# Patient Record
Sex: MALE | Race: WHITE | Employment: OTHER | URBAN - METROPOLITAN AREA
[De-identification: names, ages, dates, MRNs, and addresses within clinical notes are randomized per-mention and may not be internally consistent; named-entity substitution may affect disease eponyms.]

---

## 2020-06-11 ENCOUNTER — HOSPITAL ENCOUNTER (OUTPATIENT)
Dept: LAB | Age: 65
Discharge: HOME | End: 2020-06-11

## 2020-06-11 ENCOUNTER — OFFICE VISIT (OUTPATIENT)
Dept: ONCOLOGY | Age: 65
End: 2020-06-11

## 2020-06-11 VITALS
HEART RATE: 82 BPM | HEIGHT: 71 IN | SYSTOLIC BLOOD PRESSURE: 155 MMHG | OXYGEN SATURATION: 97 % | DIASTOLIC BLOOD PRESSURE: 92 MMHG | WEIGHT: 183.2 LBS | TEMPERATURE: 99.2 F | BODY MASS INDEX: 25.65 KG/M2

## 2020-06-11 DIAGNOSIS — C7A.8 LARGE CELL NEUROENDOCRINE CARCINOMA (HCC): ICD-10-CM

## 2020-06-11 DIAGNOSIS — R03.0 ELEVATED BP WITHOUT DIAGNOSIS OF HYPERTENSION: ICD-10-CM

## 2020-06-11 DIAGNOSIS — Z00.00 HEALTHCARE MAINTENANCE: ICD-10-CM

## 2020-06-11 DIAGNOSIS — C7A.8 LARGE CELL NEUROENDOCRINE CARCINOMA (HCC): Primary | ICD-10-CM

## 2020-06-11 DIAGNOSIS — F90.8 ATTENTION DEFICIT HYPERACTIVITY DISORDER (ADHD), OTHER TYPE: ICD-10-CM

## 2020-06-11 LAB
ALBUMIN SERPL-MCNC: 3.6 G/DL (ref 3.5–5)
ALBUMIN/GLOB SERPL: 1 {RATIO} (ref 1.1–2.2)
ALP SERPL-CCNC: 130 U/L (ref 45–117)
ALT SERPL-CCNC: 22 U/L (ref 12–78)
ANION GAP SERPL CALC-SCNC: 10 MMOL/L (ref 5–15)
AST SERPL-CCNC: 26 U/L (ref 15–37)
BASOPHILS # BLD: 0.1 K/UL (ref 0–0.1)
BASOPHILS NFR BLD: 1 % (ref 0–1)
BILIRUB SERPL-MCNC: 0.6 MG/DL (ref 0.2–1)
BUN SERPL-MCNC: 22 MG/DL (ref 6–20)
BUN/CREAT SERPL: 21 (ref 12–20)
CALCIUM SERPL-MCNC: 8.7 MG/DL (ref 8.5–10.1)
CEA SERPL-MCNC: 1.2 NG/ML
CHLORIDE SERPL-SCNC: 104 MMOL/L (ref 97–108)
CO2 SERPL-SCNC: 24 MMOL/L (ref 21–32)
CREAT SERPL-MCNC: 1.05 MG/DL (ref 0.7–1.3)
DIFFERENTIAL METHOD BLD: ABNORMAL
EOSINOPHIL # BLD: 0.3 K/UL (ref 0–0.4)
EOSINOPHIL NFR BLD: 2 % (ref 0–7)
ERYTHROCYTE [DISTWIDTH] IN BLOOD BY AUTOMATED COUNT: 13.7 % (ref 11.5–14.5)
GLOBULIN SER CALC-MCNC: 3.6 G/DL (ref 2–4)
GLUCOSE SERPL-MCNC: 129 MG/DL (ref 65–100)
HCT VFR BLD AUTO: 41.8 % (ref 36.6–50.3)
HGB BLD-MCNC: 13.3 G/DL (ref 12.1–17)
IMM GRANULOCYTES # BLD AUTO: 0.1 K/UL (ref 0–0.04)
IMM GRANULOCYTES NFR BLD AUTO: 1 % (ref 0–0.5)
LYMPHOCYTES # BLD: 1.7 K/UL (ref 0.8–3.5)
LYMPHOCYTES NFR BLD: 14 % (ref 12–49)
MCH RBC QN AUTO: 28.9 PG (ref 26–34)
MCHC RBC AUTO-ENTMCNC: 31.8 G/DL (ref 30–36.5)
MCV RBC AUTO: 90.9 FL (ref 80–99)
MONOCYTES # BLD: 1.1 K/UL (ref 0–1)
MONOCYTES NFR BLD: 9 % (ref 5–13)
NEUTS SEG # BLD: 9 K/UL (ref 1.8–8)
NEUTS SEG NFR BLD: 73 % (ref 32–75)
NRBC # BLD: 0 K/UL (ref 0–0.01)
NRBC BLD-RTO: 0 PER 100 WBC
PLATELET # BLD AUTO: 301 K/UL (ref 150–400)
PMV BLD AUTO: 11.8 FL (ref 8.9–12.9)
POTASSIUM SERPL-SCNC: 3.8 MMOL/L (ref 3.5–5.1)
PROT SERPL-MCNC: 7.2 G/DL (ref 6.4–8.2)
RBC # BLD AUTO: 4.6 M/UL (ref 4.1–5.7)
SODIUM SERPL-SCNC: 138 MMOL/L (ref 136–145)
WBC # BLD AUTO: 12.3 K/UL (ref 4.1–11.1)

## 2020-06-11 RX ORDER — DEXTROAMPHETAMINE SACCHARATE, AMPHETAMINE ASPARTATE, DEXTROAMPHETAMINE SULFATE AND AMPHETAMINE SULFATE 2.5; 2.5; 2.5; 2.5 MG/1; MG/1; MG/1; MG/1
TABLET ORAL
COMMUNITY
Start: 2019-02-10

## 2020-06-11 RX ORDER — ACYCLOVIR 400 MG/1
400 TABLET ORAL 2 TIMES DAILY
COMMUNITY

## 2020-06-11 NOTE — PROGRESS NOTES
Orly Rai is a 71 y.o. male here for evaluation of carcinoma. Patient with no complaints of pain at this time.

## 2020-06-11 NOTE — LETTER
6/11/20 Patient: Randy Rivera YOB: 1955 Date of Visit: 6/11/2020 Mary Ann Villa MD 
[phone] VIA Facsimile: [phone] Dear Mary Ann Villa MD, Thank you for referring Mr. Hyacinth Gaviria to [facility] for evaluation. My notes for this consultation are attached. If you have questions, please do not hesitate to call me. I look forward to following your patient along with you. Sincerely, Sanford Pardo MD

## 2020-06-12 LAB — CANCER AG19-9 SERPL-ACNC: 6 U/ML (ref 0–35)

## 2020-06-13 LAB
PSA SERPL-MCNC: 38.6 NG/ML (ref 0–4)
REFLEX CRITERIA: ABNORMAL

## 2020-06-15 ENCOUNTER — TELEPHONE (OUTPATIENT)
Dept: ONCOLOGY | Age: 65
End: 2020-06-15

## 2020-06-15 NOTE — TELEPHONE ENCOUNTER
[facility] team member Samira Mediate about patients  PET scan  Needs a peer to peer review    The number to call is   [phone]  Ref Number [id number]    Doesn't have to be the doctor   The NP can do it     It is scheduled for Thursday

## 2020-06-16 ENCOUNTER — TELEPHONE (OUTPATIENT)
Dept: ONCOLOGY | Age: 65
End: 2020-06-16

## 2020-06-17 LAB — CGA SERPL-SCNC: 42 NG/ML (ref 0–101.8)

## 2020-06-17 NOTE — PROGRESS NOTES
[address] [facility] at [facility]  [phone]    Hematology / Oncology Established Visit    Reason for Visit:   Axel Fried is a 71 y.o. male who is seen for follow up of neuroendocrine carcinoma. Initially referred by Dr. Don Aguilar. Hematology Oncology Treatment History:     Diagnosis: Large cell neuroendocrine carcinoma. Stage: IV    Pathology:   5/29/20 excisional R external iliac LN biopsy: Poorly differentiated carcinoma with features of large cell neuroendocrine carcinoma with loss of chromogranin and synaptophysin expression. Flow cytometry negative for lymphoproliferative disorder. Prior Treatment: None    Current Treatment: Carbo-Etoposide-Atezolizumab to start 6/29/20  Treatment duration: Until disease progression or toxicity  Frequency of visits: Every 3 weeks     History of Present Illness:   Axel Fried is a 71 y.o. male who comes in for evaluation of poorly differentiated neuroendocrine carcinoma. Pt noticed a nontender bulge in his LLQ in 5/2020. He thought this was a hernia and was evaluated by Dr. David Lagos. CT on 5/20/20 was notable for extensive lymphadenopathy in abd/pelvis. Pt underwent robot assisted excisional Right external iliac LN biopsy 5/29/20, which showed poorly differentiated large cell neuroendocrine carcinoma. He reports intentional weight loss with dietary changes and exercise, losing 40-lbs in past 8 months. No n/v/d, melena/hematochezia, cough, SOB. No fevers, chills, sweats. [employer] nonsmoker. Mother had breast cancer diagnosed age < 48. Twin brother had melanoma diagnosed aged late 46s. He does not think he has ever had a colonoscopy or PSA screening. Interval History:  Pt comes in to review results of PET scan and labwork. No past medical history on file.    Past Surgical History:   Procedure Laterality Date    HX APPENDECTOMY  1964    HX OTHER SURGICAL      subcutaneous cyst of forehead    HX VASECTOMY Social History     Tobacco Use    Smoking status: Never Smoker    Smokeless tobacco: Never Used   Substance Use Topics    Alcohol use: Not Currently   Unmarried. Has 2 children, 27 and 32. 1 lives in [place]. Family History   Problem Relation Age of Onset    Cancer Mother         breast    Cancer Father         prostate    Cancer Brother         melanoma    COPD Brother      Current Outpatient Medications   Medication Sig    dextroamphetamine-amphetamine (ADDERALL) 10 mg tablet TAKE 1 TABLET TWICE A DAY FOR 30 DAYS    acyclovir (ZOVIRAX) 400 mg tablet Take 400 mg by mouth two (2) times a day. No current facility-administered medications for this visit. No Known Allergies     Review of Systems: A complete review of systems was obtained, negative except as described above. Physical Exam:     Visit Vitals  /81 (BP 1 Location: Left arm, BP Patient Position: Sitting)   Pulse 64   Temp 98.5 °F (36.9 °C) (Oral)   Ht 5' 10\" (1.778 m)   Wt 185 lb (83.9 kg)   SpO2 98%   BMI 26.54 kg/m²     ECOG PS: 0  General: Well developed, no acute distress  Eyes: PERRLA, EOMI, anicteric sclerae  HENT: Atraumatic, OP clear, TMs intact without erythema  Neck: Supple  Lymphatic: No cervical, supraclavicular, axillary or inguinal adenopathy  Respiratory: CTAB, normal respiratory effort  CV: Normal rate, regular rhythm, no murmurs, no peripheral edema  GI: Soft, nontender, nondistended, no hepatomegaly, no splenomegaly. L lower abdomen with soft mobile mass, approx 5cm  MS: Normal gait and station. Digits without clubbing or cyanosis. Skin: No rashes, ecchymoses, or petechiae. Normal temperature, turgor, and texture. Neuro/Psych: Alert, oriented. 5/5 strength in all 4 extremities. Appropriate affect, normal judgment/insight.     Results:     Lab Results   Component Value Date/Time    WBC 12.3 (H) 06/11/2020 04:39 PM    HGB 13.3 06/11/2020 04:39 PM    HCT 41.8 06/11/2020 04:39 PM    PLATELET 963 01/15/0964 04:39 PM    MCV 90.9 2020 04:39 PM    ABS. NEUTROPHILS 9.0 (H) 2020 04:39 PM     Lab Results   Component Value Date/Time    Sodium 138 2020 04:39 PM    Potassium 3.8 2020 04:39 PM    Chloride 104 2020 04:39 PM    CO2 24 2020 04:39 PM    Glucose 129 (H) 2020 04:39 PM    BUN 22 (H) 2020 04:39 PM    Creatinine 1.05 2020 04:39 PM    GFR est AA >60 2020 04:39 PM    GFR est non-AA >60 2020 04:39 PM    Calcium 8.7 2020 04:39 PM     Lab Results   Component Value Date/Time    Bilirubin, total 0.6 2020 04:39 PM    ALT (SGPT) 22 2020 04:39 PM    Alk. phosphatase 130 (H) 2020 04:39 PM    Protein, total 7.2 2020 04:39 PM    Albumin 3.6 2020 04:39 PM    Globulin 3.6 2020 04:39 PM     No results found for: IRON, FE, TIBC, IBCT, PSAT, FERR    No results found for: B12LT, FOL, RBCF  No results found for: TSH, TSH2, TSH3, TSHP, TSHEXT, TSHEXT  No results found for: HAMAT, HAAB, HABT, HAAT, HBSAG, HBSB, HBSAT, HBABN, HBCM, HBCAB, HBCAT, [place], [address] [facility], [address] [facility], [facility], [address] [facility], [address] [facility], [address] [facility], [facility], [id number], [id number], [address] [place] [address], [id number], HBCMLT, [id number], HCGAT     20: Chromogranin 42      Imagin/20/20 CT abd/pelvis with IV contrast:  Impression:  1. No findings to suggest gross abdominal wall hernia or flank hernia. 2. Extensive adenopathy throughout the abdomen and pelvis as described. Findings are concerning for possible metastatic disease or lymphoma. Recommend follow up or comparison with prior studies. 3. Other findings as described. 20 PET:  FINDINGS:  HEAD/NECK: No apparent foci of abnormal hypermetabolism. Cerebral evaluation is  limited by normal intense activity. CHEST: Hypermetabolic lymphadenopathy at the base of the left neck and in the  left supraclavicular region is noted.  Hypermetabolic superior mediastinal,  prevascular, right paratracheal, subcarinal, and bilateral hilar lymph  lymphadenopathy, maximum SUV of the subcarinal lymph node is 5.7. Small but  hypermetabolic soft tissue nodule left anterior chest wall. ABDOMEN/PELVIS: Hypermetabolic retrocrural, left para-aortic, aortocaval,  bilateral common iliac, bilateral external iliac, and bilateral inguinal  lymphadenopathy, maximum SUV 5.8 of an aortocaval lymph node. Hypermetabolic  mesenteric lymph node left lower quadrant, maximum SUV 12.3. SKELETON: No foci of abnormal hypermetabolism in the axial and visualized  appendicular skeleton. IMPRESSION: Hypermetabolic lymphadenopathy involving the left neck and left  supraclavicular region, mediastinum, bilateral hilum, retroperitoneum,  mesentery, and pelvis as described above. Hypermetabolic soft tissue nodule left  chest.    Assessment & Plan:   Omar Lafleur is a 71 y.o. 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 1. Large cell neuroendocrine carcinoma of lung: I spoke with pathologist who states this appears to be a high grade aggressive and poorly differentiated cancer. Likely neuroendocrine based on perinuclear staining pattern and some TTF-1 positivity. Primary lesion is likely lung based on PET scan. The pathology is somewhat incongruent with patient who remains largely asymptomatic. Large cell neuroendocrine carcinoma of lung is rare and is thought to behave like small cell lung cancer and non-small cell lung cancer. Genomic profiling can help identify which type is more similar. Current recommendations are to treat similar to small cell carcinoma with Carboplatin-Etoposide. The addition of immunotherapy such as Ann Ego is also recommended by experts based on extrapolation of data in small cell lung cancer. Chemotherapy teaching and consent completed. Supportive medications: EMLA cream, zofran, compazine.    -- Load outside CT in [address] [place] [address] One testing on tumor requested  -- COVID19 testing today/tomorrow  -- Brain MRI this week  -- Port by Dr. Linda Dillon this week  -- Return in 1 week for cycle 1 of Carbo-Etoposide-Atezolizumab    2. Elevated PSA: Refer to Urology for evaluation and discussion. PSA 38.6 and given peculiar (asymptomatic) presentation of the large cell neuroendocrine carcinoma, would like evaluation of elevated PSA. -- Refer to Urology     3. ADHD: On Adderal    4. 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 5. Elevated BP: No diagnosis of  HTN. 6. Health maintenance: No prior colonoscopy or PSA per patient. Emotional well being: Pt is coping well with his/her disease and has excellent support. I appreciate the opportunity to participate in Mr. Isbell Lower care. Significant other: Arnie Murrieta - [phone].      Signed By: Boyd Maxwell MD     June 22, 2020

## 2020-06-18 ENCOUNTER — HOSPITAL ENCOUNTER (OUTPATIENT)
Dept: PET IMAGING | Age: 65
Discharge: HOME | End: 2020-06-18
Attending: INTERNAL MEDICINE
Payer: COMMERCIAL

## 2020-06-18 VITALS — WEIGHT: 182 LBS | BODY MASS INDEX: 26.05 KG/M2 | HEIGHT: 70 IN

## 2020-06-18 DIAGNOSIS — C7A.8 LARGE CELL NEUROENDOCRINE CARCINOMA (HCC): ICD-10-CM

## 2020-06-18 PROCEDURE — A9552 F18 FDG: HCPCS

## 2020-06-18 RX ORDER — SODIUM CHLORIDE 0.9 % (FLUSH) 0.9 %
10 SYRINGE (ML) INJECTION
Status: COMPLETED | OUTPATIENT
Start: 2020-06-18 | End: 2020-06-18

## 2020-06-18 RX ADMIN — Medication 10 ML: at 08:00

## 2020-06-22 ENCOUNTER — OFFICE VISIT (OUTPATIENT)
Dept: ONCOLOGY | Age: 65
End: 2020-06-22

## 2020-06-22 ENCOUNTER — TELEPHONE (OUTPATIENT)
Dept: ONCOLOGY | Age: 65
End: 2020-06-22

## 2020-06-22 ENCOUNTER — DOCUMENTATION ONLY (OUTPATIENT)
Dept: ONCOLOGY | Age: 65
End: 2020-06-22

## 2020-06-22 VITALS
BODY MASS INDEX: 26.48 KG/M2 | WEIGHT: 185 LBS | TEMPERATURE: 98.5 F | HEIGHT: 70 IN | SYSTOLIC BLOOD PRESSURE: 164 MMHG | DIASTOLIC BLOOD PRESSURE: 81 MMHG | HEART RATE: 64 BPM | OXYGEN SATURATION: 98 %

## 2020-06-22 DIAGNOSIS — C7A.8 LARGE CELL NEUROENDOCRINE CARCINOMA (HCC): Primary | ICD-10-CM

## 2020-06-22 DIAGNOSIS — R11.2 CHEMOTHERAPY INDUCED NAUSEA AND VOMITING: ICD-10-CM

## 2020-06-22 DIAGNOSIS — C34.00 MALIGNANT NEOPLASM OF HILUS OF LUNG, UNSPECIFIED LATERALITY (HCC): ICD-10-CM

## 2020-06-22 DIAGNOSIS — T45.1X5A CHEMOTHERAPY INDUCED NAUSEA AND VOMITING: ICD-10-CM

## 2020-06-22 DIAGNOSIS — R97.20 ELEVATED PSA: ICD-10-CM

## 2020-06-22 RX ORDER — DEXAMETHASONE SODIUM PHOSPHATE 4 MG/ML
8 INJECTION, SOLUTION INTRA-ARTICULAR; INTRALESIONAL; INTRAMUSCULAR; INTRAVENOUS; SOFT TISSUE ONCE
Status: CANCELLED | OUTPATIENT
Start: 2020-06-30

## 2020-06-22 RX ORDER — SODIUM CHLORIDE 0.9 % (FLUSH) 0.9 %
10 SYRINGE (ML) INJECTION AS NEEDED
Status: CANCELLED
Start: 2020-07-01

## 2020-06-22 RX ORDER — DIPHENHYDRAMINE HYDROCHLORIDE 50 MG/ML
25 INJECTION, SOLUTION INTRAMUSCULAR; INTRAVENOUS AS NEEDED
Status: CANCELLED
Start: 2020-07-01

## 2020-06-22 RX ORDER — ACETAMINOPHEN 325 MG/1
650 TABLET ORAL AS NEEDED
Status: CANCELLED
Start: 2020-06-29

## 2020-06-22 RX ORDER — ACETAMINOPHEN 325 MG/1
650 TABLET ORAL AS NEEDED
Status: CANCELLED
Start: 2020-06-30

## 2020-06-22 RX ORDER — ONDANSETRON HYDROCHLORIDE 8 MG/1
8 TABLET, FILM COATED ORAL
Qty: 30 TAB | Refills: 2 | Status: SHIPPED | OUTPATIENT
Start: 2020-06-22 | End: 2020-06-29 | Stop reason: SDUPTHER

## 2020-06-22 RX ORDER — SODIUM CHLORIDE 0.9 % (FLUSH) 0.9 %
10 SYRINGE (ML) INJECTION AS NEEDED
Status: CANCELLED
Start: 2020-06-30

## 2020-06-22 RX ORDER — ONDANSETRON 2 MG/ML
8 INJECTION INTRAMUSCULAR; INTRAVENOUS AS NEEDED
Status: CANCELLED | OUTPATIENT
Start: 2020-07-01

## 2020-06-22 RX ORDER — PROCHLORPERAZINE MALEATE 10 MG
5 TABLET ORAL
Qty: 30 TAB | Refills: 2 | Status: SHIPPED | OUTPATIENT
Start: 2020-06-22

## 2020-06-22 RX ORDER — ONDANSETRON 2 MG/ML
8 INJECTION INTRAMUSCULAR; INTRAVENOUS AS NEEDED
Status: CANCELLED | OUTPATIENT
Start: 2020-06-29

## 2020-06-22 RX ORDER — SODIUM CHLORIDE 9 MG/ML
25 INJECTION, SOLUTION INTRAVENOUS CONTINUOUS
Status: CANCELLED | OUTPATIENT
Start: 2020-06-30

## 2020-06-22 RX ORDER — SODIUM CHLORIDE 0.9 % (FLUSH) 0.9 %
10 SYRINGE (ML) INJECTION AS NEEDED
Status: CANCELLED
Start: 2020-06-29

## 2020-06-22 RX ORDER — LORAZEPAM 2 MG/ML
0.5 INJECTION INTRAMUSCULAR
Status: CANCELLED | OUTPATIENT
Start: 2020-07-01

## 2020-06-22 RX ORDER — ONDANSETRON 2 MG/ML
8 INJECTION INTRAMUSCULAR; INTRAVENOUS AS NEEDED
Status: CANCELLED | OUTPATIENT
Start: 2020-06-30

## 2020-06-22 RX ORDER — DIPHENHYDRAMINE HYDROCHLORIDE 50 MG/ML
25 INJECTION, SOLUTION INTRAMUSCULAR; INTRAVENOUS AS NEEDED
Status: CANCELLED
Start: 2020-06-30

## 2020-06-22 RX ORDER — SODIUM CHLORIDE 9 MG/ML
10 INJECTION INTRAMUSCULAR; INTRAVENOUS; SUBCUTANEOUS AS NEEDED
Status: CANCELLED | OUTPATIENT
Start: 2020-06-29

## 2020-06-22 RX ORDER — LORAZEPAM 2 MG/ML
0.5 INJECTION INTRAMUSCULAR
Status: CANCELLED | OUTPATIENT
Start: 2020-06-30

## 2020-06-22 RX ORDER — ALBUTEROL SULFATE 0.83 MG/ML
2.5 SOLUTION RESPIRATORY (INHALATION) AS NEEDED
Status: CANCELLED
Start: 2020-07-01

## 2020-06-22 RX ORDER — ALBUTEROL SULFATE 0.83 MG/ML
2.5 SOLUTION RESPIRATORY (INHALATION) AS NEEDED
Status: CANCELLED
Start: 2020-06-30

## 2020-06-22 RX ORDER — DEXAMETHASONE SODIUM PHOSPHATE 4 MG/ML
8 INJECTION, SOLUTION INTRA-ARTICULAR; INTRALESIONAL; INTRAMUSCULAR; INTRAVENOUS; SOFT TISSUE ONCE
Status: CANCELLED | OUTPATIENT
Start: 2020-07-01

## 2020-06-22 RX ORDER — DIPHENHYDRAMINE HYDROCHLORIDE 50 MG/ML
25 INJECTION, SOLUTION INTRAMUSCULAR; INTRAVENOUS AS NEEDED
Status: CANCELLED
Start: 2020-06-29

## 2020-06-22 RX ORDER — DIPHENHYDRAMINE HYDROCHLORIDE 50 MG/ML
50 INJECTION, SOLUTION INTRAMUSCULAR; INTRAVENOUS AS NEEDED
Status: CANCELLED
Start: 2020-06-30

## 2020-06-22 RX ORDER — LIDOCAINE AND PRILOCAINE 25; 25 MG/G; MG/G
CREAM TOPICAL
Qty: 30 G | Refills: 1 | Status: SHIPPED | OUTPATIENT
Start: 2020-06-22

## 2020-06-22 RX ORDER — EPINEPHRINE 1 MG/ML
0.3 INJECTION, SOLUTION, CONCENTRATE INTRAVENOUS AS NEEDED
Status: CANCELLED | OUTPATIENT
Start: 2020-07-01

## 2020-06-22 RX ORDER — ACETAMINOPHEN 325 MG/1
650 TABLET ORAL AS NEEDED
Status: CANCELLED
Start: 2020-07-01

## 2020-06-22 RX ORDER — DIPHENHYDRAMINE HYDROCHLORIDE 50 MG/ML
50 INJECTION, SOLUTION INTRAMUSCULAR; INTRAVENOUS AS NEEDED
Status: CANCELLED
Start: 2020-06-29

## 2020-06-22 RX ORDER — DIPHENHYDRAMINE HYDROCHLORIDE 50 MG/ML
50 INJECTION, SOLUTION INTRAMUSCULAR; INTRAVENOUS AS NEEDED
Status: CANCELLED
Start: 2020-07-01

## 2020-06-22 RX ORDER — HEPARIN 100 UNIT/ML
300-500 SYRINGE INTRAVENOUS AS NEEDED
Status: CANCELLED
Start: 2020-06-29

## 2020-06-22 RX ORDER — SODIUM CHLORIDE 9 MG/ML
25 INJECTION, SOLUTION INTRAVENOUS CONTINUOUS
Status: CANCELLED | OUTPATIENT
Start: 2020-06-29

## 2020-06-22 RX ORDER — HYDROCORTISONE SODIUM SUCCINATE 100 MG/2ML
100 INJECTION, POWDER, FOR SOLUTION INTRAMUSCULAR; INTRAVENOUS AS NEEDED
Status: CANCELLED | OUTPATIENT
Start: 2020-06-30

## 2020-06-22 RX ORDER — HEPARIN 100 UNIT/ML
300-500 SYRINGE INTRAVENOUS AS NEEDED
Status: CANCELLED
Start: 2020-06-30

## 2020-06-22 RX ORDER — HEPARIN 100 UNIT/ML
300-500 SYRINGE INTRAVENOUS AS NEEDED
Status: CANCELLED
Start: 2020-07-01

## 2020-06-22 RX ORDER — ALBUTEROL SULFATE 0.83 MG/ML
2.5 SOLUTION RESPIRATORY (INHALATION) AS NEEDED
Status: CANCELLED
Start: 2020-06-29

## 2020-06-22 RX ORDER — EPINEPHRINE 1 MG/ML
0.3 INJECTION, SOLUTION, CONCENTRATE INTRAVENOUS AS NEEDED
Status: CANCELLED | OUTPATIENT
Start: 2020-06-30

## 2020-06-22 RX ORDER — SODIUM CHLORIDE 9 MG/ML
25 INJECTION, SOLUTION INTRAVENOUS CONTINUOUS
Status: CANCELLED | OUTPATIENT
Start: 2020-07-01

## 2020-06-22 RX ORDER — EPINEPHRINE 1 MG/ML
0.3 INJECTION, SOLUTION, CONCENTRATE INTRAVENOUS AS NEEDED
Status: CANCELLED | OUTPATIENT
Start: 2020-06-29

## 2020-06-22 RX ORDER — HYDROCORTISONE SODIUM SUCCINATE 100 MG/2ML
100 INJECTION, POWDER, FOR SOLUTION INTRAMUSCULAR; INTRAVENOUS AS NEEDED
Status: CANCELLED | OUTPATIENT
Start: 2020-07-01

## 2020-06-22 RX ORDER — SODIUM CHLORIDE 9 MG/ML
10 INJECTION INTRAMUSCULAR; INTRAVENOUS; SUBCUTANEOUS AS NEEDED
Status: CANCELLED | OUTPATIENT
Start: 2020-06-30

## 2020-06-22 RX ORDER — SODIUM CHLORIDE 9 MG/ML
10 INJECTION INTRAMUSCULAR; INTRAVENOUS; SUBCUTANEOUS AS NEEDED
Status: CANCELLED | OUTPATIENT
Start: 2020-07-01

## 2020-06-22 RX ORDER — ONDANSETRON 2 MG/ML
8 INJECTION INTRAMUSCULAR; INTRAVENOUS ONCE
Status: CANCELLED | OUTPATIENT
Start: 2020-06-29

## 2020-06-22 RX ORDER — HYDROCORTISONE SODIUM SUCCINATE 100 MG/2ML
100 INJECTION, POWDER, FOR SOLUTION INTRAMUSCULAR; INTRAVENOUS AS NEEDED
Status: CANCELLED | OUTPATIENT
Start: 2020-06-29

## 2020-06-22 NOTE — PROGRESS NOTES
Brett Schofield is a 71 y.o. male here for follow up of large cell neuroendocrine carcinoma. Patient with no complaints of pain at this time.

## 2020-06-22 NOTE — PATIENT INSTRUCTIONS
1. Zofran (ondansetron) 8 mg every 8 hours   2. Compazine (Prochlorperazine) 10 mg every 6 hours as needed for nausea    3. EMLA lidocaine cream- apply a quarter size to your port area before you go to the infusion center, this will numb the port spot so it won't hurt when port is accessed    We are prescribing two anti-nausea medications, zofran (ondansetron) and compazine (prochlorperazine). We are prescribing two anti-nausea medications, zofran (ondansetron) and compazine (prochlorperazine). Nausea prevention: For the first 2 days after chemotherapy, take zofran before breakfast and lunch. Take compazine before dinner. This is on a scheduled basis, which means you take these medications whether you feel nauseated or not. Your Care Team:    Dr. Rosa Chang- oncologist  Mickey Rodney, Nurse Practitioner. Zechariah Drummond, Registered Nurse. Contact:   : [phone]  Blend (do not leave voicemail): [phone]  Please set up IM5 if you do not already have it when you check out, we are very quick to respond.

## 2020-06-22 NOTE — ONCOLOGY PATHWAY NOTE
START OFF PATHWAY REGIMEN - Other        [id number]:Atezolizumab D1 + Carboplatin D1 + Etoposide  IV D1-3 q21 Days x 4 Cycles Followed by Denzel Roberto D1 q21 Days:      Atezolizumab (Tecentriq)       Carboplatin (Paraplatin)       Etoposide (Toposar)       Atezolizumab (Tecentriq)     **Always confirm dose/schedule in your pharmacy ordering system**    Patient Characteristics:  Intent of Therapy:  Non-Curative / Palliative Intent, Discussed with Patient

## 2020-06-23 ENCOUNTER — OFFICE VISIT (OUTPATIENT)
Dept: PRIMARY CARE CLINIC | Age: 65
End: 2020-06-23

## 2020-06-23 VITALS — OXYGEN SATURATION: 96 % | TEMPERATURE: 98.8 F | HEART RATE: 75 BPM

## 2020-06-23 DIAGNOSIS — Z11.59 SPECIAL SCREENING EXAMINATION FOR UNSPECIFIED VIRAL DISEASE: Primary | ICD-10-CM

## 2020-06-23 NOTE — PROGRESS NOTES
This note will not be viewable in [address] Oncology Navigator  Psychosocial Assessment    Reason for Assessment:    []Depression  []Anxiety  []Caregiver [place]  []Maladaptive Coping with Serious Illness   [x] Social Work Referral [x] Initial Assessment  [] Other     Sources of Information:    [x]Patient  [x]Family  [x]Staff  [x]Medical Record    Advance Care Planning:  No flowsheet data found.     Mental Status:    [x]Alert  []Lethargic  []Unresponsive   [] Unable to assess   Oriented to:  [x]Person  [x]Place  [x]Time  [x]Situation      Barriers to Learning:    []Language  []Developmental  []Cognitive  []Altered Mental Status  []Visual/Hearing Impairment  []Unable to Read/Write  []Motivational   [x]No Barriers Identified  []Other:    Relationship Status:  []Single  []  [x]Significant Other/Life Partner  []  []  []      Living Circumstances:  []Lives Alone  [x]Family/Significant Other in Household  []Roommates  []Children in the Home  []Paid Caregivers  []Assisted Living Facility/Group Home  []Skilled [address]  []Homeless  []Incarcerated  []Environmental/Care Concerns  []other:    Employment Status:  []Employed Full-time []Employed Part-time []Homemaker [] Disabled  [x] Retired []Other:    Support System:    [x]Strong  []Fair  []Limited    Financial/Legal Concerns:    []Uninsured  []Limited Income/Resources  []Non-Citizen  [x]No Concerns Identified  []Financial POA:    []Other:    Mandaeism/Spiritual/Existential:  []Strong Sense of Spirituality  []Involved in [facility]  []Request  Visit  []Expressing Maureen Dominguezrsusie  [x]No Concerns Identified    Coping with Illness:         Patient: Family/Caregiver:   Understanding and Acceptance of Illness/Prognosis  [x] [x]   Strong Sense of Resilience [] []   Self Reflection [] []   Engaged Support System [x] []   Does not Readily Discuss Illness [] []   Denial of Terminal Status [] []   Anger [] []   Depression [] []   Anxiety/Fear [x] []   Bargaining [] []   Recent Diagnosis/Prognosis [x] []   Difficulties with Body Image [] []   Loss of Identity [] []   Excessive Substance Use [] []   Mental Health History [] []   Enmeshed Relationships [] []   History of Loss [] []   Anticipatory Grief [] []   Concern for Complicated Grief [] []   Suicidal Ideation or Plan [] []   Unable to assess [] []            Narrative: Patient here for consultation with Dr. Hola Tineo for the management of neuroendocrine carcinoma and chemotherapy teaching. Met with patient and his SO to introduce social work role and supports. 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 He has a son and two grandchildren who live close to him. He also tells me he has several friend ad family members who are supportive of his emotional and practical needs. Offered supportive listening as patient ventilates feelings. Patient is tearful as he shared with the shock of his diagnosis and prognosis given his lack of symptoms. Discussed ways to cope and patient is hopeful to continue to engaged in his hobbies such as hiking and bike riding. Reassured patient that we are here to support him during this process. Encouraged him to contact me as needed for ongoing psychosocial support. Assessment/Action:   1. Patient is actively coping with diagnosis and treatment and well supported by family and friends with practical and emotional needs. 2. Ongoing psychosocial support as patient continue to process and coping with diagnosis.     Plan/Referral:    Thank you,  Vivi Mac LCSW

## 2020-06-23 NOTE — PROGRESS NOTES
[facility] Please see scanned documentation as well for further information. S:  Mr. Radha Chappell presents for pre-op or pre-procedure testing for Covid 19. Patient has chemo schedule to start nest week under the direction of  [facility]. Patient denies current Covid type symptoms. O:    Visit Vitals  Pulse 75   Temp 98.8 °F (37.1 °C) (Oral)   SpO2 96%     Alert and oriented  No acute distress, no increased work of breathing  Normocephalic, atraumatic  Skin color normal  Calm and cooperative  Voice clear, conversant without shortness of breath    A/P:  Pre-op, Pre-procedure exam    Covid 19 testing performed  Patient understands he will be contacted if the results are positive. Follow up prn.

## 2020-06-24 NOTE — PROGRESS NOTES
[address]  Medical Oncology at [address] [place] [address]  [phone]    Hematology / Oncology Established Visit    Reason for Visit:   Anthony Amaro is a 76 y.o. male who is seen for follow up of neuroendocrine carcinoma. Initially referred by Dr. Hetty Saint. Hematology Oncology Treatment History:     Diagnosis: Large cell neuroendocrine carcinoma. Stage: IV    Pathology:   5/29/20 excisional R external iliac LN biopsy: Poorly differentiated carcinoma with features of large cell neuroendocrine carcinoma with loss of chromogranin and synaptophysin expression. Flow cytometry negative for lymphoproliferative disorder. Prior Treatment: None    Current Treatment: Carbo-Etoposide-Atezolizumab to start 6/29/20  Treatment duration: Until disease progression or toxicity  Frequency of visits: Every 3 weeks     History of Present Illness:   Anthony Amaro is a 76 y.o. male who comes in for evaluation of poorly differentiated neuroendocrine carcinoma. Pt noticed a nontender bulge in his LLQ in 5/2020. He thought this was a hernia and was evaluated by Dr. Celine Luu. CT on 5/20/20 was notable for extensive lymphadenopathy in abd/pelvis. Pt underwent robot assisted excisional Right external iliac LN biopsy 5/29/20, which showed poorly differentiated large cell neuroendocrine carcinoma. He reports intentional weight loss with dietary changes and exercise, losing 40-lbs in past 8 months. No n/v/d, melena/hematochezia, cough, SOB. No fevers, chills, sweats. [employer] nonsmoker. Mother had breast cancer diagnosed age < 48. Twin brother had melanoma diagnosed aged late 46s. He does not think he has ever had a colonoscopy or PSA screening. Interval History:  Patient her for follow up of neuroendocrine carcinoma and cycle 1 of carbo-etoposide-atezolizumab. We reviewed how to take the premedications and what to expect after treatment. He feels well today. Denies n/v/fatigue/sob/cp. Reports normal BM.  He plans to see Urology on 7/31/20 for his elevated PSA. No past medical history on file. Past Surgical History:   Procedure Laterality Date    HX APPENDECTOMY  1964    HX OTHER SURGICAL      subcutaneous cyst of forehead    HX VASECTOMY        Social History     Tobacco Use    Smoking status: Never Smoker    Smokeless tobacco: Never Used   Substance Use Topics    Alcohol use: Not Currently   Unmarried. Has 2 children, 27 and 32. 1 lives in [place]. Family History   Problem Relation Age of Onset   [employer] Cancer Mother         breast    Cancer Father         prostate    Cancer Brother         melanoma    COPD Brother      Current Outpatient Medications   Medication Sig    lidocaine-prilocaine (EMLA) topical cream Apply a dime size amount to port site 30-60 minutes before access on treatment days to numb port site.  ondansetron hcl (ZOFRAN) 8 mg tablet Take 1 Tab by mouth every eight (8) hours as needed for Nausea or Vomiting.  prochlorperazine (Compazine) 10 mg tablet Take 0.5 Tabs by mouth every six (6) hours as needed for Nausea or Vomiting.  dextroamphetamine-amphetamine (ADDERALL) 10 mg tablet TAKE 1 TABLET TWICE A DAY FOR 30 DAYS    acyclovir (ZOVIRAX) 400 mg tablet Take 400 mg by mouth two (2) times a day. No current facility-administered medications for this visit. No Known Allergies     Review of Systems: A complete review of systems was obtained, negative except as described above.     Physical Exam:     Visit Vitals  /88   Pulse 89   Temp 98.9 °F (37.2 °C) (Temporal)   Resp 18   Ht 5' 10\" (1.778 m)   Wt 180 lb (81.6 kg)   SpO2 97%   BMI 25.83 kg/m²     ECOG PS: 0  General: Well developed, no acute distress  Eyes: PERRLA, EOMI, anicteric sclerae  HENT: Atraumatic, OP clear, TMs intact without erythema  Neck: Supple  Lymphatic: No cervical, supraclavicular, axillary or inguinal adenopathy  Respiratory: CTAB, normal respiratory effort  CV: Normal rate, regular rhythm, no murmurs, no peripheral edema  GI: Soft, nontender, nondistended, no hepatomegaly, no splenomegaly. L lower abdomen with soft mobile mass, approx 5cm  MS: Normal gait and station. Digits without clubbing or cyanosis. Skin: No rashes, ecchymoses, or petechiae. Normal temperature, turgor, and texture. Neuro/Psych: Alert, oriented. 5/5 strength in all 4 extremities. Appropriate affect, normal judgment/insight. Results:     Lab Results   Component Value Date/Time    WBC 8.3 2020 09:58 AM    HGB 14.0 2020 09:58 AM    HCT 40.9 2020 09:58 AM    PLATELET 587  09:58 AM    MCV 88.9 2020 09:58 AM    ABS. NEUTROPHILS 6.0 2020 09:58 AM     Lab Results   Component Value Date/Time    Sodium 138 2020 04:39 PM    Potassium 3.8 2020 04:39 PM    Chloride 104 2020 04:39 PM    CO2 24 2020 04:39 PM    Glucose 129 (H) 2020 04:39 PM    BUN 22 (H) 2020 04:39 PM    Creatinine 1.05 2020 04:39 PM    GFR est AA >60 2020 04:39 PM    GFR est non-AA >60 2020 04:39 PM    Calcium 8.7 2020 04:39 PM     Lab Results   Component Value Date/Time    Bilirubin, total 0.6 2020 04:39 PM    ALT (SGPT) 22 2020 04:39 PM    Alk. phosphatase 130 (H) 2020 04:39 PM    Protein, total 7.2 2020 04:39 PM    Albumin 3.6 2020 04:39 PM    Globulin 3.6 2020 04:39 PM     No results found for: IRON, FE, TIBC, IBCT, PSAT, FERR    No results found for: B12LT, FOL, RBCF  No results found for: TSH, TSH2, TSH3, TSHP, TSHEXT, TSHEXT  No results found for: HAMAT, HAAB, HABT, HAAT, HBSAG, HBSB, HBSAT, HBABN, HBCM, HBCAB, HBCAT, Ed Burns, [facility], [address], XHES, 674/586 Prince Romero, [address] [facility], [address] [place] [address] Drive, [address] [place] [address], [id number], [id number], [address] [place] [address], [id number], HBCMLT, [id number], HCGAT     20: Chromogranin 42      Imagin/20/20 CT abd/pelvis with IV contrast:  Impression:  1. No findings to suggest gross abdominal wall hernia or flank hernia.   2. Extensive adenopathy throughout the abdomen and pelvis as described. Findings are concerning for possible metastatic disease or lymphoma. Recommend follow up or comparison with prior studies. 3. Other findings as described. 6/18/20 PET:  FINDINGS:  HEAD/NECK: No apparent foci of abnormal hypermetabolism. Cerebral evaluation is  limited by normal intense activity. CHEST: Hypermetabolic lymphadenopathy at the base of the left neck and in the  left supraclavicular region is noted. Hypermetabolic superior mediastinal,  prevascular, right paratracheal, subcarinal, and bilateral hilar lymph  lymphadenopathy, maximum SUV of the subcarinal lymph node is 5.7. Small but  hypermetabolic soft tissue nodule left anterior chest wall. ABDOMEN/PELVIS: Hypermetabolic retrocrural, left para-aortic, aortocaval,  bilateral common iliac, bilateral external iliac, and bilateral inguinal  lymphadenopathy, maximum SUV 5.8 of an aortocaval lymph node. Hypermetabolic  mesenteric lymph node left lower quadrant, maximum SUV 12.3. SKELETON: No foci of abnormal hypermetabolism in the axial and visualized  appendicular skeleton. IMPRESSION: Hypermetabolic lymphadenopathy involving the left neck and left  supraclavicular region, mediastinum, bilateral hilum, retroperitoneum,  mesentery, and pelvis as described above. Hypermetabolic soft tissue nodule left  chest.    Brain MRI 6/27/20: IMPRESSION:  There is no evidence of intracranial metastatic disease. Mild chronic microvascular ischemic change. No intracranial mass, hemorrhage or evidence of acute infarction. Assessment & Plan:   Axel Fried is a 76 y.o. 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 1. Large cell neuroendocrine carcinoma of lung: I spoke with pathologist who states this appears to be a high grade aggressive and poorly differentiated cancer. Likely neuroendocrine based on perinuclear staining pattern and some TTF-1 positivity.  Primary lesion is likely lung based on PET scan. The pathology is somewhat incongruent with patient who remains largely asymptomatic. Large cell neuroendocrine carcinoma of lung is rare and is thought to behave like small cell lung cancer and non-small cell lung cancer. Genomic profiling can help identify which type is more similar. Current recommendations are to treat similar to small cell carcinoma with Carboplatin-Etoposide. The addition of immunotherapy such as Tatianna Coins is also recommended by experts based on extrapolation of data in small cell lung cancer. Chemotherapy teaching and consent completed. Supportive medications: EMLA cream, zofran, compazine. -- Load outside CT in [facility] One testing on tumor requested  -- Proceed with cycle 1 of Carbo-Etoposide-Atezolizumab, MD/NP visit. -- Follow up in 3 weeks for Cycle 2 Carbo-Etop-Atezolizumab, MD/NP visit. 2. Elevated PSA: Refer to Urology for evaluation and discussion. PSA 38.6 and given peculiar (asymptomatic) presentation of the large cell neuroendocrine carcinoma, would like evaluation of elevated PSA. -- Scheduled to see Urology 7/31/20.     3. ADHD: On Adderal    4. 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 5. Elevated BP: No diagnosis of  HTN. Likely 2/2 to anxiety, will continue to monitor closely. 6. Health maintenance: No prior colonoscopy or PSA per patient. Emotional well being: Pt is coping well with his/her disease and has excellent support. I appreciate the opportunity to participate in Mr. Fanta tabor. Significant other: Minnette Saint  [phone]. Patient seen with Pura Krabbe, NP.      Signed By: Jose Manuel Vazquez MD     June 29, 2020

## 2020-06-27 ENCOUNTER — HOSPITAL ENCOUNTER (OUTPATIENT)
Dept: MRI IMAGING | Age: 65
Discharge: HOME | End: 2020-06-27
Attending: NURSE PRACTITIONER
Payer: COMMERCIAL

## 2020-06-27 VITALS — HEIGHT: 67 IN | BODY MASS INDEX: 23.54 KG/M2 | WEIGHT: 150 LBS

## 2020-06-27 DIAGNOSIS — C7A.8 LARGE CELL NEUROENDOCRINE CARCINOMA (HCC): ICD-10-CM

## 2020-06-27 LAB — SARS-COV-2, NAA: NOT DETECTED

## 2020-06-27 PROCEDURE — A9575 INJ GADOTERATE MEGLUMI 0.1ML: HCPCS | Performed by: NURSE PRACTITIONER

## 2020-06-27 PROCEDURE — 74011250636 HC RX REV CODE- 250/636: Performed by: NURSE PRACTITIONER

## 2020-06-27 PROCEDURE — 70553 MRI BRAIN STEM W/O & W/DYE: CPT

## 2020-06-27 RX ORDER — GADOTERATE MEGLUMINE 376.9 MG/ML
7 INJECTION INTRAVENOUS
Status: COMPLETED | OUTPATIENT
Start: 2020-06-27 | End: 2020-06-27

## 2020-06-27 RX ADMIN — GADOTERATE MEGLUMINE 7 ML: 376.9 INJECTION INTRAVENOUS at 11:41

## 2020-06-29 ENCOUNTER — OFFICE VISIT (OUTPATIENT)
Dept: ONCOLOGY | Age: 65
End: 2020-06-29

## 2020-06-29 ENCOUNTER — HOSPITAL ENCOUNTER (OUTPATIENT)
Dept: INFUSION THERAPY | Age: 65
Discharge: HOME | End: 2020-06-29
Payer: COMMERCIAL

## 2020-06-29 VITALS
OXYGEN SATURATION: 97 % | HEART RATE: 89 BPM | WEIGHT: 180 LBS | SYSTOLIC BLOOD PRESSURE: 140 MMHG | TEMPERATURE: 98.9 F | HEIGHT: 70 IN | BODY MASS INDEX: 25.77 KG/M2 | RESPIRATION RATE: 18 BRPM | DIASTOLIC BLOOD PRESSURE: 88 MMHG

## 2020-06-29 VITALS
DIASTOLIC BLOOD PRESSURE: 81 MMHG | TEMPERATURE: 98.9 F | HEART RATE: 70 BPM | OXYGEN SATURATION: 97 % | RESPIRATION RATE: 16 BRPM | SYSTOLIC BLOOD PRESSURE: 139 MMHG | BODY MASS INDEX: 24.71 KG/M2 | WEIGHT: 172.6 LBS | HEIGHT: 70 IN

## 2020-06-29 DIAGNOSIS — C7A.8 LARGE CELL NEUROENDOCRINE CARCINOMA (HCC): Primary | ICD-10-CM

## 2020-06-29 DIAGNOSIS — C34.00 MALIGNANT NEOPLASM OF HILUS OF LUNG, UNSPECIFIED LATERALITY (HCC): ICD-10-CM

## 2020-06-29 DIAGNOSIS — R11.2 CHEMOTHERAPY INDUCED NAUSEA AND VOMITING: ICD-10-CM

## 2020-06-29 DIAGNOSIS — T45.1X5A CHEMOTHERAPY INDUCED NAUSEA AND VOMITING: ICD-10-CM

## 2020-06-29 DIAGNOSIS — Z51.11 CHEMOTHERAPY MANAGEMENT, ENCOUNTER FOR: ICD-10-CM

## 2020-06-29 DIAGNOSIS — R97.20 ELEVATED PSA: ICD-10-CM

## 2020-06-29 LAB
ALBUMIN SERPL-MCNC: 3.6 G/DL (ref 3.5–5)
ALBUMIN/GLOB SERPL: 0.9 {RATIO} (ref 1.1–2.2)
ALP SERPL-CCNC: 108 U/L (ref 45–117)
ALT SERPL-CCNC: 20 U/L (ref 12–78)
ANION GAP SERPL CALC-SCNC: 9 MMOL/L (ref 5–15)
AST SERPL-CCNC: 30 U/L (ref 15–37)
BASO+EOS+MONOS # BLD AUTO: 0.8 K/UL (ref 0.2–1.2)
BASO+EOS+MONOS NFR BLD AUTO: 10 % (ref 3.2–16.9)
BILIRUB SERPL-MCNC: 0.8 MG/DL (ref 0.2–1)
BUN SERPL-MCNC: 24 MG/DL (ref 6–20)
BUN/CREAT SERPL: 26 (ref 12–20)
CALCIUM SERPL-MCNC: 8.9 MG/DL (ref 8.5–10.1)
CHLORIDE SERPL-SCNC: 104 MMOL/L (ref 97–108)
CO2 SERPL-SCNC: 27 MMOL/L (ref 21–32)
CREAT SERPL-MCNC: 0.92 MG/DL (ref 0.7–1.3)
DIFFERENTIAL METHOD BLD: NORMAL
ERYTHROCYTE [DISTWIDTH] IN BLOOD BY AUTOMATED COUNT: 14.7 % (ref 11.8–15.8)
GLOBULIN SER CALC-MCNC: 3.8 G/DL (ref 2–4)
GLUCOSE SERPL-MCNC: 113 MG/DL (ref 65–100)
HCT VFR BLD AUTO: 40.9 % (ref 36.6–50.3)
HGB BLD-MCNC: 14 G/DL (ref 12.1–17)
LYMPHOCYTES # BLD: 1.5 K/UL (ref 0.8–3.5)
LYMPHOCYTES NFR BLD: 18 % (ref 12–49)
MCH RBC QN AUTO: 30.4 PG (ref 26–34)
MCHC RBC AUTO-ENTMCNC: 34.2 G/DL (ref 30–36.5)
MCV RBC AUTO: 88.9 FL (ref 80–99)
NEUTS SEG # BLD: 6 K/UL (ref 1.8–8)
NEUTS SEG NFR BLD: 72 % (ref 32–75)
PLATELET # BLD AUTO: 197 K/UL (ref 150–400)
POTASSIUM SERPL-SCNC: 3.7 MMOL/L (ref 3.5–5.1)
PROT SERPL-MCNC: 7.4 G/DL (ref 6.4–8.2)
RBC # BLD AUTO: 4.6 M/UL (ref 4.1–5.7)
SODIUM SERPL-SCNC: 140 MMOL/L (ref 136–145)
TSH SERPL DL<=0.05 MIU/L-ACNC: 1.72 UIU/ML (ref 0.36–3.74)
WBC # BLD AUTO: 8.3 K/UL (ref 4.1–11.1)

## 2020-06-29 PROCEDURE — 80053 COMPREHEN METABOLIC PANEL: CPT

## 2020-06-29 PROCEDURE — 96413 CHEMO IV INFUSION 1 HR: CPT

## 2020-06-29 PROCEDURE — 77030016057 HC NDL HUBR APOL -B

## 2020-06-29 PROCEDURE — 85025 COMPLETE CBC W/AUTO DIFF WBC: CPT

## 2020-06-29 PROCEDURE — 74011000250 HC RX REV CODE- 250: Performed by: INTERNAL MEDICINE

## 2020-06-29 PROCEDURE — 96417 CHEMO IV INFUS EACH ADDL SEQ: CPT

## 2020-06-29 PROCEDURE — 36415 COLL VENOUS BLD VENIPUNCTURE: CPT

## 2020-06-29 PROCEDURE — 74011250636 HC RX REV CODE- 250/636: Performed by: INTERNAL MEDICINE

## 2020-06-29 PROCEDURE — 96367 TX/PROPH/DG ADDL SEQ IV INF: CPT

## 2020-06-29 PROCEDURE — 84443 ASSAY THYROID STIM HORMONE: CPT

## 2020-06-29 PROCEDURE — 96375 TX/PRO/DX INJ NEW DRUG ADDON: CPT

## 2020-06-29 PROCEDURE — 74011000258 HC RX REV CODE- 258: Performed by: INTERNAL MEDICINE

## 2020-06-29 RX ORDER — SODIUM CHLORIDE 9 MG/ML
10 INJECTION INTRAMUSCULAR; INTRAVENOUS; SUBCUTANEOUS AS NEEDED
Status: ACTIVE | OUTPATIENT
Start: 2020-06-29 | End: 2020-06-29

## 2020-06-29 RX ORDER — ONDANSETRON HYDROCHLORIDE 8 MG/1
8 TABLET, FILM COATED ORAL
Qty: 30 TAB | Refills: 2 | Status: SHIPPED | OUTPATIENT
Start: 2020-06-29

## 2020-06-29 RX ORDER — DIPHENHYDRAMINE HYDROCHLORIDE 50 MG/ML
25 INJECTION, SOLUTION INTRAMUSCULAR; INTRAVENOUS AS NEEDED
Status: ACTIVE | OUTPATIENT
Start: 2020-06-29 | End: 2020-06-29

## 2020-06-29 RX ORDER — HEPARIN 100 UNIT/ML
300-500 SYRINGE INTRAVENOUS AS NEEDED
Status: ACTIVE | OUTPATIENT
Start: 2020-06-29 | End: 2020-06-29

## 2020-06-29 RX ORDER — HYDROCORTISONE SODIUM SUCCINATE 100 MG/2ML
100 INJECTION, POWDER, FOR SOLUTION INTRAMUSCULAR; INTRAVENOUS AS NEEDED
Status: ACTIVE | OUTPATIENT
Start: 2020-06-29 | End: 2020-06-29

## 2020-06-29 RX ORDER — SODIUM CHLORIDE 9 MG/ML
25 INJECTION, SOLUTION INTRAVENOUS CONTINUOUS
Status: DISPENSED | OUTPATIENT
Start: 2020-06-29 | End: 2020-06-29

## 2020-06-29 RX ORDER — ONDANSETRON 2 MG/ML
8 INJECTION INTRAMUSCULAR; INTRAVENOUS ONCE
Status: COMPLETED | OUTPATIENT
Start: 2020-06-29 | End: 2020-06-29

## 2020-06-29 RX ORDER — SODIUM CHLORIDE 0.9 % (FLUSH) 0.9 %
10 SYRINGE (ML) INJECTION AS NEEDED
Status: DISPENSED | OUTPATIENT
Start: 2020-06-29 | End: 2020-06-29

## 2020-06-29 RX ADMIN — ETOPOSIDE 204 MG: 20 INJECTION INTRAVENOUS at 14:27

## 2020-06-29 RX ADMIN — SODIUM CHLORIDE 25 ML/HR: 900 INJECTION, SOLUTION INTRAVENOUS at 11:44

## 2020-06-29 RX ADMIN — CARBOPLATIN 563 MG: 10 INJECTION INTRAVENOUS at 13:52

## 2020-06-29 RX ADMIN — DEXAMETHASONE SODIUM PHOSPHATE 12 MG: 10 INJECTION, SOLUTION INTRAMUSCULAR; INTRAVENOUS at 12:58

## 2020-06-29 RX ADMIN — SODIUM CHLORIDE 10 ML: 9 INJECTION, SOLUTION INTRAMUSCULAR; INTRAVENOUS; SUBCUTANEOUS at 09:51

## 2020-06-29 RX ADMIN — ATEZOLIZUMAB 1200 MG: 1200 INJECTION, SOLUTION INTRAVENOUS at 11:48

## 2020-06-29 RX ADMIN — ONDANSETRON 8 MG: 2 INJECTION INTRAMUSCULAR; INTRAVENOUS at 12:52

## 2020-06-29 RX ADMIN — Medication 500 UNITS: at 15:33

## 2020-06-29 RX ADMIN — Medication 10 ML: at 09:51

## 2020-06-29 RX ADMIN — FOSAPREPITANT 150 MG: 150 INJECTION, POWDER, LYOPHILIZED, FOR SOLUTION INTRAVENOUS at 12:58

## 2020-06-29 RX ADMIN — Medication 10 ML: at 15:33

## 2020-06-29 NOTE — PROGRESS NOTES
[facility] Progress Note    Date: 2020    Name: Kelin Luke    MRN: [id number]         : 1955    Mr. Sheng Martinez Arrived ambulatory and in no distress for C1D1 of Tecentriq, Etoposide, Carboplatin  Regimen. Assessment was completed, no acute issues at this time, no new complaints voiced. Left chest wall port accessed without difficulty, labs drawn & sent for processing. Patient proceed to appointment with Dr. Irvin Jenkins. Mr. Perico Trejo vitals were reviewed. Visit Vitals  /88   Pulse 89   Temp 98.9 °F (37.2 °C)   Resp 17   Ht 5' 10\" (1.778 m)   Wt 78.3 kg (172 lb 9.6 oz)   SpO2 97%   BMI 24.77 kg/m²       Lab results were obtained and reviewed. Recent Results (from the past 12 hour(s))   CBC WITH 3 PART DIFF    Collection Time: 20  9:58 AM   Result Value Ref Range    WBC 8.3 4.1 - 11.1 K/uL    RBC 4.60 4.10 - 5.70 M/uL    HGB 14.0 12.1 - 17.0 g/dL    HCT 40.9 36.6 - 50.3 %    MCV 88.9 80.0 - 99.0 FL    MCH 30.4 26.0 - 34.0 PG    MCHC 34.2 30.0 - 36.5 g/dL    RDW 14.7 11.8 - 15.8 %    PLATELET 259 178 - 793 K/uL    NEUTROPHILS 72 32 - 75 %    MIXED CELLS 10 3.2 - 16.9 %    LYMPHOCYTES 18 12 - 49 %    ABS. NEUTROPHILS 6.0 1.8 - 8.0 K/UL    ABS. MIXED CELLS 0.8 0.2 - 1.2 K/uL    ABS. LYMPHOCYTES 1.5 0.8 - 3.5 K/UL    DF AUTOMATED     METABOLIC PANEL, COMPREHENSIVE    Collection Time: 20  9:58 AM   Result Value Ref Range    Sodium 140 136 - 145 mmol/L    Potassium 3.7 3.5 - 5.1 mmol/L    Chloride 104 97 - 108 mmol/L    CO2 27 21 - 32 mmol/L    Anion gap 9 5 - 15 mmol/L    Glucose 113 (H) 65 - 100 mg/dL    BUN 24 (H) 6 - 20 MG/DL    Creatinine 0.92 0.70 - 1.30 MG/DL    BUN/Creatinine ratio 26 (H) 12 - 20      GFR est AA >60 >60 ml/min/1.73m2    GFR est non-AA >60 >60 ml/min/1.73m2    Calcium 8.9 8.5 - 10.1 MG/DL    Bilirubin, total 0.8 0.2 - 1.0 MG/DL    ALT (SGPT) 20 12 - 78 U/L    AST (SGOT) 30 15 - 37 U/L    Alk.  phosphatase 108 45 - 117 U/L    Protein, total 7.4 6.4 - 8.2 g/dL    Albumin 3.6 3.5 - 5.0 g/dL    Globulin 3.8 2.0 - 4.0 g/dL    A-G Ratio 0.9 (L) 1.1 - 2.2     TSH 3RD GENERATION    Collection Time: 06/29/20  9:58 AM   Result Value Ref Range    TSH 1.72 0.36 - 3.74 uIU/mL       Medications:  Tecentriq IV  Zofran IVP  Decadron IVPB  Emend IVPB  Carboplatin IV  Etoposide IV    Mr. Elisa Chavarria tolerated treatment well and was discharged from [place] [address] in stable condition. Port de-accessed, flushed & heparinized per protocol. He is to return on 6/30/20 for his next appointment.     Issac Erickson RN  June 29, 2020

## 2020-06-29 NOTE — PROGRESS NOTES
Please notify pt of negative covid 19 test. Pt should follow up with PCP for any concerning symptoms.

## 2020-06-29 NOTE — PROGRESS NOTES
Omar Lafleur is a 76 y.o. male follow up for neuroendocrine carcinoma. 1. Have you been to the ER, urgent care clinic since your last visit? Hospitalized since your last visit?no     2. Have you seen or consulted any other health care providers outside of the [address] [place] [address] since your last visit? Include any pap smears or colon screening.  No    Vitals 6/29/2020   Blood Pressure 140/88   Pulse 89   Temp 98.9   Resp 17   Height 5' 10\"   Weight 172 lb 9.6 oz   SpO2 97   BSA 1.97 m2   BMI 24.77 kg/m2

## 2020-06-30 ENCOUNTER — HOSPITAL ENCOUNTER (OUTPATIENT)
Dept: INFUSION THERAPY | Age: 65
Discharge: HOME | End: 2020-06-30
Payer: COMMERCIAL

## 2020-06-30 VITALS
HEIGHT: 70 IN | TEMPERATURE: 98.4 F | RESPIRATION RATE: 18 BRPM | DIASTOLIC BLOOD PRESSURE: 72 MMHG | BODY MASS INDEX: 26.45 KG/M2 | HEART RATE: 80 BPM | WEIGHT: 184.8 LBS | OXYGEN SATURATION: 97 % | SYSTOLIC BLOOD PRESSURE: 124 MMHG

## 2020-06-30 DIAGNOSIS — C34.00 MALIGNANT NEOPLASM OF HILUS OF LUNG, UNSPECIFIED LATERALITY (HCC): ICD-10-CM

## 2020-06-30 DIAGNOSIS — C7A.8 LARGE CELL NEUROENDOCRINE CARCINOMA (HCC): Primary | ICD-10-CM

## 2020-06-30 PROCEDURE — 74011250636 HC RX REV CODE- 250/636: Performed by: INTERNAL MEDICINE

## 2020-06-30 PROCEDURE — 77030012965 HC NDL HUBR BBMI -A

## 2020-06-30 PROCEDURE — 96413 CHEMO IV INFUSION 1 HR: CPT

## 2020-06-30 PROCEDURE — 96375 TX/PRO/DX INJ NEW DRUG ADDON: CPT

## 2020-06-30 RX ORDER — SODIUM CHLORIDE 9 MG/ML
25 INJECTION, SOLUTION INTRAVENOUS CONTINUOUS
Status: DISCONTINUED | OUTPATIENT
Start: 2020-06-30 | End: 2020-07-01 | Stop reason: HOSPADM

## 2020-06-30 RX ORDER — DEXAMETHASONE SODIUM PHOSPHATE 4 MG/ML
8 INJECTION, SOLUTION INTRA-ARTICULAR; INTRALESIONAL; INTRAMUSCULAR; INTRAVENOUS; SOFT TISSUE ONCE
Status: COMPLETED | OUTPATIENT
Start: 2020-06-30 | End: 2020-06-30

## 2020-06-30 RX ORDER — SODIUM CHLORIDE 9 MG/ML
10 INJECTION INTRAMUSCULAR; INTRAVENOUS; SUBCUTANEOUS AS NEEDED
Status: DISCONTINUED | OUTPATIENT
Start: 2020-06-30 | End: 2020-07-01 | Stop reason: HOSPADM

## 2020-06-30 RX ORDER — HEPARIN 100 UNIT/ML
300-500 SYRINGE INTRAVENOUS AS NEEDED
Status: DISCONTINUED | OUTPATIENT
Start: 2020-06-30 | End: 2020-07-01 | Stop reason: HOSPADM

## 2020-06-30 RX ORDER — SODIUM CHLORIDE 0.9 % (FLUSH) 0.9 %
10 SYRINGE (ML) INJECTION AS NEEDED
Status: DISCONTINUED | OUTPATIENT
Start: 2020-06-30 | End: 2020-07-01 | Stop reason: HOSPADM

## 2020-06-30 RX ADMIN — SODIUM CHLORIDE 25 ML/HR: 900 INJECTION, SOLUTION INTRAVENOUS at 14:49

## 2020-06-30 RX ADMIN — Medication 500 UNITS: at 17:00

## 2020-06-30 RX ADMIN — ETOPOSIDE 204 MG: 20 INJECTION, SOLUTION, CONCENTRATE INTRAVENOUS at 15:37

## 2020-06-30 RX ADMIN — Medication 10 ML: at 17:00

## 2020-06-30 RX ADMIN — Medication 10 ML: at 14:47

## 2020-06-30 RX ADMIN — Medication 10 ML: at 14:49

## 2020-06-30 RX ADMIN — DEXAMETHASONE SODIUM PHOSPHATE 8 MG: 4 INJECTION, SOLUTION INTRAMUSCULAR; INTRAVENOUS at 14:49

## 2020-06-30 RX ADMIN — Medication 10 ML: at 14:46

## 2020-06-30 NOTE — PROGRESS NOTES
[facility] - Chemotherapy Progress Note    1310 Pt admit to [facility] for C 1 D 2 Etoposide ambulatory in stable condition. Assessment completed. No new concerns voiced. PAC with positive blood return. Chemotherapy Flowsheet 6/30/2020   Cycle C 1 D 2   Date 6/30/2020   Drug / Regimen Etoposide   Pre Meds -   Notes -       Visit Vitals  /72   Pulse 80   Temp 98.4 °F (36.9 °C)   Resp 18   Ht 5' 10\" (1.778 m)   Wt 83.8 kg (184 lb 12.8 oz)   SpO2 97%   BMI 26.52 kg/m²       Medications:  Dexamethasone ivp  Etoposide iv      1445 Pt tolerated treatment well. PAC maintained positive blood return throughout treatment, flushed with positive blood return at conclusion and throughout treatment. D/c home ambulatory in no distress.  Pt aware of next appointment scheduled for 7/1/20

## 2020-07-01 ENCOUNTER — HOSPITAL ENCOUNTER (OUTPATIENT)
Dept: INFUSION THERAPY | Age: 65
Discharge: HOME | End: 2020-07-01
Payer: COMMERCIAL

## 2020-07-01 VITALS
DIASTOLIC BLOOD PRESSURE: 77 MMHG | HEART RATE: 63 BPM | SYSTOLIC BLOOD PRESSURE: 142 MMHG | OXYGEN SATURATION: 94 % | RESPIRATION RATE: 18 BRPM

## 2020-07-01 DIAGNOSIS — C34.00 MALIGNANT NEOPLASM OF HILUS OF LUNG, UNSPECIFIED LATERALITY (HCC): ICD-10-CM

## 2020-07-01 DIAGNOSIS — C7A.8 LARGE CELL NEUROENDOCRINE CARCINOMA (HCC): Primary | ICD-10-CM

## 2020-07-01 PROCEDURE — 74011250636 HC RX REV CODE- 250/636: Performed by: INTERNAL MEDICINE

## 2020-07-01 PROCEDURE — 96375 TX/PRO/DX INJ NEW DRUG ADDON: CPT

## 2020-07-01 PROCEDURE — 96413 CHEMO IV INFUSION 1 HR: CPT

## 2020-07-01 PROCEDURE — 74011000258 HC RX REV CODE- 258: Performed by: INTERNAL MEDICINE

## 2020-07-01 PROCEDURE — 77030016057 HC NDL HUBR APOL -B

## 2020-07-01 RX ORDER — SODIUM CHLORIDE 9 MG/ML
25 INJECTION, SOLUTION INTRAVENOUS CONTINUOUS
Status: DISPENSED | OUTPATIENT
Start: 2020-07-01 | End: 2020-07-01

## 2020-07-01 RX ORDER — SODIUM CHLORIDE 9 MG/ML
10 INJECTION INTRAMUSCULAR; INTRAVENOUS; SUBCUTANEOUS AS NEEDED
Status: ACTIVE | OUTPATIENT
Start: 2020-07-01 | End: 2020-07-01

## 2020-07-01 RX ORDER — SODIUM CHLORIDE 0.9 % (FLUSH) 0.9 %
10 SYRINGE (ML) INJECTION AS NEEDED
Status: DISPENSED | OUTPATIENT
Start: 2020-07-01 | End: 2020-07-01

## 2020-07-01 RX ORDER — HEPARIN 100 UNIT/ML
300-500 SYRINGE INTRAVENOUS AS NEEDED
Status: ACTIVE | OUTPATIENT
Start: 2020-07-01 | End: 2020-07-01

## 2020-07-01 RX ORDER — DEXAMETHASONE SODIUM PHOSPHATE 4 MG/ML
8 INJECTION, SOLUTION INTRA-ARTICULAR; INTRALESIONAL; INTRAMUSCULAR; INTRAVENOUS; SOFT TISSUE ONCE
Status: COMPLETED | OUTPATIENT
Start: 2020-07-01 | End: 2020-07-01

## 2020-07-01 RX ADMIN — ETOPOSIDE 204 MG: 20 INJECTION INTRAVENOUS at 12:31

## 2020-07-01 RX ADMIN — SODIUM CHLORIDE 25 ML/HR: 900 INJECTION, SOLUTION INTRAVENOUS at 11:59

## 2020-07-01 RX ADMIN — DEXAMETHASONE SODIUM PHOSPHATE 8 MG: 4 INJECTION, SOLUTION INTRAMUSCULAR; INTRAVENOUS at 11:59

## 2020-07-01 NOTE — PROGRESS NOTES
[facility] Progress Note    Date: 2020    Name: Dinora Evans    MRN: [id number]         : 1955    Mr. Fely Rai Arrived ambulatory and in no distress for C1D3 of Etoposide Regimen. Assessment was completed, no acute issues at this time, no new complaints voiced. Left chest wall port accessed without difficulty. Chemotherapy Flowsheet 2020   Cycle C1D3   Date 2020   Drug / Regimen Etoposide   Pre Meds given   Notes given       Mr. Shanice Cavazos vitals were reviewed. Visit Vitals  /65 (BP 1 Location: Left arm, BP Patient Position: At rest)   Pulse 63   Resp 18   SpO2 94%     Medications:  Medications Administered     0.9% sodium chloride infusion     Admin Date  2020 Action  New Bag Dose  25 mL/hr Rate  25 mL/hr Route  IntraVENous Administered By  Esdras Sellers RN          dexamethasone (DECADRON) 4 mg/mL injection 8 mg     Admin Date  2020 Action  Given Dose  8 mg Route  IntraVENous Administered By  Esdrsa Sellers RN          etoposide (VEPESID) 204 mg in 0.9% sodium chloride 500 mL, overfill volume 50 mL chemo infusion     Admin Date  2020 Action  New Bag Dose  204 mg Rate  560.2 mL/hr Route  IntraVENous Administered By  Esdras Sellers RN                Mr. Fely Rai tolerated treatment well and was discharged from [place] [address] in stable condition. Port de-accessed, flushed & heparinized per protocol. He is to return on 2020 for his next appointment.     Royer Castillo RN  2020

## 2020-07-07 ENCOUNTER — OFFICE VISIT (OUTPATIENT)
Dept: ONCOLOGY | Age: 65
End: 2020-07-07

## 2020-07-07 VITALS
OXYGEN SATURATION: 98 % | BODY MASS INDEX: 26.31 KG/M2 | DIASTOLIC BLOOD PRESSURE: 82 MMHG | HEART RATE: 72 BPM | SYSTOLIC BLOOD PRESSURE: 133 MMHG | TEMPERATURE: 99.6 F | WEIGHT: 183.8 LBS | HEIGHT: 70 IN

## 2020-07-07 DIAGNOSIS — C7A.8 LARGE CELL NEUROENDOCRINE CARCINOMA (HCC): ICD-10-CM

## 2020-07-07 DIAGNOSIS — R97.20 ELEVATED PSA: ICD-10-CM

## 2020-07-07 DIAGNOSIS — R22.1 LOCALIZED SWELLING, MASS AND LUMP, NECK: Primary | ICD-10-CM

## 2020-07-07 DIAGNOSIS — R59.0 SUPRACLAVICULAR LYMPHADENOPATHY: ICD-10-CM

## 2020-07-07 RX ORDER — SODIUM CHLORIDE 9 MG/ML
25 INJECTION, SOLUTION INTRAVENOUS CONTINUOUS
Status: CANCELLED | OUTPATIENT
Start: 2020-07-20

## 2020-07-07 RX ORDER — DEXAMETHASONE SODIUM PHOSPHATE 4 MG/ML
8 INJECTION, SOLUTION INTRA-ARTICULAR; INTRALESIONAL; INTRAMUSCULAR; INTRAVENOUS; SOFT TISSUE ONCE
Status: CANCELLED | OUTPATIENT
Start: 2020-07-22

## 2020-07-07 RX ORDER — ONDANSETRON 2 MG/ML
8 INJECTION INTRAMUSCULAR; INTRAVENOUS AS NEEDED
Status: CANCELLED | OUTPATIENT
Start: 2020-07-21

## 2020-07-07 RX ORDER — HEPARIN 100 UNIT/ML
300-500 SYRINGE INTRAVENOUS AS NEEDED
Status: CANCELLED
Start: 2020-07-22

## 2020-07-07 RX ORDER — DIPHENHYDRAMINE HYDROCHLORIDE 50 MG/ML
50 INJECTION, SOLUTION INTRAMUSCULAR; INTRAVENOUS AS NEEDED
Status: CANCELLED
Start: 2020-07-22

## 2020-07-07 RX ORDER — ONDANSETRON 2 MG/ML
8 INJECTION INTRAMUSCULAR; INTRAVENOUS AS NEEDED
Status: CANCELLED | OUTPATIENT
Start: 2020-07-22

## 2020-07-07 RX ORDER — DIPHENHYDRAMINE HYDROCHLORIDE 50 MG/ML
50 INJECTION, SOLUTION INTRAMUSCULAR; INTRAVENOUS
Status: CANCELLED | OUTPATIENT
Start: 2020-07-20

## 2020-07-07 RX ORDER — SODIUM CHLORIDE 9 MG/ML
10 INJECTION INTRAMUSCULAR; INTRAVENOUS; SUBCUTANEOUS AS NEEDED
Status: CANCELLED | OUTPATIENT
Start: 2020-07-21

## 2020-07-07 RX ORDER — DIPHENHYDRAMINE HYDROCHLORIDE 50 MG/ML
25 INJECTION, SOLUTION INTRAMUSCULAR; INTRAVENOUS AS NEEDED
Status: CANCELLED
Start: 2020-07-20

## 2020-07-07 RX ORDER — ALBUTEROL SULFATE 0.83 MG/ML
2.5 SOLUTION RESPIRATORY (INHALATION) AS NEEDED
Status: CANCELLED
Start: 2020-07-21

## 2020-07-07 RX ORDER — HYDROCORTISONE SODIUM SUCCINATE 100 MG/2ML
100 INJECTION, POWDER, FOR SOLUTION INTRAMUSCULAR; INTRAVENOUS AS NEEDED
Status: CANCELLED | OUTPATIENT
Start: 2020-07-20

## 2020-07-07 RX ORDER — DIPHENHYDRAMINE HYDROCHLORIDE 50 MG/ML
50 INJECTION, SOLUTION INTRAMUSCULAR; INTRAVENOUS AS NEEDED
Status: CANCELLED
Start: 2020-07-21

## 2020-07-07 RX ORDER — IBUPROFEN 200 MG
400 TABLET ORAL
Status: CANCELLED | OUTPATIENT
Start: 2020-07-20

## 2020-07-07 RX ORDER — SODIUM CHLORIDE 0.9 % (FLUSH) 0.9 %
10 SYRINGE (ML) INJECTION AS NEEDED
Status: CANCELLED
Start: 2020-07-22

## 2020-07-07 RX ORDER — EPINEPHRINE 1 MG/ML
0.3 INJECTION, SOLUTION, CONCENTRATE INTRAVENOUS AS NEEDED
Status: CANCELLED | OUTPATIENT
Start: 2020-07-20

## 2020-07-07 RX ORDER — ALBUTEROL SULFATE 0.83 MG/ML
2.5 SOLUTION RESPIRATORY (INHALATION) AS NEEDED
Status: CANCELLED
Start: 2020-07-20

## 2020-07-07 RX ORDER — ACETAMINOPHEN 325 MG/1
650 TABLET ORAL AS NEEDED
Status: CANCELLED
Start: 2020-07-20

## 2020-07-07 RX ORDER — ALBUTEROL SULFATE 0.83 MG/ML
2.5 SOLUTION RESPIRATORY (INHALATION) AS NEEDED
Status: CANCELLED
Start: 2020-07-22

## 2020-07-07 RX ORDER — HEPARIN 100 UNIT/ML
300-500 SYRINGE INTRAVENOUS AS NEEDED
Status: CANCELLED
Start: 2020-07-20

## 2020-07-07 RX ORDER — EPINEPHRINE 1 MG/ML
0.3 INJECTION, SOLUTION, CONCENTRATE INTRAVENOUS AS NEEDED
Status: CANCELLED | OUTPATIENT
Start: 2020-07-22

## 2020-07-07 RX ORDER — ONDANSETRON 2 MG/ML
8 INJECTION INTRAMUSCULAR; INTRAVENOUS AS NEEDED
Status: CANCELLED | OUTPATIENT
Start: 2020-07-20

## 2020-07-07 RX ORDER — SODIUM CHLORIDE 0.9 % (FLUSH) 0.9 %
10 SYRINGE (ML) INJECTION AS NEEDED
Status: CANCELLED
Start: 2020-07-20

## 2020-07-07 RX ORDER — ACETAMINOPHEN 325 MG/1
650 TABLET ORAL AS NEEDED
Status: CANCELLED
Start: 2020-07-22

## 2020-07-07 RX ORDER — LORAZEPAM 2 MG/ML
0.5 INJECTION INTRAMUSCULAR
Status: CANCELLED | OUTPATIENT
Start: 2020-07-21

## 2020-07-07 RX ORDER — ACETAMINOPHEN 325 MG/1
650 TABLET ORAL AS NEEDED
Status: CANCELLED
Start: 2020-07-21

## 2020-07-07 RX ORDER — DIPHENHYDRAMINE HYDROCHLORIDE 50 MG/ML
25 INJECTION, SOLUTION INTRAMUSCULAR; INTRAVENOUS AS NEEDED
Status: CANCELLED
Start: 2020-07-22

## 2020-07-07 RX ORDER — DIPHENHYDRAMINE HYDROCHLORIDE 50 MG/ML
25 INJECTION, SOLUTION INTRAMUSCULAR; INTRAVENOUS AS NEEDED
Status: CANCELLED
Start: 2020-07-21

## 2020-07-07 RX ORDER — HYDROCORTISONE SODIUM SUCCINATE 100 MG/2ML
100 INJECTION, POWDER, FOR SOLUTION INTRAMUSCULAR; INTRAVENOUS AS NEEDED
Status: CANCELLED | OUTPATIENT
Start: 2020-07-21

## 2020-07-07 RX ORDER — HEPARIN 100 UNIT/ML
300-500 SYRINGE INTRAVENOUS AS NEEDED
Status: CANCELLED
Start: 2020-07-21

## 2020-07-07 RX ORDER — EPINEPHRINE 1 MG/ML
0.3 INJECTION, SOLUTION, CONCENTRATE INTRAVENOUS AS NEEDED
Status: CANCELLED | OUTPATIENT
Start: 2020-07-21

## 2020-07-07 RX ORDER — SODIUM CHLORIDE 0.9 % (FLUSH) 0.9 %
10 SYRINGE (ML) INJECTION AS NEEDED
Status: CANCELLED
Start: 2020-07-21

## 2020-07-07 RX ORDER — DIPHENHYDRAMINE HYDROCHLORIDE 50 MG/ML
50 INJECTION, SOLUTION INTRAMUSCULAR; INTRAVENOUS AS NEEDED
Status: CANCELLED
Start: 2020-07-20

## 2020-07-07 RX ORDER — DEXAMETHASONE SODIUM PHOSPHATE 4 MG/ML
8 INJECTION, SOLUTION INTRA-ARTICULAR; INTRALESIONAL; INTRAMUSCULAR; INTRAVENOUS; SOFT TISSUE ONCE
Status: CANCELLED | OUTPATIENT
Start: 2020-07-21

## 2020-07-07 RX ORDER — SODIUM CHLORIDE 9 MG/ML
10 INJECTION INTRAMUSCULAR; INTRAVENOUS; SUBCUTANEOUS AS NEEDED
Status: CANCELLED | OUTPATIENT
Start: 2020-07-20

## 2020-07-07 RX ORDER — ONDANSETRON 2 MG/ML
8 INJECTION INTRAMUSCULAR; INTRAVENOUS ONCE
Status: CANCELLED | OUTPATIENT
Start: 2020-07-20

## 2020-07-07 RX ORDER — HYDROCORTISONE SODIUM SUCCINATE 100 MG/2ML
100 INJECTION, POWDER, FOR SOLUTION INTRAMUSCULAR; INTRAVENOUS AS NEEDED
Status: CANCELLED | OUTPATIENT
Start: 2020-07-22

## 2020-07-07 RX ORDER — SODIUM CHLORIDE 9 MG/ML
25 INJECTION, SOLUTION INTRAVENOUS CONTINUOUS
Status: CANCELLED | OUTPATIENT
Start: 2020-07-21

## 2020-07-07 RX ORDER — SODIUM CHLORIDE 9 MG/ML
25 INJECTION, SOLUTION INTRAVENOUS CONTINUOUS
Status: CANCELLED | OUTPATIENT
Start: 2020-07-22

## 2020-07-07 RX ORDER — SODIUM CHLORIDE 9 MG/ML
10 INJECTION INTRAMUSCULAR; INTRAVENOUS; SUBCUTANEOUS AS NEEDED
Status: CANCELLED | OUTPATIENT
Start: 2020-07-22

## 2020-07-07 RX ORDER — LORAZEPAM 2 MG/ML
0.5 INJECTION INTRAMUSCULAR
Status: CANCELLED | OUTPATIENT
Start: 2020-07-22

## 2020-07-07 NOTE — PROGRESS NOTES
[address]  Medical Oncology at [address] [facility]  [phone]    Hematology / Oncology Established Visit    Reason for Visit:   Amada Mcfadden is a 76 y.o. male who is seen for follow up of neuroendocrine carcinoma. Initially referred by Dr. Desirae Greenwood. Hematology Oncology Treatment History:     Diagnosis: Large cell neuroendocrine carcinoma. Stage: IV    Pathology:   5/29/20 excisional R external iliac LN biopsy: Poorly differentiated carcinoma with features of large cell neuroendocrine carcinoma with loss of chromogranin and synaptophysin expression. Flow cytometry negative for lymphoproliferative disorder. FoundationOne: MS Stable, TMB 0, MDM4 amplification, MYC amplification, TMPRSS2 TMPRSS2-ERD fusion, CEBPA 1311fs*10, ERBB4 amplification - equivocal, MCL1 amplification, LCE6C5Y amplification, RAD21 amplification. See scanned report for full info. Prior Treatment: None    Current Treatment: Carbo-Etoposide-Atezolizumab started 6/29/20  Treatment duration: Until disease progression or toxicity  Frequency of visits: Every 3 weeks     History of Present Illness:   Amada Mcfadden is a 76 y.o. male who comes in for evaluation of poorly differentiated neuroendocrine carcinoma. Pt noticed a nontender bulge in his LLQ in 5/2020. He thought this was a hernia and was evaluated by Dr. Christiana Smyth. CT on 5/20/20 was notable for extensive lymphadenopathy in abd/pelvis. Pt underwent robot assisted excisional Right external iliac LN biopsy 5/29/20, which showed poorly differentiated large cell neuroendocrine carcinoma. He reports intentional weight loss with dietary changes and exercise, losing 40-lbs in past 8 months. No n/v/d, melena/hematochezia, cough, SOB. No fevers, chills, sweats. [employer] nonsmoker. Mother had breast cancer diagnosed age < 48. Twin brother had melanoma diagnosed aged late 46s. He does not think he has ever had a colonoscopy or PSA screening.     Interval History:  Patient here for urgent follow up. He completed cycle 1 carbo-etoposide-atezolizumab (6/29-7/1). He moved a large play-set with his son on July 2. He developed swelling of his left neck on July 3rd. Reports mild tenderness to palpation of the site. No tenderness when moving his neck from side to side. No past medical history on file. Past Surgical History:   Procedure Laterality Date    HX APPENDECTOMY  1964    HX OTHER SURGICAL      subcutaneous cyst of forehead    HX VASECTOMY        Social History     Tobacco Use    Smoking status: Never Smoker    Smokeless tobacco: Never Used   Substance Use Topics    Alcohol use: Not Currently   Unmarried. Has 2 children, 27 and 32. 1 lives in [place]. Family History   Problem Relation Age of Onset   [facility] Cancer Mother         breast    Cancer Father         prostate    Cancer Brother         melanoma    COPD Brother      Current Outpatient Medications   Medication Sig    ondansetron hcl (ZOFRAN) 8 mg tablet Take 1 Tab by mouth every eight (8) hours as needed for Nausea or Vomiting.  lidocaine-prilocaine (EMLA) topical cream Apply a dime size amount to port site 30-60 minutes before access on treatment days to numb port site.  prochlorperazine (Compazine) 10 mg tablet Take 0.5 Tabs by mouth every six (6) hours as needed for Nausea or Vomiting.  dextroamphetamine-amphetamine (ADDERALL) 10 mg tablet TAKE 1 TABLET TWICE A DAY FOR 30 DAYS    acyclovir (ZOVIRAX) 400 mg tablet Take 400 mg by mouth two (2) times a day. No current facility-administered medications for this visit. No Known Allergies     Review of Systems: A complete review of systems was obtained, negative except as described above.     Physical Exam:     Visit Vitals  /82 (BP 1 Location: Right arm, BP Patient Position: Sitting)   Pulse 72   Temp 99.6 °F (37.6 °C) (Oral)   Ht 5' 10\" (1.778 m)   Wt 183 lb 12.8 oz (83.4 kg)   SpO2 98%   BMI 26.37 kg/m²     ECOG PS: 0  General: Well developed, no acute distress  Eyes: PERRLA, EOMI, anicteric sclerae  HENT: Atraumatic, OP clear, TMs intact without erythema  Neck: Supple, large mass of left neck present around port site. Lymphatic: No cervical, supraclavicular, axillary or inguinal adenopathy  Respiratory: CTAB, normal respiratory effort  CV: Normal rate, regular rhythm, no murmurs, no peripheral edema  GI: Soft, nontender, nondistended, no hepatomegaly, no splenomegaly. L lower abdomen with soft mobile mass, approx 5cm  MS: Normal gait and station. Digits without clubbing or cyanosis. Skin: No rashes, ecchymoses, or petechiae. Normal temperature, turgor, and texture. Neuro/Psych: Alert, oriented. 5/5 strength in all 4 extremities. Appropriate affect, normal judgment/insight. Results:     Lab Results   Component Value Date/Time    WBC 8.3 06/29/2020 09:58 AM    HGB 14.0 06/29/2020 09:58 AM    HCT 40.9 06/29/2020 09:58 AM    PLATELET 291 01/50/5205 09:58 AM    MCV 88.9 06/29/2020 09:58 AM    ABS. NEUTROPHILS 6.0 06/29/2020 09:58 AM     Lab Results   Component Value Date/Time    Sodium 140 06/29/2020 09:58 AM    Potassium 3.7 06/29/2020 09:58 AM    Chloride 104 06/29/2020 09:58 AM    CO2 27 06/29/2020 09:58 AM    Glucose 113 (H) 06/29/2020 09:58 AM    BUN 24 (H) 06/29/2020 09:58 AM    Creatinine 0.92 06/29/2020 09:58 AM    GFR est AA >60 06/29/2020 09:58 AM    GFR est non-AA >60 06/29/2020 09:58 AM    Calcium 8.9 06/29/2020 09:58 AM     Lab Results   Component Value Date/Time    Bilirubin, total 0.8 06/29/2020 09:58 AM    ALT (SGPT) 20 06/29/2020 09:58 AM    Alk.  phosphatase 108 06/29/2020 09:58 AM    Protein, total 7.4 06/29/2020 09:58 AM    Albumin 3.6 06/29/2020 09:58 AM    Globulin 3.8 06/29/2020 09:58 AM     No results found for: IRON, FE, TIBC, IBCT, PSAT, FERR    No results found for: B12LT, FOL, RBCF  Lab Results   Component Value Date/Time    TSH 1.72 06/29/2020 09:58 AM     No results found for: HAMAT, HAAB, HABT, [facility], [address] [facility], HBSB, HBSAT, HBABN, HBCM, HBCAB, HBCAT, XBCABS, [address] [facility], [address], [address] [facility], [id number], [address], [facility], [address] [facility] [address], [address] [facility], [id number], [id number], [address] [facility], [id number], HBCMLT, [id number], HCGAT     20: Chromogranin 42      Imagin/20/20 CT abd/pelvis with IV contrast:  Impression:  1. No findings to suggest gross abdominal wall hernia or flank hernia. 2. Extensive adenopathy throughout the abdomen and pelvis as described. Findings are concerning for possible metastatic disease or lymphoma. Recommend follow up or comparison with prior studies. 3. Other findings as described. 20 PET:  FINDINGS:  HEAD/NECK: No apparent foci of abnormal hypermetabolism. Cerebral evaluation is  limited by normal intense activity. CHEST: Hypermetabolic lymphadenopathy at the base of the left neck and in the  left supraclavicular region is noted. Hypermetabolic superior mediastinal,  prevascular, right paratracheal, subcarinal, and bilateral hilar lymph  lymphadenopathy, maximum SUV of the subcarinal lymph node is 5.7. Small but  hypermetabolic soft tissue nodule left anterior chest wall. ABDOMEN/PELVIS: Hypermetabolic retrocrural, left para-aortic, aortocaval,  bilateral common iliac, bilateral external iliac, and bilateral inguinal  lymphadenopathy, maximum SUV 5.8 of an aortocaval lymph node. Hypermetabolic  mesenteric lymph node left lower quadrant, maximum SUV 12.3. SKELETON: No foci of abnormal hypermetabolism in the axial and visualized  appendicular skeleton. IMPRESSION: Hypermetabolic lymphadenopathy involving the left neck and left  supraclavicular region, mediastinum, bilateral hilum, retroperitoneum,  mesentery, and pelvis as described above. Hypermetabolic soft tissue nodule left  chest.    Brain MRI 20: IMPRESSION:  There is no evidence of intracranial metastatic disease. Mild chronic microvascular ischemic change. No intracranial mass, hemorrhage or evidence of acute infarction.     Assessment & Plan:   Inez Black is a 76 y.o. 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 1. Large cell neuroendocrine carcinoma of lung or prostate: I spoke with pathologist who states this appears to be a high grade aggressive and poorly differentiated cancer. Likely neuroendocrine based on perinuclear staining pattern and some TTF-1 positivity. Primary lesion is likely lung based on PET scan. The pathology is somewhat incongruent with patient who remains largely asymptomatic. Large cell neuroendocrine carcinoma of lung is rare and is thought to behave like small cell lung cancer and non-small cell lung cancer. Genomic profiling can help identify which type is more similar. Current recommendations are to treat similar to small cell carcinoma with Carboplatin-Etoposide. The addition of immunotherapy such as Akbar Public is also recommended by experts based on extrapolation of data in small cell lung cancer. Chemotherapy teaching and consent completed. Supportive medications: EMLA cream, zofran, compazine. -- Load outside CT in PACS  -- Send pathology specimen to [facility] pathology for expert review. -- Follow up on 7/20/20 for Cycle 2 Carbo-Etop-Atezolizumab, MD/NP visit. 2. Elevated PSA: Refer to Urology for evaluation and discussion. PSA 38.6 and given peculiar (asymptomatic) presentation of the large cell neuroendocrine carcinoma, would like evaluation of elevated PSA. There are rare cases of large cell neuroendocrine carcinoma of prostate reported as well. -- Scheduled to see Urology 7/31/20.     3. ADHD: On Adderal    4. 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 5. Elevated BP: No diagnosis of  HTN. Likely 2/2 to anxiety, will continue to monitor closely. 6. Neck swelling: Left supraclavicular LAD noted with surrounding edema. No erythema or drainage present. Occurred 1 week after cycle 1 and shortly after moving heavy equipment.  Discussed case with Dr. Alvaro Verdin who does not suspect port problem (catheter in subclavian vein.)  -- Continue to monitor and re-evaluate on CT after cycle 2.    7. Health maintenance: No prior colonoscopy or PSA per patient. Emotional well being: Pt is coping well with his/her disease and has excellent support. I appreciate the opportunity to participate in Mr. Erlin tabor. Significant other: Marguerite Bernal - [phone]. Patient seen with Mignon Vergara NP.      Signed By: Oneyda Coe MD     July 7, 2020

## 2020-07-07 NOTE — PROGRESS NOTES
Inez Black is a 76 y.o. male here for follow up for:  Chief Complaint   Patient presents with    Cancer     Large cell neuroendocrine carcinoma. 1. Have you been to the ER, urgent care clinic since your last visit? Hospitalized since your last visit? no    2. Have you seen or consulted any other health care providers outside of the [address] [place] [address] since your last visit? Include any pap smears or colon screening. no    Has some swelling on left side of neck. Feels like a hard knot under skin.

## 2020-07-08 ENCOUNTER — TELEPHONE (OUTPATIENT)
Dept: ONCOLOGY | Age: 65
End: 2020-07-08

## 2020-07-08 NOTE — TELEPHONE ENCOUNTER
[address] at [facility] [address]  [phone]        07/08/20 10:51 AM Per Dr. Eladia Parker, patient's tumor specimen to be sent from [address] [place] [address] pathology department to [facility] for second opinion. Contacted [facility] pathology to inquire about process of submitting this request and was transferred to speak with Leslie Vaca ([phone]). No answer, left message requesting that Leslie Vaca return call regarding above. Will continue to monitor. 07/13/20 2:37 PM Attempted to contact Claudia to follow up on above message. No answer, left message requesting return call. Provided office phone number as well.     07/16/20 9:52 AM Attempted to call Leslie Vaca again. No answer left additional message requesting a call back. Need to confirm mailing address to send tumor specimen as well as any additional steps to facilitate above request. Provided office number as well for return call. 10:02 AM Spoke with Claudia. She states [facility] requires a cover letter indicating how many slides are being mailed, patient demographics, and copy of pathology report. Slides can be mailed to  [facility]  [address] [place] [address], [address] [place] [address], [address] [place] [address]. Will contact [facility] to coordinate above. 10:49 AM Tania Witt with [address] [place] [address] pathology. Discussed above. She states provider would need to review case and determine which slides/how many to send to [facility]. This nurse will submit request via fax ([phone]), including address for [facility] pathology. Also requested that [address] [place] [address] call this nurse back with update of how many slides are being sent so this can be communicated with [facility]. Will continue to monitor. 1:23 PM Received message from [address] [place] [address] that there will be 14 slides, 3 blocks sent to [facility] that should be mailed today.  Fax sent to [facility] pathology department at [phone] specifying above.       07/23/20 8:36 AM Attempted to call Leslie Vaca at [facility] to verify that they received above slides. No answer, left message requesting return call. Provided office phone number as well.     07/28/20 2:21 PM Per chart review, [facility] pathology has reviewed slides and report scanned in under media. No further questions or concerns at this time.

## 2020-07-10 ENCOUNTER — TELEPHONE (OUTPATIENT)
Dept: ONCOLOGY | Age: 65
End: 2020-07-10

## 2020-07-10 NOTE — TELEPHONE ENCOUNTER
[address] at [facility]  [phone]        07/10/20 3:49 PM Spoke with Dragan Gallo, foundation medicine representative, regarding fax this nurse received inquiring about status of prior authorization for [facility]. Per portal, status still indicates \"non-authorized. \" Pascale Echavarria states she will discuss with billing department for guidance and notify office of update. Per chart review, patient has been approved for 90% financial assistance. Pascale Echavarria stated if insurance denies coverage for testing, his out of pocket expense would be $350. Will continue to monitor. 07/16/20 10:22 AM Message sent to Pascale Echavarria to follow up on status of prior authorization. 12:10 PM Received response back from Pascale Echavarria. Was informed that \"non-authorized\" needed to be documented on test order. Nothing additional needed at this time. No further questions or concerns at this time.

## 2020-07-15 NOTE — PROGRESS NOTES
[address]  Medical Oncology at [address] [place] [address]  [phone]    Hematology / Oncology Established Visit    Reason for Visit:   Misael Alcocer is a 76 y.o. male who is seen for follow up of neuroendocrine carcinoma. Initially referred by Dr. Pj Mike. Hematology Oncology Treatment History:     Diagnosis: Large cell neuroendocrine carcinoma. Stage: IV    Pathology:   5/29/20 excisional R external iliac LN biopsy: Poorly differentiated carcinoma with features of large cell neuroendocrine carcinoma with loss of chromogranin and synaptophysin expression. Flow cytometry negative for lymphoproliferative disorder. FoundationOne: MS Stable, TMB 0, MDM4 amplification, MYC amplification, TMPRSS2 TMPRSS2-ERD fusion, CEBPA 1311fs*10, ERBB4 amplification - equivocal, MCL1 amplification, KTT5H0H amplification, RAD21 amplification. See scanned report for full info. Prior Treatment: None    Current Treatment: Carbo-Etoposide-Atezolizumab started 6/29/20  Treatment duration: Until disease progression or toxicity  Frequency of visits: Every 3 weeks     History of Present Illness:   Misael Alcocer is a 76 y.o. male who comes in for evaluation of poorly differentiated neuroendocrine carcinoma. Pt noticed a nontender bulge in his LLQ in 5/2020. He thought this was a hernia and was evaluated by Dr. Jessica Walker. CT on 5/20/20 was notable for extensive lymphadenopathy in abd/pelvis. Pt underwent robot assisted excisional Right external iliac LN biopsy 5/29/20, which showed poorly differentiated large cell neuroendocrine carcinoma. He reports intentional weight loss with dietary changes and exercise, losing 40-lbs in past 8 months. No n/v/d, melena/hematochezia, cough, SOB. No fevers, chills, sweats. Lifeti[employer] nonsmoker. Mother had breast cancer diagnosed age < 48. Twin brother had melanoma diagnosed aged late 46s. He does not think he has ever had a colonoscopy or PSA screening.     Interval History:  Patient here neuroendocrine carcinoma and cycle 2 of treatment. He completed cycle 1 carbo-etoposide-atezolizumab (6/29-7/1). The swelling of left neck is still present but is smaller in size. No tenderness currently. After treatment he did not have nausea and reports a good energy level. He is taking antiemetics once daily. Good appetite. No constipation or diarrhea. No fevers or chills. No neuropathy. Alopecia occurred. Reports he feels \"stitches\" above port and asks if these can be removed. Asks if he can go visit his son in [place] for a week. History reviewed. No pertinent past medical history. Past Surgical History:   Procedure Laterality Date    HX APPENDECTOMY  1964    HX OTHER SURGICAL      subcutaneous cyst of forehead    HX VASECTOMY        Social History     Tobacco Use    Smoking status: Never Smoker    Smokeless tobacco: Never Used   Substance Use Topics    Alcohol use: Not Currently   Unmarried. Has 2 children, 27 and 32. 1 lives in [place]. Family History   Problem Relation Age of Onset   24 \[facility]\"" Cancer Mother         breast    Cancer Father         prostate    Cancer Brother         melanoma    COPD Brother      Current Outpatient Medications   Medication Sig    ondansetron hcl (ZOFRAN) 8 mg tablet Take 1 Tab by mouth every eight (8) hours as needed for Nausea or Vomiting.  lidocaine-prilocaine (EMLA) topical cream Apply a dime size amount to port site 30-60 minutes before access on treatment days to numb port site.  prochlorperazine (Compazine) 10 mg tablet Take 0.5 Tabs by mouth every six (6) hours as needed for Nausea or Vomiting.  dextroamphetamine-amphetamine (ADDERALL) 10 mg tablet TAKE 1 TABLET TWICE A DAY FOR 30 DAYS    acyclovir (ZOVIRAX) 400 mg tablet Take 400 mg by mouth two (2) times a day. No current facility-administered medications for this visit.        No Known Allergies     Review of Systems: A complete review of systems was obtained, negative except as described above. Physical Exam:     Visit Vitals  /83 (BP 1 Location: Left arm, BP Patient Position: Sitting)   Pulse 81   Temp 98 °F (36.7 °C) (Oral)   Resp 18   Ht 5' 11\" (1.803 m)   Wt 182 lb 12.8 oz (82.9 kg)   SpO2 96%   BMI 25.50 kg/m²     ECOG PS: 0  General: Well developed, no acute distress  Eyes: PERRLA, EOMI, anicteric sclerae  HENT: Atraumatic, OP clear, TMs intact without erythema  Neck: Supple, Left supraclavicular LN now smaller in size. Lymphatic: No cervical, supraclavicular, axillary or inguinal adenopathy  Respiratory: CTAB, normal respiratory effort  CV: Normal rate, regular rhythm, no murmurs, no peripheral edema  GI: Soft, nontender, nondistended, no hepatomegaly, no splenomegaly. L lower abdomen with soft mobile mass, approx 5cm  MS: Normal gait and station. Digits without clubbing or cyanosis. Skin: No rashes, ecchymoses, or petechiae. Normal temperature, turgor, and texture. Neuro/Psych: Alert, oriented. 5/5 strength in all 4 extremities. Appropriate affect, normal judgment/insight. Results:     Lab Results   Component Value Date/Time    WBC 4.1 07/20/2020 09:58 AM    HGB 13.3 07/20/2020 09:58 AM    HCT 38.8 07/20/2020 09:58 AM    PLATELET 090 23/39/4587 09:58 AM    MCV 88.6 07/20/2020 09:58 AM    ABS. NEUTROPHILS 2.1 07/20/2020 09:58 AM     Lab Results   Component Value Date/Time    Sodium 137 07/20/2020 09:58 AM    Potassium 3.4 (L) 07/20/2020 09:58 AM    Chloride 104 07/20/2020 09:58 AM    CO2 28 07/20/2020 09:58 AM    Glucose 128 (H) 07/20/2020 09:58 AM    BUN 23 (H) 07/20/2020 09:58 AM    Creatinine 0.96 07/20/2020 09:58 AM    GFR est AA >60 07/20/2020 09:58 AM    GFR est non-AA >60 07/20/2020 09:58 AM    Calcium 9.2 07/20/2020 09:58 AM     Lab Results   Component Value Date/Time    Bilirubin, total 0.4 07/20/2020 09:58 AM    ALT (SGPT) 23 07/20/2020 09:58 AM    Alk.  phosphatase 116 07/20/2020 09:58 AM    Protein, total 7.2 07/20/2020 09:58 AM Albumin 3.4 (L) 2020 09:58 AM    Globulin 3.8 2020 09:58 AM     No results found for: IRON, FE, TIBC, IBCT, PSAT, FERR    No results found for: B12LT, FOL, RBCF  Lab Results   Component Value Date/Time    TSH 1.72 2020 09:58 AM     No results found for: HAMAT, HAAB, HABT, HAAT, HBSAG, HBSB, HBSAT, HBABN, HBCM, HBCAB, HBCAT, XBCABS, [facility], [address], XHEPCS, [id number], [address] [facility], [facility], HBCLT, [address] [facility], [id number], [id number], [address] [place] [address], [id number], HBCMLT, [id number], HCGAT     20: Chromogranin 42      Imagin/20/20 CT abd/pelvis with IV contrast:  Impression:  1. No findings to suggest gross abdominal wall hernia or flank hernia. 2. Extensive adenopathy throughout the abdomen and pelvis as described. Findings are concerning for possible metastatic disease or lymphoma. Recommend follow up or comparison with prior studies. 3. Other findings as described. 20 PET:  FINDINGS:  HEAD/NECK: No apparent foci of abnormal hypermetabolism. Cerebral evaluation is  limited by normal intense activity. CHEST: Hypermetabolic lymphadenopathy at the base of the left neck and in the  left supraclavicular region is noted. Hypermetabolic superior mediastinal,  prevascular, right paratracheal, subcarinal, and bilateral hilar lymph  lymphadenopathy, maximum SUV of the subcarinal lymph node is 5.7. Small but  hypermetabolic soft tissue nodule left anterior chest wall. ABDOMEN/PELVIS: Hypermetabolic retrocrural, left para-aortic, aortocaval,  bilateral common iliac, bilateral external iliac, and bilateral inguinal  lymphadenopathy, maximum SUV 5.8 of an aortocaval lymph node. Hypermetabolic  mesenteric lymph node left lower quadrant, maximum SUV 12.3. SKELETON: No foci of abnormal hypermetabolism in the axial and visualized  appendicular skeleton.   IMPRESSION: Hypermetabolic lymphadenopathy involving the left neck and left  supraclavicular region, mediastinum, bilateral hilum, retroperitoneum,  mesentery, and pelvis as described above. Hypermetabolic soft tissue nodule left  chest.    Brain MRI 6/27/20: IMPRESSION:  There is no evidence of intracranial metastatic disease. Mild chronic microvascular ischemic change. No intracranial mass, hemorrhage or evidence of acute infarction. Assessment & Plan:   Norm Granger is a 76 y.o. 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 1. Large cell neuroendocrine carcinoma of lung or prostate: I spoke with pathologist who states this appears to be a high grade aggressive and poorly differentiated cancer. Likely neuroendocrine based on perinuclear staining pattern and some TTF-1 positivity. Primary lesion is likely lung based on PET scan. The pathology is somewhat incongruent with patient who remains largely asymptomatic. Large cell neuroendocrine carcinoma of lung is rare and is thought to behave like small cell lung cancer and non-small cell lung cancer. Genomic profiling can help identify which type is more similar. Current recommendations are to treat similar to small cell carcinoma with Carboplatin-Etoposide. The addition of immunotherapy such as Garnica Manges is also recommended by experts based on extrapolation of data in small cell lung cancer. Chemotherapy teaching and consent completed. Supportive medications: EMLA cream, zofran, compazine. -- Load outside CT in PACS  -- Send pathology specimen to [facility] pathology for expert review. -- Proceed with Cycle 2 Carbo-Etop-Atezolizumab. -- Return in 3 weeks for Cycle 3 Carbo-Etop-Atezolizumab, MD/NP visit. -- CT scan ordered after cycle 2.     2. Elevated PSA: Referred to Urology for evaluation and discussion. PSA 38.6 and given peculiar (asymptomatic) presentation of the large cell neuroendocrine carcinoma, would like evaluation of elevated PSA. There are rare cases of large cell neuroendocrine carcinoma of prostate reported as well.  If Urology feels prostate biopsy is easily feasible, this would help mckenna in on diagnosis and primary lesion. -- Scheduled to see Urology 7/31/20.     3. ADHD: On Adderal    4. 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 5. Hx elevated BP: Resolved. No diagnosis of  HTN. Likely 2/2 to anxiety, will continue to monitor closely. 6. Neck swelling: Left supraclavicular LAD noted with surrounding edema has improved. No erythema or drainage present. Occurred 1 week after cycle 1. Discussed case with Dr. Salomon Gardiner who does not suspect port problem (catheter in subclavian vein.)  -- Continue to monitor and re-evaluate on CT after cycle 2.    7. GI / Electrolytes: Replete as needed. Encourage foods high in potassium. 8. Health maintenance: No prior colonoscopy or PSA per patient. Emotional well being: Pt is coping well with his/her disease and has excellent support. I appreciate the opportunity to participate in Mr. Camron Conley care. Significant other: Dank Bravoel - [phone]. Patient seen with Levi Molina NP.      Signed By: Frank Lindquist MD     July 20, 2020

## 2020-07-16 ENCOUNTER — TELEPHONE (OUTPATIENT)
Dept: ONCOLOGY | Age: 65
End: 2020-07-16

## 2020-07-16 NOTE — TELEPHONE ENCOUNTER
[facility] pathology-----there will be 14 slides 3 blocks sent to [facility]  And should go out today     Thanks

## 2020-07-17 ENCOUNTER — TELEPHONE (OUTPATIENT)
Dept: ONCOLOGY | Age: 65
End: 2020-07-17

## 2020-07-17 NOTE — TELEPHONE ENCOUNTER
Patient called and stated that his wife has been out of town and he wanted to know if his wife need to stay some where when she comes back for 12 days.   [phone]

## 2020-07-20 ENCOUNTER — OFFICE VISIT (OUTPATIENT)
Dept: ONCOLOGY | Age: 65
End: 2020-07-20

## 2020-07-20 ENCOUNTER — TELEPHONE (OUTPATIENT)
Dept: ONCOLOGY | Age: 65
End: 2020-07-20

## 2020-07-20 ENCOUNTER — HOSPITAL ENCOUNTER (OUTPATIENT)
Dept: INFUSION THERAPY | Age: 65
Discharge: HOME | End: 2020-07-20
Payer: COMMERCIAL

## 2020-07-20 VITALS
DIASTOLIC BLOOD PRESSURE: 92 MMHG | HEIGHT: 70 IN | RESPIRATION RATE: 18 BRPM | BODY MASS INDEX: 25.98 KG/M2 | HEART RATE: 68 BPM | OXYGEN SATURATION: 97 % | TEMPERATURE: 98.6 F | WEIGHT: 181.5 LBS | SYSTOLIC BLOOD PRESSURE: 136 MMHG

## 2020-07-20 VITALS
BODY MASS INDEX: 25.59 KG/M2 | SYSTOLIC BLOOD PRESSURE: 120 MMHG | DIASTOLIC BLOOD PRESSURE: 83 MMHG | HEART RATE: 81 BPM | RESPIRATION RATE: 18 BRPM | WEIGHT: 182.8 LBS | TEMPERATURE: 98 F | HEIGHT: 71 IN | OXYGEN SATURATION: 96 %

## 2020-07-20 DIAGNOSIS — E87.6 HYPOKALEMIA: ICD-10-CM

## 2020-07-20 DIAGNOSIS — R97.20 ELEVATED PSA: ICD-10-CM

## 2020-07-20 DIAGNOSIS — C34.00 MALIGNANT NEOPLASM OF HILUS OF LUNG, UNSPECIFIED LATERALITY (HCC): ICD-10-CM

## 2020-07-20 DIAGNOSIS — Z51.11 CHEMOTHERAPY MANAGEMENT, ENCOUNTER FOR: ICD-10-CM

## 2020-07-20 DIAGNOSIS — C7A.8 LARGE CELL NEUROENDOCRINE CARCINOMA (HCC): Primary | ICD-10-CM

## 2020-07-20 DIAGNOSIS — R59.0 SUPRACLAVICULAR LYMPHADENOPATHY: ICD-10-CM

## 2020-07-20 LAB
ALBUMIN SERPL-MCNC: 3.4 G/DL (ref 3.5–5)
ALBUMIN/GLOB SERPL: 0.9 {RATIO} (ref 1.1–2.2)
ALP SERPL-CCNC: 116 U/L (ref 45–117)
ALT SERPL-CCNC: 23 U/L (ref 12–78)
ANION GAP SERPL CALC-SCNC: 5 MMOL/L (ref 5–15)
AST SERPL-CCNC: 22 U/L (ref 15–37)
BASO+EOS+MONOS # BLD AUTO: 0.7 K/UL (ref 0.2–1.2)
BASO+EOS+MONOS NFR BLD AUTO: 16 % (ref 3.2–16.9)
BILIRUB SERPL-MCNC: 0.4 MG/DL (ref 0.2–1)
BUN SERPL-MCNC: 23 MG/DL (ref 6–20)
BUN/CREAT SERPL: 24 (ref 12–20)
CALCIUM SERPL-MCNC: 9.2 MG/DL (ref 8.5–10.1)
CHLORIDE SERPL-SCNC: 104 MMOL/L (ref 97–108)
CO2 SERPL-SCNC: 28 MMOL/L (ref 21–32)
CREAT SERPL-MCNC: 0.96 MG/DL (ref 0.7–1.3)
DIFFERENTIAL METHOD BLD: NORMAL
ERYTHROCYTE [DISTWIDTH] IN BLOOD BY AUTOMATED COUNT: 15.8 % (ref 11.8–15.8)
GLOBULIN SER CALC-MCNC: 3.8 G/DL (ref 2–4)
GLUCOSE SERPL-MCNC: 128 MG/DL (ref 65–100)
HCT VFR BLD AUTO: 38.8 % (ref 36.6–50.3)
HGB BLD-MCNC: 13.3 G/DL (ref 12.1–17)
LYMPHOCYTES # BLD: 1.3 K/UL (ref 0.8–3.5)
LYMPHOCYTES NFR BLD: 31 % (ref 12–49)
MCH RBC QN AUTO: 30.4 PG (ref 26–34)
MCHC RBC AUTO-ENTMCNC: 34.3 G/DL (ref 30–36.5)
MCV RBC AUTO: 88.6 FL (ref 80–99)
NEUTS SEG # BLD: 2.1 K/UL (ref 1.8–8)
NEUTS SEG NFR BLD: 53 % (ref 32–75)
PLATELET # BLD AUTO: 279 K/UL (ref 150–400)
POTASSIUM SERPL-SCNC: 3.4 MMOL/L (ref 3.5–5.1)
PROT SERPL-MCNC: 7.2 G/DL (ref 6.4–8.2)
PSA SERPL-MCNC: 13.2 NG/ML (ref 0.01–4)
RBC # BLD AUTO: 4.38 M/UL (ref 4.1–5.7)
SODIUM SERPL-SCNC: 137 MMOL/L (ref 136–145)
WBC # BLD AUTO: 4.1 K/UL (ref 4.1–11.1)

## 2020-07-20 PROCEDURE — 80053 COMPREHEN METABOLIC PANEL: CPT

## 2020-07-20 PROCEDURE — 74011250636 HC RX REV CODE- 250/636: Performed by: INTERNAL MEDICINE

## 2020-07-20 PROCEDURE — 74011250637 HC RX REV CODE- 250/637: Performed by: NURSE PRACTITIONER

## 2020-07-20 PROCEDURE — 96413 CHEMO IV INFUSION 1 HR: CPT

## 2020-07-20 PROCEDURE — 96417 CHEMO IV INFUS EACH ADDL SEQ: CPT

## 2020-07-20 PROCEDURE — 77030016057 HC NDL HUBR APOL -B

## 2020-07-20 PROCEDURE — 85025 COMPLETE CBC W/AUTO DIFF WBC: CPT

## 2020-07-20 PROCEDURE — 74011000258 HC RX REV CODE- 258: Performed by: INTERNAL MEDICINE

## 2020-07-20 PROCEDURE — 96367 TX/PROPH/DG ADDL SEQ IV INF: CPT

## 2020-07-20 PROCEDURE — 84153 ASSAY OF PSA TOTAL: CPT

## 2020-07-20 PROCEDURE — 96375 TX/PRO/DX INJ NEW DRUG ADDON: CPT

## 2020-07-20 PROCEDURE — 86316 IMMUNOASSAY TUMOR OTHER: CPT

## 2020-07-20 PROCEDURE — 36415 COLL VENOUS BLD VENIPUNCTURE: CPT

## 2020-07-20 RX ORDER — ONDANSETRON 2 MG/ML
8 INJECTION INTRAMUSCULAR; INTRAVENOUS ONCE
Status: COMPLETED | OUTPATIENT
Start: 2020-07-20 | End: 2020-07-20

## 2020-07-20 RX ORDER — SODIUM CHLORIDE 9 MG/ML
10 INJECTION INTRAMUSCULAR; INTRAVENOUS; SUBCUTANEOUS AS NEEDED
Status: ACTIVE | OUTPATIENT
Start: 2020-07-20 | End: 2020-07-20

## 2020-07-20 RX ORDER — SODIUM CHLORIDE 0.9 % (FLUSH) 0.9 %
10 SYRINGE (ML) INJECTION AS NEEDED
Status: DISPENSED | OUTPATIENT
Start: 2020-07-20 | End: 2020-07-20

## 2020-07-20 RX ORDER — POTASSIUM CHLORIDE 750 MG/1
40 TABLET, FILM COATED, EXTENDED RELEASE ORAL
Status: COMPLETED | OUTPATIENT
Start: 2020-07-20 | End: 2020-07-20

## 2020-07-20 RX ORDER — SODIUM CHLORIDE 9 MG/ML
25 INJECTION, SOLUTION INTRAVENOUS CONTINUOUS
Status: DISPENSED | OUTPATIENT
Start: 2020-07-20 | End: 2020-07-20

## 2020-07-20 RX ORDER — HEPARIN 100 UNIT/ML
300-500 SYRINGE INTRAVENOUS AS NEEDED
Status: ACTIVE | OUTPATIENT
Start: 2020-07-20 | End: 2020-07-20

## 2020-07-20 RX ADMIN — CARBOPLATIN 566 MG: 10 INJECTION INTRAVENOUS at 13:01

## 2020-07-20 RX ADMIN — SODIUM CHLORIDE 25 ML/HR: 900 INJECTION, SOLUTION INTRAVENOUS at 11:43

## 2020-07-20 RX ADMIN — ATEZOLIZUMAB 1200 MG: 1200 INJECTION, SOLUTION INTRAVENOUS at 12:23

## 2020-07-20 RX ADMIN — FOSAPREPITANT 150 MG: 150 INJECTION, POWDER, LYOPHILIZED, FOR SOLUTION INTRAVENOUS at 11:49

## 2020-07-20 RX ADMIN — ONDANSETRON 8 MG: 2 INJECTION INTRAMUSCULAR; INTRAVENOUS at 11:43

## 2020-07-20 RX ADMIN — ETOPOSIDE 204 MG: 20 INJECTION, SOLUTION, CONCENTRATE INTRAVENOUS at 14:03

## 2020-07-20 RX ADMIN — POTASSIUM CHLORIDE 40 MEQ: 750 TABLET, FILM COATED, EXTENDED RELEASE ORAL at 13:54

## 2020-07-20 RX ADMIN — DEXAMETHASONE SODIUM PHOSPHATE 12 MG: 10 INJECTION, SOLUTION INTRAMUSCULAR; INTRAVENOUS at 11:49

## 2020-07-20 NOTE — TELEPHONE ENCOUNTER
[address] at [facility]  [phone]    07/20/20- Phone call placed to Sheri at [address] [facility]- she reported she does not see imaging from [address] [place] [address] pushed into our system. Delfin Tesfaye will reach out to Edgardo Ji at [address] [place] [address] and request imaging to be pushed today. 3:13 PM- Called to get update on imaging- spoke to Delfin Tesfaye she reported she spoke to Edgardo Ji about 30 minutes ago who reported imaging was going to be sent soon. Per Delfin Tesfaye no imaging received in our system yet but per Edgardo Ji it should be in our system soon. Pat from [address] [place] [address] can be reached at [phone].

## 2020-07-20 NOTE — PROGRESS NOTES
Randee Jansen is a 76 y.o. male  Chief Complaint   Patient presents with   [facility] Maintenance Due   Topic Date Due    Hepatitis C Screening  1955    DTaP/Tdap/Td series (1 - Tdap) 06/23/1975    Lipid Screen  06/23/1994    Shingrix Vaccine Age 50> (1 of 2) 06/23/2004    FOBT Q1Y Age 50-75  06/23/2004    GLAUCOMA SCREENING Q2Y  06/23/2019    Pneumococcal 65+ years (1 of 1 - PPSV23) 06/23/2019     Visit Vitals  /83 (BP 1 Location: Left arm, BP Patient Position: Sitting)   Pulse 81   Temp 98 °F (36.7 °C) (Oral)   Resp 18   Ht 5' 11\" (1.803 m)   Wt 182 lb 12.8 oz (82.9 kg)   SpO2 96%   BMI 25.50 kg/m²     1. Have you been to the ER, urgent care clinic since your last visit? Hospitalized since your last visit? No     2. Have you seen or consulted any other health care providers outside of the [address] [place] [address] since your last visit? Include any pap smears or colon screening. No     Pt states he has a prescription for sildenafil but has not taken it in a while, and was wondering if it was ok to take it.

## 2020-07-20 NOTE — PROGRESS NOTES
hospitals Progress Note    Date: 2020    Name: Jossy Watt    MRN: [id number]         : 1955    Mr. Citlalli Munoz Arrived ambulatory and in no distress for cycle 2 day 1 of Tecentriq Carbo Etoposide regimen. Assessment was completed, no acute issues at this time,pt states he recently lost his hair. Pt noticed lump on left side of neck has seen MD regarding this, no pain noted. Left chest port accessed without difficulty, labs drawn and in process. Chemotherapy Flowsheet 2020   Cycle C2D1   Date 2020   Drug / Regimen Tecentriq Etoposide Carbo   Pre Meds -   Notes -         Proceeded to appt with Dr. Fabi Becerril    Mr. Larry Guzmantrena vitals were reviewed. Visit Vitals  /84   Pulse 84   Temp (P) 98.6 °F (37 °C)   Resp 18   Ht 5' 10\" (1.778 m)   Wt 82.3 kg (181 lb 8 oz)   SpO2 95%   BMI 26.04 kg/m²       Lab results were obtained and reviewed. Recent Results (from the past 12 hour(s))   CBC WITH 3 PART DIFF    Collection Time: 20  9:58 AM   Result Value Ref Range    WBC 4.1 4.1 - 11.1 K/uL    RBC 4.38 4.10 - 5.70 M/uL    HGB 13.3 12.1 - 17.0 g/dL    HCT 38.8 36.6 - 50.3 %    MCV 88.6 80.0 - 99.0 FL    MCH 30.4 26.0 - 34.0 PG    MCHC 34.3 30.0 - 36.5 g/dL    RDW 15.8 11.8 - 15.8 %    PLATELET 021 853 - 641 K/uL    NEUTROPHILS 53 32 - 75 %    MIXED CELLS 16 3.2 - 16.9 %    LYMPHOCYTES 31 12 - 49 %    ABS. NEUTROPHILS 2.1 1.8 - 8.0 K/UL    ABS. MIXED CELLS 0.7 0.2 - 1.2 K/uL    ABS.  LYMPHOCYTES 1.3 0.8 - 3.5 K/UL    DF AUTOMATED     METABOLIC PANEL, COMPREHENSIVE    Collection Time: 20  9:58 AM   Result Value Ref Range    Sodium 137 136 - 145 mmol/L    Potassium 3.4 (L) 3.5 - 5.1 mmol/L    Chloride 104 97 - 108 mmol/L    CO2 28 21 - 32 mmol/L    Anion gap 5 5 - 15 mmol/L    Glucose 128 (H) 65 - 100 mg/dL    BUN 23 (H) 6 - 20 MG/DL    Creatinine 0.96 0.70 - 1.30 MG/DL    BUN/Creatinine ratio 24 (H) 12 - 20      GFR est AA >60 >60 ml/min/1.73m2    GFR est non-AA >60 >60 ml/min/1.73m2 Calcium 9.2 8.5 - 10.1 MG/DL    Bilirubin, total 0.4 0.2 - 1.0 MG/DL    ALT (SGPT) 23 12 - 78 U/L    AST (SGOT) 22 15 - 37 U/L    Alk. phosphatase 116 45 - 117 U/L    Protein, total 7.2 6.4 - 8.2 g/dL    Albumin 3.4 (L) 3.5 - 5.0 g/dL    Globulin 3.8 2.0 - 4.0 g/dL    A-G Ratio 0.9 (L) 1.1 - 2.2     PSA, DIAGNOSTIC (PROSTATE SPECIFIC AG)    Collection Time: 07/20/20  9:58 AM   Result Value Ref Range    Prostate Specific Ag 13.2 (H) 0.01 - 4.0 ng/mL     Pre-medications  were administered as ordered and chemotherapy was initiated.      Medications Administered     0.9% sodium chloride infusion     Admin Date  07/20/2020 Action  New Bag Dose  25 mL/hr Rate  25 mL/hr Route  IntraVENous Administered By  Ponce Amauri          atezolizumab (TECENTRIQ) 1,200 mg in 0.9% sodium chloride 250 mL, overfill volume 25 mL IVPB     Admin Date  07/20/2020 Action  New Bag Dose  1200 mg Rate  590 mL/hr Route  IntraVENous Administered By  Ponce Amauri          CARBOplatin (PARAPLATIN) 566 mg in 0.9% sodium chloride 250 mL, overfill volume 25 mL chemo infusion     Admin Date  07/20/2020 Action  New Bag Dose  566 mg Rate  663.2 mL/hr Route  IntraVENous Administered By  Ponce Amauri          dexamethasone (DECADRON) 12 mg in 0.9% sodium chloride 50 mL IVPB     Admin Date  07/20/2020 Action  Given Dose  12 mg Route  IntraVENous Administered By  Ponce Amauri          etoposide (VEPESID) 204 mg in 0.9% sodium chloride 500 mL, overfill volume 50 mL chemo infusion     Admin Date  07/20/2020 Action  New Bag Dose  204 mg Rate  560.2 mL/hr Route  IntraVENous Administered By  Ponce Amauri          fosaprepitant (EMEND) 150 mg in 0.9% sodium chloride 150 mL IVPB     Admin Date  07/20/2020 Action  Given Dose  150 mg Rate  450 mL/hr Route  IntraVENous Administered By  Ponce Amauri          [facility]ron [facility] injection 8 mg     Admin Date  07/20/2020 Action  Given Dose  8 mg Route  IntraVENous Administered By  Ponce Amauri          potassium chloride SR (KLOR-CON 10) tablet 40 mEq     Admin Date  07/20/2020 Action  Given Dose  40 mEq Route  Oral Administered By  Emily Megan                Mr. Brenner Son tolerated treatment well and was discharged from [place] [address] in stable condition. He is to return on 7/21 for his next appointment.     [facility]  July 20, 2020

## 2020-07-20 NOTE — TELEPHONE ENCOUNTER
[facility] [employer] at [facility]  [phone]    07/20/20- Returned patients phone call from Friday- he reported his wife was out of town with family. He is inquriing if wife needs to quaratine for 2 weeks. She was not exposed to anyone with covid while away that she is aware of. He already reconnected with wife over the weekend. Advised him to follow standard CDC guidelines. Call with any further concerns or questions.

## 2020-07-21 ENCOUNTER — HOSPITAL ENCOUNTER (OUTPATIENT)
Dept: INFUSION THERAPY | Age: 65
Discharge: HOME | End: 2020-07-21
Payer: COMMERCIAL

## 2020-07-21 VITALS
SYSTOLIC BLOOD PRESSURE: 129 MMHG | BODY MASS INDEX: 25.59 KG/M2 | TEMPERATURE: 98.8 F | HEIGHT: 71 IN | HEART RATE: 65 BPM | WEIGHT: 182.8 LBS | RESPIRATION RATE: 16 BRPM | DIASTOLIC BLOOD PRESSURE: 76 MMHG

## 2020-07-21 DIAGNOSIS — C7A.8 LARGE CELL NEUROENDOCRINE CARCINOMA (HCC): Primary | ICD-10-CM

## 2020-07-21 DIAGNOSIS — C34.00 MALIGNANT NEOPLASM OF HILUS OF LUNG, UNSPECIFIED LATERALITY (HCC): ICD-10-CM

## 2020-07-21 PROCEDURE — 96413 CHEMO IV INFUSION 1 HR: CPT

## 2020-07-21 PROCEDURE — 74011250636 HC RX REV CODE- 250/636: Performed by: INTERNAL MEDICINE

## 2020-07-21 PROCEDURE — 77030016057 HC NDL HUBR APOL -B

## 2020-07-21 PROCEDURE — 74011000258 HC RX REV CODE- 258: Performed by: INTERNAL MEDICINE

## 2020-07-21 PROCEDURE — 96375 TX/PRO/DX INJ NEW DRUG ADDON: CPT

## 2020-07-21 RX ORDER — SODIUM CHLORIDE 9 MG/ML
10 INJECTION INTRAMUSCULAR; INTRAVENOUS; SUBCUTANEOUS AS NEEDED
Status: DISCONTINUED | OUTPATIENT
Start: 2020-07-21 | End: 2020-07-22 | Stop reason: HOSPADM

## 2020-07-21 RX ORDER — HEPARIN 100 UNIT/ML
300-500 SYRINGE INTRAVENOUS AS NEEDED
Status: DISCONTINUED | OUTPATIENT
Start: 2020-07-21 | End: 2020-07-22 | Stop reason: HOSPADM

## 2020-07-21 RX ORDER — SODIUM CHLORIDE 9 MG/ML
25 INJECTION, SOLUTION INTRAVENOUS CONTINUOUS
Status: DISPENSED | OUTPATIENT
Start: 2020-07-21 | End: 2020-07-21

## 2020-07-21 RX ORDER — SODIUM CHLORIDE 0.9 % (FLUSH) 0.9 %
10 SYRINGE (ML) INJECTION AS NEEDED
Status: DISCONTINUED | OUTPATIENT
Start: 2020-07-21 | End: 2020-07-22 | Stop reason: HOSPADM

## 2020-07-21 RX ORDER — DEXAMETHASONE SODIUM PHOSPHATE 4 MG/ML
8 INJECTION, SOLUTION INTRA-ARTICULAR; INTRALESIONAL; INTRAMUSCULAR; INTRAVENOUS; SOFT TISSUE ONCE
Status: COMPLETED | OUTPATIENT
Start: 2020-07-21 | End: 2020-07-21

## 2020-07-21 RX ADMIN — DEXAMETHASONE SODIUM PHOSPHATE 8 MG: 4 INJECTION, SOLUTION INTRAMUSCULAR; INTRAVENOUS at 11:51

## 2020-07-21 RX ADMIN — SODIUM CHLORIDE 25 ML/HR: 900 INJECTION, SOLUTION INTRAVENOUS at 11:50

## 2020-07-21 RX ADMIN — Medication 10 ML: at 11:40

## 2020-07-21 RX ADMIN — Medication 10 ML: at 13:35

## 2020-07-21 RX ADMIN — ETOPOSIDE 204 MG: 20 INJECTION, SOLUTION INTRAVENOUS at 12:30

## 2020-07-21 RX ADMIN — HEPARIN 500 UNITS: 100 SYRINGE at 13:35

## 2020-07-21 RX ADMIN — SODIUM CHLORIDE 10 ML: 9 INJECTION INTRAMUSCULAR; INTRAVENOUS; SUBCUTANEOUS at 11:40

## 2020-07-21 NOTE — PROGRESS NOTES
[facility] Progress Note    Date: 2020    Name: Abby Ramos    MRN: [id number]         : 1955    Mr. Nida Howard Arrived ambulatory and in no distress for C2D2 of Etoposide Regimen. Assessment was completed, no acute issues at this time, no new complaints voiced. Left chest wall port accessed without difficulty, positive blood return noted. Chemotherapy Flowsheet 2020   Cycle C2D2   Date 2020   Drug / Regimen Etoposide   Pre Meds -   Notes -     Mr. Sánchez's vitals were reviewed. Patient Vitals for the past 12 hrs:   Temp Pulse Resp BP   20 1332 -- 65 16 129/76   20 1140 98.8 °F (37.1 °C) 81 16 114/63     Labs drawn on 2020 and within treatment parameters.     Medications:  Medications Administered     0.9% sodium chloride infusion     Admin Date  2020 Action  New Bag Dose  25 mL/hr Rate  25 mL/hr Route  IntraVENous Administered By  Leoncio Romano RN          0.9% sodium chloride injection 10 mL     Admin Date  2020 Action  Given Dose  10 mL Route  IntraVENous Administered By  Leoncio Romano RN          dexamethasone (DECADRON) 4 mg/mL injection 8 mg     Admin Date  2020 Action  Given Dose  8 mg Route  IntraVENous Administered By  Leoncio Romano RN          etoposide (VEPESID) 204 mg in 0.9% sodium chloride 500 mL, overfill volume 50 mL chemo infusion     Admin Date  2020 Action  New Bag Dose  204 mg Rate  560.2 mL/hr Route  IntraVENous Administered By  Roberta Neumann RN          heparin (porcine) pf 300-500 Units     Admin Date  2020 Action  Given Dose  500 Units Route  InterCATHeter Administered By  Leoncio Romano RN          saline peripheral flush soln 10 mL     Admin Date  2020 Action  Given Dose  10 mL Route  InterCATHeter Administered By  Leoncio Romano RN           Admin Date  2020 Action  Given Dose  10 mL Route  InterCATHeter Administered By  Leoncio Romano RN Mr. Lokesh Durham tolerated treatment well and was discharged from [place] [address] in stable condition at 1335. Port de-accessed, flushed & heparinized per protocol. He is to return on July 22 at 1130 for his next appointment.     Robert Lan RN  July 21, 2020

## 2020-07-22 ENCOUNTER — HOSPITAL ENCOUNTER (OUTPATIENT)
Dept: INFUSION THERAPY | Age: 65
Discharge: HOME | End: 2020-07-22
Payer: COMMERCIAL

## 2020-07-22 VITALS
WEIGHT: 185.2 LBS | BODY MASS INDEX: 26.51 KG/M2 | RESPIRATION RATE: 18 BRPM | DIASTOLIC BLOOD PRESSURE: 84 MMHG | HEART RATE: 63 BPM | SYSTOLIC BLOOD PRESSURE: 144 MMHG | TEMPERATURE: 98.2 F | HEIGHT: 70 IN

## 2020-07-22 DIAGNOSIS — C7A.8 LARGE CELL NEUROENDOCRINE CARCINOMA (HCC): Primary | ICD-10-CM

## 2020-07-22 DIAGNOSIS — C34.00 MALIGNANT NEOPLASM OF HILUS OF LUNG, UNSPECIFIED LATERALITY (HCC): ICD-10-CM

## 2020-07-22 LAB — CGA SERPL-SCNC: 50.3 NG/ML (ref 0–101.8)

## 2020-07-22 PROCEDURE — 96413 CHEMO IV INFUSION 1 HR: CPT

## 2020-07-22 PROCEDURE — 77030016057 HC NDL HUBR APOL -B

## 2020-07-22 PROCEDURE — 74011250636 HC RX REV CODE- 250/636: Performed by: INTERNAL MEDICINE

## 2020-07-22 PROCEDURE — 96375 TX/PRO/DX INJ NEW DRUG ADDON: CPT

## 2020-07-22 RX ORDER — DEXAMETHASONE SODIUM PHOSPHATE 4 MG/ML
8 INJECTION, SOLUTION INTRA-ARTICULAR; INTRALESIONAL; INTRAMUSCULAR; INTRAVENOUS; SOFT TISSUE ONCE
Status: COMPLETED | OUTPATIENT
Start: 2020-07-22 | End: 2020-07-22

## 2020-07-22 RX ORDER — SODIUM CHLORIDE 0.9 % (FLUSH) 0.9 %
10 SYRINGE (ML) INJECTION AS NEEDED
Status: DISPENSED | OUTPATIENT
Start: 2020-07-22 | End: 2020-07-22

## 2020-07-22 RX ORDER — SODIUM CHLORIDE 9 MG/ML
10 INJECTION INTRAMUSCULAR; INTRAVENOUS; SUBCUTANEOUS AS NEEDED
Status: ACTIVE | OUTPATIENT
Start: 2020-07-22 | End: 2020-07-22

## 2020-07-22 RX ORDER — HEPARIN 100 UNIT/ML
300-500 SYRINGE INTRAVENOUS AS NEEDED
Status: ACTIVE | OUTPATIENT
Start: 2020-07-22 | End: 2020-07-22

## 2020-07-22 RX ORDER — SODIUM CHLORIDE 9 MG/ML
25 INJECTION, SOLUTION INTRAVENOUS CONTINUOUS
Status: DISPENSED | OUTPATIENT
Start: 2020-07-22 | End: 2020-07-22

## 2020-07-22 RX ADMIN — DEXAMETHASONE SODIUM PHOSPHATE 8 MG: 4 INJECTION, SOLUTION INTRAMUSCULAR; INTRAVENOUS at 11:52

## 2020-07-22 RX ADMIN — SODIUM CHLORIDE 25 ML/HR: 900 INJECTION, SOLUTION INTRAVENOUS at 11:51

## 2020-07-22 RX ADMIN — ETOPOSIDE 204 MG: 20 INJECTION INTRAVENOUS at 12:48

## 2020-07-22 RX ADMIN — Medication 500 UNITS: at 13:55

## 2020-07-22 RX ADMIN — Medication 10 ML: at 13:55

## 2020-07-29 PROBLEM — C79.82 METASTATIC ADENOCARCINOMA TO PROSTATE (HCC): Status: ACTIVE | Noted: 2020-07-29

## 2020-07-29 NOTE — PROGRESS NOTES
[address]  Medical Oncology at [facility]  [phone]    Hematology / Oncology Established Visit    Reason for Visit:   Tomasz Estes is a 76 y.o. male who is seen for follow up of neuroendocrine carcinoma. Initially referred by Dr. Qiana Kat. 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 Flow cytometry negative for lymphoproliferative disorder. FoundationOne: MS Stable, TMB 0, MDM4 amplification, MYC amplification, TMPRSS2 TMPRSS2-ERD fusion, CEBPA 1311fs*10, ERBB4 amplification - equivocal, MCL1 amplification, PCT7G3S amplification, RAD21 amplification. See scanned report for full info. Abbreviated [facility] Pathology Consultation:  Final diagnosis: Right external iliac node: Metastatic prostate adenocarcinoma. Comment: Histologic sections of the right external iliac node show sheets of cells with minimal eosinophilic cytoplasm and variable cytologic atypia. Some areas show acinar formation. The cells have predominantly round nuclei with vesicular chromatin and prominent nucleoli. Some areas show significantly more atypia with more irregular nuclear borders, hyperchromatic nyclei, and increase nuclear to cytoplasmic ratio. Frequent mitotic figures are identified. A provided pane of IHC stains is reviewed and demonstrates that the malignant cells show strong, diffuse expression of pan-cytokeratin and focal expression of TTF. CK7, CK20, chromogranin, and synaptophysin are negative in the lesional cells. CD3 and CD20 are negative in the lesions cells and positive in the background lymphocytes.  An abbreviated FoundationOne report was included, which included a TMPRSS2-ERG fusion (among other nonspecific abnormalities), which si found in approximately 50% of prostate cancers. Additional IHC studies performed at [facility] showed that the malignant cells have immunoreactivity with antibodies for PSA and PSAP, consistent with a diagnosis of metastatic prostate adenocarcinoma. Per report, flow cytometry negative for lymphoma. Overall, the histomorphology, immunophenotype and genomic findings in this case are diagnostic of metastatic prostate adenocarcinoma. The acinar formation and cytology were suggestive of this diagnosis, and the diffuse PSA and PSAP positivity as well as the TMPRSS2-ERG fusion confirmed the diagnosis. This fusion is the most common genomic alteration in prostate carcinoma and can be detected in over half of the cases, yet is not seen with any frequency in other types of carcinoma. Prior Treatment:   1. Carbo-Etoposide-Atezolizumab, 6/29/20-7/20/20. Current Treatment: ADT and Docetaxel, started 8/10/20  Treatment duration: Until disease progression or toxicity  Frequency of visits: Every 3 weeks    History of Present Illness:   Rock Murray is a 76 y.o. male who comes in for evaluation of poorly differentiated neuroendocrine carcinoma. Pt noticed a nontender bulge in his LLQ in 5/2020. He thought this was a hernia and was evaluated by Dr. Marina Lincoln. CT on 5/20/20 was notable for extensive lymphadenopathy in abd/pelvis. Pt underwent robot assisted excisional Right external iliac LN biopsy 5/29/20, which showed poorly differentiated large cell neuroendocrine carcinoma. He reports intentional weight loss with dietary changes and exercise, losing 40-lbs in past 8 months. No n/v/d, melena/hematochezia, cough, SOB. No fevers, chills, sweats. [employer] nonsmoker. Mother had breast cancer diagnosed age < 48. Twin brother had melanoma diagnosed aged late 46s. He does not think he has ever had a colonoscopy or PSA screening. Interval History:  Patient here for follow up.  Since the last visit, his pathology was reviewed at [facility] with rendering of new diagnosis of metastatic prostate cancer rather than neuroendocrine carcinoma. He has already completed 2 cycles of Carbo-etoposide-atezolizumab. Reports mild fatigue but still remaining active. The swelling of left neck is still present but is smaller in size. No tenderness currently. Good appetite. No constipation or diarrhea. No fevers or chills. No neuropathy. Reports \"knots\" present in abdomen where he had the firmagon injections with urology. No past medical history on file. Past Surgical History:   Procedure Laterality Date    HX APPENDECTOMY  1964    HX OTHER SURGICAL      subcutaneous cyst of forehead    HX VASECTOMY        Social History     Tobacco Use    Smoking status: Never Smoker    Smokeless tobacco: Never Used   Substance Use Topics    Alcohol use: Not Currently   Unmarried. Has 2 children, 27 and 32. 1 lives in [place]. Family History   Problem Relation Age of Onset   [address] [facility] Cancer Mother         breast    Cancer Father         prostate    Cancer Brother         melanoma    COPD Brother      Current Outpatient Medications   Medication Sig    ondansetron hcl (ZOFRAN) 8 mg tablet Take 1 Tab by mouth every eight (8) hours as needed for Nausea or Vomiting.  lidocaine-prilocaine (EMLA) topical cream Apply a dime size amount to port site 30-60 minutes before access on treatment days to numb port site.  prochlorperazine (Compazine) 10 mg tablet Take 0.5 Tabs by mouth every six (6) hours as needed for Nausea or Vomiting.  dextroamphetamine-amphetamine (ADDERALL) 10 mg tablet TAKE 1 TABLET TWICE A DAY FOR 30 DAYS    acyclovir (ZOVIRAX) 400 mg tablet Take 400 mg by mouth two (2) times a day. No current facility-administered medications for this visit. No Known Allergies     Review of Systems: A complete review of systems was obtained, negative except as described above.     Physical Exam:     Visit Vitals  /71 (BP 1 Location: Right arm, BP Patient Position: Sitting)   Pulse 91   Temp 98.6 °F (37 °C)   Resp 18   Wt 181 lb (82.1 kg)   SpO2 97%   BMI 25.97 kg/m²     ECOG PS: 0  General: Well developed, no acute distress  Eyes: PERRLA, EOMI, anicteric sclerae  HENT: Atraumatic, OP clear, TMs intact without erythema  Neck: Supple, Left supraclavicular LN now smaller in size. Lymphatic: No cervical, supraclavicular, axillary or inguinal adenopathy  Respiratory: CTAB, normal respiratory effort  CV: Normal rate, regular rhythm, no murmurs, no peripheral edema  GI: Soft, nontender, nondistended, no hepatomegaly, no splenomegaly. L lower abdomen with soft mobile mass, approx 5cm  MS: Normal gait and station. Digits without clubbing or cyanosis. Skin: No rashes, ecchymoses, or petechiae. Normal temperature, turgor, and texture. Neuro/Psych: Alert, oriented. 5/5 strength in all 4 extremities. Appropriate affect, normal judgment/insight. Results:     Lab Results   Component Value Date/Time    WBC 4.2 08/10/2020 08:36 AM    HGB 12.0 (L) 08/10/2020 08:36 AM    HCT 36.5 (L) 08/10/2020 08:36 AM    PLATELET 514 53/89/7763 08:36 AM    MCV 88.6 08/10/2020 08:36 AM    ABS. NEUTROPHILS 2.3 08/10/2020 08:36 AM     Lab Results   Component Value Date/Time    Sodium 140 08/10/2020 08:22 AM    Potassium 3.2 (L) 08/10/2020 08:22 AM    Chloride 107 08/10/2020 08:22 AM    CO2 24 08/10/2020 08:22 AM    Glucose 219 (H) 08/10/2020 08:22 AM    BUN 21 (H) 08/10/2020 08:22 AM    Creatinine 0.93 08/10/2020 08:22 AM    GFR est AA >60 08/10/2020 08:22 AM    GFR est non-AA >60 08/10/2020 08:22 AM    Calcium 9.1 08/10/2020 08:22 AM     Lab Results   Component Value Date/Time    Bilirubin, total 0.4 07/20/2020 09:58 AM    ALT (SGPT) 23 07/20/2020 09:58 AM    Alk.  phosphatase 116 07/20/2020 09:58 AM    Protein, total 7.2 07/20/2020 09:58 AM    Albumin 3.4 (L) 07/20/2020 09:58 AM    Globulin 3.8 07/20/2020 09:58 AM     No results found for: IRON, FE, TIBC, IBCT, PSAT, FERR    No results found for: B12LT, FOL, RBCF  Lab Results   Component Value Date/Time    TSH 1.72 2020 09:58 AM     No results found for: HAMAT, HAAB, HABT, HAAT, HBSAG, HBSB, HBSAT, HBABN, HBCM, HBCAB, HBCAT, XBCABS, HBEAB, HBEAG, XHEPCS, [id number], HBEGLT, [place], HBCLT, HBEBLT, [id number], [id number], HAVMLT, [id number], HBCMLT, [id number], HCGAT     20: Chromogranin 42    20: PSA 38.6  20: PSA 13.2  8/10/20: PSA 1.2      Imagin/20/20 CT abd/pelvis with IV contrast:  Impression:  1. No findings to suggest gross abdominal wall hernia or flank hernia. 2. Extensive adenopathy throughout the abdomen and pelvis as described. Findings are concerning for possible metastatic disease or lymphoma. Recommend follow up or comparison with prior studies. 3. Other findings as described. 20 PET:  FINDINGS:  HEAD/NECK: No apparent foci of abnormal hypermetabolism. Cerebral evaluation is  limited by normal intense activity. CHEST: Hypermetabolic lymphadenopathy at the base of the left neck and in the  left supraclavicular region is noted. Hypermetabolic superior mediastinal,  prevascular, right paratracheal, subcarinal, and bilateral hilar lymph  lymphadenopathy, maximum SUV of the subcarinal lymph node is 5.7. Small but  hypermetabolic soft tissue nodule left anterior chest wall. ABDOMEN/PELVIS: Hypermetabolic retrocrural, left para-aortic, aortocaval,  bilateral common iliac, bilateral external iliac, and bilateral inguinal  lymphadenopathy, maximum SUV 5.8 of an aortocaval lymph node. Hypermetabolic  mesenteric lymph node left lower quadrant, maximum SUV 12.3. SKELETON: No foci of abnormal hypermetabolism in the axial and visualized  appendicular skeleton. IMPRESSION: Hypermetabolic lymphadenopathy involving the left neck and left  supraclavicular region, mediastinum, bilateral hilum, retroperitoneum,  mesentery, and pelvis as described above.  Hypermetabolic soft tissue nodule left  chest.    Brain MRI 20: IMPRESSION:  There is no evidence of intracranial metastatic disease. Mild chronic microvascular ischemic change. No intracranial mass, hemorrhage or evidence of acute infarction. CT c/a/p 8/3/20: IMPRESSION:  Chest:  1. Findings consistent with partial treatment response, with interval decrease  in size of mediastinal and hilar lymph nodes, and interval decrease in size of  left chest wall nodule. Abdomen/pelvis:   1. Findings consistent with partial treatment response, with interval decrease  in size of retroperitoneal lymph nodes. 2. Unchanged circumferential bladder wall thickening, which may represent acute  or chronic cystitis. Correlate with urinalysis. RECIST   TARGET LESIONS:      Lesion (description)         Location (series/slice)                Size            1. Posterior mediastinal lymph node    series 2 image 26    2.3 x 2.0 cm, previously 3.3 x 2.2 cm  2. Left upper paraesophageal lymph node    series 2 image 9    5 mm, previously 15 mm. 3. Right external iliac lymphadenopathy    series 2 image 98    3.8 x 3.2 cm, previously 6.3 x 4.7 cm  4. Left external iliac lymphadenopathy    series 2 image 99    1.6 cm, previously 2.5 cm    Assessment & Plan:   Amada Mcfadden is a 76 y.o. 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 1. Metastatic adenocarcinoma of prostate, high grade: Initial pathologist at [address] [place] [address] called this \"Large cell neuroendocrine carcinoma,\" and based on diffuse disease on PET, patient was started on treatment with Carbo-Etoposide-Atezolizumab (thinking this was large cell carcinoma of the lung.) However FoundationOne testing identified TMPRSS2-ERG fusion, which is primarily seen in prostate cancers. This along with elevated PSA of 38 prompted sending of pathology specimen to [facility] for review. Their opinion and IHC staining is consistent with high grade adenocarcinoma of prostate.  Treatment options now include: Carboplatin + Cabazitaxel vs single agent Docetaxel. Will also start patient on ADT with Lupron. CT on 8/3/20 shows response to treatment. Supportive medications: EMLA cream, zofran, compazine. -- Pt was started on ADT [facility] [place]) by Dr. Cheyenne Quintero in Urology, next dose due 8/28/20, patient asking if he can receive treatment here. Will discuss with Dr. Cheyenne Quintero. -- Proceed with cycle 1 of docetaxeln today  -- Return in 3 weeks for cycle 2 of docetaxel, MD/NP visit. Can give Lupron with q3mo dosing at that time. -- CT and bone scan due after cycle 4.     2. ADHD: On Adderal    3. H/o Genital Herpes: On suppression with Acyclovir. 4. GI / Electrolytes: Replete as needed in [facility]IC. Encourage foods high in potassium. -- Start Potassium 20 meq daily. 5. Health maintenance: No prior colonoscopy or PSA per patient. 6. Bladder wall thickening: Seen on CT scan, which may represent acute or chronic cystitis. -- Urinalysis today - call patient with results. 7. Hyperglycemia: 2/2 to steroid premedications. Will discuss decreasing dose in future if he tolerates docetaxel. -- Referral to [facility] placed for management of steroid induced hyperglycemia. Emotional well being: Pt is coping well with his/her disease and has excellent support. I appreciate the opportunity to participate in Mr. Larry tabor. Significant other: Lynnette Arellano - [phone]. Patient seen with Rex Lambert NP.      Signed By: Alvy Fleischer, MD     August 10, 2020

## 2020-08-03 ENCOUNTER — HOSPITAL ENCOUNTER (OUTPATIENT)
Dept: CT IMAGING | Age: 65
Discharge: HOME | End: 2020-08-03
Attending: NURSE PRACTITIONER
Payer: COMMERCIAL

## 2020-08-03 DIAGNOSIS — C7A.8 LARGE CELL NEUROENDOCRINE CARCINOMA (HCC): ICD-10-CM

## 2020-08-03 PROCEDURE — 74177 CT ABD & PELVIS W/CONTRAST: CPT

## 2020-08-03 PROCEDURE — 74011636320 HC RX REV CODE- 636/320: Performed by: RADIOLOGY

## 2020-08-03 RX ADMIN — IOPAMIDOL 100 ML: 755 INJECTION, SOLUTION INTRAVENOUS at 11:45

## 2020-08-05 PROBLEM — C77.9 REGIONAL LYMPH NODE METASTASIS PRESENT (HCC): Status: ACTIVE | Noted: 2020-08-05

## 2020-08-05 PROBLEM — R59.0 MEDIASTINAL LYMPHADENOPATHY: Status: ACTIVE | Noted: 2020-08-05

## 2020-08-05 PROBLEM — C61 ADENOCARCINOMA OF PROSTATE, STAGE 4 (HCC): Status: ACTIVE | Noted: 2020-08-05

## 2020-08-05 RX ORDER — ONDANSETRON 2 MG/ML
8 INJECTION INTRAMUSCULAR; INTRAVENOUS AS NEEDED
Status: CANCELLED | OUTPATIENT
Start: 2020-08-10

## 2020-08-05 RX ORDER — EPINEPHRINE 1 MG/ML
0.3 INJECTION, SOLUTION, CONCENTRATE INTRAVENOUS AS NEEDED
Status: CANCELLED | OUTPATIENT
Start: 2020-08-10

## 2020-08-05 RX ORDER — HEPARIN 100 UNIT/ML
300-500 SYRINGE INTRAVENOUS AS NEEDED
Status: CANCELLED
Start: 2020-08-10

## 2020-08-05 RX ORDER — SODIUM CHLORIDE 9 MG/ML
25 INJECTION, SOLUTION INTRAVENOUS CONTINUOUS
Status: CANCELLED | OUTPATIENT
Start: 2020-08-10

## 2020-08-05 RX ORDER — DIPHENHYDRAMINE HYDROCHLORIDE 50 MG/ML
50 INJECTION, SOLUTION INTRAMUSCULAR; INTRAVENOUS AS NEEDED
Status: CANCELLED
Start: 2020-08-10

## 2020-08-05 RX ORDER — ALBUTEROL SULFATE 0.83 MG/ML
2.5 SOLUTION RESPIRATORY (INHALATION) AS NEEDED
Status: CANCELLED
Start: 2020-08-10

## 2020-08-05 RX ORDER — DEXAMETHASONE SODIUM PHOSPHATE 4 MG/ML
8 INJECTION, SOLUTION INTRA-ARTICULAR; INTRALESIONAL; INTRAMUSCULAR; INTRAVENOUS; SOFT TISSUE ONCE
Status: CANCELLED | OUTPATIENT
Start: 2020-08-10

## 2020-08-05 RX ORDER — SODIUM CHLORIDE 0.9 % (FLUSH) 0.9 %
10 SYRINGE (ML) INJECTION AS NEEDED
Status: CANCELLED
Start: 2020-08-10

## 2020-08-05 RX ORDER — SODIUM CHLORIDE 9 MG/ML
10 INJECTION INTRAMUSCULAR; INTRAVENOUS; SUBCUTANEOUS AS NEEDED
Status: CANCELLED | OUTPATIENT
Start: 2020-08-10

## 2020-08-05 RX ORDER — HYDROCORTISONE SODIUM SUCCINATE 100 MG/2ML
100 INJECTION, POWDER, FOR SOLUTION INTRAMUSCULAR; INTRAVENOUS AS NEEDED
Status: CANCELLED | OUTPATIENT
Start: 2020-08-10

## 2020-08-05 RX ORDER — DIPHENHYDRAMINE HYDROCHLORIDE 50 MG/ML
25 INJECTION, SOLUTION INTRAMUSCULAR; INTRAVENOUS AS NEEDED
Status: CANCELLED
Start: 2020-08-10

## 2020-08-05 RX ORDER — ACETAMINOPHEN 325 MG/1
650 TABLET ORAL AS NEEDED
Status: CANCELLED
Start: 2020-08-10

## 2020-08-05 NOTE — ONCOLOGY PATHWAY NOTE
START ON PATHWAY REGIMEN - Prostate    POS96        Docetaxel (Taxotere)     **Always confirm dose/schedule in your pharmacy ordering system**    Patient Characteristics:  Adenocarcinoma, Distant Metastases, Hormone Naive  Histology: Adenocarcinoma  Therapeutic Status: Distant Metastases    Intent of Therapy:  Non-Curative / Palliative Intent, Discussed with Patient

## 2020-08-06 ENCOUNTER — TELEPHONE (OUTPATIENT)
Dept: ONCOLOGY | Age: 65
End: 2020-08-06

## 2020-08-06 DIAGNOSIS — C61 PROSTATE CANCER (HCC): Primary | ICD-10-CM

## 2020-08-06 RX ORDER — DEXAMETHASONE 4 MG/1
TABLET ORAL
Qty: 32 TAB | Refills: 2 | Status: SHIPPED | OUTPATIENT
Start: 2020-08-06 | End: 2022-04-18 | Stop reason: ALTCHOICE

## 2020-08-06 NOTE — TELEPHONE ENCOUNTER
08/06/20 3:41 PM Called patient and reviewed SE of docetaxel and how to take steroid pre and post treatment. Dexamethasone prescription sent to pharmacy. Patient verbalized understanding.

## 2020-08-10 ENCOUNTER — OFFICE VISIT (OUTPATIENT)
Dept: ONCOLOGY | Age: 65
End: 2020-08-10
Payer: COMMERCIAL

## 2020-08-10 ENCOUNTER — HOSPITAL ENCOUNTER (OUTPATIENT)
Dept: INFUSION THERAPY | Age: 65
Discharge: HOME | End: 2020-08-10
Payer: COMMERCIAL

## 2020-08-10 VITALS
BODY MASS INDEX: 25.94 KG/M2 | WEIGHT: 181.2 LBS | TEMPERATURE: 98.6 F | RESPIRATION RATE: 18 BRPM | HEIGHT: 70 IN | OXYGEN SATURATION: 97 % | SYSTOLIC BLOOD PRESSURE: 161 MMHG | DIASTOLIC BLOOD PRESSURE: 84 MMHG | HEART RATE: 70 BPM

## 2020-08-10 VITALS
TEMPERATURE: 98.6 F | SYSTOLIC BLOOD PRESSURE: 152 MMHG | RESPIRATION RATE: 18 BRPM | WEIGHT: 181 LBS | DIASTOLIC BLOOD PRESSURE: 71 MMHG | BODY MASS INDEX: 25.97 KG/M2 | OXYGEN SATURATION: 97 % | HEART RATE: 91 BPM

## 2020-08-10 DIAGNOSIS — C61 ADENOCARCINOMA OF PROSTATE, STAGE 4 (HCC): Primary | ICD-10-CM

## 2020-08-10 DIAGNOSIS — C77.9 REGIONAL LYMPH NODE METASTASIS PRESENT (HCC): ICD-10-CM

## 2020-08-10 DIAGNOSIS — C34.00 MALIGNANT NEOPLASM OF HILUS OF LUNG, UNSPECIFIED LATERALITY (HCC): ICD-10-CM

## 2020-08-10 DIAGNOSIS — E87.6 HYPOKALEMIA: ICD-10-CM

## 2020-08-10 DIAGNOSIS — R73.9 HYPERGLYCEMIA: ICD-10-CM

## 2020-08-10 DIAGNOSIS — R97.20 ELEVATED PSA: ICD-10-CM

## 2020-08-10 DIAGNOSIS — R59.0 MEDIASTINAL LYMPHADENOPATHY: ICD-10-CM

## 2020-08-10 DIAGNOSIS — Z51.11 CHEMOTHERAPY MANAGEMENT, ENCOUNTER FOR: ICD-10-CM

## 2020-08-10 LAB
ALBUMIN SERPL-MCNC: 3.5 G/DL (ref 3.5–5)
ALBUMIN/GLOB SERPL: 0.9 {RATIO} (ref 1.1–2.2)
ALP SERPL-CCNC: 125 U/L (ref 45–117)
ALT SERPL-CCNC: 27 U/L (ref 12–78)
ANION GAP SERPL CALC-SCNC: 9 MMOL/L (ref 5–15)
APPEARANCE UR: CLEAR
AST SERPL-CCNC: 21 U/L (ref 15–37)
BACTERIA URNS QL MICRO: NEGATIVE /HPF
BASOPHILS # BLD: 0 K/UL (ref 0–0.1)
BASOPHILS NFR BLD: 1 % (ref 0–1)
BILIRUB SERPL-MCNC: 0.6 MG/DL (ref 0.2–1)
BILIRUB UR QL: NEGATIVE
BUN SERPL-MCNC: 21 MG/DL (ref 6–20)
BUN/CREAT SERPL: 23 (ref 12–20)
CALCIUM SERPL-MCNC: 9.1 MG/DL (ref 8.5–10.1)
CHLORIDE SERPL-SCNC: 107 MMOL/L (ref 97–108)
CO2 SERPL-SCNC: 24 MMOL/L (ref 21–32)
COLOR UR: NORMAL
CREAT SERPL-MCNC: 0.93 MG/DL (ref 0.7–1.3)
DIFFERENTIAL METHOD BLD: ABNORMAL
EOSINOPHIL # BLD: 0 K/UL (ref 0–0.4)
EOSINOPHIL NFR BLD: 0 % (ref 0–7)
EPITH CASTS URNS QL MICRO: NORMAL /LPF
ERYTHROCYTE [DISTWIDTH] IN BLOOD BY AUTOMATED COUNT: 15 % (ref 11.5–14.5)
GLOBULIN SER CALC-MCNC: 3.9 G/DL (ref 2–4)
GLUCOSE SERPL-MCNC: 219 MG/DL (ref 65–100)
GLUCOSE UR STRIP.AUTO-MCNC: NEGATIVE MG/DL
HCT VFR BLD AUTO: 36.5 % (ref 36.6–50.3)
HGB BLD-MCNC: 12 G/DL (ref 12.1–17)
HGB UR QL STRIP: NEGATIVE
HYALINE CASTS URNS QL MICRO: NORMAL /LPF (ref 0–5)
IMM GRANULOCYTES # BLD AUTO: 0.1 K/UL (ref 0–0.04)
IMM GRANULOCYTES NFR BLD AUTO: 2 % (ref 0–0.5)
KETONES UR QL STRIP.AUTO: NEGATIVE MG/DL
LEUKOCYTE ESTERASE UR QL STRIP.AUTO: NEGATIVE
LYMPHOCYTES # BLD: 0.9 K/UL (ref 0.8–3.5)
LYMPHOCYTES NFR BLD: 22 % (ref 12–49)
MCH RBC QN AUTO: 29.1 PG (ref 26–34)
MCHC RBC AUTO-ENTMCNC: 32.9 G/DL (ref 30–36.5)
MCV RBC AUTO: 88.6 FL (ref 80–99)
MONOCYTES # BLD: 0.9 K/UL (ref 0–1)
MONOCYTES NFR BLD: 21 % (ref 5–13)
NEUTS SEG # BLD: 2.3 K/UL (ref 1.8–8)
NEUTS SEG NFR BLD: 54 % (ref 32–75)
NITRITE UR QL STRIP.AUTO: NEGATIVE
NRBC # BLD: 0 K/UL (ref 0–0.01)
NRBC BLD-RTO: 0 PER 100 WBC
PH UR STRIP: 5.5 [PH] (ref 5–8)
PLATELET # BLD AUTO: 221 K/UL (ref 150–400)
PMV BLD AUTO: 10.8 FL (ref 8.9–12.9)
POTASSIUM SERPL-SCNC: 3.2 MMOL/L (ref 3.5–5.1)
PROT SERPL-MCNC: 7.4 G/DL (ref 6.4–8.2)
PROT UR STRIP-MCNC: NEGATIVE MG/DL
PSA SERPL-MCNC: 1.2 NG/ML (ref 0.01–4)
RBC # BLD AUTO: 4.12 M/UL (ref 4.1–5.7)
RBC #/AREA URNS HPF: NORMAL /HPF (ref 0–5)
RBC MORPH BLD: ABNORMAL
SODIUM SERPL-SCNC: 140 MMOL/L (ref 136–145)
SP GR UR REFRACTOMETRY: 1.02 (ref 1–1.03)
UROBILINOGEN UR QL STRIP.AUTO: 0.2 EU/DL (ref 0.2–1)
WBC # BLD AUTO: 4.2 K/UL (ref 4.1–11.1)
WBC URNS QL MICRO: NORMAL /HPF (ref 0–4)

## 2020-08-10 PROCEDURE — 36415 COLL VENOUS BLD VENIPUNCTURE: CPT

## 2020-08-10 PROCEDURE — 96413 CHEMO IV INFUSION 1 HR: CPT

## 2020-08-10 PROCEDURE — 99215 OFFICE O/P EST HI 40 MIN: CPT | Performed by: INTERNAL MEDICINE

## 2020-08-10 PROCEDURE — 74011250636 HC RX REV CODE- 250/636: Performed by: INTERNAL MEDICINE

## 2020-08-10 PROCEDURE — 96375 TX/PRO/DX INJ NEW DRUG ADDON: CPT

## 2020-08-10 PROCEDURE — 77030012965 HC NDL HUBR BBMI -A

## 2020-08-10 PROCEDURE — 84153 ASSAY OF PSA TOTAL: CPT

## 2020-08-10 PROCEDURE — 74011000250 HC RX REV CODE- 250: Performed by: INTERNAL MEDICINE

## 2020-08-10 PROCEDURE — 80053 COMPREHEN METABOLIC PANEL: CPT

## 2020-08-10 PROCEDURE — 81001 URINALYSIS AUTO W/SCOPE: CPT

## 2020-08-10 PROCEDURE — 85025 COMPLETE CBC W/AUTO DIFF WBC: CPT

## 2020-08-10 RX ORDER — SODIUM CHLORIDE 0.9 % (FLUSH) 0.9 %
10 SYRINGE (ML) INJECTION AS NEEDED
Status: CANCELLED
Start: 2020-10-15

## 2020-08-10 RX ORDER — ALBUTEROL SULFATE 0.83 MG/ML
2.5 SOLUTION RESPIRATORY (INHALATION) AS NEEDED
Status: CANCELLED
Start: 2020-10-15

## 2020-08-10 RX ORDER — DIPHENHYDRAMINE HYDROCHLORIDE 50 MG/ML
25 INJECTION, SOLUTION INTRAMUSCULAR; INTRAVENOUS AS NEEDED
Status: ACTIVE | OUTPATIENT
Start: 2020-08-10 | End: 2020-08-10

## 2020-08-10 RX ORDER — SODIUM CHLORIDE 9 MG/ML
25 INJECTION, SOLUTION INTRAVENOUS CONTINUOUS
Status: CANCELLED | OUTPATIENT
Start: 2020-09-21

## 2020-08-10 RX ORDER — HEPARIN 100 UNIT/ML
300-500 SYRINGE INTRAVENOUS AS NEEDED
Status: CANCELLED
Start: 2020-10-15

## 2020-08-10 RX ORDER — HEPARIN 100 UNIT/ML
300-500 SYRINGE INTRAVENOUS AS NEEDED
Status: ACTIVE | OUTPATIENT
Start: 2020-08-10 | End: 2020-08-10

## 2020-08-10 RX ORDER — DIPHENHYDRAMINE HYDROCHLORIDE 50 MG/ML
25 INJECTION, SOLUTION INTRAMUSCULAR; INTRAVENOUS AS NEEDED
Status: CANCELLED
Start: 2020-10-15

## 2020-08-10 RX ORDER — ACETAMINOPHEN 325 MG/1
650 TABLET ORAL AS NEEDED
Status: CANCELLED
Start: 2020-09-21

## 2020-08-10 RX ORDER — DIPHENHYDRAMINE HYDROCHLORIDE 50 MG/ML
50 INJECTION, SOLUTION INTRAMUSCULAR; INTRAVENOUS AS NEEDED
Status: CANCELLED
Start: 2020-10-15

## 2020-08-10 RX ORDER — ONDANSETRON 2 MG/ML
8 INJECTION INTRAMUSCULAR; INTRAVENOUS AS NEEDED
Status: CANCELLED | OUTPATIENT
Start: 2020-09-21

## 2020-08-10 RX ORDER — SODIUM CHLORIDE 9 MG/ML
10 INJECTION INTRAMUSCULAR; INTRAVENOUS; SUBCUTANEOUS AS NEEDED
Status: CANCELLED | OUTPATIENT
Start: 2020-10-15

## 2020-08-10 RX ORDER — DIPHENHYDRAMINE HYDROCHLORIDE 50 MG/ML
25 INJECTION, SOLUTION INTRAMUSCULAR; INTRAVENOUS AS NEEDED
Status: CANCELLED
Start: 2020-09-21

## 2020-08-10 RX ORDER — HYDROCORTISONE SODIUM SUCCINATE 100 MG/2ML
100 INJECTION, POWDER, FOR SOLUTION INTRAMUSCULAR; INTRAVENOUS AS NEEDED
Status: CANCELLED | OUTPATIENT
Start: 2020-09-21

## 2020-08-10 RX ORDER — DEXAMETHASONE SODIUM PHOSPHATE 4 MG/ML
8 INJECTION, SOLUTION INTRA-ARTICULAR; INTRALESIONAL; INTRAMUSCULAR; INTRAVENOUS; SOFT TISSUE ONCE
Status: CANCELLED | OUTPATIENT
Start: 2020-09-21

## 2020-08-10 RX ORDER — EPINEPHRINE 1 MG/ML
0.3 INJECTION, SOLUTION, CONCENTRATE INTRAVENOUS AS NEEDED
Status: CANCELLED | OUTPATIENT
Start: 2020-09-21

## 2020-08-10 RX ORDER — HYDROCORTISONE SODIUM SUCCINATE 100 MG/2ML
100 INJECTION, POWDER, FOR SOLUTION INTRAMUSCULAR; INTRAVENOUS AS NEEDED
Status: CANCELLED | OUTPATIENT
Start: 2020-10-15

## 2020-08-10 RX ORDER — DEXAMETHASONE SODIUM PHOSPHATE 4 MG/ML
8 INJECTION, SOLUTION INTRA-ARTICULAR; INTRALESIONAL; INTRAMUSCULAR; INTRAVENOUS; SOFT TISSUE ONCE
Status: CANCELLED | OUTPATIENT
Start: 2020-08-31

## 2020-08-10 RX ORDER — SODIUM CHLORIDE 0.9 % (FLUSH) 0.9 %
10 SYRINGE (ML) INJECTION AS NEEDED
Status: DISPENSED | OUTPATIENT
Start: 2020-08-10 | End: 2020-08-10

## 2020-08-10 RX ORDER — ACETAMINOPHEN 325 MG/1
650 TABLET ORAL AS NEEDED
Status: CANCELLED
Start: 2020-08-31

## 2020-08-10 RX ORDER — SODIUM CHLORIDE 9 MG/ML
25 INJECTION, SOLUTION INTRAVENOUS CONTINUOUS
Status: DISPENSED | OUTPATIENT
Start: 2020-08-10 | End: 2020-08-10

## 2020-08-10 RX ORDER — EPINEPHRINE 1 MG/ML
0.3 INJECTION, SOLUTION, CONCENTRATE INTRAVENOUS AS NEEDED
Status: CANCELLED | OUTPATIENT
Start: 2020-10-15

## 2020-08-10 RX ORDER — HEPARIN 100 UNIT/ML
300-500 SYRINGE INTRAVENOUS AS NEEDED
Status: CANCELLED
Start: 2020-08-31

## 2020-08-10 RX ORDER — HEPARIN 100 UNIT/ML
300-500 SYRINGE INTRAVENOUS AS NEEDED
Status: CANCELLED
Start: 2020-09-21

## 2020-08-10 RX ORDER — DIPHENHYDRAMINE HYDROCHLORIDE 50 MG/ML
50 INJECTION, SOLUTION INTRAMUSCULAR; INTRAVENOUS AS NEEDED
Status: CANCELLED
Start: 2020-08-31

## 2020-08-10 RX ORDER — ALBUTEROL SULFATE 0.83 MG/ML
2.5 SOLUTION RESPIRATORY (INHALATION) AS NEEDED
Status: CANCELLED
Start: 2020-09-21

## 2020-08-10 RX ORDER — POTASSIUM CHLORIDE 750 MG/1
40 TABLET, FILM COATED, EXTENDED RELEASE ORAL
Status: DISCONTINUED | OUTPATIENT
Start: 2020-08-10 | End: 2020-08-11 | Stop reason: HOSPADM

## 2020-08-10 RX ORDER — SODIUM CHLORIDE 9 MG/ML
25 INJECTION, SOLUTION INTRAVENOUS CONTINUOUS
Status: CANCELLED | OUTPATIENT
Start: 2020-10-15

## 2020-08-10 RX ORDER — DIPHENHYDRAMINE HYDROCHLORIDE 50 MG/ML
25 INJECTION, SOLUTION INTRAMUSCULAR; INTRAVENOUS AS NEEDED
Status: CANCELLED
Start: 2020-08-31

## 2020-08-10 RX ORDER — EPINEPHRINE 1 MG/ML
0.3 INJECTION, SOLUTION, CONCENTRATE INTRAVENOUS AS NEEDED
Status: CANCELLED | OUTPATIENT
Start: 2020-08-31

## 2020-08-10 RX ORDER — DIPHENHYDRAMINE HYDROCHLORIDE 50 MG/ML
50 INJECTION, SOLUTION INTRAMUSCULAR; INTRAVENOUS AS NEEDED
Status: CANCELLED
Start: 2020-09-21

## 2020-08-10 RX ORDER — SODIUM CHLORIDE 9 MG/ML
25 INJECTION, SOLUTION INTRAVENOUS CONTINUOUS
Status: CANCELLED | OUTPATIENT
Start: 2020-08-31

## 2020-08-10 RX ORDER — ACETAMINOPHEN 325 MG/1
650 TABLET ORAL AS NEEDED
Status: CANCELLED
Start: 2020-10-15

## 2020-08-10 RX ORDER — DEXAMETHASONE SODIUM PHOSPHATE 4 MG/ML
8 INJECTION, SOLUTION INTRA-ARTICULAR; INTRALESIONAL; INTRAMUSCULAR; INTRAVENOUS; SOFT TISSUE ONCE
Status: COMPLETED | OUTPATIENT
Start: 2020-08-10 | End: 2020-08-10

## 2020-08-10 RX ORDER — DEXAMETHASONE SODIUM PHOSPHATE 4 MG/ML
8 INJECTION, SOLUTION INTRA-ARTICULAR; INTRALESIONAL; INTRAMUSCULAR; INTRAVENOUS; SOFT TISSUE ONCE
Status: CANCELLED | OUTPATIENT
Start: 2020-10-15

## 2020-08-10 RX ORDER — SODIUM CHLORIDE 9 MG/ML
10 INJECTION INTRAMUSCULAR; INTRAVENOUS; SUBCUTANEOUS AS NEEDED
Status: CANCELLED | OUTPATIENT
Start: 2020-09-21

## 2020-08-10 RX ORDER — SODIUM CHLORIDE 9 MG/ML
10 INJECTION INTRAMUSCULAR; INTRAVENOUS; SUBCUTANEOUS AS NEEDED
Status: CANCELLED | OUTPATIENT
Start: 2020-08-31

## 2020-08-10 RX ORDER — POTASSIUM CHLORIDE 20 MEQ/1
20 TABLET, EXTENDED RELEASE ORAL DAILY
Qty: 30 TAB | Refills: 0 | Status: SHIPPED | OUTPATIENT
Start: 2020-08-10 | End: 2020-09-02 | Stop reason: SDUPTHER

## 2020-08-10 RX ORDER — SODIUM CHLORIDE 9 MG/ML
10 INJECTION INTRAMUSCULAR; INTRAVENOUS; SUBCUTANEOUS AS NEEDED
Status: ACTIVE | OUTPATIENT
Start: 2020-08-10 | End: 2020-08-10

## 2020-08-10 RX ORDER — ONDANSETRON 2 MG/ML
8 INJECTION INTRAMUSCULAR; INTRAVENOUS AS NEEDED
Status: CANCELLED | OUTPATIENT
Start: 2020-10-15

## 2020-08-10 RX ORDER — ONDANSETRON 2 MG/ML
8 INJECTION INTRAMUSCULAR; INTRAVENOUS AS NEEDED
Status: ACTIVE | OUTPATIENT
Start: 2020-08-10 | End: 2020-08-10

## 2020-08-10 RX ORDER — ALBUTEROL SULFATE 0.83 MG/ML
2.5 SOLUTION RESPIRATORY (INHALATION) AS NEEDED
Status: CANCELLED
Start: 2020-08-31

## 2020-08-10 RX ORDER — SODIUM CHLORIDE 0.9 % (FLUSH) 0.9 %
10 SYRINGE (ML) INJECTION AS NEEDED
Status: CANCELLED
Start: 2020-08-31

## 2020-08-10 RX ORDER — HYDROCORTISONE SODIUM SUCCINATE 100 MG/2ML
100 INJECTION, POWDER, FOR SOLUTION INTRAMUSCULAR; INTRAVENOUS AS NEEDED
Status: CANCELLED | OUTPATIENT
Start: 2020-08-31

## 2020-08-10 RX ORDER — ONDANSETRON 2 MG/ML
8 INJECTION INTRAMUSCULAR; INTRAVENOUS AS NEEDED
Status: CANCELLED | OUTPATIENT
Start: 2020-08-31

## 2020-08-10 RX ORDER — SODIUM CHLORIDE 0.9 % (FLUSH) 0.9 %
10 SYRINGE (ML) INJECTION AS NEEDED
Status: CANCELLED
Start: 2020-09-21

## 2020-08-10 RX ADMIN — Medication 10 ML: at 12:16

## 2020-08-10 RX ADMIN — Medication 500 UNITS: at 12:16

## 2020-08-10 RX ADMIN — SODIUM CHLORIDE 10 ML: 9 INJECTION, SOLUTION INTRAMUSCULAR; INTRAVENOUS; SUBCUTANEOUS at 08:23

## 2020-08-10 RX ADMIN — SODIUM CHLORIDE 25 ML/HR: 900 INJECTION, SOLUTION INTRAVENOUS at 09:58

## 2020-08-10 RX ADMIN — DOCETAXEL ANHYDROUS 153 MG: 10 INJECTION, SOLUTION INTRAVENOUS at 11:12

## 2020-08-10 RX ADMIN — DEXAMETHASONE SODIUM PHOSPHATE 8 MG: 4 INJECTION, SOLUTION INTRAMUSCULAR; INTRAVENOUS at 10:40

## 2020-08-10 NOTE — PROGRESS NOTES
Aren Boothe is a 76 y.o. male follow up for neuroendocrine carcinoma. 1. Have you been to the ER, urgent care clinic since your last visit? Hospitalized since your last visit?no     2. Have you seen or consulted any other health care providers outside of the [address] [place] [address] since your last visit? Include any pap smears or colon screening.  No    Vitals 8/10/2020   Blood Pressure 164/73   Pulse 93   Temp 98.6   Resp 18   Height 5' 10\"   Weight 181 lb 3.2 oz   SpO2 97   BSA 2.01 m2   BMI 26 kg/m2
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
## 2020-08-11 ENCOUNTER — APPOINTMENT (OUTPATIENT)
Dept: INFUSION THERAPY | Age: 65
End: 2020-08-11

## 2020-08-11 ENCOUNTER — TELEPHONE (OUTPATIENT)
Dept: INTERNAL MEDICINE CLINIC | Age: 65
End: 2020-08-11

## 2020-08-11 RX ORDER — LANCETS
EACH MISCELLANEOUS
Qty: 100 EACH | Refills: 3 | Status: SHIPPED | OUTPATIENT
Start: 2020-08-11 | End: 2020-09-14 | Stop reason: SDUPTHER

## 2020-08-11 RX ORDER — INSULIN PUMP SYRINGE, 3 ML
EACH MISCELLANEOUS
Qty: 1 KIT | Refills: 0 | Status: SHIPPED | OUTPATIENT
Start: 2020-08-11 | End: 2020-09-10

## 2020-08-11 RX ORDER — IBUPROFEN 200 MG
CAPSULE ORAL
Qty: 100 STRIP | Refills: 3 | Status: SHIPPED | OUTPATIENT
Start: 2020-08-11 | End: 2020-09-14 | Stop reason: SDUPTHER

## 2020-08-11 NOTE — TELEPHONE ENCOUNTER
Called patient to discuss blood sugars and referral. Left message for patient to return call to [phone].     Jony Padgett, PharmD, BCPS, [facility]

## 2020-08-12 ENCOUNTER — HOSPITAL ENCOUNTER (OUTPATIENT)
Dept: INFUSION THERAPY | Age: 65
End: 2020-08-12

## 2020-08-12 ENCOUNTER — OFFICE VISIT (OUTPATIENT)
Dept: INTERNAL MEDICINE CLINIC | Age: 65
End: 2020-08-12
Payer: MEDICARE

## 2020-08-12 DIAGNOSIS — R73.9 ELEVATED BLOOD SUGAR: Primary | ICD-10-CM

## 2020-08-12 PROCEDURE — 83036 HEMOGLOBIN GLYCOSYLATED A1C: CPT

## 2020-08-12 NOTE — TELEPHONE ENCOUNTER
Patient returned call - ID [id number]. Scheduled appointment for tomorrow in office to show him how to use a glucometer. Rx sent to pharmacy per CPA and patient to call if any issues getting it filled. Will see him tomorrow for a more detailed visit.     Tremaine Echevarria, PharmD, BCPS, CDCES      CLINICAL PHARMACY CONSULT: MED RECONCILIATION/REVIEW ADDENDUM    For Pharmacy Admin Tracking Only    PHSO: PHSO Patient?: No  Total # of Interventions Recommended: Count: 2  - New Order #: 1 New Medication Order Reason(s): Needs Additional Medication Therapy  Total Interventions Accepted: 2  Time Spent (min): 15

## 2020-08-13 LAB — HBA1C MFR BLD HPLC: 6.5 %

## 2020-08-24 DIAGNOSIS — L27.0 ALLERGIC DRUG RASH: Primary | ICD-10-CM

## 2020-08-24 RX ORDER — TRIAMCINOLONE ACETONIDE 1 MG/G
OINTMENT TOPICAL
Qty: 30 G | Refills: 0 | Status: SHIPPED | OUTPATIENT
Start: 2020-08-24 | End: 2020-08-31 | Stop reason: DRUGHIGH

## 2020-08-24 NOTE — PROGRESS NOTES
08/24/20 2:46 PM Patient developed a rash 8/19/20 (had first treatment of docetaxel on 8/10/20.) Rash present on arms, legs and shoulders. Describes the rash as flat and pruritic. He is using hydrocortisone cream with minimal relief. He met with PCP who started him on Prednisone taper. He is unsure of the dose. - Advised prednisone is ok to take. - Advised I will call in a stronger steroid topical cream to apply sparingly to arms, avoid face and wash hands after use. - Wear sunscreen if outside or long sleeve shirt.  - Take benadryl PRN for severe itching.   - Advised patient take pictures daily and call to update if rash is not improving.

## 2020-08-26 NOTE — PROGRESS NOTES
[facility] [employer]  Medical Oncology at [address] [place] [address]  [phone]    Hematology / Oncology Established Visit    Reason for Visit:   Michelle Vo is a 76 y.o. male who is seen for follow up of neuroendocrine carcinoma. Initially referred by Dr. Rayshawn Fernandez. 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 Flow cytometry negative for lymphoproliferative disorder. FoundationOne: MS Stable, TMB 0, MDM4 amplification, MYC amplification, TMPRSS2 TMPRSS2-ERD fusion, CEBPA 1311fs*10, ERBB4 amplification - equivocal, MCL1 amplification, UIS7B1S amplification, RAD21 amplification. See scanned report for full info. Abbreviated [facility] Pathology Consultation:  Final diagnosis: Right external iliac node: Metastatic prostate adenocarcinoma. Comment: Histologic sections of the right external iliac node show sheets of cells with minimal eosinophilic cytoplasm and variable cytologic atypia. Some areas show acinar formation. The cells have predominantly round nuclei with vesicular chromatin and prominent nucleoli. Some areas show significantly more atypia with more irregular nuclear borders, hyperchromatic nyclei, and increase nuclear to cytoplasmic ratio. Frequent mitotic figures are identified. A provided pane of IHC stains is reviewed and demonstrates that the malignant cells show strong, diffuse expression of pan-cytokeratin and focal expression of TTF. CK7, CK20, chromogranin, and synaptophysin are negative in the lesional cells. CD3 and CD20 are negative in the lesions cells and positive in the background lymphocytes.  An abbreviated FoundationOne report was included, which included a TMPRSS2-ERG fusion (among other nonspecific abnormalities), which si found in approximately 50% of prostate cancers. Additional IHC studies performed at [facility] showed that the malignant cells have immunoreactivity with antibodies for PSA and PSAP, consistent with a diagnosis of metastatic prostate adenocarcinoma. Per report, flow cytometry negative for lymphoma. Overall, the histomorphology, immunophenotype and genomic findings in this case are diagnostic of metastatic prostate adenocarcinoma. The acinar formation and cytology were suggestive of this diagnosis, and the diffuse PSA and PSAP positivity as well as the TMPRSS2-ERG fusion confirmed the diagnosis. This fusion is the most common genomic alteration in prostate carcinoma and can be detected in over half of the cases, yet is not seen with any frequency in other types of carcinoma. Prior Treatment:   1. Carbo-Etoposide-Atezolizumab x 2 cycles, 6/29/20-7/20/20. Current Treatment: ADT and Docetaxel, started 8/10/20  Treatment duration: Until disease progression or toxicity  Frequency of visits: Every 3 weeks    History of Present Illness:   Dinorah Navas is a 76 y.o. male who comes in for evaluation of poorly differentiated neuroendocrine carcinoma. Pt noticed a nontender bulge in his LLQ in 5/2020. He thought this was a hernia and was evaluated by Dr. Obi Prakash. CT on 5/20/20 was notable for extensive lymphadenopathy in abd/pelvis. Pt underwent robot assisted excisional Right external iliac LN biopsy 5/29/20, which showed poorly differentiated large cell neuroendocrine carcinoma. He reports intentional weight loss with dietary changes and exercise, losing 40-lbs in past 8 months. No n/v/d, melena/hematochezia, cough, SOB. No fevers, chills, sweats. [employer] nonsmoker. Mother had breast cancer diagnosed age < 48. Twin brother had melanoma diagnosed aged late 46s. He does not think he has ever had a colonoscopy or PSA screening. Interval History:  Patient here for follow up.  Developed on 8/19/20 (C1 docetaxel on 8/10/20.) Rash present on arms, legs and shoulders. Describes the rash as flat and pruritic. He is completing prednisone taper with 2 x days left. Pruritis is improving, but rash is still present on arms, legs and trunk. He is meeting with Dorinda Braden for steroid induced hyperglycemia. BG readings at home range 100-300. He plans to follow up with her this week to discuss further management of BG levels. Left neck swelling has resolved. Good appetite. Reports constipation but controlled with stool softener. No diarrhea. No fevers or chills. No neuropathy. No past medical history on file. Past Surgical History:   Procedure Laterality Date    HX APPENDECTOMY  1964    HX OTHER SURGICAL      subcutaneous cyst of forehead    HX VASECTOMY        Social History     Tobacco Use    Smoking status: Never Smoker    Smokeless tobacco: Never Used   Substance Use Topics    Alcohol use: Not Currently   Unmarried. Has 2 children, 27 and 32. 1 lives in [place]. Family History   Problem Relation Age of Onset    Cancer Mother         breast    Cancer Father         prostate    Cancer Brother         melanoma    COPD Brother      Current Outpatient Medications   Medication Sig    triamcinolone acetonide (KENALOG) 0.1 % ointment Apply a thin layer to arms twice daily as needed.  Blood-Glucose Meter monitoring kit Use to check blood sugars twice daily    glucose blood VI test strips (blood glucose test) strip Use to check blood sugars twice daily    lancets misc Use to check blood sugars twice daily    degarelix (Firmagon) 120 mg solr injection by SubCUTAneous route once.  potassium chloride (K-DUR, KLOR-CON) 20 mEq tablet Take 1 Tab by mouth daily.     dexAMETHasone (DECADRON) 4 mg tablet Take 8mg (2 tabs) twice daily the day BEFORE chemotherapy and take 8mg (2 tabs) twice daily the day AFTER chemotherapy    ondansetron hcl (ZOFRAN) 8 mg tablet Take 1 Tab by mouth every eight (8) hours as needed for Nausea or Vomiting.  lidocaine-prilocaine (EMLA) topical cream Apply a dime size amount to port site 30-60 minutes before access on treatment days to numb port site.  prochlorperazine (Compazine) 10 mg tablet Take 0.5 Tabs by mouth every six (6) hours as needed for Nausea or Vomiting.  dextroamphetamine-amphetamine (ADDERALL) 10 mg tablet TAKE 1 TABLET TWICE A DAY FOR 30 DAYS    acyclovir (ZOVIRAX) 400 mg tablet Take 400 mg by mouth two (2) times a day. No current facility-administered medications for this visit. No Known Allergies     Review of Systems: A complete review of systems was obtained, negative except as described above. Physical Exam:     Visit Vitals  /87   Pulse 73   Temp 98.5 °F (36.9 °C)   Ht 5' 10\" (1.778 m)   Wt 191 lb (86.6 kg)   SpO2 97%   BMI 27.41 kg/m²     ECOG PS: 0  General: Well developed, no acute distress  Eyes: PERRLA, EOMI, anicteric sclerae  HENT: Atraumatic, OP clear, TMs intact without erythema  Neck: Supple, Left supraclavicular LN now smaller in size. Lymphatic: No cervical, supraclavicular, axillary or inguinal adenopathy  Respiratory: CTAB, normal respiratory effort  CV: Normal rate, regular rhythm, no murmurs, no peripheral edema  GI: Soft, nontender, nondistended, no hepatomegaly, no splenomegaly. L lower abdomen with soft mobile mass, approx 5cm  MS: Normal gait and station. Digits without clubbing or cyanosis. Skin: Maculopapular rash present on arms, legs, trunk. No ecchymoses, or petechiae. Normal temperature, turgor, and texture. Neuro/Psych: Alert, oriented. 5/5 strength in all 4 extremities. Appropriate affect, normal judgment/insight. Results:     Lab Results   Component Value Date/Time    WBC 33.4 (H) 08/31/2020 10:09 AM    HGB 12.7 08/31/2020 10:09 AM    HCT 36.8 08/31/2020 10:09 AM    PLATELET 170 86/48/6291 10:09 AM    MCV 88.0 08/31/2020 10:09 AM    ABS.  NEUTROPHILS 28.0 (H) 08/31/2020 10:09 AM     Lab Results   Component Value Date/Time    Sodium 135 (L) 2020 10:09 AM    Potassium 3.6 2020 10:09 AM    Chloride 101 2020 10:09 AM    CO2 28 2020 10:09 AM    Glucose 118 (H) 2020 10:09 AM    BUN 34 (H) 2020 10:09 AM    Creatinine 1.08 2020 10:09 AM    GFR est AA >60 2020 10:09 AM    GFR est non-AA >60 2020 10:09 AM    Calcium 9.0 2020 10:09 AM     Lab Results   Component Value Date/Time    Bilirubin, total 0.3 2020 10:09 AM    ALT (SGPT) 26 2020 10:09 AM    Alk. phosphatase 92 2020 10:09 AM    Protein, total 6.7 2020 10:09 AM    Albumin 3.4 (L) 2020 10:09 AM    Globulin 3.3 2020 10:09 AM     No results found for: IRON, FE, TIBC, IBCT, PSAT, FERR    No results found for: B12LT, FOL, RBCF  Lab Results   Component Value Date/Time    TSH 1.72 2020 09:58 AM     No results found for: HAMAT, HAAB, HABT, HAAT, HBSAG, HBSB, HBSAT, HBABN, HBCM, HBCAB, HBCAT, XBCABS, HBEAB, [facility], [facility], [id number], [address] [facility], [facility], HBCLT, [address] [facility], [id number], [id number], [address] [place] [address], [id number], [facility], [id number], HCGAT     20: Chromogranin 42    20: PSA 38.6  20: PSA 13.2  8/10/20: PSA 1.2  20: PSA pending       Imagin/20/20 CT abd/pelvis with IV contrast:  Impression:  1. No findings to suggest gross abdominal wall hernia or flank hernia. 2. Extensive adenopathy throughout the abdomen and pelvis as described. Findings are concerning for possible metastatic disease or lymphoma. Recommend follow up or comparison with prior studies. 3. Other findings as described. 20 PET:  FINDINGS:  HEAD/NECK: No apparent foci of abnormal hypermetabolism. Cerebral evaluation is  limited by normal intense activity. CHEST: Hypermetabolic lymphadenopathy at the base of the left neck and in the  left supraclavicular region is noted.  Hypermetabolic superior mediastinal,  prevascular, right paratracheal, subcarinal, and bilateral hilar lymph  lymphadenopathy, maximum SUV of the subcarinal lymph node is 5.7. Small but  hypermetabolic soft tissue nodule left anterior chest wall. ABDOMEN/PELVIS: Hypermetabolic retrocrural, left para-aortic, aortocaval,  bilateral common iliac, bilateral external iliac, and bilateral inguinal  lymphadenopathy, maximum SUV 5.8 of an aortocaval lymph node. Hypermetabolic  mesenteric lymph node left lower quadrant, maximum SUV 12.3. SKELETON: No foci of abnormal hypermetabolism in the axial and visualized  appendicular skeleton. IMPRESSION: Hypermetabolic lymphadenopathy involving the left neck and left  supraclavicular region, mediastinum, bilateral hilum, retroperitoneum,  mesentery, and pelvis as described above. Hypermetabolic soft tissue nodule left  chest.    Brain MRI 6/27/20: IMPRESSION:  There is no evidence of intracranial metastatic disease. Mild chronic microvascular ischemic change. No intracranial mass, hemorrhage or evidence of acute infarction. CT c/a/p 8/3/20: IMPRESSION:  Chest:  1. Findings consistent with partial treatment response, with interval decrease  in size of mediastinal and hilar lymph nodes, and interval decrease in size of  left chest wall nodule. Abdomen/pelvis:   1. Findings consistent with partial treatment response, with interval decrease  in size of retroperitoneal lymph nodes. 2. Unchanged circumferential bladder wall thickening, which may represent acute  or chronic cystitis. Correlate with urinalysis. RECIST   TARGET LESIONS:      Lesion (description)         Location (series/slice)                Size            1. Posterior mediastinal lymph node    series 2 image 26    2.3 x 2.0 cm, previously 3.3 x 2.2 cm  2. Left upper paraesophageal lymph node    series 2 image 9    5 mm, previously 15 mm. 3. Right external iliac lymphadenopathy    series 2 image 98    3.8 x 3.2 cm, previously 6.3 x 4.7 cm  4.  Left external iliac lymphadenopathy    series 2 image 99    1.6 cm, previously 2.5 cm    Assessment & Plan:   Miguelito Daniel is a 76 y.o. 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 1. Metastatic adenocarcinoma of prostate, high grade: Initial pathologist at [address] [place] [address] called this \"Large cell neuroendocrine carcinoma,\" and based on diffuse disease on PET, patient was started on treatment with Carbo-Etoposide-Atezolizumab (thinking this was large cell carcinoma of the lung.) However [facility] testing identified TMPRSS2-ERG fusion, which is primarily seen in prostate cancers. This along with elevated PSA of 38 prompted sending of pathology specimen to [facility] for review. Their opinion and IHC staining is consistent with high grade adenocarcinoma of prostate. Treatment options now include: Carboplatin + Cabazitaxel vs single agent Docetaxel. Pt signed consent for Docetaxel. CT on 8/3/20 shows response to treatment. Supportive medications: EMLA cream, zofran, compazine. --Lupron #1 today and q12wks for ADT per patient, #2 due 11/23/20. Discussed with Dr. Loreto Callahan. -- Proceed with cycle 2 of docetaxel today  -- Return in 3 weeks for cycle 3 of docetaxel, MD/NP visit. Can give Lupron with q3mo dosing at that time. -- CT and bone scan due after cycle 4.     2. ADHD: On Adderal    3. H/o Genital Herpes: On suppression with Acyclovir. 4. GI / Electrolytes: Replete as needed in [facility]IC. Encourage foods high in potassium. On KCl 20 mEQ/d.    5. Health maintenance: No prior colonoscopy or PSA prior to prostate ca diagnosis per patient. 6. Bladder wall thickening: Seen on CT scan, which may represent acute or chronic cystitis. UA negative. 7. Hyperglycemia: 2/2 to steroid premedications. Will discuss decreasing dose in future if he tolerates docetaxel. Jessika Solis assisting with management of steroid induced hyperglycemia. 8. Rash: Developed on 8/19/20 (C1 docetaxel on 8/10/20.) Rash present on arms, legs and shoulders. Describes the rash as flat and pruritic.   -- Complete prednisone taper; then can start prednisone 5mg BID. -- Betamethasone 0.05% ointment to affected areas bid. Avoid face, genital area. 9. Leukocytosis: Likely 2/2 to prednisone and dexamethasone. Denies recent fevers. Monitor. Emotional well being: Pt is coping well with his/her disease and has excellent support. I appreciate the opportunity to participate in Mr. Reyes [facility]. Significant other: Vadim Temple - [phone]. Patient seen with Deon Harrell NP.      Signed By: Ronny Noriega MD     August 31, 2020

## 2020-08-31 ENCOUNTER — TELEPHONE (OUTPATIENT)
Dept: ONCOLOGY | Age: 65
End: 2020-08-31

## 2020-08-31 ENCOUNTER — HOSPITAL ENCOUNTER (OUTPATIENT)
Dept: INFUSION THERAPY | Age: 65
Discharge: HOME | End: 2020-08-31
Payer: COMMERCIAL

## 2020-08-31 ENCOUNTER — TELEPHONE (OUTPATIENT)
Dept: INFUSION THERAPY | Age: 65
End: 2020-08-31

## 2020-08-31 ENCOUNTER — OFFICE VISIT (OUTPATIENT)
Dept: ONCOLOGY | Age: 65
End: 2020-08-31
Payer: MEDICARE

## 2020-08-31 VITALS
HEIGHT: 70 IN | WEIGHT: 191 LBS | HEART RATE: 57 BPM | TEMPERATURE: 98.5 F | RESPIRATION RATE: 16 BRPM | SYSTOLIC BLOOD PRESSURE: 157 MMHG | BODY MASS INDEX: 27.35 KG/M2 | DIASTOLIC BLOOD PRESSURE: 81 MMHG | OXYGEN SATURATION: 97 %

## 2020-08-31 VITALS
SYSTOLIC BLOOD PRESSURE: 161 MMHG | WEIGHT: 191 LBS | OXYGEN SATURATION: 97 % | HEART RATE: 73 BPM | TEMPERATURE: 98.5 F | HEIGHT: 70 IN | BODY MASS INDEX: 27.35 KG/M2 | DIASTOLIC BLOOD PRESSURE: 87 MMHG

## 2020-08-31 DIAGNOSIS — C34.00 MALIGNANT NEOPLASM OF HILUS OF LUNG, UNSPECIFIED LATERALITY (HCC): ICD-10-CM

## 2020-08-31 DIAGNOSIS — L27.0 DRUG RASH: ICD-10-CM

## 2020-08-31 DIAGNOSIS — R59.0 MEDIASTINAL LYMPHADENOPATHY: ICD-10-CM

## 2020-08-31 DIAGNOSIS — C77.9 REGIONAL LYMPH NODE METASTASIS PRESENT (HCC): ICD-10-CM

## 2020-08-31 DIAGNOSIS — C79.82 METASTATIC ADENOCARCINOMA TO PROSTATE (HCC): ICD-10-CM

## 2020-08-31 DIAGNOSIS — D72.829 LEUKOCYTOSIS, UNSPECIFIED TYPE: ICD-10-CM

## 2020-08-31 DIAGNOSIS — C61 ADENOCARCINOMA OF PROSTATE, STAGE 4 (HCC): Primary | ICD-10-CM

## 2020-08-31 DIAGNOSIS — Z51.11 CHEMOTHERAPY MANAGEMENT, ENCOUNTER FOR: ICD-10-CM

## 2020-08-31 LAB
ALBUMIN SERPL-MCNC: 3.4 G/DL (ref 3.5–5)
ALBUMIN/GLOB SERPL: 1 {RATIO} (ref 1.1–2.2)
ALP SERPL-CCNC: 92 U/L (ref 45–117)
ALT SERPL-CCNC: 26 U/L (ref 12–78)
ANION GAP SERPL CALC-SCNC: 6 MMOL/L (ref 5–15)
AST SERPL-CCNC: 16 U/L (ref 15–37)
BASO+EOS+MONOS # BLD AUTO: 1.7 K/UL (ref 0.2–1.2)
BASO+EOS+MONOS NFR BLD AUTO: 5 % (ref 3.2–16.9)
BILIRUB SERPL-MCNC: 0.3 MG/DL (ref 0.2–1)
BUN SERPL-MCNC: 34 MG/DL (ref 6–20)
BUN/CREAT SERPL: 31 (ref 12–20)
CALCIUM SERPL-MCNC: 9 MG/DL (ref 8.5–10.1)
CHLORIDE SERPL-SCNC: 101 MMOL/L (ref 97–108)
CO2 SERPL-SCNC: 28 MMOL/L (ref 21–32)
CREAT SERPL-MCNC: 1.08 MG/DL (ref 0.7–1.3)
DIFFERENTIAL METHOD BLD: ABNORMAL
ERYTHROCYTE [DISTWIDTH] IN BLOOD BY AUTOMATED COUNT: 17.7 % (ref 11.8–15.8)
GLOBULIN SER CALC-MCNC: 3.3 G/DL (ref 2–4)
GLUCOSE SERPL-MCNC: 118 MG/DL (ref 65–100)
HCT VFR BLD AUTO: 36.8 % (ref 36.6–50.3)
HGB BLD-MCNC: 12.7 G/DL (ref 12.1–17)
LYMPHOCYTES # BLD: 3.7 K/UL (ref 0.8–3.5)
LYMPHOCYTES NFR BLD: 11 % (ref 12–49)
MCH RBC QN AUTO: 30.4 PG (ref 26–34)
MCHC RBC AUTO-ENTMCNC: 34.5 G/DL (ref 30–36.5)
MCV RBC AUTO: 88 FL (ref 80–99)
NEUTS SEG # BLD: 28 K/UL (ref 1.8–8)
NEUTS SEG NFR BLD: 84 % (ref 32–75)
PLATELET # BLD AUTO: 228 K/UL (ref 150–400)
POTASSIUM SERPL-SCNC: 3.6 MMOL/L (ref 3.5–5.1)
PROT SERPL-MCNC: 6.7 G/DL (ref 6.4–8.2)
PSA SERPL-MCNC: 0.5 NG/ML (ref 0.01–4)
RBC # BLD AUTO: 4.18 M/UL (ref 4.1–5.7)
SODIUM SERPL-SCNC: 135 MMOL/L (ref 136–145)
WBC # BLD AUTO: 33.4 K/UL (ref 4.1–11.1)

## 2020-08-31 PROCEDURE — 36415 COLL VENOUS BLD VENIPUNCTURE: CPT

## 2020-08-31 PROCEDURE — 74011250636 HC RX REV CODE- 250/636: Performed by: INTERNAL MEDICINE

## 2020-08-31 PROCEDURE — 85025 COMPLETE CBC W/AUTO DIFF WBC: CPT

## 2020-08-31 PROCEDURE — 96413 CHEMO IV INFUSION 1 HR: CPT

## 2020-08-31 PROCEDURE — 77030016057 HC NDL HUBR APOL -B

## 2020-08-31 PROCEDURE — 96402 CHEMO HORMON ANTINEOPL SQ/IM: CPT

## 2020-08-31 PROCEDURE — 96375 TX/PRO/DX INJ NEW DRUG ADDON: CPT

## 2020-08-31 PROCEDURE — 84153 ASSAY OF PSA TOTAL: CPT

## 2020-08-31 PROCEDURE — 80053 COMPREHEN METABOLIC PANEL: CPT

## 2020-08-31 PROCEDURE — 74011250636 HC RX REV CODE- 250/636: Performed by: NURSE PRACTITIONER

## 2020-08-31 PROCEDURE — 99215 OFFICE O/P EST HI 40 MIN: CPT | Performed by: INTERNAL MEDICINE

## 2020-08-31 RX ORDER — PREDNISONE 5 MG/1
5 TABLET ORAL 2 TIMES DAILY
Qty: 60 TAB | Refills: 1 | Status: SHIPPED | OUTPATIENT
Start: 2020-08-31 | End: 2020-10-22

## 2020-08-31 RX ORDER — SODIUM CHLORIDE 9 MG/ML
25 INJECTION, SOLUTION INTRAVENOUS CONTINUOUS
Status: DISPENSED | OUTPATIENT
Start: 2020-08-31 | End: 2020-08-31

## 2020-08-31 RX ORDER — HEPARIN 100 UNIT/ML
300-500 SYRINGE INTRAVENOUS AS NEEDED
Status: ACTIVE | OUTPATIENT
Start: 2020-08-31 | End: 2020-08-31

## 2020-08-31 RX ORDER — DEXAMETHASONE SODIUM PHOSPHATE 4 MG/ML
8 INJECTION, SOLUTION INTRA-ARTICULAR; INTRALESIONAL; INTRAMUSCULAR; INTRAVENOUS; SOFT TISSUE ONCE
Status: COMPLETED | OUTPATIENT
Start: 2020-08-31 | End: 2020-08-31

## 2020-08-31 RX ORDER — BETAMETHASONE DIPROPIONATE 0.5 MG/G
LOTION TOPICAL SEE ADMIN INSTRUCTIONS
Qty: 60 ML | Refills: 0 | Status: SHIPPED | OUTPATIENT
Start: 2020-08-31 | End: 2020-12-17 | Stop reason: ALTCHOICE

## 2020-08-31 RX ORDER — SODIUM CHLORIDE 0.9 % (FLUSH) 0.9 %
10 SYRINGE (ML) INJECTION AS NEEDED
Status: DISPENSED | OUTPATIENT
Start: 2020-08-31 | End: 2020-08-31

## 2020-08-31 RX ORDER — SODIUM CHLORIDE 9 MG/ML
10 INJECTION INTRAMUSCULAR; INTRAVENOUS; SUBCUTANEOUS AS NEEDED
Status: ACTIVE | OUTPATIENT
Start: 2020-08-31 | End: 2020-08-31

## 2020-08-31 RX ADMIN — SODIUM CHLORIDE 25 ML/HR: 900 INJECTION, SOLUTION INTRAVENOUS at 11:22

## 2020-08-31 RX ADMIN — SODIUM CHLORIDE 10 ML: 9 INJECTION INTRAMUSCULAR; INTRAVENOUS; SUBCUTANEOUS at 10:04

## 2020-08-31 RX ADMIN — LEUPROLIDE ACETATE 22.5 MG: KIT at 12:01

## 2020-08-31 RX ADMIN — Medication 10 ML: at 13:16

## 2020-08-31 RX ADMIN — HEPARIN 500 UNITS: 100 SYRINGE at 13:16

## 2020-08-31 RX ADMIN — Medication 10 ML: at 10:04

## 2020-08-31 RX ADMIN — SODIUM CHLORIDE 153 MG: 900 INJECTION, SOLUTION INTRAVENOUS at 12:08

## 2020-08-31 RX ADMIN — DEXAMETHASONE SODIUM PHOSPHATE 8 MG: 4 INJECTION, SOLUTION INTRAMUSCULAR; INTRAVENOUS at 11:22

## 2020-08-31 NOTE — TELEPHONE ENCOUNTER
08/31/20 4:34 PM called patient and notified him - he can stop prednisone taper. He needs to take dexamethasone post medication tomorrow, then start on the prednisone 5mg BID daily. Patient verbalized understanding.

## 2020-08-31 NOTE — TELEPHONE ENCOUNTER
[facility] Navigator Encounter    8/31/2020- PA submited for this patient's betamethasone dipropionate via covermymeds. PA approved 8/31/2020-8/31/2021 Case ID [id number]. [facility] notified and co-pay is $10 for patient.

## 2020-08-31 NOTE — PROGRESS NOTES
[facility] Progress Note    Date: 2020    Name: Jabier Dorman    MRN: [id number]         : 1955    Mr. Dick Sue Arrived ambulatory and in no distress for C2D1 of Taxotere Regimen. Assessment was completed, no acute issues at this time, no new complaints voiced. Left chest wall port accessed without difficulty, labs drawn & sent for processing. Chemotherapy Flowsheet 2020   Cycle C2D1   Date 2020   Drug / Regimen Taxotere   Pre Meds -   Notes -       8668 Patient proceed to appointment with Dr. Cris Haider. Mr. Chelo Leo vitals were reviewed. Patient Vitals for the past 12 hrs:   Temp Pulse Resp BP SpO2   20 1315  (!) 57 16 157/81    20 1000 98.5 °F (36.9 °C) 73 16 161/87 97 %     Recent Results (from the past 12 hour(s))   CBC WITH 3 PART DIFF    Collection Time: 20 10:09 AM   Result Value Ref Range    WBC 33.4 (H) 4.1 - 11.1 K/uL    RBC 4.18 4.10 - 5.70 M/uL    HGB 12.7 12.1 - 17.0 g/dL    HCT 36.8 36.6 - 50.3 %    MCV 88.0 80.0 - 99.0 FL    MCH 30.4 26.0 - 34.0 PG    MCHC 34.5 30.0 - 36.5 g/dL    RDW 17.7 (H) 11.8 - 15.8 %    PLATELET 971 521 - 983 K/uL    NEUTROPHILS 84 (H) 32 - 75 %    MIXED CELLS 5 3.2 - 16.9 %    LYMPHOCYTES 11 (L) 12 - 49 %    ABS. NEUTROPHILS 28.0 (H) 1.8 - 8.0 K/UL    ABS. MIXED CELLS 1.7 (H) 0.2 - 1.2 K/uL    ABS.  LYMPHOCYTES 3.7 (H) 0.8 - 3.5 K/UL    DF AUTOMATED     METABOLIC PANEL, COMPREHENSIVE    Collection Time: 20 10:09 AM   Result Value Ref Range    Sodium 135 (L) 136 - 145 mmol/L    Potassium 3.6 3.5 - 5.1 mmol/L    Chloride 101 97 - 108 mmol/L    CO2 28 21 - 32 mmol/L    Anion gap 6 5 - 15 mmol/L    Glucose 118 (H) 65 - 100 mg/dL    BUN 34 (H) 6 - 20 MG/DL    Creatinine 1.08 0.70 - 1.30 MG/DL    BUN/Creatinine ratio 31 (H) 12 - 20      GFR est AA >60 >60 ml/min/1.73m2    GFR est non-AA >60 >60 ml/min/1.73m2    Calcium 9.0 8.5 - 10.1 MG/DL    Bilirubin, total 0.3 0.2 - 1.0 MG/DL    ALT (SGPT) 26 12 - 78 U/L    AST (SGOT) 16 15 - 37 U/L    Alk. phosphatase 92 45 - 117 U/L    Protein, total 6.7 6.4 - 8.2 g/dL    Albumin 3.4 (L) 3.5 - 5.0 g/dL    Globulin 3.3 2.0 - 4.0 g/dL    A-G Ratio 1.0 (L) 1.1 - 2.2       All labs within treatment parameters. Medications:  Medications Administered     0.9% sodium chloride infusion     Admin Date  08/31/2020 Action  New Bag Dose  25 mL/hr Rate  25 mL/hr Route  IntraVENous Administered By  Neetu Mason RN          0.9% sodium chloride injection 10 mL     Admin Date  08/31/2020 Action  Given Dose  10 mL Route  IntraVENous Administered By  Neetu Mason RN          dexamethasone (DECADRON) 4 mg/mL injection 8 mg     Admin Date  08/31/2020 Action  Given Dose  8 mg Route  IntraVENous Administered By  Neetu Mason RN          DOCEtaxeL (TAXOTERE) 153 mg in 0.9% sodium chloride 250 mL, overfill volume 25 mL chemo infusion     Admin Date  08/31/2020 Action  New Bag Dose  153 mg Route  IntraVENous Administered By  Neetu Mason RN          heparin (porcine) pf 300-500 Units     Admin Date  08/31/2020 Action  Given Dose  500 Units Route  InterCATHeter Administered By  Neetu Mason RN          leuprolide depot (LUPRON 3 MONTH) injection sykt 22.5 mg     Admin Date  08/31/2020 Action  Given Dose  22.5 mg Route  IntraMUSCular Administered By  Neetu Mason RN          saline peripheral flush soln 10 mL     Admin Date  08/31/2020 Action  Given Dose  10 mL Route  InterCATHeter Administered By  Neetu Mason RN           Admin Date  08/31/2020 Action  Given Dose  10 mL Route  InterCATHeter Administered By  Neetu Mason RN                  Mr. Lokesh Durham tolerated treatment well and was discharged from [place] [address] in stable condition at 1320. Port de-accessed, flushed & heparinized per protocol. He is to return on September 21 at 1130 for his next appointment.     Robert Lan RN  August 31, 2020

## 2020-08-31 NOTE — PROGRESS NOTES
Rock Murray is a 76 y.o. male  Chief Complaint   Patient presents with    Follow-up     neuroendocrine carcinoma     1. Have you been to the ER, urgent care clinic since your last visit? Hospitalized since your last visit? No  2. Have you seen or consulted any other health care providers outside of the [address] [place] [address] since your last visit? Include any pap smears or colon screening.   No

## 2020-09-01 ENCOUNTER — APPOINTMENT (OUTPATIENT)
Dept: INFUSION THERAPY | Age: 65
End: 2020-09-01

## 2020-09-01 ENCOUNTER — HOSPITAL ENCOUNTER (OUTPATIENT)
Dept: NUCLEAR MEDICINE | Age: 65
Discharge: HOME | End: 2020-09-01
Attending: UROLOGY
Payer: COMMERCIAL

## 2020-09-01 DIAGNOSIS — C61 PROSTATE CANCER (HCC): ICD-10-CM

## 2020-09-01 PROCEDURE — 78306 BONE IMAGING WHOLE BODY: CPT

## 2020-09-02 ENCOUNTER — APPOINTMENT (OUTPATIENT)
Dept: INFUSION THERAPY | Age: 65
End: 2020-09-02

## 2020-09-02 DIAGNOSIS — E87.6 HYPOKALEMIA: ICD-10-CM

## 2020-09-02 RX ORDER — POTASSIUM CHLORIDE 20 MEQ/1
20 TABLET, EXTENDED RELEASE ORAL DAILY
Qty: 90 TAB | Refills: 0 | Status: SHIPPED | OUTPATIENT
Start: 2020-09-02 | End: 2020-12-07

## 2020-09-10 RX ORDER — BLOOD-GLUCOSE METER
EACH MISCELLANEOUS
Qty: 1 EACH | Refills: 0 | Status: SHIPPED | OUTPATIENT
Start: 2020-09-10

## 2020-09-14 RX ORDER — IBUPROFEN 200 MG
CAPSULE ORAL
Qty: 100 STRIP | Refills: 3 | Status: SHIPPED | OUTPATIENT
Start: 2020-09-14

## 2020-09-14 RX ORDER — LANCETS
EACH MISCELLANEOUS
Qty: 100 EACH | Refills: 3 | Status: SHIPPED | OUTPATIENT
Start: 2020-09-14

## 2020-09-15 RX ORDER — SODIUM CHLORIDE 9 MG/ML
25 INJECTION, SOLUTION INTRAVENOUS CONTINUOUS
Status: CANCELLED | OUTPATIENT
Start: 2020-09-24

## 2020-09-15 RX ORDER — DIPHENHYDRAMINE HYDROCHLORIDE 50 MG/ML
50 INJECTION, SOLUTION INTRAMUSCULAR; INTRAVENOUS AS NEEDED
Status: CANCELLED
Start: 2020-09-24

## 2020-09-15 RX ORDER — HYDROCORTISONE SODIUM SUCCINATE 100 MG/2ML
100 INJECTION, POWDER, FOR SOLUTION INTRAMUSCULAR; INTRAVENOUS AS NEEDED
Status: CANCELLED | OUTPATIENT
Start: 2020-09-24

## 2020-09-15 RX ORDER — HEPARIN 100 UNIT/ML
300-500 SYRINGE INTRAVENOUS AS NEEDED
Status: CANCELLED
Start: 2020-09-24

## 2020-09-15 RX ORDER — ACETAMINOPHEN 325 MG/1
650 TABLET ORAL AS NEEDED
Status: CANCELLED
Start: 2020-09-24

## 2020-09-15 RX ORDER — SODIUM CHLORIDE 9 MG/ML
10 INJECTION INTRAMUSCULAR; INTRAVENOUS; SUBCUTANEOUS AS NEEDED
Status: CANCELLED | OUTPATIENT
Start: 2020-09-24

## 2020-09-15 RX ORDER — EPINEPHRINE 1 MG/ML
0.3 INJECTION, SOLUTION, CONCENTRATE INTRAVENOUS AS NEEDED
Status: CANCELLED | OUTPATIENT
Start: 2020-09-24

## 2020-09-15 RX ORDER — ALBUTEROL SULFATE 0.83 MG/ML
2.5 SOLUTION RESPIRATORY (INHALATION) AS NEEDED
Status: CANCELLED
Start: 2020-09-24

## 2020-09-15 RX ORDER — ONDANSETRON 2 MG/ML
8 INJECTION INTRAMUSCULAR; INTRAVENOUS AS NEEDED
Status: CANCELLED | OUTPATIENT
Start: 2020-09-24

## 2020-09-15 RX ORDER — SODIUM CHLORIDE 0.9 % (FLUSH) 0.9 %
10 SYRINGE (ML) INJECTION AS NEEDED
Status: CANCELLED
Start: 2020-09-24

## 2020-09-15 RX ORDER — DEXAMETHASONE SODIUM PHOSPHATE 4 MG/ML
8 INJECTION, SOLUTION INTRA-ARTICULAR; INTRALESIONAL; INTRAMUSCULAR; INTRAVENOUS; SOFT TISSUE ONCE
Status: CANCELLED | OUTPATIENT
Start: 2020-09-24

## 2020-09-15 RX ORDER — DIPHENHYDRAMINE HYDROCHLORIDE 50 MG/ML
25 INJECTION, SOLUTION INTRAMUSCULAR; INTRAVENOUS AS NEEDED
Status: CANCELLED
Start: 2020-09-24

## 2020-09-22 NOTE — PROGRESS NOTES
[address]  Medical Oncology at [address] [place] [address]  [phone]    Hematology / Oncology Established Visit    Reason for Visit:   Titus Jordan is a 76 y.o. male who is seen for follow up of neuroendocrine carcinoma. Initially referred by Dr. Fariba Duran. 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 Flow cytometry negative for lymphoproliferative disorder. FoundationOne: MS Stable, TMB 0, MDM4 amplification, MYC amplification, TMPRSS2 TMPRSS2-ERD fusion, CEBPA 1311fs*10, ERBB4 amplification - equivocal, MCL1 amplification, QMG2Y2T amplification, RAD21 amplification. See scanned report for full info. Abbreviated [facility] Pathology Consultation:  Final diagnosis: Right external iliac node: Metastatic prostate adenocarcinoma. Comment: Histologic sections of the right external iliac node show sheets of cells with minimal eosinophilic cytoplasm and variable cytologic atypia. Some areas show acinar formation. The cells have predominantly round nuclei with vesicular chromatin and prominent nucleoli. Some areas show significantly more atypia with more irregular nuclear borders, hyperchromatic nyclei, and increase nuclear to cytoplasmic ratio. Frequent mitotic figures are identified. A provided pane of IHC stains is reviewed and demonstrates that the malignant cells show strong, diffuse expression of pan-cytokeratin and focal expression of TTF. CK7, CK20, chromogranin, and synaptophysin are negative in the lesional cells. CD3 and CD20 are negative in the lesions cells and positive in the background lymphocytes.  An abbreviated FoundationOne report was included, which included a TMPRSS2-ERG fusion (among other nonspecific abnormalities), which si found in approximately 50% of prostate cancers. Additional IHC studies performed at [facility] showed that the malignant cells have immunoreactivity with antibodies for PSA and PSAP, consistent with a diagnosis of metastatic prostate adenocarcinoma. Per report, flow cytometry negative for lymphoma. Overall, the histomorphology, immunophenotype and genomic findings in this case are diagnostic of metastatic prostate adenocarcinoma. The acinar formation and cytology were suggestive of this diagnosis, and the diffuse PSA and PSAP positivity as well as the TMPRSS2-ERG fusion confirmed the diagnosis. This fusion is the most common genomic alteration in prostate carcinoma and can be detected in over half of the cases, yet is not seen with any frequency in other types of carcinoma. Prior Treatment:   1. Carbo-Etoposide-Atezolizumab x 2 cycles, 6/29/20-7/20/20. Current Treatment: ADT and Docetaxel, started 8/10/20  Treatment duration: Until disease progression or toxicity  Frequency of visits: Every 3 weeks    History of Present Illness:   Feliberto Pappas is a 76 y.o. male who comes in for evaluation of poorly differentiated neuroendocrine carcinoma. Pt noticed a nontender bulge in his LLQ in 5/2020. He thought this was a hernia and was evaluated by Dr. Chante Beckham. CT on 5/20/20 was notable for extensive lymphadenopathy in abd/pelvis. Pt underwent robot assisted excisional Right external iliac LN biopsy 5/29/20, which showed poorly differentiated large cell neuroendocrine carcinoma. He reports intentional weight loss with dietary changes and exercise, losing 40-lbs in past 8 months. No n/v/d, melena/hematochezia, cough, SOB. No fevers, chills, sweats. [employer] nonsmoker. Mother had breast cancer diagnosed age < 48. Twin brother had melanoma diagnosed aged late 46s. He does not think he has ever had a colonoscopy or PSA screening. Interval History:  Patient here for follow up. Here for C3 of docetaxel.  He is taking prednisone 5mg/d. Reports rash that occurred after C1 of treatment is present but improved. Reports multiple scale like lesion on tops of hands that are improving. Pruritis has resolved, but there are multiple macules and erythematous areas still present on arms, legs and trunk. He is following with Gloria Espinosa for steroid induced hyperglycemia. BG readings at home range . Good appetite. Reports constipation but controlled with daily stool softener. No diarrhea. No fevers or chills. No neuropathy. No past medical history on file. Past Surgical History:   Procedure Laterality Date    HX APPENDECTOMY  1964    HX OTHER SURGICAL      subcutaneous cyst of forehead    HX VASECTOMY        Social History     Tobacco Use    Smoking status: Never Smoker    Smokeless tobacco: Never Used   Substance Use Topics    Alcohol use: Not Currently   Unmarried. Has 2 children, 27 and 32. 1 lives in [place]. Family History   Problem Relation Age of Onset    Cancer Mother         breast    Cancer Father         prostate    Cancer Brother         melanoma    COPD Brother      Current Outpatient Medications   Medication Sig    glucose blood VI test strips (blood glucose test) strip Use to check blood sugars four times daily    lancets misc Use to check blood sugars four times daily    OneTouch Ultra2 Meter misc USE TO CHECK BLOOD SUGARS TWICE DAILY    potassium chloride (K-DUR, KLOR-CON) 20 mEq tablet Take 1 Tab by mouth daily.  predniSONE (DELTASONE) 5 mg tablet Take 1 Tab by mouth two (2) times a day.  betamethasone dipropionate (DIPROLENE) 0.05 % topical lotion Apply  to affected area See Admin Instructions. Apply a thin film to affect areas (arms, legs, trunk) once or twice daily. Avoid face. Wash hands.  degarelix (Firmagon) 120 mg solr injection by SubCUTAneous route once.     dexAMETHasone (DECADRON) 4 mg tablet Take 8mg (2 tabs) twice daily the day BEFORE chemotherapy and take 8mg (2 tabs) twice daily the day AFTER chemotherapy    ondansetron hcl (ZOFRAN) 8 mg tablet Take 1 Tab by mouth every eight (8) hours as needed for Nausea or Vomiting.  lidocaine-prilocaine (EMLA) topical cream Apply a dime size amount to port site 30-60 minutes before access on treatment days to numb port site.  prochlorperazine (Compazine) 10 mg tablet Take 0.5 Tabs by mouth every six (6) hours as needed for Nausea or Vomiting.  dextroamphetamine-amphetamine (ADDERALL) 10 mg tablet TAKE 1 TABLET TWICE A DAY FOR 30 DAYS    acyclovir (ZOVIRAX) 400 mg tablet Take 400 mg by mouth two (2) times a day. No current facility-administered medications for this visit. No Known Allergies     Review of Systems: A complete review of systems was obtained, negative except as described above. Physical Exam:     Visit Vitals  BP (!) 152/90 (BP 1 Location: Left arm, BP Patient Position: Sitting)   Pulse 74   Temp 98.3 °F (36.8 °C) (Oral)   Resp 18   Ht 5' 10\" (1.778 m)   Wt 199 lb (90.3 kg)   SpO2 99%   BMI 28.55 kg/m²     ECOG PS: 0  General: Well developed, no acute distress  Eyes: PERRLA, EOMI, anicteric sclerae  HENT: Atraumatic, OP clear, TMs intact without erythema  Neck: Supple, Left supraclavicular LN now smaller in size. Lymphatic: No cervical, supraclavicular, axillary or inguinal adenopathy  Respiratory: CTAB, normal respiratory effort  CV: Normal rate, regular rhythm, no murmurs, no peripheral edema  GI: Soft, nontender, nondistended, no hepatomegaly, no splenomegaly. L lower abdomen with soft mobile mass, approx 5cm  MS: Normal gait and station. Digits without clubbing or cyanosis. Skin: Maculopapular rash present on arms, legs, trunk. No ecchymoses, or petechiae. Normal temperature, turgor, and texture. Neuro/Psych: Alert, oriented. 5/5 strength in all 4 extremities. Appropriate affect, normal judgment/insight.     Results:     Lab Results   Component Value Date/Time    WBC 15.0 (H) 09/24/2020 09:23 AM HGB 12.8 2020 09:23 AM    HCT 36.8 2020 09:23 AM    PLATELET 650  09:23 AM    MCV 89.5 2020 09:23 AM    ABS. NEUTROPHILS 12.8 (H) 2020 09:23 AM     Lab Results   Component Value Date/Time    Sodium 136 2020 09:23 AM    Potassium 3.7 2020 09:23 AM    Chloride 103 2020 09:23 AM    CO2 25 2020 09:23 AM    Glucose 147 (H) 2020 09:23 AM    BUN 21 (H) 2020 09:23 AM    Creatinine 1.35 (H) 2020 09:23 AM    GFR est AA >60 2020 09:23 AM    GFR est non-AA 53 (L) 2020 09:23 AM    Calcium 9.1 2020 09:23 AM     Lab Results   Component Value Date/Time    Bilirubin, total 0.5 2020 09:23 AM    ALT (SGPT) 37 2020 09:23 AM    Alk. phosphatase 86 2020 09:23 AM    Protein, total 7.0 2020 09:23 AM    Albumin 3.6 2020 09:23 AM    Globulin 3.4 2020 09:23 AM     No results found for: IRON, FE, TIBC, IBCT, PSAT, FERR    No results found for: B12LT, FOL, RBCF  Lab Results   Component Value Date/Time    TSH 1.72 2020 09:58 AM     No results found for: HAMAT, HAAB, HABT, HAAT, HBSAG, HBSB, HBSAT, HBABN, HBCM, HBCAB, HBCAT, XBCABS, HBEAB, [facility], [facility] [id number], [address], [facility], HBCLT, [address], [id number], [id number], [address] [facility], [id number], HBCMLT, [id number], HCGAT     20: Chromogranin 42    20: PSA 38.6  20: PSA 13.2   8/10/20: PSA 1.2  20: PSA 0.5  20: PSA pending    Imagin/20/20 CT abd/pelvis with IV contrast:  Impression:  1. No findings to suggest gross abdominal wall hernia or flank hernia. 2. Extensive adenopathy throughout the abdomen and pelvis as described. Findings are concerning for possible metastatic disease or lymphoma. Recommend follow up or comparison with prior studies. 3. Other findings as described. 20 PET:  FINDINGS:  HEAD/NECK: No apparent foci of abnormal hypermetabolism. Cerebral evaluation is  limited by normal intense activity.   CHEST: Hypermetabolic lymphadenopathy at the base of the left neck and in the  left supraclavicular region is noted. Hypermetabolic superior mediastinal,  prevascular, right paratracheal, subcarinal, and bilateral hilar lymph  lymphadenopathy, maximum SUV of the subcarinal lymph node is 5.7. Small but  hypermetabolic soft tissue nodule left anterior chest wall. ABDOMEN/PELVIS: Hypermetabolic retrocrural, left para-aortic, aortocaval,  bilateral common iliac, bilateral external iliac, and bilateral inguinal  lymphadenopathy, maximum SUV 5.8 of an aortocaval lymph node. Hypermetabolic  mesenteric lymph node left lower quadrant, maximum SUV 12.3. SKELETON: No foci of abnormal hypermetabolism in the axial and visualized  appendicular skeleton. IMPRESSION: Hypermetabolic lymphadenopathy involving the left neck and left  supraclavicular region, mediastinum, bilateral hilum, retroperitoneum,  mesentery, and pelvis as described above. Hypermetabolic soft tissue nodule left  chest.    Brain MRI 6/27/20: IMPRESSION:  There is no evidence of intracranial metastatic disease. Mild chronic microvascular ischemic change. No intracranial mass, hemorrhage or evidence of acute infarction. CT c/a/p 8/3/20: IMPRESSION:  Chest:  1. Findings consistent with partial treatment response, with interval decrease  in size of mediastinal and hilar lymph nodes, and interval decrease in size of  left chest wall nodule. Abdomen/pelvis:   1. Findings consistent with partial treatment response, with interval decrease  in size of retroperitoneal lymph nodes. 2. Unchanged circumferential bladder wall thickening, which may represent acute  or chronic cystitis. Correlate with urinalysis. RECIST   TARGET LESIONS:      Lesion (description)         Location (series/slice)                Size            1. Posterior mediastinal lymph node    series 2 image 26    2.3 x 2.0 cm, previously 3.3 x 2.2 cm  2.  Left upper paraesophageal lymph node    series 2 image 9    5 mm, previously 15 mm. 3. Right external iliac lymphadenopathy    series 2 image 98    3.8 x 3.2 cm, previously 6.3 x 4.7 cm  4. Left external iliac lymphadenopathy    series 2 image 99    1.6 cm, previously 2.5 cm    9/1/20 Bone Scan:  FINDINGS: There is physiologic uptake of radiotracer in the skeleton and soft  tissues. There is no evidence of fracture, osteomyelitis or bone tumor. There  is no pattern of skeletal metastases. IMPRESSION: Normal Whole Body Nuclear Bone Scan. Assessment & Plan:   Rock Murray is a 76 y.o. 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 1. Metastatic adenocarcinoma of prostate, high grade: Initial pathologist at [address] [place] [address] called this \"Large cell neuroendocrine carcinoma,\" and based on diffuse disease on PET, patient was started on treatment with Carbo-Etoposide-Atezolizumab (thinking this was large cell carcinoma of the lung.) However [facility] testing identified TMPRSS2-ERG fusion, which is primarily seen in prostate cancers. This along with elevated PSA of 38 prompted sending of pathology specimen to [facility] for review. Their opinion and IHC staining is consistent with high grade adenocarcinoma of prostate. Treatment options now include: Carboplatin + Cabazitaxel vs single agent Docetaxel. Pt signed consent for Docetaxel. CT on 8/3/20 shows response to treatment. 9/01/20 bone scan negative for skeletal mets. Supportive medications: EMLA cream, zofran, compazine, dexamethasone 8mg BID the day before and day after treatment. -- Lupron #1 on 8/31/20 and q12wks for ADT per patient, #2 due 11/23/20. Discussed with Dr. Latonia Guido. -- Proceed with cycle 3 of docetaxel today  -- Return in 3 weeks for cycle 4 of docetaxel, MD/NP visit. Can give Lupron with q3mo dosing at that time. -- CT and bone scan due after cycle 4.     2. ADHD: On Adderal    3. H/o Genital Herpes: On suppression with Acyclovir. 4. GI / Electrolytes: Replete as needed in [facility]IC. Encourage foods high in potassium. On KCl 20 mEQ/d.    5. Health maintenance: No prior colonoscopy or PSA prior to prostate ca diagnosis per patient. 6. Bladder wall thickening: Seen on CT scan, which may represent acute or chronic cystitis. UA negative. 7. Hyperglycemia: 2/2 to steroid premedications. Will discuss decreasing dose in future if he tolerates docetaxel. Arelis Delgado assisting with management of steroid induced hyperglycemia. 8. Rash: Developed on 8/19/20 (C1 docetaxel on 8/10/20.) Rash present on arms, legs and shoulders is present but improving. No longer pruritic. -- Continue prednisone 5mg BID. -- Betamethasone 0.05% ointment to affected areas bid. Avoid face, genital area. 9. Leukocytosis: Likely 2/2 to prednisone and dexamethasone. Denies recent fevers. Monitor. 10. HTN: Likely 2/2 to anxiety regarding treatment. Monitor. 11. Renal insufficiency: Likely 2/2 to decreased PO intake. Encouraged better oral hydration. -- 500mL NS today with treatment. Emotional well being: Pt is coping well with his/her disease and has excellent support. I appreciate the opportunity to participate in Mr. Cesar tabor. Significant other: Welton Councilman - [phone]. Patient seen with Marguerite Levin NP.      Signed By: Luis Angel Wallace MD     September 24, 2020

## 2020-09-24 ENCOUNTER — OFFICE VISIT (OUTPATIENT)
Dept: ONCOLOGY | Age: 65
End: 2020-09-24
Payer: MEDICARE

## 2020-09-24 ENCOUNTER — HOSPITAL ENCOUNTER (OUTPATIENT)
Dept: INFUSION THERAPY | Age: 65
Discharge: HOME | End: 2020-09-24
Payer: COMMERCIAL

## 2020-09-24 VITALS
WEIGHT: 199.5 LBS | HEART RATE: 46 BPM | SYSTOLIC BLOOD PRESSURE: 146 MMHG | TEMPERATURE: 98.3 F | DIASTOLIC BLOOD PRESSURE: 78 MMHG | OXYGEN SATURATION: 99 % | HEIGHT: 70 IN | BODY MASS INDEX: 28.56 KG/M2 | RESPIRATION RATE: 18 BRPM

## 2020-09-24 VITALS
HEIGHT: 70 IN | TEMPERATURE: 98.3 F | HEART RATE: 74 BPM | SYSTOLIC BLOOD PRESSURE: 152 MMHG | RESPIRATION RATE: 18 BRPM | DIASTOLIC BLOOD PRESSURE: 90 MMHG | WEIGHT: 199 LBS | OXYGEN SATURATION: 99 % | BODY MASS INDEX: 28.49 KG/M2

## 2020-09-24 DIAGNOSIS — R59.0 MEDIASTINAL LYMPHADENOPATHY: ICD-10-CM

## 2020-09-24 DIAGNOSIS — C34.00 MALIGNANT NEOPLASM OF HILUS OF LUNG, UNSPECIFIED LATERALITY (HCC): ICD-10-CM

## 2020-09-24 DIAGNOSIS — Z51.11 CHEMOTHERAPY MANAGEMENT, ENCOUNTER FOR: ICD-10-CM

## 2020-09-24 DIAGNOSIS — N28.9 RENAL INSUFFICIENCY: ICD-10-CM

## 2020-09-24 DIAGNOSIS — C61 ADENOCARCINOMA OF PROSTATE, STAGE 4 (HCC): Primary | ICD-10-CM

## 2020-09-24 DIAGNOSIS — C77.9 REGIONAL LYMPH NODE METASTASIS PRESENT (HCC): ICD-10-CM

## 2020-09-24 DIAGNOSIS — D72.829 LEUKOCYTOSIS, UNSPECIFIED TYPE: ICD-10-CM

## 2020-09-24 DIAGNOSIS — R73.9 HYPERGLYCEMIA: ICD-10-CM

## 2020-09-24 LAB
ALBUMIN SERPL-MCNC: 3.6 G/DL (ref 3.5–5)
ALBUMIN/GLOB SERPL: 1.1 {RATIO} (ref 1.1–2.2)
ALP SERPL-CCNC: 86 U/L (ref 45–117)
ALT SERPL-CCNC: 37 U/L (ref 12–78)
ANION GAP SERPL CALC-SCNC: 8 MMOL/L (ref 5–15)
AST SERPL-CCNC: 54 U/L (ref 15–37)
BASO+EOS+MONOS # BLD AUTO: 0.9 K/UL (ref 0.2–1.2)
BASO+EOS+MONOS NFR BLD AUTO: 6 % (ref 3.2–16.9)
BILIRUB SERPL-MCNC: 0.5 MG/DL (ref 0.2–1)
BUN SERPL-MCNC: 21 MG/DL (ref 6–20)
BUN/CREAT SERPL: 16 (ref 12–20)
CALCIUM SERPL-MCNC: 9.1 MG/DL (ref 8.5–10.1)
CHLORIDE SERPL-SCNC: 103 MMOL/L (ref 97–108)
CO2 SERPL-SCNC: 25 MMOL/L (ref 21–32)
CREAT SERPL-MCNC: 1.35 MG/DL (ref 0.7–1.3)
DIFFERENTIAL METHOD BLD: ABNORMAL
ERYTHROCYTE [DISTWIDTH] IN BLOOD BY AUTOMATED COUNT: 19 % (ref 11.8–15.8)
GLOBULIN SER CALC-MCNC: 3.4 G/DL (ref 2–4)
GLUCOSE SERPL-MCNC: 147 MG/DL (ref 65–100)
HCT VFR BLD AUTO: 36.8 % (ref 36.6–50.3)
HGB BLD-MCNC: 12.8 G/DL (ref 12.1–17)
LYMPHOCYTES # BLD: 1.3 K/UL (ref 0.8–3.5)
LYMPHOCYTES NFR BLD: 9 % (ref 12–49)
MCH RBC QN AUTO: 31.1 PG (ref 26–34)
MCHC RBC AUTO-ENTMCNC: 34.8 G/DL (ref 30–36.5)
MCV RBC AUTO: 89.5 FL (ref 80–99)
NEUTS SEG # BLD: 12.8 K/UL (ref 1.8–8)
NEUTS SEG NFR BLD: 86 % (ref 32–75)
PLATELET # BLD AUTO: 213 K/UL (ref 150–400)
POTASSIUM SERPL-SCNC: 3.7 MMOL/L (ref 3.5–5.1)
PROT SERPL-MCNC: 7 G/DL (ref 6.4–8.2)
PSA SERPL-MCNC: 0.4 NG/ML (ref 0.01–4)
RBC # BLD AUTO: 4.11 M/UL (ref 4.1–5.7)
SODIUM SERPL-SCNC: 136 MMOL/L (ref 136–145)
WBC # BLD AUTO: 15 K/UL (ref 4.1–11.1)

## 2020-09-24 PROCEDURE — 74011250636 HC RX REV CODE- 250/636: Performed by: NURSE PRACTITIONER

## 2020-09-24 PROCEDURE — 77030016057 HC NDL HUBR APOL -B

## 2020-09-24 PROCEDURE — 85025 COMPLETE CBC W/AUTO DIFF WBC: CPT

## 2020-09-24 PROCEDURE — 84153 ASSAY OF PSA TOTAL: CPT

## 2020-09-24 PROCEDURE — 36415 COLL VENOUS BLD VENIPUNCTURE: CPT

## 2020-09-24 PROCEDURE — 99215 OFFICE O/P EST HI 40 MIN: CPT | Performed by: INTERNAL MEDICINE

## 2020-09-24 PROCEDURE — 96413 CHEMO IV INFUSION 1 HR: CPT

## 2020-09-24 PROCEDURE — 96361 HYDRATE IV INFUSION ADD-ON: CPT

## 2020-09-24 PROCEDURE — 80053 COMPREHEN METABOLIC PANEL: CPT

## 2020-09-24 PROCEDURE — 96375 TX/PRO/DX INJ NEW DRUG ADDON: CPT

## 2020-09-24 RX ORDER — SODIUM CHLORIDE 9 MG/ML
500 INJECTION, SOLUTION INTRAVENOUS ONCE
Status: COMPLETED | OUTPATIENT
Start: 2020-09-24 | End: 2020-09-24

## 2020-09-24 RX ORDER — SODIUM CHLORIDE 9 MG/ML
25 INJECTION, SOLUTION INTRAVENOUS CONTINUOUS
Status: DISPENSED | OUTPATIENT
Start: 2020-09-24 | End: 2020-09-24

## 2020-09-24 RX ORDER — SODIUM CHLORIDE 0.9 % (FLUSH) 0.9 %
10 SYRINGE (ML) INJECTION AS NEEDED
Status: DISPENSED | OUTPATIENT
Start: 2020-09-24 | End: 2020-09-24

## 2020-09-24 RX ORDER — HEPARIN 100 UNIT/ML
300-500 SYRINGE INTRAVENOUS AS NEEDED
Status: ACTIVE | OUTPATIENT
Start: 2020-09-24 | End: 2020-09-24

## 2020-09-24 RX ORDER — SODIUM CHLORIDE 9 MG/ML
10 INJECTION INTRAMUSCULAR; INTRAVENOUS; SUBCUTANEOUS AS NEEDED
Status: ACTIVE | OUTPATIENT
Start: 2020-09-24 | End: 2020-09-24

## 2020-09-24 RX ORDER — DEXAMETHASONE SODIUM PHOSPHATE 4 MG/ML
8 INJECTION, SOLUTION INTRA-ARTICULAR; INTRALESIONAL; INTRAMUSCULAR; INTRAVENOUS; SOFT TISSUE ONCE
Status: COMPLETED | OUTPATIENT
Start: 2020-09-24 | End: 2020-09-24

## 2020-09-24 RX ADMIN — SODIUM CHLORIDE 10 ML: 9 INJECTION INTRAMUSCULAR; INTRAVENOUS; SUBCUTANEOUS at 13:43

## 2020-09-24 RX ADMIN — DOCETAXEL ANHYDROUS 153 MG: 10 INJECTION, SOLUTION INTRAVENOUS at 12:34

## 2020-09-24 RX ADMIN — SODIUM CHLORIDE 500 ML: 900 INJECTION, SOLUTION INTRAVENOUS at 11:30

## 2020-09-24 RX ADMIN — SODIUM CHLORIDE 25 ML/HR: 900 INJECTION, SOLUTION INTRAVENOUS at 12:27

## 2020-09-24 RX ADMIN — DEXAMETHASONE SODIUM PHOSPHATE 8 MG: 4 INJECTION, SOLUTION INTRAMUSCULAR; INTRAVENOUS at 11:30

## 2020-09-24 RX ADMIN — Medication 500 UNITS: at 13:43

## 2020-09-24 NOTE — PROGRESS NOTES
Skylar Hernandez is a 76 y.o. male    Chief Complaint   Patient presents with    Follow-up    Chemotherapy     1. Have you been to the ER, urgent care clinic since your last visit? Hospitalized since your last visit? No    2. Have you seen or consulted any other health care providers outside of the [address] [place] [address] since your last visit? Include any pap smears or colon screening.  Yes [facility] urology

## 2020-09-24 NOTE — PROGRESS NOTES
[facility] Progress Note    Date: 2020    Name: Jd Reynolds    MRN: [id number]         : 1955    Mr. Joce Recio Arrived ambulatory and in no distress for C3D1 of Taxotere Regimen. Assessment was completed, no acute issues at this time, no new complaints voiced. Left chest wall port accessed without difficulty, labs drawn & sent for processing. Chemotherapy Flowsheet 2020   Cycle C3D1   Date 2020   Drug / Regimen Taxotere   Pre Meds -   Notes -       0935 Patient proceed to appointment with Dr. Kalina Salazar. Mr. Cindy Patrick vitals were reviewed. Visit Vitals  BP (!) 152/90   Pulse 74   Temp 98.3 °F (36.8 °C)   Resp 18   Ht 5' 10\" (1.778 m)   Wt 90.5 kg (199 lb 8 oz)   SpO2 99%   BMI 28.63 kg/m²       Lab results were obtained and reviewed. Recent Results (from the past 12 hour(s))   CBC WITH 3 PART DIFF    Collection Time: 20  9:23 AM   Result Value Ref Range    WBC 15.0 (H) 4.1 - 11.1 K/uL    RBC 4.11 4.10 - 5.70 M/uL    HGB 12.8 12.1 - 17.0 g/dL    HCT 36.8 36.6 - 50.3 %    MCV 89.5 80.0 - 99.0 FL    MCH 31.1 26.0 - 34.0 PG    MCHC 34.8 30.0 - 36.5 g/dL    RDW 19.0 (H) 11.8 - 15.8 %    PLATELET 167 168 - 990 K/uL    NEUTROPHILS 86 (H) 32 - 75 %    MIXED CELLS 6 3.2 - 16.9 %    LYMPHOCYTES 9 (L) 12 - 49 %    ABS. NEUTROPHILS 12.8 (H) 1.8 - 8.0 K/UL    ABS. MIXED CELLS 0.9 0.2 - 1.2 K/uL    ABS.  LYMPHOCYTES 1.3 0.8 - 3.5 K/UL    DF AUTOMATED         Medications:  Medications Administered     0.9% sodium chloride infusion     Admin Date  2020 Action  New Bag Dose  25 mL/hr Rate  25 mL/hr Route  IntraVENous Administered By  Akbar Herring, JOHN          0.9% sodium chloride infusion 500 mL     Admin Date  2020 Action  New Bag Dose  500 mL Rate  1,000 mL/hr Route  IntraVENous Administered By  Akbar Herring RN          0.9% sodium chloride injection 10 mL     Admin Date  2020 Action  Given Dose  10 mL Route  IntraVENous Administered By  Akbar Herring RN dexamethasone (DECADRON) 4 mg/mL injection 8 mg     Admin Date  09/24/2020 Action  Given Dose  8 mg Route  IntraVENous Administered By  Sinan Crandall RN          DOCEtaxeL (TAXOTERE) 153 mg in 0.9% sodium chloride 250 mL, overfill volume 25 mL chemo infusion     Admin Date  09/24/2020 Action  New Bag Dose  153 mg Route  IntraVENous Administered By  Sinan Crandall RN          heparin (porcine) pf 300-500 Units     Admin Date  09/24/2020 Action  Given Dose  500 Units Route  InterCATHeter Administered By  Sinan Crandall RN                  Mr. Lanette Ward tolerated treatment well and was discharged from [place] [address] in stable condition. Port de-accessed, flushed & heparinized per protocol. He is to return on 10/12/20 for his next appointment.     Scott Ho RN  September 24, 2020

## 2020-09-30 ENCOUNTER — TELEPHONE (OUTPATIENT)
Dept: ONCOLOGY | Age: 65
End: 2020-09-30

## 2020-09-30 NOTE — TELEPHONE ENCOUNTER
[address] at [place]  [phone]        09/30/20 3:31 PM Called patient and advised of NP response. Patient verbalized understanding. He states he had already received \"Shingrix\" vaccine but was told this was \"non-live\" option of vaccine. Advised this nurse would update Dr. Radha Winchester and Hedy Bailey and call back if any further concerns. He verbalized understanding. No further questions or concerns at this time.

## 2020-10-05 ENCOUNTER — TELEPHONE (OUTPATIENT)
Dept: ONCOLOGY | Age: 65
End: 2020-10-05

## 2020-10-05 NOTE — TELEPHONE ENCOUNTER
10/05/20 2:42 PM Advised moving chemo appt from 10/12 to 10/15 since it was scheduled too early. Patient verbalized understanding.

## 2020-10-09 ENCOUNTER — TELEPHONE (OUTPATIENT)
Dept: INTERNAL MEDICINE CLINIC | Age: 65
End: 2020-10-09

## 2020-10-09 NOTE — TELEPHONE ENCOUNTER
Pharmacist/Oncology Collaboration for Hyperglycemia Management    CC:  Hyperglycemia management/education    S/O: Martha Laguerre is a 76 y.o. male referred by Dr. Ifrah Suarez for hyperglycemia management. Oncology Diagnosis: Adenocarcinoma of prostate, stage 4. Current Oncology Regimen:  Docetaxel #3 and every 3 weeks - last dose 9/24/20  -Premed with Dexamethasone 8mg BID day before and day after    HPI Hyperglycemia: patient started checking blood sugars after 8/12/20 appt with me. Advised him to send in blood sugars but had not heard from him. Current Diabetes Regimen:  None    ROS:   Patient denies hypoglycemic and hyperglycemic signs/symptoms, chest pain, shortness of breath, neuropathy, vision changes, and any foot changes. Self-Monitoring Blood Glucose:  Checking multiple times a day - usually in the 140's and 150's - did not send log - does think that it increases after getting steroids      Data reviewed:  Lab Results   Component Value Date/Time    Hemoglobin A1c (POC) 6.5 08/12/2020 09:11 AM       Lab Results   Component Value Date/Time    Sodium 136 09/24/2020 09:23 AM    Potassium 3.7 09/24/2020 09:23 AM    Chloride 103 09/24/2020 09:23 AM    CO2 25 09/24/2020 09:23 AM    Anion gap 8 09/24/2020 09:23 AM    Glucose 147 (H) 09/24/2020 09:23 AM    BUN 21 (H) 09/24/2020 09:23 AM    Creatinine 1.35 (H) 09/24/2020 09:23 AM    BUN/Creatinine ratio 16 09/24/2020 09:23 AM    GFR est AA >60 09/24/2020 09:23 AM    GFR est non-AA 53 (L) 09/24/2020 09:23 AM    Calcium 9.1 09/24/2020 09:23 AM    Bilirubin, total 0.5 09/24/2020 09:23 AM    Alk. phosphatase 86 09/24/2020 09:23 AM    Protein, total 7.0 09/24/2020 09:23 AM    Albumin 3.6 09/24/2020 09:23 AM    Globulin 3.4 09/24/2020 09:23 AM    A-G Ratio 1.1 09/24/2020 09:23 AM    ALT (SGPT) 37 09/24/2020 09:23 AM    AST (SGOT) 54 (H) 09/24/2020 09:23 AM         Assessment/Plan:     1.   Hyperglycemia/Diabetes: a1c is more indicative of diabetes but being that it's at the cutoff and patient receiving steroids, could be directly related to that. Patient checking sugars and numbers running higher but not excessively high. Patient to record on log and send log to me. Likely will need medication to help keep sugars down. Interested to see how much they are spiking after treatment with steroids and since now on daily low dose of steroids. Patient to send me log in 1 week. Advised him that he did not need to check sugars 4 times a day, every day. Only the day of treatment and for the next 2-3 days. The rest of the month, it's fine to check twice a day - always fasting and then varying from pre-lunch, pre-dinner or at bedtime. Reiterated that he did not need to check 4 times a day the rest of the month for right now. All changes made to patient regimen per collaborative practice agreement. Patient verbalized understanding of the information presented and all of the patients questions were answered. AVS was handed to the patient and information reviewed. Patient advised to call me directly or Dr. Abbey Ortiz with any additional questions or concerns. Information regarding visit will also be sent to Tammie Arriola MD to ensure continuity of care.      Follow-up: 1-2 weeks via phone      Anjelica Waddell, PharmD, BCPS, CDCES    CLINICAL PHARMACY CONSULT: MED RECONCILIATION/REVIEW ADDENDUM    For Pharmacy Admin Tracking Only    PHSO: Cristian Hardy [id number] Patient?: No  Total # of Interventions Recommended: Count: 2  Total Interventions Accepted: 2  Time Spent (min): 45

## 2020-10-13 NOTE — PROGRESS NOTES
[employer]  Medical Oncology at [facility]  [phone]    Hematology / Oncology Established Visit    Reason for Visit:   Toño Black is a 76 y.o. male who is seen for follow up of neuroendocrine carcinoma. Initially referred by Dr. Daysi Ashraf. 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 Flow cytometry negative for lymphoproliferative disorder. FoundationOne: MS Stable, TMB 0, MDM4 amplification, MYC amplification, TMPRSS2 TMPRSS2-ERD fusion, CEBPA 1311fs*10, ERBB4 amplification - equivocal, MCL1 amplification, [id number] amplification, RAD21 amplification. See scanned report for full info. Abbreviated [facility] Consultation:  Final diagnosis: Right external iliac node: Metastatic prostate adenocarcinoma. Comment: Histologic sections of the right external iliac node show sheets of cells with minimal eosinophilic cytoplasm and variable cytologic atypia. Some areas show acinar formation. The cells have predominantly round nuclei with vesicular chromatin and prominent nucleoli. Some areas show significantly more atypia with more irregular nuclear borders, hyperchromatic nyclei, and increase nuclear to cytoplasmic ratio. Frequent mitotic figures are identified. A provided pane of IHC stains is reviewed and demonstrates that the malignant cells show strong, diffuse expression of pan-cytokeratin and focal expression of TTF. CK7, CK20, chromogranin, and synaptophysin are negative in the lesional cells. CD3 and CD20 are negative in the lesions cells and positive in the background lymphocytes.  An abbreviated FoundationOne report was included, which included a TMPRSS2-ERG fusion (among other nonspecific abnormalities), which si found in approximately 50% of prostate cancers. Additional IHC studies performed at [facility] showed that the malignant cells have immunoreactivity with antibodies for PSA and PSAP, consistent with a diagnosis of metastatic prostate adenocarcinoma. Per report, flow cytometry negative for lymphoma. Overall, the histomorphology, immunophenotype and genomic findings in this case are diagnostic of metastatic prostate adenocarcinoma. The acinar formation and cytology were suggestive of this diagnosis, and the diffuse PSA and PSAP positivity as well as the TMPRSS2-ERG fusion confirmed the diagnosis. This fusion is the most common genomic alteration in prostate carcinoma and can be detected in over half of the cases, yet is not seen with any frequency in other types of carcinoma. Prior Treatment:   1. Carbo-Etoposide-Atezolizumab x 2 cycles, 6/29/20-7/20/20. Current Treatment: ADT and Docetaxel, started 8/10/20  Treatment duration: Until disease progression or toxicity  Frequency of visits: Every 3 weeks    History of Present Illness:   Bety Marie is a 76 y.o. male who comes in for evaluation of poorly differentiated neuroendocrine carcinoma. Pt noticed a nontender bulge in his LLQ in 5/2020. He thought this was a hernia and was evaluated by Dr. Allan Marley. CT on 5/20/20 was notable for extensive lymphadenopathy in abd/pelvis. Pt underwent robot assisted excisional Right external iliac LN biopsy 5/29/20, which showed poorly differentiated large cell neuroendocrine carcinoma. He reports intentional weight loss with dietary changes and exercise, losing 40-lbs in past 8 months. No n/v/d, melena/hematochezia, cough, SOB. No fevers, chills, sweats. [employer] nonsmoker. Mother had breast cancer diagnosed age < 48. Twin brother had melanoma diagnosed aged late 46s. He does not think he has ever had a colonoscopy or PSA screening prior to current diagnosis. Interval History:  Patient here for follow up.  Here for C4 of docetaxel. Continues to take prednisone 5mg BID except on days of treatment. Reports increased neuropathy in fingertips of bilateral hands, right worse than left. No difficulty with zippers or buttons. Does note he is fumbling and dropping objects more often. Reports multiple scale like plaque lesions on tops of hands and arms that are improving, pruritis has resolved. He is following with Bandar Sharma for steroid induced hyperglycemia. BG readings at home range . Good appetite and has actually gained 10 lbs. Reports constipation but controlled with daily stool softener. No diarrhea. No fevers or chills. He states he received an injection at [facility] last month after telling them he had not received any recent Lupron with us. No past medical history on file. Past Surgical History:   Procedure Laterality Date    HX APPENDECTOMY  1964    HX OTHER SURGICAL      subcutaneous cyst of forehead    HX VASECTOMY        Social History     Tobacco Use    Smoking status: Never Smoker    Smokeless tobacco: Never Used   Substance Use Topics    Alcohol use: Not Currently   Unmarried. Has 2 children, 27 and 32. 1 lives in [place]. Family History   Problem Relation Age of Onset    Cancer Mother         breast    Cancer Father         prostate    Cancer Brother         melanoma    COPD Brother      Current Outpatient Medications   Medication Sig    glucose blood VI test strips (blood glucose test) strip Use to check blood sugars four times daily    lancets misc Use to check blood sugars four times daily    OneTouch Ultra2 Meter misc USE TO CHECK BLOOD SUGARS TWICE DAILY    potassium chloride (K-DUR, KLOR-CON) 20 mEq tablet Take 1 Tab by mouth daily.  predniSONE (DELTASONE) 5 mg tablet Take 1 Tab by mouth two (2) times a day.  betamethasone dipropionate (DIPROLENE) 0.05 % topical lotion Apply  to affected area See Admin Instructions.  Apply a thin film to affect areas (arms, legs, trunk) once or twice daily. Avoid face. Wash hands.  degarelix (Firmagon) 120 mg solr injection by SubCUTAneous route once.  dexAMETHasone (DECADRON) 4 mg tablet Take 8mg (2 tabs) twice daily the day BEFORE chemotherapy and take 8mg (2 tabs) twice daily the day AFTER chemotherapy    ondansetron hcl (ZOFRAN) 8 mg tablet Take 1 Tab by mouth every eight (8) hours as needed for Nausea or Vomiting.  lidocaine-prilocaine (EMLA) topical cream Apply a dime size amount to port site 30-60 minutes before access on treatment days to numb port site.  prochlorperazine (Compazine) 10 mg tablet Take 0.5 Tabs by mouth every six (6) hours as needed for Nausea or Vomiting.  dextroamphetamine-amphetamine (ADDERALL) 10 mg tablet TAKE 1 TABLET TWICE A DAY FOR 30 DAYS    acyclovir (ZOVIRAX) 400 mg tablet Take 400 mg by mouth two (2) times a day. No current facility-administered medications for this visit. No Known Allergies     Review of Systems: A complete review of systems was obtained, negative except as described above. Physical Exam:     Visit Vitals  /82   Pulse 71   Temp 98 °F (36.7 °C)   Ht 5' 10\" (1.778 m)   Wt 210 lb (95.3 kg)   SpO2 97%   BMI 30.13 kg/m²     ECOG PS: 0  General: Well developed, no acute distress  Eyes: PERRLA, EOMI, anicteric sclerae  HENT: Atraumatic, OP clear, TMs intact without erythema  Neck: Supple, Left supraclavicular LN now smaller in size. Lymphatic: No cervical, supraclavicular, axillary or inguinal adenopathy  Respiratory: CTAB, normal respiratory effort  CV: Normal rate, regular rhythm, no murmurs, no peripheral edema  GI: firm, nontender, mild distention, no hepatomegaly, no splenomegaly. L lower abdomen with soft mobile mass, approx 5cm  MS: Normal gait and station. Digits without clubbing or cyanosis. Skin: Maculopapular rash present on arms, legs, trunk. No ecchymoses, or petechiae. Normal temperature, turgor, and texture. Neuro/Psych: Alert, oriented.  5/5 strength in all 4 extremities. Appropriate affect, normal judgment/insight. Results:     Lab Results   Component Value Date/Time    WBC 17.3 (H) 10/15/2020 09:55 AM    HGB 12.0 (L) 10/15/2020 09:55 AM    HCT 35.0 (L) 10/15/2020 09:55 AM    PLATELET 298  09:55 AM    MCV 90.2 10/15/2020 09:55 AM    ABS. NEUTROPHILS 15.5 (H) 10/15/2020 09:55 AM     Lab Results   Component Value Date/Time    Sodium 137 10/15/2020 09:55 AM    Potassium 3.9 10/15/2020 09:55 AM    Chloride 104 10/15/2020 09:55 AM    CO2 24 10/15/2020 09:55 AM    Glucose 220 (H) 10/15/2020 09:55 AM    BUN 34 (H) 10/15/2020 09:55 AM    Creatinine 1.42 (H) 10/15/2020 09:55 AM    GFR est AA >60 10/15/2020 09:55 AM    GFR est non-AA 50 (L) 10/15/2020 09:55 AM    Calcium 9.1 10/15/2020 09:55 AM     Lab Results   Component Value Date/Time    Bilirubin, total 0.5 2020 09:23 AM    ALT (SGPT) 37 2020 09:23 AM    Alk. phosphatase 86 2020 09:23 AM    Protein, total 7.0 2020 09:23 AM    Albumin 3.6 2020 09:23 AM    Globulin 3.4 2020 09:23 AM     No results found for: IRON, FE, TIBC, IBCT, PSAT, FERR    No results found for: B12LT, FOL, RBCF  Lab Results   Component Value Date/Time    TSH 1.72 2020 09:58 AM     No results found for: HAMAT, HAAB, HABT, HAAT, HBSAG, HBSB, HBSAT, HBABN, HBCM, HBCAB, HBCAT, XBCABS, HBEAB, HBEAG, XHEPCS, [id number], HBEGLT, [place], HBCLT, HBEBLT, [id number], [id number], HAVMLT, [id number], HBCMLT, [id number], HCGAT     20: Chromogranin 42    20: PSA 38.6  20: PSA 13.2   8/10/20: PSA 1.2  20: PSA 0.5  20: PSA 0.4  10/15/20: PSA     Imagin/20/20 CT abd/pelvis with IV contrast:  Impression:  1. No findings to suggest gross abdominal wall hernia or flank hernia. 2. Extensive adenopathy throughout the abdomen and pelvis as described. Findings are concerning for possible metastatic disease or lymphoma. Recommend follow up or comparison with prior studies.    3. Other findings as described. 6/18/20 PET:  FINDINGS:  HEAD/NECK: No apparent foci of abnormal hypermetabolism. Cerebral evaluation is  limited by normal intense activity. CHEST: Hypermetabolic lymphadenopathy at the base of the left neck and in the  left supraclavicular region is noted. Hypermetabolic superior mediastinal,  prevascular, right paratracheal, subcarinal, and bilateral hilar lymph  lymphadenopathy, maximum SUV of the subcarinal lymph node is 5.7. Small but  hypermetabolic soft tissue nodule left anterior chest wall. ABDOMEN/PELVIS: Hypermetabolic retrocrural, left para-aortic, aortocaval,  bilateral common iliac, bilateral external iliac, and bilateral inguinal  lymphadenopathy, maximum SUV 5.8 of an aortocaval lymph node. Hypermetabolic  mesenteric lymph node left lower quadrant, maximum SUV 12.3. SKELETON: No foci of abnormal hypermetabolism in the axial and visualized  appendicular skeleton. IMPRESSION: Hypermetabolic lymphadenopathy involving the left neck and left  supraclavicular region, mediastinum, bilateral hilum, retroperitoneum,  mesentery, and pelvis as described above. Hypermetabolic soft tissue nodule left  chest.    Brain MRI 6/27/20: IMPRESSION:  There is no evidence of intracranial metastatic disease. Mild chronic microvascular ischemic change. No intracranial mass, hemorrhage or evidence of acute infarction. CT c/a/p 8/3/20: IMPRESSION:  Chest:  1. Findings consistent with partial treatment response, with interval decrease  in size of mediastinal and hilar lymph nodes, and interval decrease in size of  left chest wall nodule. Abdomen/pelvis:   1. Findings consistent with partial treatment response, with interval decrease  in size of retroperitoneal lymph nodes. 2. Unchanged circumferential bladder wall thickening, which may represent acute  or chronic cystitis. Correlate with urinalysis.   RECIST   TARGET LESIONS:      Lesion (description)         Location (series/slice) Size            1. Posterior mediastinal lymph node    series 2 image 26    2.3 x 2.0 cm, previously 3.3 x 2.2 cm  2. Left upper paraesophageal lymph node    series 2 image 9    5 mm, previously 15 mm. 3. Right external iliac lymphadenopathy    series 2 image 98    3.8 x 3.2 cm, previously 6.3 x 4.7 cm  4. Left external iliac lymphadenopathy    series 2 image 99    1.6 cm, previously 2.5 cm    9/1/20 Bone Scan:  FINDINGS: There is physiologic uptake of radiotracer in the skeleton and soft  tissues. There is no evidence of fracture, osteomyelitis or bone tumor. There  is no pattern of skeletal metastases. IMPRESSION: Normal Whole Body Nuclear Bone Scan. Assessment & Plan:   Jabier Dorman is a 76 y.o. 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 1. Metastatic adenocarcinoma of prostate, high grade: Initial pathologist at [address] [place] [address] called this \"Large cell neuroendocrine carcinoma,\" and based on diffuse disease on PET, patient was started on treatment with Carbo-Etoposide-Atezolizumab (thinking this was large cell carcinoma of the lung.) However FoundationOne testing identified TMPRSS2-ERG fusion, which is primarily seen in prostate cancers. This along with elevated PSA of 38 prompted sending of pathology specimen to [facility] for review. Their opinion and IHC staining is consistent with high grade adenocarcinoma of prostate. Treatment options now include: Carboplatin + Cabazitaxel vs single agent Docetaxel. Pt signed consent for Docetaxel. CT on 8/3/20 shows response to treatment. 9/01/20 bone scan negative for skeletal mets. Supportive medications: EMLA cream, zofran, compazine, dexamethasone 8mg BID the day before and day after treatment. -- Lupron #1 given 8/31/20 and q12wks for ADT per patient, #2 due 11/23/20. Previously discussed with Dr. Michelle Ferrell of [facility] that pt [facility].  Pt seems to have forgotten that we agreed and gave him a 3 month depot injection Lupron on 8/31/20. He likely received Charmel Sterling in 9/2020 which is unnecessary. Asked pt again where he prefers to receive ADT, and he again states he wants to get it here. Will discuss with Dr. Darron De La Torre. Have placed call to his office. Also requesting records of bone scan and injection. -- Proceed with cycle 4 of docetaxel today  -- Return in 3 weeks for cycle 5 of docetaxel, MD/NP visit. Can give Lupron with q3mo dosing at that time. -- CT due after cycle 4, ordered. Bone scan not due until next set of imaging.  -- Decreasing Prednisone to once daily given weight gain, hyperglycemia. 2. Hyperglycemia: 2/2 to steroid premedications. Will discuss decreasing dose in future if he tolerates docetaxel. Zack Leung assisting with management of steroid induced hyperglycemia. 3. Leukocytosis: Likely 2/2 to prednisone and dexamethasone. Denies recent fevers. Monitor. 4. 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 Still able to button and zipper clothing. He is dropping objects more often. 5. Bladder wall thickening: Seen on CT scan, which may represent acute or chronic cystitis. UA negative. 6. H/o Genital Herpes: On suppression with Acyclovir. 7. Rash: Developed on 8/19/20 (C1 docetaxel on 8/10/20.) Rash present on arms, legs and shoulders is present but improving. No longer pruritic. -- Decrease Prednisone to 5mg/d. -- Betamethasone 0.05% ointment to affected areas bid. Avoid face, genital area. 8. Renal insufficiency: Likely 2/2 to decreased PO intake. Encouraged better oral hydration. -- 500 cc NS bolus today. 9. Depression/anxiety: 2/2 to #1. Advised patient I would like him to follow up with palliative medicine and start on antidepressant to help with mood. I am also concerned that extra firmagon dose could have caused emotional lability. -- Referral to palliative medicine.   -- Start Zoloft 25mg/d for depression. Emotional well being: Pt is coping well with his/her disease and has excellent support. I appreciate the opportunity to participate in Mr. Kwame Castillo care. Significant other: Margret Hannah - [phone]. Patient seen with Gonsalo Rodriguez NP.      Signed By: Shefali Cortez MD     October 15, 2020

## 2020-10-15 ENCOUNTER — OFFICE VISIT (OUTPATIENT)
Dept: ONCOLOGY | Age: 65
End: 2020-10-15
Payer: MEDICARE

## 2020-10-15 ENCOUNTER — HOSPITAL ENCOUNTER (OUTPATIENT)
Dept: INFUSION THERAPY | Age: 65
Discharge: HOME | End: 2020-10-15
Payer: COMMERCIAL

## 2020-10-15 VITALS
HEIGHT: 70 IN | OXYGEN SATURATION: 97 % | TEMPERATURE: 98 F | SYSTOLIC BLOOD PRESSURE: 153 MMHG | BODY MASS INDEX: 29.92 KG/M2 | DIASTOLIC BLOOD PRESSURE: 89 MMHG | WEIGHT: 209 LBS | HEART RATE: 62 BPM | RESPIRATION RATE: 18 BRPM

## 2020-10-15 VITALS
DIASTOLIC BLOOD PRESSURE: 82 MMHG | TEMPERATURE: 98 F | HEIGHT: 70 IN | WEIGHT: 210 LBS | OXYGEN SATURATION: 97 % | SYSTOLIC BLOOD PRESSURE: 138 MMHG | BODY MASS INDEX: 30.06 KG/M2 | HEART RATE: 71 BPM

## 2020-10-15 DIAGNOSIS — C34.00 MALIGNANT NEOPLASM OF HILUS OF LUNG, UNSPECIFIED LATERALITY (HCC): ICD-10-CM

## 2020-10-15 DIAGNOSIS — R73.9 HYPERGLYCEMIA: Primary | ICD-10-CM

## 2020-10-15 DIAGNOSIS — Z51.11 CHEMOTHERAPY MANAGEMENT, ENCOUNTER FOR: ICD-10-CM

## 2020-10-15 DIAGNOSIS — F41.8 ANXIETY ABOUT HEALTH: ICD-10-CM

## 2020-10-15 DIAGNOSIS — C77.9 REGIONAL LYMPH NODE METASTASIS PRESENT (HCC): ICD-10-CM

## 2020-10-15 DIAGNOSIS — F32.9 REACTIVE DEPRESSION: ICD-10-CM

## 2020-10-15 DIAGNOSIS — C61 ADENOCARCINOMA OF PROSTATE, STAGE 4 (HCC): Primary | ICD-10-CM

## 2020-10-15 DIAGNOSIS — R59.0 MEDIASTINAL LYMPHADENOPATHY: ICD-10-CM

## 2020-10-15 DIAGNOSIS — G62.9 NEUROPATHY: ICD-10-CM

## 2020-10-15 DIAGNOSIS — D72.829 LEUKOCYTOSIS, UNSPECIFIED TYPE: ICD-10-CM

## 2020-10-15 LAB
ALBUMIN SERPL-MCNC: 3.7 G/DL (ref 3.5–5)
ALBUMIN/GLOB SERPL: 1 {RATIO} (ref 1.1–2.2)
ALP SERPL-CCNC: 124 U/L (ref 45–117)
ALT SERPL-CCNC: 44 U/L (ref 12–78)
ANION GAP SERPL CALC-SCNC: 9 MMOL/L (ref 5–15)
AST SERPL-CCNC: 63 U/L (ref 15–37)
BASO+EOS+MONOS # BLD AUTO: 0.4 K/UL (ref 0.2–1.2)
BASO+EOS+MONOS NFR BLD AUTO: 2 % (ref 3.2–16.9)
BILIRUB SERPL-MCNC: 0.7 MG/DL (ref 0.2–1)
BUN SERPL-MCNC: 34 MG/DL (ref 6–20)
BUN/CREAT SERPL: 24 (ref 12–20)
CALCIUM SERPL-MCNC: 9.1 MG/DL (ref 8.5–10.1)
CHLORIDE SERPL-SCNC: 104 MMOL/L (ref 97–108)
CO2 SERPL-SCNC: 24 MMOL/L (ref 21–32)
CREAT SERPL-MCNC: 1.42 MG/DL (ref 0.7–1.3)
DIFFERENTIAL METHOD BLD: ABNORMAL
ERYTHROCYTE [DISTWIDTH] IN BLOOD BY AUTOMATED COUNT: 19 % (ref 11.8–15.8)
GLOBULIN SER CALC-MCNC: 3.6 G/DL (ref 2–4)
GLUCOSE SERPL-MCNC: 220 MG/DL (ref 65–100)
HCT VFR BLD AUTO: 35 % (ref 36.6–50.3)
HGB BLD-MCNC: 12 G/DL (ref 12.1–17)
LYMPHOCYTES # BLD: 1.4 K/UL (ref 0.8–3.5)
LYMPHOCYTES NFR BLD: 8 % (ref 12–49)
MCH RBC QN AUTO: 30.9 PG (ref 26–34)
MCHC RBC AUTO-ENTMCNC: 34.3 G/DL (ref 30–36.5)
MCV RBC AUTO: 90.2 FL (ref 80–99)
NEUTS SEG # BLD: 15.5 K/UL (ref 1.8–8)
NEUTS SEG NFR BLD: 90 % (ref 32–75)
PLATELET # BLD AUTO: 223 K/UL (ref 150–400)
POTASSIUM SERPL-SCNC: 3.9 MMOL/L (ref 3.5–5.1)
PROT SERPL-MCNC: 7.3 G/DL (ref 6.4–8.2)
PSA SERPL-MCNC: 0.3 NG/ML (ref 0.01–4)
RBC # BLD AUTO: 3.88 M/UL (ref 4.1–5.7)
SODIUM SERPL-SCNC: 137 MMOL/L (ref 136–145)
WBC # BLD AUTO: 17.3 K/UL (ref 4.1–11.1)

## 2020-10-15 PROCEDURE — 74011000250 HC RX REV CODE- 250: Performed by: INTERNAL MEDICINE

## 2020-10-15 PROCEDURE — 85025 COMPLETE CBC W/AUTO DIFF WBC: CPT

## 2020-10-15 PROCEDURE — 84153 ASSAY OF PSA TOTAL: CPT

## 2020-10-15 PROCEDURE — 77030016057 HC NDL HUBR APOL -B

## 2020-10-15 PROCEDURE — 74011250636 HC RX REV CODE- 250/636: Performed by: INTERNAL MEDICINE

## 2020-10-15 PROCEDURE — 3288F FALL RISK ASSESSMENT DOCD: CPT | Performed by: INTERNAL MEDICINE

## 2020-10-15 PROCEDURE — G8427 DOCREV CUR MEDS BY ELIG CLIN: HCPCS | Performed by: INTERNAL MEDICINE

## 2020-10-15 PROCEDURE — G8536 NO DOC ELDER MAL SCRN: HCPCS | Performed by: INTERNAL MEDICINE

## 2020-10-15 PROCEDURE — 74011250636 HC RX REV CODE- 250/636: Performed by: NURSE PRACTITIONER

## 2020-10-15 PROCEDURE — 96413 CHEMO IV INFUSION 1 HR: CPT

## 2020-10-15 PROCEDURE — G0463 HOSPITAL OUTPT CLINIC VISIT: HCPCS | Performed by: NURSE PRACTITIONER

## 2020-10-15 PROCEDURE — 96361 HYDRATE IV INFUSION ADD-ON: CPT

## 2020-10-15 PROCEDURE — G8417 CALC BMI ABV UP PARAM F/U: HCPCS | Performed by: INTERNAL MEDICINE

## 2020-10-15 PROCEDURE — 96375 TX/PRO/DX INJ NEW DRUG ADDON: CPT

## 2020-10-15 PROCEDURE — 1100F PTFALLS ASSESS-DOCD GE2>/YR: CPT | Performed by: INTERNAL MEDICINE

## 2020-10-15 PROCEDURE — 80053 COMPREHEN METABOLIC PANEL: CPT

## 2020-10-15 PROCEDURE — 36415 COLL VENOUS BLD VENIPUNCTURE: CPT

## 2020-10-15 PROCEDURE — 3017F COLORECTAL CA SCREEN DOC REV: CPT | Performed by: INTERNAL MEDICINE

## 2020-10-15 PROCEDURE — G8432 DEP SCR NOT DOC, RNG: HCPCS | Performed by: INTERNAL MEDICINE

## 2020-10-15 PROCEDURE — 99215 OFFICE O/P EST HI 40 MIN: CPT | Performed by: INTERNAL MEDICINE

## 2020-10-15 RX ORDER — HEPARIN 100 UNIT/ML
300-500 SYRINGE INTRAVENOUS AS NEEDED
Status: ACTIVE | OUTPATIENT
Start: 2020-10-15 | End: 2020-10-15

## 2020-10-15 RX ORDER — DEXAMETHASONE SODIUM PHOSPHATE 4 MG/ML
8 INJECTION, SOLUTION INTRA-ARTICULAR; INTRALESIONAL; INTRAMUSCULAR; INTRAVENOUS; SOFT TISSUE ONCE
Status: COMPLETED | OUTPATIENT
Start: 2020-10-15 | End: 2020-10-15

## 2020-10-15 RX ORDER — SODIUM CHLORIDE 0.9 % (FLUSH) 0.9 %
10 SYRINGE (ML) INJECTION AS NEEDED
Status: DISPENSED | OUTPATIENT
Start: 2020-10-15 | End: 2020-10-15

## 2020-10-15 RX ORDER — SODIUM CHLORIDE 9 MG/ML
10 INJECTION INTRAMUSCULAR; INTRAVENOUS; SUBCUTANEOUS AS NEEDED
Status: ACTIVE | OUTPATIENT
Start: 2020-10-15 | End: 2020-10-15

## 2020-10-15 RX ORDER — SODIUM CHLORIDE 9 MG/ML
25 INJECTION, SOLUTION INTRAVENOUS CONTINUOUS
Status: DISPENSED | OUTPATIENT
Start: 2020-10-15 | End: 2020-10-15

## 2020-10-15 RX ORDER — SERTRALINE HYDROCHLORIDE 25 MG/1
25 TABLET, FILM COATED ORAL DAILY
Qty: 30 TAB | Refills: 0 | Status: SHIPPED | OUTPATIENT
Start: 2020-10-15 | End: 2020-11-04 | Stop reason: DRUGHIGH

## 2020-10-15 RX ORDER — SODIUM CHLORIDE 9 MG/ML
500 INJECTION, SOLUTION INTRAVENOUS ONCE
Status: COMPLETED | OUTPATIENT
Start: 2020-10-15 | End: 2020-10-15

## 2020-10-15 RX ADMIN — Medication 500 UNITS: at 13:35

## 2020-10-15 RX ADMIN — Medication 10 ML: at 13:35

## 2020-10-15 RX ADMIN — Medication 10 ML: at 09:51

## 2020-10-15 RX ADMIN — SODIUM CHLORIDE 25 ML/HR: 900 INJECTION, SOLUTION INTRAVENOUS at 12:21

## 2020-10-15 RX ADMIN — DEXAMETHASONE SODIUM PHOSPHATE 8 MG: 4 INJECTION, SOLUTION INTRAMUSCULAR; INTRAVENOUS at 11:44

## 2020-10-15 RX ADMIN — DOCETAXEL ANHYDROUS 153 MG: 10 INJECTION, SOLUTION INTRAVENOUS at 12:25

## 2020-10-15 RX ADMIN — SODIUM CHLORIDE 10 ML: 9 INJECTION INTRAMUSCULAR; INTRAVENOUS; SUBCUTANEOUS at 09:51

## 2020-10-15 RX ADMIN — SODIUM CHLORIDE 500 ML: 900 INJECTION, SOLUTION INTRAVENOUS at 11:40

## 2020-10-15 NOTE — PROGRESS NOTES
[facility] Progress Note    Date: October 15, 2020    Name: Kelin Luke    MRN: [id number]         : 1955    Mr. Sheng Martinez Arrived ambulatory and in no distress for C4D1of Taxotere  Regimen. Assessment was completed, no acute issues at this time, no new complaints voiced. Left chest wall port accessed without difficulty, labs drawn & sent for processing. Confirmed pt took dexamethasone as precribed. Patient proceed to appointment with . Mr. Perico Trejo vitals were reviewed. Visit Vitals  /82   Pulse 71   Temp 98 °F (36.7 °C)   Resp 18   Ht 5' 10\" (1.778 m)   Wt 94.8 kg (209 lb)   SpO2 97%   BMI 29.99 kg/m²       Lab results were obtained and reviewed. Recent Results (from the past 12 hour(s))   CBC WITH 3 PART DIFF    Collection Time: 10/15/20  9:55 AM   Result Value Ref Range    WBC 17.3 (H) 4.1 - 11.1 K/uL    RBC 3.88 (L) 4.10 - 5.70 M/uL    HGB 12.0 (L) 12.1 - 17.0 g/dL    HCT 35.0 (L) 36.6 - 50.3 %    MCV 90.2 80.0 - 99.0 FL    MCH 30.9 26.0 - 34.0 PG    MCHC 34.3 30.0 - 36.5 g/dL    RDW 19.0 (H) 11.8 - 15.8 %    PLATELET 439 141 - 207 K/uL    NEUTROPHILS 90 (H) 32 - 75 %    MIXED CELLS 2 (L) 3.2 - 16.9 %    LYMPHOCYTES 8 (L) 12 - 49 %    ABS. NEUTROPHILS 15.5 (H) 1.8 - 8.0 K/UL    ABS. MIXED CELLS 0.4 0.2 - 1.2 K/uL    ABS. LYMPHOCYTES 1.4 0.8 - 3.5 K/UL    DF AUTOMATED     METABOLIC PANEL, COMPREHENSIVE    Collection Time: 10/15/20  9:55 AM   Result Value Ref Range    Sodium 137 136 - 145 mmol/L    Potassium 3.9 3.5 - 5.1 mmol/L    Chloride 104 97 - 108 mmol/L    CO2 24 21 - 32 mmol/L    Anion gap 9 5 - 15 mmol/L    Glucose 220 (H) 65 - 100 mg/dL    BUN 34 (H) 6 - 20 MG/DL    Creatinine 1.42 (H) 0.70 - 1.30 MG/DL    BUN/Creatinine ratio 24 (H) 12 - 20      GFR est AA >60 >60 ml/min/1.73m2    GFR est non-AA 50 (L) >60 ml/min/1.73m2    Calcium 9.1 8.5 - 10.1 MG/DL    Bilirubin, total 0.7 0.2 - 1.0 MG/DL    ALT (SGPT) 44 12 - 78 U/L    AST (SGOT) 63 (H) 15 - 37 U/L    Alk.  phosphatase 124 (H) 45 - 117 U/L    Protein, total 7.3 6.4 - 8.2 g/dL    Albumin 3.7 3.5 - 5.0 g/dL    Globulin 3.6 2.0 - 4.0 g/dL    A-G Ratio 1.0 (L) 1.1 - 2.2         Medications:  500 ml bolus  Decadron IVP  Taxotere IV    Mr. Adele Magallon tolerated treatment well and was discharged from [place] [address] in stable condition. Port de-accessed, flushed & heparinized per protocol. He is to return on 11/5/20 for his next appointment.     Muna Mann RN  October 15, 2020

## 2020-10-15 NOTE — PROGRESS NOTES
Sugar Prasad is a 76 y.o. male here for follow up of prostate cancer. Patient with no complaints of pain at this time.

## 2020-10-15 NOTE — PATIENT INSTRUCTIONS
1. Please get a CT scan on 10/29/20 (can be any date between 10/26 and 10/30. Scheduling will reach out to you to schedule but you have the option to call [phone] to schedule on your own. 2. Decrease your prednisone to 5mg once daily 3. Please start taking Zoloft 25mg/d for your anxiety/depression, I am also going to refer you to palliative medicine with Dr. Kurt Prieto, she is a wonderful resource for managing all symptoms associated with cancer, including anxiety and depression. Please at least meet with her to see if this would be a good fit for you. 4. Hydration: drink at least 64 oz water a day. Carry a water bottle with you everywhere.

## 2020-10-16 ENCOUNTER — TELEPHONE (OUTPATIENT)
Dept: PALLATIVE CARE | Age: 65
End: 2020-10-16

## 2020-10-16 RX ORDER — ALBUTEROL SULFATE 0.83 MG/ML
2.5 SOLUTION RESPIRATORY (INHALATION) AS NEEDED
Status: CANCELLED
Start: 2020-11-05

## 2020-10-16 RX ORDER — SODIUM CHLORIDE 0.9 % (FLUSH) 0.9 %
10 SYRINGE (ML) INJECTION AS NEEDED
Status: CANCELLED
Start: 2020-11-05

## 2020-10-16 RX ORDER — ONDANSETRON 2 MG/ML
8 INJECTION INTRAMUSCULAR; INTRAVENOUS AS NEEDED
Status: CANCELLED | OUTPATIENT
Start: 2020-11-05

## 2020-10-16 RX ORDER — EPINEPHRINE 1 MG/ML
0.3 INJECTION, SOLUTION, CONCENTRATE INTRAVENOUS AS NEEDED
Status: CANCELLED | OUTPATIENT
Start: 2020-11-05

## 2020-10-16 RX ORDER — SODIUM CHLORIDE 9 MG/ML
25 INJECTION, SOLUTION INTRAVENOUS CONTINUOUS
Status: CANCELLED | OUTPATIENT
Start: 2020-11-05

## 2020-10-16 RX ORDER — HEPARIN 100 UNIT/ML
300-500 SYRINGE INTRAVENOUS AS NEEDED
Status: CANCELLED
Start: 2020-11-05

## 2020-10-16 RX ORDER — SODIUM CHLORIDE 9 MG/ML
10 INJECTION INTRAMUSCULAR; INTRAVENOUS; SUBCUTANEOUS AS NEEDED
Status: CANCELLED | OUTPATIENT
Start: 2020-11-05

## 2020-10-16 RX ORDER — DIPHENHYDRAMINE HYDROCHLORIDE 50 MG/ML
50 INJECTION, SOLUTION INTRAMUSCULAR; INTRAVENOUS AS NEEDED
Status: CANCELLED
Start: 2020-11-05

## 2020-10-16 RX ORDER — HYDROCORTISONE SODIUM SUCCINATE 100 MG/2ML
100 INJECTION, POWDER, FOR SOLUTION INTRAMUSCULAR; INTRAVENOUS AS NEEDED
Status: CANCELLED | OUTPATIENT
Start: 2020-11-05

## 2020-10-16 RX ORDER — DEXAMETHASONE SODIUM PHOSPHATE 4 MG/ML
8 INJECTION, SOLUTION INTRA-ARTICULAR; INTRALESIONAL; INTRAMUSCULAR; INTRAVENOUS; SOFT TISSUE ONCE
Status: CANCELLED | OUTPATIENT
Start: 2020-11-05

## 2020-10-16 RX ORDER — DIPHENHYDRAMINE HYDROCHLORIDE 50 MG/ML
25 INJECTION, SOLUTION INTRAMUSCULAR; INTRAVENOUS AS NEEDED
Status: CANCELLED
Start: 2020-11-05

## 2020-10-16 RX ORDER — ACETAMINOPHEN 325 MG/1
650 TABLET ORAL AS NEEDED
Status: CANCELLED
Start: 2020-11-05

## 2020-10-16 NOTE — TELEPHONE ENCOUNTER
[facility] Palliative Medicine Office  Nursing Note  [phone]  Fax [phone]      Name:  Lorne Castaneda  YOB: 1955    Received outpatient Palliative Medicine referral from [facility] to see pt for symptom management and supportive care. Chart  reviewed. Lorne Castaneda is a 76 y.o. male with prostate cancer with bone mets. Pt's most recent office visit with Dr. Haley Nair was on 10/15/20. See her note for complete HPI, treatment history, and plan. Pt is experiencing depression and anxiety related to his diagnosis. ACP:    No ACP documents on file    Nurse called pt to introduce Palliative Medicine services and to offer appt. No answer, nurse left message requesting call back.

## 2020-10-16 NOTE — PROGRESS NOTES
[address]  Medical Oncology at [facility]  [phone]    Hematology / Oncology Established Visit    Reason for Visit:   Marycruz Vazquez is a 76 y.o. male who is seen for follow up of neuroendocrine carcinoma. Initially referred by Dr. Jelly Munoz. 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 Flow cytometry negative for lymphoproliferative disorder. FoundationOne: MS Stable, TMB 0, MDM4 amplification, MYC amplification, TMPRSS2 TMPRSS2-ERD fusion, CEBPA 1311fs*10, ERBB4 amplification - equivocal, MCL1 amplification, [id number] amplification, RAD21 amplification. See scanned report for full info. Abbreviated [facility] Consultation:  Final diagnosis: Right external iliac node: Metastatic prostate adenocarcinoma. Comment: Histologic sections of the right external iliac node show sheets of cells with minimal eosinophilic cytoplasm and variable cytologic atypia. Some areas show acinar formation. The cells have predominantly round nuclei with vesicular chromatin and prominent nucleoli. Some areas show significantly more atypia with more irregular nuclear borders, hyperchromatic nyclei, and increase nuclear to cytoplasmic ratio. Frequent mitotic figures are identified. A provided pane of IHC stains is reviewed and demonstrates that the malignant cells show strong, diffuse expression of pan-cytokeratin and focal expression of TTF. CK7, CK20, chromogranin, and synaptophysin are negative in the lesional cells. CD3 and CD20 are negative in the lesions cells and positive in the background lymphocytes.  An abbreviated FoundationOne report was included, which included a TMPRSS2-ERG fusion (among other nonspecific abnormalities), which si found in approximately 50% of prostate cancers. Additional IHC studies performed at [facility] showed that the malignant cells have immunoreactivity with antibodies for PSA and PSAP, consistent with a diagnosis of metastatic prostate adenocarcinoma. Per report, flow cytometry negative for lymphoma. Overall, the histomorphology, immunophenotype and genomic findings in this case are diagnostic of metastatic prostate adenocarcinoma. The acinar formation and cytology were suggestive of this diagnosis, and the diffuse PSA and PSAP positivity as well as the TMPRSS2-ERG fusion confirmed the diagnosis. This fusion is the most common genomic alteration in prostate carcinoma and can be detected in over half of the cases, yet is not seen with any frequency in other types of carcinoma. Prior Treatment:   1. Carbo-Etoposide-Atezolizumab x 2 cycles, 6/29/20-7/20/20. Current Treatment: ADT and Docetaxel, started 8/10/20  Treatment duration: Until disease progression or toxicity  Frequency of visits: Every 3 weeks    History of Present Illness:   Layton Beyer is a 76 y.o. male who comes in for evaluation of poorly differentiated neuroendocrine carcinoma. Pt noticed a nontender bulge in his LLQ in 5/2020. He thought this was a hernia and was evaluated by Dr. Robel Blanco. CT on 5/20/20 was notable for extensive lymphadenopathy in abd/pelvis. Pt underwent robot assisted excisional Right external iliac LN biopsy 5/29/20, which showed poorly differentiated large cell neuroendocrine carcinoma. He reports intentional weight loss with dietary changes and exercise, losing 40-lbs in past 8 months. No n/v/d, melena/hematochezia, cough, SOB. No fevers, chills, sweats. [employer] nonsmoker. Mother had breast cancer diagnosed age < 48. Twin brother had melanoma diagnosed aged late 46s. He does not think he has ever had a colonoscopy or PSA screening prior to current diagnosis. Interval History:  Patient here for follow up.  Here for C5 of docetaxel. He had a CT scan completed. Since starting on zoloft he notes improvement in mood. He established care with palliative medicine. Reports ongoing neuropathy in fingertips of bilateral hands, right worse than left. No difficulty with zippers or buttons. Does note he continues to fumble and drop objects more often. Reports multiple scale like plaque lesions on tops of hands and arms. He is following with Sanket Osakis for steroid induced hyperglycemia. AM BG readings at home range 119-140s. Good appetite and maintained weight since the last visit. Reports constipation but controlled with daily stool softener. No diarrhea. No fevers or chills. No SOB or CP. No past medical history on file. Past Surgical History:   Procedure Laterality Date    HX APPENDECTOMY  1964    HX OTHER SURGICAL      subcutaneous cyst of forehead    HX VASECTOMY        Social History     Tobacco Use    Smoking status: Never Smoker    Smokeless tobacco: Never Used   Substance Use Topics    Alcohol use: Not Currently   Unmarried. Has 2 children, [id number] and 31. 1 lives in [place]. Family History   Problem Relation Age of Onset    Cancer Mother         breast    Cancer Father         prostate    Cancer Brother         melanoma    COPD Brother      Current Outpatient Medications   Medication Sig    sertraline (ZOLOFT) 25 mg tablet Take 1 Tab by mouth daily.  glucose blood VI test strips (blood glucose test) strip Use to check blood sugars four times daily    lancets misc Use to check blood sugars four times daily    OneTouch Ultra2 Meter misc USE TO CHECK BLOOD SUGARS TWICE DAILY    potassium chloride (K-DUR, KLOR-CON) 20 mEq tablet Take 1 Tab by mouth daily.  predniSONE (DELTASONE) 5 mg tablet Take 1 Tab by mouth two (2) times a day.  betamethasone dipropionate (DIPROLENE) 0.05 % topical lotion Apply  to affected area See Admin Instructions.  Apply a thin film to affect areas (arms, legs, trunk) once or twice daily. Avoid face. Wash hands.  degarelix (Firmagon) 120 mg solr injection by SubCUTAneous route once.  dexAMETHasone (DECADRON) 4 mg tablet Take 8mg (2 tabs) twice daily the day BEFORE chemotherapy and take 8mg (2 tabs) twice daily the day AFTER chemotherapy    ondansetron hcl (ZOFRAN) 8 mg tablet Take 1 Tab by mouth every eight (8) hours as needed for Nausea or Vomiting.  lidocaine-prilocaine (EMLA) topical cream Apply a dime size amount to port site 30-60 minutes before access on treatment days to numb port site.  prochlorperazine (Compazine) 10 mg tablet Take 0.5 Tabs by mouth every six (6) hours as needed for Nausea or Vomiting.  dextroamphetamine-amphetamine (ADDERALL) 10 mg tablet TAKE 1 TABLET TWICE A DAY FOR 30 DAYS    acyclovir (ZOVIRAX) 400 mg tablet Take 400 mg by mouth two (2) times a day. No current facility-administered medications for this visit. No Known Allergies     Review of Systems: A complete review of systems was obtained, negative except as described above. Physical Exam:     Visit Vitals  BP (!) 148/92   Pulse 76   Temp 97.5 °F (36.4 °C)   Resp 18   Ht 5' 10\" (1.778 m)   Wt 210 lb 12.2 oz (95.6 kg)   SpO2 97%   BMI 30.24 kg/m²     ECOG PS: 0  General: Well developed, no acute distress  Eyes: PERRLA, EOMI, anicteric sclerae  HENT: Atraumatic, OP clear, TMs intact without erythema  Neck: Supple, Left supraclavicular LN now smaller in size. Lymphatic: No cervical, supraclavicular, axillary or inguinal adenopathy  Respiratory: CTAB, normal respiratory effort  CV: Normal rate, regular rhythm, no murmurs, no peripheral edema  GI: firm, nontender, mild distention, no hepatomegaly, no splenomegaly. L lower abdomen with soft mobile mass, approx 5cm  MS: Normal gait and station. Digits without clubbing or cyanosis. Skin: Maculopapular rash present on arms, legs, trunk. No ecchymoses, or petechiae. Normal temperature, turgor, and texture. Neuro/Psych: Alert, oriented. 5/5 strength in all 4 extremities. Appropriate affect, normal judgment/insight. Results:     Lab Results   Component Value Date/Time    WBC 20.4 (H) 2020 09:09 AM    HGB 12.3 2020 09:09 AM    HCT 35.0 (L) 2020 09:09 AM    PLATELET 841  09:09 AM    MCV 89.1 2020 09:09 AM    ABS. NEUTROPHILS 18.3 (H) 2020 09:09 AM     Lab Results   Component Value Date/Time    Sodium 135 (L) 2020 09:09 AM    Potassium 3.5 2020 09:09 AM    Chloride 99 2020 09:09 AM    CO2 28 2020 09:09 AM    Glucose 197 (H) 2020 09:09 AM    BUN 23 (H) 2020 09:09 AM    Creatinine 1.46 (H) 2020 09:09 AM    GFR est AA 59 (L) 2020 09:09 AM    GFR est non-AA 48 (L) 2020 09:09 AM    Calcium 9.2 2020 09:09 AM    Creatinine (POC) 1.3 10/29/2020 11:59 AM     Lab Results   Component Value Date/Time    Bilirubin, total 0.5 2020 09:09 AM    ALT (SGPT) 60 2020 09:09 AM    Alk. phosphatase 105 2020 09:09 AM    Protein, total 7.7 2020 09:09 AM    Albumin 3.9 2020 09:09 AM    Globulin 3.8 2020 09:09 AM     No results found for: IRON, FE, TIBC, IBCT, PSAT, FERR    No results found for: B12LT, FOL, RBCF  Lab Results   Component Value Date/Time    TSH 1.72 2020 09:58 AM     No results found for: HAMAT, HAAB, HABT, HAAT, HBSAG, HBSB, HBSAT, HBABN, HBCM, HBCAB, HBCAT, XBCABS, HBEAB, HBEAG, XHEPCS, [id number], HBEGLT, [place], HBCLT, HBEBLT, [id number], [id number], HAVMLT, [id number], HBCMLT, [id number], HCGAT     20: Chromogranin 42    20: PSA 38.6  20: PSA 13.2   8/10/20: PSA 1.2  20: PSA 0.5  20: PSA 0.4  10/15/20: PSA 0.3  20: PSA pending     Imagin/20/20 CT abd/pelvis with IV contrast:  Impression:  1. No findings to suggest gross abdominal wall hernia or flank hernia. 2. Extensive adenopathy throughout the abdomen and pelvis as described.  Findings are concerning for possible metastatic disease or lymphoma. Recommend follow up or comparison with prior studies. 3. Other findings as described. 6/18/20 PET:  FINDINGS:  HEAD/NECK: No apparent foci of abnormal hypermetabolism. Cerebral evaluation is  limited by normal intense activity. CHEST: Hypermetabolic lymphadenopathy at the base of the left neck and in the  left supraclavicular region is noted. Hypermetabolic superior mediastinal,  prevascular, right paratracheal, subcarinal, and bilateral hilar lymph  lymphadenopathy, maximum SUV of the subcarinal lymph node is 5.7. Small but  hypermetabolic soft tissue nodule left anterior chest wall. ABDOMEN/PELVIS: Hypermetabolic retrocrural, left para-aortic, aortocaval,  bilateral common iliac, bilateral external iliac, and bilateral inguinal  lymphadenopathy, maximum SUV 5.8 of an aortocaval lymph node. Hypermetabolic  mesenteric lymph node left lower quadrant, maximum SUV 12.3. SKELETON: No foci of abnormal hypermetabolism in the axial and visualized  appendicular skeleton. IMPRESSION: Hypermetabolic lymphadenopathy involving the left neck and left  supraclavicular region, mediastinum, bilateral hilum, retroperitoneum,  mesentery, and pelvis as described above. Hypermetabolic soft tissue nodule left  chest.    Brain MRI 6/27/20: IMPRESSION:  There is no evidence of intracranial metastatic disease. Mild chronic microvascular ischemic change. No intracranial mass, hemorrhage or evidence of acute infarction. CT c/a/p 8/3/20: IMPRESSION:  Chest:  1. Findings consistent with partial treatment response, with interval decrease  in size of mediastinal and hilar lymph nodes, and interval decrease in size of  left chest wall nodule. Abdomen/pelvis:   1. Findings consistent with partial treatment response, with interval decrease  in size of retroperitoneal lymph nodes. 2. Unchanged circumferential bladder wall thickening, which may represent acute  or chronic cystitis. Correlate with urinalysis.   RECIST TARGET LESIONS:      Lesion (description)         Location (series/slice)                Size            1. Posterior mediastinal lymph node    series 2 image 26    2.3 x 2.0 cm, previously 3.3 x 2.2 cm  2. Left upper paraesophageal lymph node    series 2 image 9    5 mm, previously 15 mm. 3. Right external iliac lymphadenopathy    series 2 image 98    3.8 x 3.2 cm, previously 6.3 x 4.7 cm  4. Left external iliac lymphadenopathy    series 2 image 99    1.6 cm, previously 2.5 cm    9/1/20 Bone Scan:  FINDINGS: There is physiologic uptake of radiotracer in the skeleton and soft tissues. There is no evidence of fracture, osteomyelitis or bone tumor. There is no pattern of skeletal metastases. IMPRESSION: Normal Whole Body Nuclear Bone Scan.    10/29/20 CT c/a/p:   CHEST WALL: No mass or axillary lymphadenopathy. Incidental note of mild  bilateral gynecomastia. THYROID: No nodule. MEDIASTINUM: Posterior mediastinal node between the aorta and esophagus measures  1.1 x 1.4 cm (2/27), previously 2.0 x 2.3 cm (2/26). No mass or enlarged lymphadenopathy. ERNESTO: No mass or lymphadenopathy. THORACIC AORTA: No dissection or aneurysm. MAIN PULMONARY ARTERY: Normal in caliber. TRACHEA/BRONCHI: Patent. ESOPHAGUS: No wall thickening or dilatation. HEART: The heart is normal in size without pericardial effusion. Coronary artery  calcifications are noted. PLEURA: No effusion or pneumothorax. LUNGS: There is a small patch of localized groundglass opacity in the anterior  left upper lobe (series 3, image 14). The lungs are otherwise clear. LIVER: Small right lobe cyst unchanged. Otherwise unremarkable. BILIARY TREE: Gallbladder is within normal limits. CBD is not dilated. SPLEEN: within normal limits. PANCREAS: No mass or ductal dilatation. ADRENALS: Unremarkable. KIDNEYS: No mass, calculus, or hydronephrosis. STOMACH: Unremarkable. SMALL BOWEL: No dilatation or wall thickening.   COLON: No dilatation or wall thickening. APPENDIX: The questioned absent. PERITONEUM: No ascites or pneumoperitoneum. RETROPERITONEUM: Stranding around the right common and external iliac arteries  with decrease in conglomerate right pelvic sidewall adenopathy measuring 2.7 x  3.1 cm (2/21), previously 3.2 x 3.8 cm (2/98). Other increase in measured nodes  as follows: Left retroaortic lymph node 8 mm (2/74), previously 12 mm (2 73). Aortocaval lymph node 9 mm (2/73), previously 11 mm (2/72). Left external iliac  lymph node 11 mm (2/101) series, previously 16 mm (2/99). Atherosclerotic  calcination without aneurysm or dissection. REPRODUCTIVE ORGANS: No cemented change in appearance of prostate or seminal  vesicles with no evident soft tissue extension beyond the confines of the  prostate. URINARY BLADDER: Partially distended with diffuse wall thickness unchanged. BONES: Degenerative spine change. No acute fracture or aggressive lesion. ABDOMINAL WALL: No mass or hernia. ADDITIONAL COMMENTS: Catheter of left Port-A-Cath is partially visualized and  traverses expected course to terminate in the mid SVC. Lesion (description):     Location (series/slice):  Size   1. Posterior mediastinal lymph node   2/27            1.1 x 1.4 cm, previously 2.3 x 2.0 cm (2/26)  2. Left upper paraesophageal lymph node  2/9           2 mm, previously 5 mm (2/9)   3. Right external iliac lymphadenopathy 2/21                3.8 x 3.2 cm (2/98)  4. Left external iliac lymphadenopathy  2/99              11 mm, previously 16 mm (2/99)   IMPRESSION:  1. Findings consistent with treatment response, with interval decrease in size  of mediastinal, retroperitoneal, and pelvic lymph nodes and no CT evidence of  new metastatic disease within the thorax, abdomen, or pelvis.   2. Unchanged circumferential bladder wall thickening, which may represent acute  or chronic cystitis.        Assessment & Plan:   Bety Marie is a 76 y.o. 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 1. Metastatic adenocarcinoma of prostate, high grade: Initial pathologist at [address] [place] [address] called this \"Large cell neuroendocrine carcinoma,\" and based on diffuse disease on PET, patient was started on treatment with Carbo-Etoposide-Atezolizumab (thinking this was large cell carcinoma of the lung.) However FoundationOne testing identified TMPRSS2-ERG fusion, which is primarily seen in prostate cancers. This along with elevated PSA of 38 prompted sending of pathology specimen to [facility] for review. Their opinion and IHC staining is consistent with high grade adenocarcinoma of prostate. Treatment options now include: Carboplatin + Cabazitaxel vs single agent Docetaxel. Pt signed consent for Docetaxel. CT on 10/29/20 showed good response to treatment. Bone scan 9/1/20 negative. Supportive medications: EMLA cream, zofran, compazine, decrease dexamethasone to 4mg BID the day before and day after treatment. -- Lupron (3 month depot) #1 given 8/31/20. Next due 11/23/20. Pt forgot about the injection given at our office and he received a 1 month Lupron injection at [facility] on 9/28/20. Discussed with Dr. Barrie Coleman and Dr. Thong Curtis of [facility] that pt wants to get ADT here rather than at [facility] to consolidate appointments. -- Proceed with cycle 5 of Docetaxel today  -- Decreased Docetaxel dose by 10% with C5 due to neuropathy. -- Return on 11/24 for Lupron and C6 of Docetaxel, MD visit. -- Decrease Dex premeds to 4mg once daily the day before and the day after chemo. -- Next CT and bone scan due in 3 months, 1/2020. -- Prednisone 5mg/d except on treatment days. 2. Hyperglycemia: 2/2 to steroid premedications. Decreased dexamethasone dose. Talia Snowden assisting with mgmt of steroid induced hyperglycemia. 3. Leukocytosis: Likely 2/2 to prednisone and dexamethasone. Denies infections or fevers. Monitor.       4. 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 Still able to button and zipper clothing. He is dropping objects more often. 5. Bladder wall thickening: Seen on CT scan, which may represent acute or chronic cystitis. UA negative. Forwarded CT scan to urology. 6. H/o Genital Herpes: On suppression with Acyclovir. 7. Rash: Developed on 8/19/20 (C1 docetaxel on 8/10/20.) Rash present on arms, legs and shoulders is present but improving.   -- Prednisone 5mg/d. -- Betamethasone 0.05% ointment to affected areas bid PRN. 8. Renal insufficiency: Likely 2/2 to decreased PO intake. Encouraged better oral hydration. 9. Depression/anxiety: 2/2 to #1. I am also concerned that extra firmagon dose could have caused emotional lability. -- Following with Palliative. -- Zoloft 50mg/d. 10. Hypertension: BP increased likely due to weight gain. Encouraged low carbohydrate diet and exercise. Monitor. Emotional well being: Pt is coping well with his/her disease and has excellent support. Significant other: Rajendra Barclay - [phone]. I appreciate the opportunity to participate in Mr. Andrae tabor. Patient seen with Narcisa Bacon NP. I have personally seen and evaluated the patient. I find the patient's history and physical exam are consistent with the NP's documentation, and I agree with her assessment and plan.       Signed By: Hernan Goff MD     November 5, 2020

## 2020-10-19 ENCOUNTER — TELEPHONE (OUTPATIENT)
Dept: PALLATIVE CARE | Age: 65
End: 2020-10-19

## 2020-10-19 NOTE — TELEPHONE ENCOUNTER
Patient calling to speak to [employer]. Message left on voicemail to please call him back. He can do appt on Wednesday per message.

## 2020-10-20 ENCOUNTER — TELEPHONE (OUTPATIENT)
Dept: PALLATIVE CARE | Age: 65
End: 2020-10-20

## 2020-10-20 NOTE — TELEPHONE ENCOUNTER
Spoke with patient and advised Ralph Mayo, referral nurse will give him a call back to schedule appointment date and time

## 2020-10-20 NOTE — TELEPHONE ENCOUNTER
Returned pt's call. Explained outpatient Palliative Medicine.   Appt scheduled for 11/4/20 at 9:30am.

## 2020-10-21 ENCOUNTER — TELEPHONE (OUTPATIENT)
Dept: ONCOLOGY | Age: 65
End: 2020-10-21

## 2020-10-21 NOTE — TELEPHONE ENCOUNTER
Needs to resubmit  The lanset One touch test strips   Resubmit with the ICD 10 Codes    Because the now has medicare

## 2020-10-22 NOTE — TELEPHONE ENCOUNTER
Called patient and discussed plan re: test strips. He will  brand from [address] [place] [address] and bypass insurance at this time.     Jordin Malloy, PharmD, BCPS, CDCES

## 2020-10-27 ENCOUNTER — TELEPHONE (OUTPATIENT)
Dept: INTERNAL MEDICINE CLINIC | Age: 65
End: 2020-10-27

## 2020-10-27 NOTE — TELEPHONE ENCOUNTER
----- Message from Artist Kehr sent at 10/27/2020 12:55 PM EDT -----  Regarding: PharmD Lex Reilly / Sleam Valera first and last name: self    Reason for call: Pt would like to speak with provider.     Callback required yes/no and why: Yes    Best contact number(s): [phone]    Details to clarify the request: n/a

## 2020-10-27 NOTE — TELEPHONE ENCOUNTER
Numbness in hands and fingertips. Feet will cramp up at night. Noticing that he's dropping things more often. Wondering if it's related to his diabetes and if he should change his diet to make it better. Discussed with oncology pharmacist, Ketan Noriega, who agrees this is likely his chemotherapy regimen causing this issue. Advised patient to continue to discuss this issue at his upcoming appointment so that NP Ankur Christopher and Dr. Eufemia Hair know where he is with this side effect. Patient also states that he's feeling kind of loopy - especially 1-3 days after treatment. Also wondering about test strips. Medicare is not covering testing more than once a day. Advised patient of option to get Relion meter and strips through [employer] without any insurance at an affordable price. Patient to call me with blood sugar numbers so that we can determine if he should be on a chronic medication for blood sugars. Depending on kidney function, may consider metformin.      Markus Chopra, PharmD, BCPS, CDCES      CLINICAL PHARMACY CONSULT: MED RECONCILIATION/REVIEW ADDENDUM    For Pharmacy Admin Tracking Only    PHSO: PHSO Patient?: No  Total # of Interventions Recommended: Count: 1  Total Interventions Accepted: 1  Time Spent (min): 30

## 2020-10-29 ENCOUNTER — HOSPITAL ENCOUNTER (OUTPATIENT)
Dept: CT IMAGING | Age: 65
Discharge: HOME | End: 2020-10-29
Attending: NURSE PRACTITIONER
Payer: MEDICARE

## 2020-10-29 DIAGNOSIS — C61 ADENOCARCINOMA OF PROSTATE, STAGE 4 (HCC): ICD-10-CM

## 2020-10-29 LAB — CREAT BLD-MCNC: 1.3 MG/DL (ref 0.6–1.3)

## 2020-10-29 PROCEDURE — 74011000636 HC RX REV CODE- 636: Performed by: ORTHOPAEDIC SURGERY

## 2020-10-29 PROCEDURE — 71260 CT THORAX DX C+: CPT

## 2020-10-29 PROCEDURE — 82565 ASSAY OF CREATININE: CPT

## 2020-10-29 RX ADMIN — IOPAMIDOL 100 ML: 755 INJECTION, SOLUTION INTRAVENOUS at 12:05

## 2020-11-02 RX ORDER — EPINEPHRINE 1 MG/ML
0.3 INJECTION, SOLUTION, CONCENTRATE INTRAVENOUS AS NEEDED
Status: CANCELLED | OUTPATIENT
Start: 2020-11-24

## 2020-11-02 RX ORDER — SODIUM CHLORIDE 9 MG/ML
25 INJECTION, SOLUTION INTRAVENOUS CONTINUOUS
Status: CANCELLED | OUTPATIENT
Start: 2020-11-24

## 2020-11-02 RX ORDER — ALBUTEROL SULFATE 0.83 MG/ML
2.5 SOLUTION RESPIRATORY (INHALATION) AS NEEDED
Status: CANCELLED
Start: 2020-11-24

## 2020-11-02 RX ORDER — ACETAMINOPHEN 325 MG/1
650 TABLET ORAL AS NEEDED
Status: CANCELLED
Start: 2020-11-24

## 2020-11-02 RX ORDER — SODIUM CHLORIDE 0.9 % (FLUSH) 0.9 %
10 SYRINGE (ML) INJECTION AS NEEDED
Status: CANCELLED
Start: 2020-11-24

## 2020-11-02 RX ORDER — SODIUM CHLORIDE 9 MG/ML
10 INJECTION INTRAMUSCULAR; INTRAVENOUS; SUBCUTANEOUS AS NEEDED
Status: CANCELLED | OUTPATIENT
Start: 2020-11-24

## 2020-11-02 RX ORDER — DEXAMETHASONE SODIUM PHOSPHATE 4 MG/ML
8 INJECTION, SOLUTION INTRA-ARTICULAR; INTRALESIONAL; INTRAMUSCULAR; INTRAVENOUS; SOFT TISSUE ONCE
Status: CANCELLED | OUTPATIENT
Start: 2020-11-24

## 2020-11-02 RX ORDER — ONDANSETRON 2 MG/ML
8 INJECTION INTRAMUSCULAR; INTRAVENOUS AS NEEDED
Status: CANCELLED | OUTPATIENT
Start: 2020-11-24

## 2020-11-02 RX ORDER — HEPARIN 100 UNIT/ML
300-500 SYRINGE INTRAVENOUS AS NEEDED
Status: CANCELLED
Start: 2020-11-24

## 2020-11-02 RX ORDER — DIPHENHYDRAMINE HYDROCHLORIDE 50 MG/ML
25 INJECTION, SOLUTION INTRAMUSCULAR; INTRAVENOUS AS NEEDED
Status: CANCELLED
Start: 2020-11-24

## 2020-11-02 RX ORDER — DIPHENHYDRAMINE HYDROCHLORIDE 50 MG/ML
50 INJECTION, SOLUTION INTRAMUSCULAR; INTRAVENOUS AS NEEDED
Status: CANCELLED
Start: 2020-11-24

## 2020-11-02 RX ORDER — HYDROCORTISONE SODIUM SUCCINATE 100 MG/2ML
100 INJECTION, POWDER, FOR SOLUTION INTRAMUSCULAR; INTRAVENOUS AS NEEDED
Status: CANCELLED | OUTPATIENT
Start: 2020-11-24

## 2020-11-04 ENCOUNTER — OFFICE VISIT (OUTPATIENT)
Dept: PALLATIVE CARE | Age: 65
End: 2020-11-04
Payer: MEDICARE

## 2020-11-04 ENCOUNTER — DOCUMENTATION ONLY (OUTPATIENT)
Dept: PALLATIVE CARE | Age: 65
End: 2020-11-04

## 2020-11-04 VITALS
OXYGEN SATURATION: 99 % | DIASTOLIC BLOOD PRESSURE: 95 MMHG | SYSTOLIC BLOOD PRESSURE: 168 MMHG | WEIGHT: 212 LBS | TEMPERATURE: 97.9 F | HEART RATE: 79 BPM | BODY MASS INDEX: 30.42 KG/M2 | RESPIRATION RATE: 18 BRPM

## 2020-11-04 DIAGNOSIS — G62.0 PERIPHERAL NEUROPATHY DUE TO CHEMOTHERAPY (HCC): ICD-10-CM

## 2020-11-04 DIAGNOSIS — C79.82 METASTATIC ADENOCARCINOMA TO PROSTATE (HCC): ICD-10-CM

## 2020-11-04 DIAGNOSIS — T45.1X5A PERIPHERAL NEUROPATHY DUE TO CHEMOTHERAPY (HCC): ICD-10-CM

## 2020-11-04 DIAGNOSIS — F32.9 REACTIVE DEPRESSION: Primary | ICD-10-CM

## 2020-11-04 DIAGNOSIS — F41.9 ANXIETY: ICD-10-CM

## 2020-11-04 PROCEDURE — G8417 CALC BMI ABV UP PARAM F/U: HCPCS | Performed by: INTERNAL MEDICINE

## 2020-11-04 PROCEDURE — 3017F COLORECTAL CA SCREEN DOC REV: CPT | Performed by: INTERNAL MEDICINE

## 2020-11-04 PROCEDURE — G8536 NO DOC ELDER MAL SCRN: HCPCS | Performed by: INTERNAL MEDICINE

## 2020-11-04 PROCEDURE — 3288F FALL RISK ASSESSMENT DOCD: CPT | Performed by: INTERNAL MEDICINE

## 2020-11-04 PROCEDURE — G8427 DOCREV CUR MEDS BY ELIG CLIN: HCPCS | Performed by: INTERNAL MEDICINE

## 2020-11-04 PROCEDURE — 99205 OFFICE O/P NEW HI 60 MIN: CPT | Performed by: INTERNAL MEDICINE

## 2020-11-04 PROCEDURE — 1100F PTFALLS ASSESS-DOCD GE2>/YR: CPT | Performed by: INTERNAL MEDICINE

## 2020-11-04 PROCEDURE — G8432 DEP SCR NOT DOC, RNG: HCPCS | Performed by: INTERNAL MEDICINE

## 2020-11-04 RX ORDER — SERTRALINE HYDROCHLORIDE 50 MG/1
50 TABLET, FILM COATED ORAL DAILY
Qty: 30 TAB | Refills: 2 | Status: SHIPPED | OUTPATIENT
Start: 2020-11-04 | End: 2020-12-08 | Stop reason: SDUPTHER

## 2020-11-04 NOTE — PATIENT INSTRUCTIONS
Dear Jose uLjan , It was a pleasure seeing you today in [facility]. We will see you again in 4 weeks. If labs or imaging tests have been ordered for you today, please call the office  at [phone] 48 hours after completion to obtain the results. Your stated goal:  
 
Your described symptoms were: Fatigue: 3 Drowsiness: 2 Depression: 3 Pain: 0 Anxiety: 3 Nausea: 0 Anorexia: 0 Dyspnea: 0 Best Well-Bein Constipation: No  
     
 
This is the plan we talked about: 1. Mood 
-Increase sertraline to 50-mg daily 
-You can take 2 of the 25-mg tabs you have at home until you run out of that prescription 
-[facility]: atke 1 tab daily 
-Today our , Marisel Martines, sat in during your visit and met with you privately afterwards to explore how she can help support you 2. Fatigue  
-Fatigue is commonly experienced by people living with cancer and is often multifactorial in nature with sleep difficulties, the stress and emotional realities of living with cancer, meidcations (including chemotherapy), among the more common contributors 
-I included information below about cancer-related fatigue which describes some non-pharmacologic interventions you can utilize to manage your energy 3. Chemotherapy-induced peripheral neuropathy 
-You have numbness in your fingertips from your chemotherapy 
-Peripheral neuropathy that manifests as numbness is often not responsive to the medications we use to manage the symptoms of neuropathy (tingling, cold sensitivity, pain) -We discussed a trial of gabapentin and you prefer to avoid the addition of another medication with the potential of adverse side-effects at this time 
-I referred you to the [facility] 4. Metastatic prostate cancer 
-You are currently being treated with docetaxel, Firmagon and prednisone under the care of Dr. Christine Verde. This is what you have shared with us about Advance Care Planning: 
 
  Primary Decision Maker: Geovanni Crawford - [phone] Advance Care Planning 11/8/2020 Patient's Healthcare Decision Maker is: Verbal statement (Legal Next of Kin remains as decision maker) Confirm Advance Directive None Patient Would Like to Complete Advance Directive Yes The Palliative Medicine Team is here to support you and your family. Sincerely, Angela Morel MD and the Palliative Medicine Team 
  
Dealing With Being Tired From Cancer Treatment: Care Instructions Your Care Instructions Cancer, some complications from cancer, and some cancer treatments can make you feel very tired. But there are things you can do to feel better. Try to get extra rest while you have chemotherapy or radiation therapy. You may be able to stay with much of your routine, with some extra breaks for rest. It is important to stay as active as you can during treatment. Staying active can help you have more energy. Feeling tired is often worse at the end of these treatments or just after they end. Your doctor may prescribe medicines to help boost your energy. Follow-up care is a key part of your treatment and safety. Be sure to make and go to all appointments, and call your doctor if you are having problems. It's also a good idea to know your test results and keep a list of the medicines you take. How can you care for yourself at home? · Slowly increase your activity to have more energy. Try walking as your energy allows. · Take breaks during the day to rest. 
· If your doctor prescribes medicines to help with your energy, take them exactly as prescribed. Call your doctor if you think you are having a problem with your medicine. · Keep a list of all your medicines, and review them with your doctor at your visits. · Eat healthy foods. A diet that contains fruits, vegetables, and whole grains may increase your energy level. Limit alcohol, which can cause dehydration and make you feel more tired. Drink plenty of fluids. Do not skip meals, especially breakfast. 
· Do not smoke. It can make you feel more tired. If you need help quitting, talk to your doctor about stop-smoking programs and medicines. These can increase your chances of quitting for good. · If you have trouble getting a good night's sleep, try to: 
? Keep your room dark and quiet. If needed, wear earplugs or use a sound machine to cover noises. ? Do not eat just before you go to bed. ? Do not read or watch TV in bed. · Plan activities for the time of day when you have the most energy. This way you can plan ahead to do what you want to do. When should you call for help? Watch closely for changes in your health, and be sure to contact your doctor if you have any problems. Where can you learn more? Go to http://www.gray.com/ Enter L890 in the search box to learn more about \"Dealing With Being Tired From Cancer Treatment: Care Instructions. \" Current as of: April 29, 2020               Content Version: 12.6 © 2094-1885 [employer]. Care instructions adapted under license by [employer] (which disclaims liability or warranty for this information). If you have questions about a medical condition or this instruction, always ask your healthcare professional. [place] [address] any warranty or liability for your use of this information.

## 2020-11-04 NOTE — PROGRESS NOTES
Palliative Medicine Office Visit  Palliative Medicine Nurse Check In  [phone]    Patient Name: Mj Gaona  YOB: 1955      Date of Office Visit: 11/4/20    Patient states: \" New patient appt \"    From Check In Sheet (scanned in Media):  Has a medical provider talked with you about cardiopulmonary resuscitation (CPR)? [] Yes   [x] No   [] Unable to obtain    Nurse reminder to complete or update ACP FlowSheet:    Is ACP on the Problem List?    [] Yes    [x] No  IF ACP Document is ON FILE; Nurse to place ACP on Problem List     Is there an ACP Note in Chart Review/Note? [] Yes    [x] No   If NO: [address] 9/24/2020   Patient's Healthcare Decision Maker is: Patient declined (Legal Next of Kin remains as decision maker)   Confirm Advance Directive None   Patient Would Like to Complete Advance Directive No       Is there anything that we should know about you as a person in order to provide you the best care possible? Have you been to the ER, urgent care clinic since your last visit? [] Yes   [x] No   [] Unable to obtain    Have you been hospitalized since your last visit? [] Yes   [x] No   [] Unable to obtain    Have you seen or consulted any other health care providers outside of the [address] [place] [address] since your last visit?    [] Yes   [] No   [] Unable to obtain - Urologist - [facility]    Functional status (describe):   independent      Last BM:  yesterday     accessed (date):  11/4/20    Bottle review (for opioid pain medication): None  Medication 1:   Date filled:   Directions:   # filled:   # left:   # pills taking per day:  Last dose taken:

## 2020-11-04 NOTE — PROGRESS NOTES
Palliative Medicine Outpatient Services  [place]: [phone] ([phone])    Patient Name: Beba Hopson  YOB: 1955    Date of Current Visit: 11/4/2020  Location of Current Visit:    [] [facility] Office  [x] [facility] Office  [] [id number] [place] [facility] Office  [] Home  []Synchronous real-time A/V virtual visit    Date of Initial Visit: 11/4/2020   Referral from: Rayshawn Killian MD  Primary Care Physician: Ramakrishna Bear MD      SUMMARY:   Beba Hopson is a 76y.o. year old with a  history of metastatic prostate cancer to lymph nodes, who was referred to Palliative Medicine by Dr. Sangeeta Espinal for symptom management and psychosocial support. He was initially diagnosed in 6/2020 and is currently being treated docetaxel, Firmagon and prednisone. The patients social history includes: he is a retired  with a previous career as a [employer]. He is . He has a significant other, Obinna, who previously lived with him prior to moving out-of-state to care for family during the pandemic. He enjoys biking and hiking. Palliative Medicine is addressing the following current patient/family concerns: depression; anxiety; fatigue; chemotherapy-induced peripheral neuropathy; advance care planning. Initial Referral Intake note reviewed   PALLIATIVE DIAGNOSES:       ICD-10-CM ICD-9-CM    1. Reactive depression  F32.9 300.4    2. Anxiety  F41.9 300.00    3. Peripheral neuropathy due to chemotherapy (HCC)  G62.0 357.7     T45. 1X5A E933.1    4. Metastatic adenocarcinoma to prostate [facility])  C79.82 198.82           PLAN:   Patient Instructions     Dear Beba Hopson ,    It was a pleasure seeing you today in [facility]. We will see you again in 4 weeks. If labs or imaging tests have been ordered for you today, please call the office  at [phone] 48 hours after completion to obtain the results. Your stated goal:     Your described symptoms were:   Fatigue: 3 Drowsiness: 2 Depression: 3 Pain: 0   Anxiety: 3 Nausea: 0   Anorexia: 0 Dyspnea: 0   Best Well-Bein Constipation: No           This is the plan we talked about:    1. Mood  -Increase sertraline to 50-mg daily  -You can take 2 of the 25-mg tabs you have at home until you run out of that prescription  -Today I sent a prescription to your pharamcy for sertraline 50-mg: atke 1 tab daily  -Today our , Sonia Cervantes, sat in during your visit and met with you privately afterwards to explore how she can help support you    2. Fatigue   -Fatigue is commonly experienced by people living with cancer and is often multifactorial in nature with sleep difficulties, the stress and emotional realities of living with cancer, meidcations (including chemotherapy), among the more common contributors  -I included information below about cancer-related fatigue which describes some non-pharmacologic interventions you can utilize to manage your energy     3. Chemotherapy-induced peripheral neuropathy  -You have numbness in your fingertips from your chemotherapy  -Peripheral neuropathy that manifests as numbness is often not responsive to the medications we use to manage the symptoms of neuropathy (tingling, cold sensitivity, pain)  -We discussed a trial of gabapentin and you prefer to avoid the addition of another medication with the potential of adverse side-effects at this time  -I referred you to the [address] [place] [address]     4. Metastatic prostate cancer  -You are currently being treated with docetaxel, Firmagon and prednisone under the care of Dr. Doug Whitaker.      This is what you have shared with us about Advance Care Planning:      Primary Decision Maker: Kiran Jimenes - [phone]  Advance Care Planning 2020   Patient's [address] is: Verbal statement (Legal Next of Kin remains as decision maker)   Confirm Advance Directive None   Patient Would Like to Complete Advance Directive Yes           The Palliative Medicine Team is here to support you and your family. Sincerely,      Katie Jurado MD and the Palliative Medicine Team     Dealing With Being Tired From Cancer Treatment: Care Instructions  Your Care Instructions     Cancer, some complications from cancer, and some cancer treatments can make you feel very tired. But there are things you can do to feel better. Try to get extra rest while you have chemotherapy or radiation therapy. You may be able to stay with much of your routine, with some extra breaks for rest. It is important to stay as active as you can during treatment. Staying active can help you have more energy. Feeling tired is often worse at the end of these treatments or just after they end. Your doctor may prescribe medicines to help boost your energy. Follow-up care is a key part of your treatment and safety. Be sure to make and go to all appointments, and call your doctor if you are having problems. It's also a good idea to know your test results and keep a list of the medicines you take. How can you care for yourself at home? · Slowly increase your activity to have more energy. Try walking as your energy allows. · Take breaks during the day to rest.  · If your doctor prescribes medicines to help with your energy, take them exactly as prescribed. Call your doctor if you think you are having a problem with your medicine. · Keep a list of all your medicines, and review them with your doctor at your visits. · Eat healthy foods. A diet that contains fruits, vegetables, and whole grains may increase your energy level. Limit alcohol, which can cause dehydration and make you feel more tired. Drink plenty of fluids. Do not skip meals, especially breakfast.  · Do not smoke. It can make you feel more tired. If you need help quitting, talk to your doctor about stop-smoking programs and medicines. These can increase your chances of quitting for good.   · If you have trouble getting a good night's sleep, try to:  ? Keep your room dark and quiet. If needed, wear earplugs or use a sound machine to cover noises. ? Do not eat just before you go to bed. ? Do not read or watch TV in bed. · Plan activities for the time of day when you have the most energy. This way you can plan ahead to do what you want to do. When should you call for help? Watch closely for changes in your health, and be sure to contact your doctor if you have any problems. Where can you learn more? Go to http://www.gray.com/  Enter Z664 in the search box to learn more about \"Dealing With Being Tired From Cancer Treatment: Care Instructions. \"  Current as of: April 29, 2020               Content Version: 12.6  © 3194-4634 [employer]. Care instructions adapted under license by [employer] (which disclaims liability or warranty for this information). If you have questions about a medical condition or this instruction, always ask your healthcare professional. [place] [address] any warranty or liability for your use of this information.            GOALS OF CARE / TREATMENT PREFERENCES:   [====Goals of Care====]  GOALS OF CARE:  Patient / health care proxy stated goals: See Patient Instructions / Summary    TREATMENT PREFERENCES:   Code Status:  [x] Attempt Resuscitation       [] Do Not Attempt Resuscitation    Advance Care Planning:  [x] The [facility] Interdisciplinary Team has updated the ACP Navigator with Decision Maker and Patient Capacity      Primary Decision MakeElpidio Alexandrshraddha - [phone]  Advance Care Planning 11/8/2020   Patient's Healthcare Decision Maker is: Verbal statement (Legal Next of Kin remains as decision maker)   Confirm Advance Directive None   Patient Would Like to Complete Advance Directive Yes       Other:  (If patient appropriate for POST, consider using PALLPOST smart phrase here)    The palliative care team has discussed with patient / health care proxy about goals of care / treatment preferences for patient.  [====Goals of Care====]     PRESCRIPTIONS GIVEN:     Medications Ordered Today   Medications    sertraline (ZOLOFT) 50 mg tablet     Sig: Take 1 Tab by mouth daily. Dispense:  30 Tab     Refill:  2           FOLLOW UP:     Future Appointments   Date Time Provider Ayala Marti   11/24/2020 11:00 AM SS INF4 CH4 <1H [facility]   11/24/2020 11:15 AM [facility]   12/17/2020  9:00 AM [facility]   12/17/2020  9:45 AM Dav[facility] Leon [facility]   12/17/2020 11:30 AM Jus Arroyo MD [facility]   1/7/2021  9:00 AM SS INF2 CH3 <4H [facility]           PHYSICIANS INVOLVED IN CARE:   Patient Care Team:  Bernardo Luque MD as PCP - General (Family Medicine)  Bernardo Luque MD as PCP - [facility] Empaneled Provider  Bacilio Huerta MD (Hematology and Oncology)  Patricia Thompson MD (Palliative Medicine)  Patricia Thompson MD as Physician (Palliative Medicine)       HISTORY:   Reviewed patient-completed ESAS and advance care planning form. Reviewed patient record in prescription monitoring program.    CHIEF COMPLAINT:   Chief Complaint   Patient presents with    Depression       HPI/SUBJECTIVE:    The patient is: [x] Verbal / [] Nonverbal     He was diagnosed with prostate cancer last May. It was a shock, he didn't feel sick. He thought he had a hernia, then found out he has prostate cancer and that it had spread. Then his girlfriend, Ashley Li, moved out to go be with her family (out-of-state) to help with their care during the pandemic. He started chemo last summer. That's been going OK except for fatigue and now he has numbness in her fingertips. He sometimes drops things. He's not as active as he used to be. He likes to bike, he used to bike 20 miles a day. He also likes to hike. He doesn't have the energy to do much these day.   He and Ashley Li used to do these things together. His appetite is good. He's gained ~20# since his cancer diagnosis (on steroids). This is discouraging as he had worked hard to lose weight. He has no pain. He's moving his bowels regularly. Clinical Pain Assessment (nonverbal scale for nonverbal patients):   [++++ Clinical Pain Assessment++++]  [++++Pain Severity++++]: Pain: 0  [++++Pain Character++++]:  [++++Pain Duration++++]:  [++++Pain Effect++++]:  [++++Pain Factors++++]:  [++++Pain Frequency++++]:  [++++Pain Location++++]:  [++++ Clinical Pain Assessment++++]       FUNCTIONAL ASSESSMENT:     Palliative Performance Scale (PPS):  PPS: 70       PSYCHOSOCIAL/SPIRITUAL SCREENING:     Any spiritual / Hindu concerns:  [] Yes /  [x] No    Caregiver Burnout:  [] Yes /  [] No /  [x] No Caregiver Present      Anticipatory grief assessment:   [x] Normal  / [] Maladaptive       ESAS Anxiety: Anxiety: 3    ESAS Depression: Depression: 3       REVIEW OF SYSTEMS:     The following systems were [x] reviewed / [] unable to be reviewed  Systems: constitutional, ears/nose/mouth/throat, respiratory, gastrointestinal, genitourinary, musculoskeletal, integumentary, neurologic, psychiatric, endocrine. Positive findings noted below. Modified ESAS Completed by: provider   Fatigue: 3 Drowsiness: 2   Depression: 3 Pain: 0   Anxiety: 3 Nausea: 0   Anorexia: 0 Dyspnea: 0   Best Well-Bein Constipation: No              PHYSICAL EXAM:     Wt Readings from Last 3 Encounters:   20 210 lb 11.2 oz (95.6 kg)   20 210 lb 12.2 oz (95.6 kg)   20 212 lb (96.2 kg)     Blood pressure (!) 168/95, pulse 79, temperature 97.9 °F (36.6 °C), temperature source Oral, resp. rate 18, weight 212 lb (96.2 kg), SpO2 99 %.   Last bowel movement: See Nursing Note    Constitutional: well-nourished  Eyes: pupils equal, anicteric  ENMT: no nasal discharge, moist mucous membranes  Cardiovascular: regular rhythm, no peripheral edema  Respiratory: breathing not labored, symmetric  Gastrointestinal: soft non-tender, +bowel sounds  Musculoskeletal: no deformity, no tenderness to palpation  Skin: warm, dry  Neurologic: following commands, moving all extremities  Psychiatric: full affect, no hallucinations  Other:       HISTORY:     History reviewed. No pertinent past medical history. Past Surgical History:   Procedure Laterality Date    HX APPENDECTOMY  1964    HX OTHER SURGICAL      subcutaneous cyst of forehead    HX VASECTOMY        Family History   Problem Relation Age of Onset    Cancer Mother         breast    Cancer Father         prostate    Cancer Brother         melanoma    COPD Brother       History reviewed, no pertinent family history. Social History     Tobacco Use    Smoking status: Never Smoker    Smokeless tobacco: Never Used   Substance Use Topics    Alcohol use: Not Currently     Comment: In active recovery from alcohol use disorder     No Known Allergies   Current Outpatient Medications   Medication Sig    sertraline (ZOLOFT) 50 mg tablet Take 1 Tab by mouth daily.  predniSONE (DELTASONE) 5 mg tablet Take 1 Tab by mouth daily.  glucose blood VI test strips (blood glucose test) strip Use to check blood sugars four times daily    lancets misc Use to check blood sugars four times daily    OneTouch Ultra2 Meter misc USE TO CHECK BLOOD SUGARS TWICE DAILY    potassium chloride (K-DUR, KLOR-CON) 20 mEq tablet Take 1 Tab by mouth daily.  degarelix (Firmagon) 120 mg solr injection by SubCUTAneous route once.  dexAMETHasone (DECADRON) 4 mg tablet Take 8mg (2 tabs) twice daily the day BEFORE chemotherapy and take 8mg (2 tabs) twice daily the day AFTER chemotherapy    dextroamphetamine-amphetamine (ADDERALL) 10 mg tablet TAKE 1 TABLET TWICE A DAY FOR 30 DAYS    acyclovir (ZOVIRAX) 400 mg tablet Take 400 mg by mouth two (2) times a day.     betamethasone dipropionate (DIPROLENE) 0.05 % topical lotion Apply  to affected area See Admin Instructions. Apply a thin film to affect areas (arms, legs, trunk) once or twice daily. Avoid face. Wash hands.  ondansetron hcl (ZOFRAN) 8 mg tablet Take 1 Tab by mouth every eight (8) hours as needed for Nausea or Vomiting.  lidocaine-prilocaine (EMLA) topical cream Apply a dime size amount to port site 30-60 minutes before access on treatment days to numb port site.  prochlorperazine (Compazine) 10 mg tablet Take 0.5 Tabs by mouth every six (6) hours as needed for Nausea or Vomiting. No current facility-administered medications for this visit. LAB DATA REVIEWED:     Lab Results   Component Value Date/Time    WBC 20.4 (H) 11/05/2020 09:09 AM    HGB 12.3 11/05/2020 09:09 AM    PLATELET 890 74/73/9644 09:09 AM     Lab Results   Component Value Date/Time    Sodium 135 (L) 11/05/2020 09:09 AM    Potassium 3.5 11/05/2020 09:09 AM    Chloride 99 11/05/2020 09:09 AM    CO2 28 11/05/2020 09:09 AM    BUN 23 (H) 11/05/2020 09:09 AM    Creatinine 1.46 (H) 11/05/2020 09:09 AM    Calcium 9.2 11/05/2020 09:09 AM      Lab Results   Component Value Date/Time    Alk.  phosphatase 105 11/05/2020 09:09 AM    Protein, total 7.7 11/05/2020 09:09 AM    Albumin 3.9 11/05/2020 09:09 AM    Globulin 3.8 11/05/2020 09:09 AM     No results found for: INR, PTMR, PTP, PT1, PT2, APTT, INREXT, INREXT   No results found for: IRON, FE, TIBC, IBCT, PSAT, FERR        CONTROLLED SUBSTANCES SAFETY ASSESSMENT (IF ON CONTROLLED SUBSTANCES):     Reviewed opioid safety handout:  [] Yes   [] No  24 hour opioid dose >150mg morphine equivalent/day:  [] Yes   [] No  Benzodiazepines:  [] Yes   [] No  Sleep apnea:  [] Yes   [] No  Urine Toxicology Testing within last 6 months:  [] Yes   [] No  History of or new aberrant medication taking behaviors:  [] Yes   [] No  Has Narcan been prescribed [] Yes   [] No          Total time: 60 minutes  Counseling / coordination time: 45 minutes  > 50% counseling / coordination?: yes - review of medical records; assessment of symptoms; review of management plan options and medications

## 2020-11-04 NOTE — PROGRESS NOTES
[facility]     Outpatient Palliative Medicine     Bio-Psycho-Social-Spiritual Social Work Assessment        Chart Review    Demographics:     76 y.o.   male  WHITE OR       Presenting Diagnoses:      Metastatic prostate cancer       Advance Care Planning History:        Primary Decision MakerShiv Young  [phone]   Advance Care Planning 11/4/2020   Patient's Healthcare Decision Maker is: Verbal statement (Legal Next of Kin remains as decision maker)   Confirm Advance Directive None   Patient Would Like to Complete Advance Directive Yes           Interview    Informants Present:    [x] Patient  [] Family:   [] Friends:         Cultural Interpretation Present? [] Yes   Which provider?  Which language? [x] No      Interview Topics:    Early Childhood: Born in [place], [place], then grew up in [place], and [place] [facility] in [place]. Grew up with a close-knit community in [place]. Tayla: Raised in a progressive [place] in [place], not a member of a tayla community. Racial/Ethnic Background: White Non-    Gender Identity/Sexual Orientation: Straight Male    Marital Status/Partnership: Girlfriend, Obinna, has been in relationship for about 1 year, see each other on weekends because she moved to [address] to take care of family. First wife is the mother of patient's two children, second wife (still legally ) is in contact with strained relationship. Social Support: Youngest son Brooks Mcmillan) recently moved to 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 Co-teachers from prior employment are also a strong point of social support. Prior Experience of Illness/Death: No prior experiences reported, both parents alive. Housing: Lives in [facility] with four bedrooms.  Some concerns with disrepair because of financial difficulties. Transportation: No concerns, vehicle is old but runs. Education: Degree in teaching. Barriers to Learning: \"It's very difficult figuring out the financial part of medicine, a lot of things have been left up to me which is why things are falling through\". 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 Patient enjoys physical activity. Emotional Health: \"I am aware of it when it bothers me, when I let it crop up\"    Sleep: Only occurs in 3 hour bouts    Coping: Uses tools of alcohol sobriety/recovery, when needing to cope with a challenge patient \"takes it in, tries to solve it\"    Strengths: Good people skills, patient states:  \"People think I'm an extrovert when I'm an introvert\"          Social Work Assessment    Identified Challenges:    [] Psychosocial [x] Lives Alone [] Housing Insecure or Homeless   [] Rent/Mortgage Issues   [] Limited Income/Resources [] Involved in [facility] [] Environmental or Care Concerns   [] Transportation Issues   []  Issues [x] [address] Strain   [] Household Strife/Domestic Violence [] Uninsured   [x] Legal Challenges [] Citizenship Challenges [] Food Insecurity     [x] Other:      Describe: Strained relationship with second wife, pending divorce, is also causing financial difficulty (in the absence of limited income)          Mental Status Review:     Consciousness:     [x] Alert  [] Lethargic  [] Other (describe):     Cognition:   [x] Normal  [] Altered Mental Status  [] Cognitive Impairment  [] Overwhelmed  [] Other:   Describe:      Language:   [x] Within Normal Limits  [] Pressed Speech  [] Slurred/Dysarthric Speech  [] Tangential Speech  [] Hard of Hearing  [] Other:   Describe:        Narrative, Assessment, Plan     Narrative:     SW joined palliative medicine provider Dr. Shara Price on initial patient visit on this day. SW introduced palliative medicine social work as a regular part of the palliative medicine team. SW described palliative medicine social work as bringing together mind, body, and spirit, in the context of family and community. SW described several means of support including community resource referral, supportive counseling, advance care planning discussion, and advocacy. Assessment / Action:     SW completed biopsychosocialspiritual assessment. SW identified that this patient's strength is in community and working closely with others. SW provided referral to [facility] \"Men [employer]\" cancer support group, as well as [facility] support group ran by social workers [employer], [facility], and [email or web address], [place]. SW reinforced own availability and means of contact. Plan:      SW to follow as needed.          [facility]   Palliative Medicine   Social Work Supervisee for [address] [place] [address]  [phone]

## 2020-11-05 ENCOUNTER — DOCUMENTATION ONLY (OUTPATIENT)
Dept: ONCOLOGY | Age: 65
End: 2020-11-05

## 2020-11-05 ENCOUNTER — HOSPITAL ENCOUNTER (OUTPATIENT)
Dept: INFUSION THERAPY | Age: 65
Discharge: HOME | End: 2020-11-05
Payer: MEDICARE

## 2020-11-05 ENCOUNTER — OFFICE VISIT (OUTPATIENT)
Dept: ONCOLOGY | Age: 65
End: 2020-11-05
Payer: MEDICARE

## 2020-11-05 VITALS
RESPIRATION RATE: 18 BRPM | TEMPERATURE: 97.5 F | DIASTOLIC BLOOD PRESSURE: 101 MMHG | HEIGHT: 70 IN | HEART RATE: 63 BPM | BODY MASS INDEX: 30.16 KG/M2 | WEIGHT: 210.7 LBS | SYSTOLIC BLOOD PRESSURE: 169 MMHG | OXYGEN SATURATION: 97 %

## 2020-11-05 VITALS
DIASTOLIC BLOOD PRESSURE: 92 MMHG | BODY MASS INDEX: 30.17 KG/M2 | SYSTOLIC BLOOD PRESSURE: 148 MMHG | HEART RATE: 76 BPM | WEIGHT: 210.76 LBS | RESPIRATION RATE: 18 BRPM | HEIGHT: 70 IN | TEMPERATURE: 97.5 F | OXYGEN SATURATION: 97 %

## 2020-11-05 DIAGNOSIS — G62.9 NEUROPATHY: ICD-10-CM

## 2020-11-05 DIAGNOSIS — C77.9 REGIONAL LYMPH NODE METASTASIS PRESENT (HCC): ICD-10-CM

## 2020-11-05 DIAGNOSIS — R59.0 MEDIASTINAL LYMPHADENOPATHY: ICD-10-CM

## 2020-11-05 DIAGNOSIS — C34.00 MALIGNANT NEOPLASM OF HILUS OF LUNG, UNSPECIFIED LATERALITY (HCC): ICD-10-CM

## 2020-11-05 DIAGNOSIS — C61 ADENOCARCINOMA OF PROSTATE, STAGE 4 (HCC): Primary | ICD-10-CM

## 2020-11-05 DIAGNOSIS — Z51.11 CHEMOTHERAPY MANAGEMENT, ENCOUNTER FOR: ICD-10-CM

## 2020-11-05 DIAGNOSIS — I10 HYPERTENSION, UNSPECIFIED TYPE: ICD-10-CM

## 2020-11-05 LAB
ALBUMIN SERPL-MCNC: 3.9 G/DL (ref 3.5–5)
ALBUMIN/GLOB SERPL: 1 {RATIO} (ref 1.1–2.2)
ALP SERPL-CCNC: 105 U/L (ref 45–117)
ALT SERPL-CCNC: 60 U/L (ref 12–78)
ANION GAP SERPL CALC-SCNC: 8 MMOL/L (ref 5–15)
AST SERPL-CCNC: 83 U/L (ref 15–37)
BASO+EOS+MONOS # BLD AUTO: 0.5 K/UL (ref 0.2–1.2)
BASO+EOS+MONOS NFR BLD AUTO: 2 % (ref 3.2–16.9)
BILIRUB SERPL-MCNC: 0.5 MG/DL (ref 0.2–1)
BUN SERPL-MCNC: 23 MG/DL (ref 6–20)
BUN/CREAT SERPL: 16 (ref 12–20)
CALCIUM SERPL-MCNC: 9.2 MG/DL (ref 8.5–10.1)
CHLORIDE SERPL-SCNC: 99 MMOL/L (ref 97–108)
CO2 SERPL-SCNC: 28 MMOL/L (ref 21–32)
CREAT SERPL-MCNC: 1.46 MG/DL (ref 0.7–1.3)
DIFFERENTIAL METHOD BLD: ABNORMAL
ERYTHROCYTE [DISTWIDTH] IN BLOOD BY AUTOMATED COUNT: 18.7 % (ref 11.8–15.8)
GLOBULIN SER CALC-MCNC: 3.8 G/DL (ref 2–4)
GLUCOSE SERPL-MCNC: 197 MG/DL (ref 65–100)
HCT VFR BLD AUTO: 35 % (ref 36.6–50.3)
HGB BLD-MCNC: 12.3 G/DL (ref 12.1–17)
LYMPHOCYTES # BLD: 1.6 K/UL (ref 0.8–3.5)
LYMPHOCYTES NFR BLD: 8 % (ref 12–49)
MCH RBC QN AUTO: 31.3 PG (ref 26–34)
MCHC RBC AUTO-ENTMCNC: 35.1 G/DL (ref 30–36.5)
MCV RBC AUTO: 89.1 FL (ref 80–99)
NEUTS SEG # BLD: 18.3 K/UL (ref 1.8–8)
NEUTS SEG NFR BLD: 90 % (ref 32–75)
PLATELET # BLD AUTO: 253 K/UL (ref 150–400)
POTASSIUM SERPL-SCNC: 3.5 MMOL/L (ref 3.5–5.1)
PROT SERPL-MCNC: 7.7 G/DL (ref 6.4–8.2)
PSA SERPL-MCNC: 0.3 NG/ML (ref 0.01–4)
RBC # BLD AUTO: 3.93 M/UL (ref 4.1–5.7)
SODIUM SERPL-SCNC: 135 MMOL/L (ref 136–145)
WBC # BLD AUTO: 20.4 K/UL (ref 4.1–11.1)

## 2020-11-05 PROCEDURE — 1101F PT FALLS ASSESS-DOCD LE1/YR: CPT | Performed by: INTERNAL MEDICINE

## 2020-11-05 PROCEDURE — 74011250636 HC RX REV CODE- 250/636: Performed by: NURSE PRACTITIONER

## 2020-11-05 PROCEDURE — G8427 DOCREV CUR MEDS BY ELIG CLIN: HCPCS | Performed by: INTERNAL MEDICINE

## 2020-11-05 PROCEDURE — G0463 HOSPITAL OUTPT CLINIC VISIT: HCPCS | Performed by: NURSE PRACTITIONER

## 2020-11-05 PROCEDURE — 96413 CHEMO IV INFUSION 1 HR: CPT

## 2020-11-05 PROCEDURE — 74011000250 HC RX REV CODE- 250: Performed by: NURSE PRACTITIONER

## 2020-11-05 PROCEDURE — 96375 TX/PRO/DX INJ NEW DRUG ADDON: CPT

## 2020-11-05 PROCEDURE — 84153 ASSAY OF PSA TOTAL: CPT

## 2020-11-05 PROCEDURE — 99215 OFFICE O/P EST HI 40 MIN: CPT | Performed by: INTERNAL MEDICINE

## 2020-11-05 PROCEDURE — 80053 COMPREHEN METABOLIC PANEL: CPT

## 2020-11-05 PROCEDURE — G8417 CALC BMI ABV UP PARAM F/U: HCPCS | Performed by: INTERNAL MEDICINE

## 2020-11-05 PROCEDURE — G9717 DOC PT DX DEP/BP F/U NT REQ: HCPCS | Performed by: INTERNAL MEDICINE

## 2020-11-05 PROCEDURE — 3017F COLORECTAL CA SCREEN DOC REV: CPT | Performed by: INTERNAL MEDICINE

## 2020-11-05 PROCEDURE — G8536 NO DOC ELDER MAL SCRN: HCPCS | Performed by: INTERNAL MEDICINE

## 2020-11-05 PROCEDURE — 77030012965 HC NDL HUBR BBMI -A

## 2020-11-05 PROCEDURE — 85025 COMPLETE CBC W/AUTO DIFF WBC: CPT

## 2020-11-05 PROCEDURE — 36415 COLL VENOUS BLD VENIPUNCTURE: CPT

## 2020-11-05 RX ORDER — SODIUM CHLORIDE 9 MG/ML
25 INJECTION, SOLUTION INTRAVENOUS CONTINUOUS
Status: DISPENSED | OUTPATIENT
Start: 2020-11-05 | End: 2020-11-05

## 2020-11-05 RX ORDER — HEPARIN 100 UNIT/ML
300-500 SYRINGE INTRAVENOUS AS NEEDED
Status: ACTIVE | OUTPATIENT
Start: 2020-11-05 | End: 2020-11-05

## 2020-11-05 RX ORDER — DEXAMETHASONE SODIUM PHOSPHATE 4 MG/ML
8 INJECTION, SOLUTION INTRA-ARTICULAR; INTRALESIONAL; INTRAMUSCULAR; INTRAVENOUS; SOFT TISSUE ONCE
Status: COMPLETED | OUTPATIENT
Start: 2020-11-05 | End: 2020-11-05

## 2020-11-05 RX ORDER — SODIUM CHLORIDE 0.9 % (FLUSH) 0.9 %
10 SYRINGE (ML) INJECTION AS NEEDED
Status: DISPENSED | OUTPATIENT
Start: 2020-11-05 | End: 2020-11-05

## 2020-11-05 RX ORDER — SODIUM CHLORIDE 9 MG/ML
10 INJECTION INTRAMUSCULAR; INTRAVENOUS; SUBCUTANEOUS AS NEEDED
Status: ACTIVE | OUTPATIENT
Start: 2020-11-05 | End: 2020-11-05

## 2020-11-05 RX ADMIN — SODIUM CHLORIDE 10 ML: 9 INJECTION INTRAMUSCULAR; INTRAVENOUS; SUBCUTANEOUS at 09:10

## 2020-11-05 RX ADMIN — Medication 500 UNITS: at 12:50

## 2020-11-05 RX ADMIN — SODIUM CHLORIDE 25 ML/HR: 900 INJECTION, SOLUTION INTRAVENOUS at 10:47

## 2020-11-05 RX ADMIN — DOCETAXEL ANHYDROUS 147 MG: 10 INJECTION, SOLUTION INTRAVENOUS at 11:47

## 2020-11-05 RX ADMIN — Medication 10 ML: at 12:45

## 2020-11-05 RX ADMIN — DEXAMETHASONE SODIUM PHOSPHATE 8 MG: 4 INJECTION, SOLUTION INTRAMUSCULAR; INTRAVENOUS at 11:07

## 2020-11-05 NOTE — PROGRESS NOTES
Chief Complaint   Patient presents with    Follow-up     Crispinmarvin Mckeon is a pleasant 76year old male who presents today for his three weeks follow up for prostate cancer. He denies any pain or discomfort at this time.

## 2020-11-05 NOTE — PROGRESS NOTES
[employer]  Social Work Navigator Encounter     Patient Name:  Latoya Garcia    Medical History: metastatic prostate cancer    Advance Directives: none on file; conversation deferred    Narrative: Patient invited to virtual cancer support group facilitated by myself and No Davis LCSW on November 23, 2020 from 9-10 am.     Barriers to Care: none identified at this time     Plan: Patient invited to cancer support group meeting.      Referral:   Support Groups referral    Thank you,  Negrito Allen LCSW

## 2020-11-05 NOTE — PATIENT INSTRUCTIONS
1. Decrease the dexamethasone pre and post treatment to 4mg twice daily (instead of 8mg twice daily). This should help with the weight gain.  
 
(take dexamethasone 4mg twice a day the day before chemotherapy and take dexamethasone 4mg twice daily the day after chemo)

## 2020-11-05 NOTE — PROGRESS NOTES
[facility] VISIT NOTE    [id number]. Pt arrived at [facility] ambulatory and in no distress for C5D1 Taxotere. Assessment completed, pt c/o fatigue and numbness/tingling in hands. Pt states he is dropping things and has some difficulty with buttons/zippers. Pt verified he took his dexamethasone yesterday. RCW chest port accessed with . 75 in donis with no difficulty. Positive blood return noted and labs drawn. Pt proceeded to MD mcduffie. Labs resulted and are within parameters to treat. Medications received:  NS KVO  Dexamethasone IV  Taxotere IV    Tolerated treatment well, no adverse reaction noted. Port de-accessed and flushed per protocol. Positive blood return noted. Patient Vitals for the past 12 hrs:   Temp Pulse Resp BP SpO2   11/05/20 1249 -- 63 -- (!) 169/101 --   11/05/20 0856 97.5 °F (36.4 °C) 76 18 (!) 160/89 97 %     Recent Results (from the past 12 hour(s))   PSA, DIAGNOSTIC (PROSTATE SPECIFIC AG)    Collection Time: 11/05/20  9:09 AM   Result Value Ref Range    Prostate Specific Ag 0.3 0.01 - 4.0 ng/mL   CBC WITH 3 PART DIFF    Collection Time: 11/05/20  9:09 AM   Result Value Ref Range    WBC 20.4 (H) 4.1 - 11.1 K/uL    RBC 3.93 (L) 4.10 - 5.70 M/uL    HGB 12.3 12.1 - 17.0 g/dL    HCT 35.0 (L) 36.6 - 50.3 %    MCV 89.1 80.0 - 99.0 FL    MCH 31.3 26.0 - 34.0 PG    MCHC 35.1 30.0 - 36.5 g/dL    RDW 18.7 (H) 11.8 - 15.8 %    PLATELET 228 679 - 224 K/uL    NEUTROPHILS 90 (H) 32 - 75 %    MIXED CELLS 2 (L) 3.2 - 16.9 %    LYMPHOCYTES 8 (L) 12 - 49 %    ABS. NEUTROPHILS 18.3 (H) 1.8 - 8.0 K/UL    ABS. MIXED CELLS 0.5 0.2 - 1.2 K/uL    ABS.  LYMPHOCYTES 1.6 0.8 - 3.5 K/UL    DF AUTOMATED     METABOLIC PANEL, COMPREHENSIVE    Collection Time: 11/05/20  9:09 AM   Result Value Ref Range    Sodium 135 (L) 136 - 145 mmol/L    Potassium 3.5 3.5 - 5.1 mmol/L    Chloride 99 97 - 108 mmol/L    CO2 28 21 - 32 mmol/L    Anion gap 8 5 - 15 mmol/L    Glucose 197 (H) 65 - 100 mg/dL    BUN 23 (H) 6 - 20 MG/DL Creatinine 1.46 (H) 0.70 - 1.30 MG/DL    BUN/Creatinine ratio 16 12 - 20      GFR est AA 59 (L) >60 ml/min/1.73m2    GFR est non-AA 48 (L) >60 ml/min/1.73m2    Calcium 9.2 8.5 - 10.1 MG/DL    Bilirubin, total 0.5 0.2 - 1.0 MG/DL    ALT (SGPT) 60 12 - 78 U/L    AST (SGOT) 83 (H) 15 - 37 U/L    Alk. phosphatase 105 45 - 117 U/L    Protein, total 7.7 6.4 - 8.2 g/dL    Albumin 3.9 3.5 - 5.0 g/dL    Globulin 3.8 2.0 - 4.0 g/dL    A-G Ratio 1.0 (L) 1.1 - 2.2       1255. D/C'd from [facility] ambulatory and in no distress.  Next appointment is 11/23/20 at 9:00 am.

## 2020-11-10 ENCOUNTER — TELEPHONE (OUTPATIENT)
Dept: ONCOLOGY | Age: 65
End: 2020-11-10

## 2020-11-10 NOTE — TELEPHONE ENCOUNTER
vm message pt just wants rishi to know he has ringing in his ears--he went to ear doc and had his ears cleaned out and they are all clear --clear view of ear drum and all was good

## 2020-11-10 NOTE — TELEPHONE ENCOUNTER
11/10/20 2:59 PM Patient report tinnitus in bilateral ears. Reports symptoms might have pre-dated docetaxel but noticed increase in ringing after this last treatment. Evaluated by PCP, no wax in ears and no infection. Will continue to monitor closely given docetaxel can cause neurotoxicity. Patient verbalized understanding.

## 2020-11-18 NOTE — PROGRESS NOTES
[address]  Medical Oncology at [address] [place] [address]  [phone]    Hematology / Oncology Established Visit    Reason for Visit:   Jd Reynolds is a 76 y.o. male who is seen for follow up of neuroendocrine carcinoma. Initially referred by Dr. Renae Mota. 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 Flow cytometry negative for lymphoproliferative disorder. FoundationOne: MS Stable, TMB 0, MDM4 amplification, MYC amplification, TMPRSS2 TMPRSS2-ERD fusion, CEBPA 1311fs*10, ERBB4 amplification - equivocal, MCL1 amplification, [id number] amplification, RAD21 amplification. See scanned report for full info. Abbreviated [facility] Pathology Consultation:  Final diagnosis: Right external iliac node: Metastatic prostate adenocarcinoma. Comment: Histologic sections of the right external iliac node show sheets of cells with minimal eosinophilic cytoplasm and variable cytologic atypia. Some areas show acinar formation. The cells have predominantly round nuclei with vesicular chromatin and prominent nucleoli. Some areas show significantly more atypia with more irregular nuclear borders, hyperchromatic nyclei, and increase nuclear to cytoplasmic ratio. Frequent mitotic figures are identified. A provided pane of IHC stains is reviewed and demonstrates that the malignant cells show strong, diffuse expression of pan-cytokeratin and focal expression of TTF. CK7, CK20, chromogranin, and synaptophysin are negative in the lesional cells. CD3 and CD20 are negative in the lesions cells and positive in the background lymphocytes.  An abbreviated FoundationOne report was included, which included a TMPRSS2-ERG fusion (among other nonspecific abnormalities), which si found in approximately 50% of prostate cancers. Additional IHC studies performed at [facility] showed that the malignant cells have immunoreactivity with antibodies for PSA and PSAP, consistent with a diagnosis of metastatic prostate adenocarcinoma. Per report, flow cytometry negative for lymphoma. Overall, the histomorphology, immunophenotype and genomic findings in this case are diagnostic of metastatic prostate adenocarcinoma. The acinar formation and cytology were suggestive of this diagnosis, and the diffuse PSA and PSAP positivity as well as the TMPRSS2-ERG fusion confirmed the diagnosis. This fusion is the most common genomic alteration in prostate carcinoma and can be detected in over half of the cases, yet is not seen with any frequency in other types of carcinoma. Prior Treatment:   1. Carbo-Etoposide-Atezolizumab x 2 cycles, 6/29/20-7/20/20. Current Treatment: ADT and Docetaxel, started 8/10/20  Treatment duration: Until disease progression or toxicity  Frequency of visits: Every 3 weeks    History of Present Illness:   Jsosy Watt is a 76 y.o. male who comes in for evaluation of poorly differentiated neuroendocrine carcinoma. Pt noticed a nontender bulge in his LLQ in 5/2020. He thought this was a hernia and was evaluated by Dr. Sweta Rosenbaum. CT on 5/20/20 was notable for extensive lymphadenopathy in abd/pelvis. Pt underwent robot assisted excisional Right external iliac LN biopsy 5/29/20, which showed poorly differentiated large cell neuroendocrine carcinoma. He reports intentional weight loss with dietary changes and exercise, losing 40-lbs in past 8 months. No n/v/d, melena/hematochezia, cough, SOB. No fevers, chills, sweats. Lifetiime nonsmoker. Mother had breast cancer diagnosed age < 48. Twin brother had melanoma diagnosed aged late 46s. He does not think he has ever had a colonoscopy or PSA screening prior to current diagnosis. Interval History:  Patient here for follow up.  Here for C6 of docetaxel and lupron. Since starting on zoloft he notes improvement in mood. He established care with palliative medicine. Reports ongoing neuropathy in fingertips of bilateral hands, right worse than left. No difficulty with zippers or buttons. Still fumbling with objects at times. Reports multiple scale like plaque lesions on tops of hands and arms. He is following with Kris Samples for steroid induced hyperglycemia. AM BG readings at home range 119-140s, but BG > 277 this morning, which he attributes to juice, banana and quiche eaten this morning. Mostly, he has been drinking Gatorade Zero today. Good appetite and maintained weight since the last visit. Reports constipation but controlled with daily stool softener. No diarrhea. No fevers or chills. No SOB or CP. No past medical history on file. 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 Has 2 children, 27 and 32. 1 lives in [place]. Family History   Problem Relation Age of Onset    Cancer Mother         breast    Cancer Father         prostate    Cancer Brother         melanoma    COPD Brother      Current Outpatient Medications   Medication Sig    sertraline (ZOLOFT) 50 mg tablet Take 1 Tab by mouth daily.  predniSONE (DELTASONE) 5 mg tablet Take 1 Tab by mouth daily.  glucose blood VI test strips (blood glucose test) strip Use to check blood sugars four times daily    lancets misc Use to check blood sugars four times daily    OneTouch Ultra2 Meter misc USE TO CHECK BLOOD SUGARS TWICE DAILY    potassium chloride (K-DUR, KLOR-CON) 20 mEq tablet Take 1 Tab by mouth daily.     betamethasone dipropionate (DIPROLENE) 0.05 % topical lotion Apply  to affected area See Admin Instructions. Apply a thin film to affect areas (arms, legs, trunk) once or twice daily. Avoid face. Wash hands.  degarelix (Firmagon) 120 mg solr injection by SubCUTAneous route once.  dexAMETHasone (DECADRON) 4 mg tablet Take 8mg (2 tabs) twice daily the day BEFORE chemotherapy and take 8mg (2 tabs) twice daily the day AFTER chemotherapy    ondansetron hcl (ZOFRAN) 8 mg tablet Take 1 Tab by mouth every eight (8) hours as needed for Nausea or Vomiting.  lidocaine-prilocaine (EMLA) topical cream Apply a dime size amount to port site 30-60 minutes before access on treatment days to numb port site.  prochlorperazine (Compazine) 10 mg tablet Take 0.5 Tabs by mouth every six (6) hours as needed for Nausea or Vomiting.  dextroamphetamine-amphetamine (ADDERALL) 10 mg tablet TAKE 1 TABLET TWICE A DAY FOR 30 DAYS    acyclovir (ZOVIRAX) 400 mg tablet Take 400 mg by mouth two (2) times a day. No current facility-administered medications for this visit. No Known Allergies     Review of Systems: A complete review of systems was obtained, negative except as described above. Physical Exam:     Visit Vitals  BP (!) 158/97   Pulse 69   Temp 98.1 °F (36.7 °C)   Resp 18   Ht 5' 10\" (1.778 m)   Wt 212 lb 4.9 oz (96.3 kg)   SpO2 97%   BMI 30.46 kg/m²     ECOG PS: 0  General: Well developed, no acute distress  Eyes: PERRLA, EOMI, anicteric sclerae  HENT: Atraumatic, OP clear, TMs intact without erythema  Neck: Supple, Left supraclavicular LN now smaller in size. Lymphatic: No cervical, supraclavicular, axillary or inguinal adenopathy  Respiratory: CTAB, normal respiratory effort  CV: Normal rate, regular rhythm, no murmurs, no peripheral edema  GI: firm, nontender, mild distention, no hepatomegaly, no splenomegaly. L lower abdomen with soft mobile mass, approx 5cm  MS: Normal gait and station. Digits without clubbing or cyanosis.   Skin: Maculopapular rash present on arms, legs, trunk. No ecchymoses, or petechiae. Normal temperature, turgor, and texture. Neuro/Psych: Alert, oriented. 5/5 strength in all 4 extremities. Appropriate affect, normal judgment/insight. Results:     Lab Results   Component Value Date/Time    WBC 12.3 (H) 2020 11:25 AM    HGB 11.2 (L) 2020 11:25 AM    HCT 32.9 (L) 2020 11:25 AM    PLATELET 134  11:25 AM    MCV 88.4 2020 11:25 AM    ABS. NEUTROPHILS 9.9 (H) 2020 11:25 AM     Lab Results   Component Value Date/Time    Sodium 135 (L) 2020 09:09 AM    Potassium 3.5 2020 09:09 AM    Chloride 99 2020 09:09 AM    CO2 28 2020 09:09 AM    Glucose 197 (H) 2020 09:09 AM    BUN 23 (H) 2020 09:09 AM    Creatinine 1.46 (H) 2020 09:09 AM    GFR est AA 59 (L) 2020 09:09 AM    GFR est non-AA 48 (L) 2020 09:09 AM    Calcium 9.2 2020 09:09 AM    Creatinine (POC) 1.3 10/29/2020 11:59 AM     Lab Results   Component Value Date/Time    Bilirubin, total 0.5 2020 09:09 AM    ALT (SGPT) 60 2020 09:09 AM    Alk. phosphatase 105 2020 09:09 AM    Protein, total 7.7 2020 09:09 AM    Albumin 3.9 2020 09:09 AM    Globulin 3.8 2020 09:09 AM     No results found for: IRON, FE, TIBC, IBCT, PSAT, FERR    No results found for: B12LT, FOL, RBCF  Lab Results   Component Value Date/Time    TSH 1.72 2020 09:58 AM     No results found for: HAMAT, HAAB, HABT, HAAT, HBSAG, HBSB, HBSAT, HBABN, HBCM, HBCAB, HBCAT, XBCABS, HBEAB, HBEAG, XHEPCS, 517120, HBEGLT, HBCMLT, HBCLT, HBEBLT, [id number], [id number], HAVMLT, [id number], HBCMLT, [id number], HCGAT     20: Chromogranin 42    20: PSA 38.6  20: PSA 13.2   8/10/20: PSA 1.2  20: PSA 0.5  20: PSA 0.4  10/15/20: PSA 0.3  20: PSA 0.3    Imagin/20/20 CT abd/pelvis with IV contrast:  Impression:  1.  No findings to suggest gross abdominal wall hernia or flank hernia. 2. Extensive adenopathy throughout the abdomen and pelvis as described. Findings are concerning for possible metastatic disease or lymphoma. Recommend follow up or comparison with prior studies. 3. Other findings as described. 6/18/20 PET:  FINDINGS:  HEAD/NECK: No apparent foci of abnormal hypermetabolism. Cerebral evaluation is  limited by normal intense activity. CHEST: Hypermetabolic lymphadenopathy at the base of the left neck and in the  left supraclavicular region is noted. Hypermetabolic superior mediastinal,  prevascular, right paratracheal, subcarinal, and bilateral hilar lymph  lymphadenopathy, maximum SUV of the subcarinal lymph node is 5.7. Small but  hypermetabolic soft tissue nodule left anterior chest wall. ABDOMEN/PELVIS: Hypermetabolic retrocrural, left para-aortic, aortocaval,  bilateral common iliac, bilateral external iliac, and bilateral inguinal  lymphadenopathy, maximum SUV 5.8 of an aortocaval lymph node. Hypermetabolic  mesenteric lymph node left lower quadrant, maximum SUV 12.3. SKELETON: No foci of abnormal hypermetabolism in the axial and visualized  appendicular skeleton. IMPRESSION: Hypermetabolic lymphadenopathy involving the left neck and left  supraclavicular region, mediastinum, bilateral hilum, retroperitoneum,  mesentery, and pelvis as described above. Hypermetabolic soft tissue nodule left  chest.    Brain MRI 6/27/20: IMPRESSION:  There is no evidence of intracranial metastatic disease. Mild chronic microvascular ischemic change. No intracranial mass, hemorrhage or evidence of acute infarction. CT c/a/p 8/3/20: IMPRESSION:  Chest:  1. Findings consistent with partial treatment response, with interval decrease  in size of mediastinal and hilar lymph nodes, and interval decrease in size of  left chest wall nodule. Abdomen/pelvis:   1.  Findings consistent with partial treatment response, with interval decrease  in size of retroperitoneal lymph nodes.  2. Unchanged circumferential bladder wall thickening, which may represent acute  or chronic cystitis. Correlate with urinalysis. RECIST   TARGET LESIONS:      Lesion (description)         Location (series/slice)                Size            1. Posterior mediastinal lymph node    series 2 image 26    2.3 x 2.0 cm, previously 3.3 x 2.2 cm  2. Left upper paraesophageal lymph node    series 2 image 9    5 mm, previously 15 mm. 3. Right external iliac lymphadenopathy    series 2 image 98    3.8 x 3.2 cm, previously 6.3 x 4.7 cm  4. Left external iliac lymphadenopathy    series 2 image 99    1.6 cm, previously 2.5 cm    9/1/20 Bone Scan:  FINDINGS: There is physiologic uptake of radiotracer in the skeleton and soft tissues. There is no evidence of fracture, osteomyelitis or bone tumor. There is no pattern of skeletal metastases. IMPRESSION: Normal Whole Body Nuclear Bone Scan.    10/29/20 CT c/a/p:   CHEST WALL: No mass or axillary lymphadenopathy. Incidental note of mild  bilateral gynecomastia. THYROID: No nodule. MEDIASTINUM: Posterior mediastinal node between the aorta and esophagus measures  1.1 x 1.4 cm (2/27), previously 2.0 x 2.3 cm (2/26). No mass or enlarged lymphadenopathy. ERNESTO: No mass or lymphadenopathy. THORACIC AORTA: No dissection or aneurysm. MAIN PULMONARY ARTERY: Normal in caliber. TRACHEA/BRONCHI: Patent. ESOPHAGUS: No wall thickening or dilatation. HEART: The heart is normal in size without pericardial effusion. Coronary artery  calcifications are noted. PLEURA: No effusion or pneumothorax. LUNGS: There is a small patch of localized groundglass opacity in the anterior  left upper lobe (series 3, image 14). The lungs are otherwise clear. LIVER: Small right lobe cyst unchanged. Otherwise unremarkable. BILIARY TREE: Gallbladder is within normal limits. CBD is not dilated. SPLEEN: within normal limits. PANCREAS: No mass or ductal dilatation. ADRENALS: Unremarkable.   KIDNEYS: No mass, calculus, or hydronephrosis. STOMACH: Unremarkable. SMALL BOWEL: No dilatation or wall thickening. COLON: No dilatation or wall thickening. APPENDIX: The questioned absent. PERITONEUM: No ascites or pneumoperitoneum. RETROPERITONEUM: Stranding around the right common and external iliac arteries  with decrease in conglomerate right pelvic sidewall adenopathy measuring 2.7 x  3.1 cm (2/21), previously 3.2 x 3.8 cm (2/98). Other increase in measured nodes  as follows: Left retroaortic lymph node 8 mm (2/74), previously 12 mm (2 73). Aortocaval lymph node 9 mm (2/73), previously 11 mm (2/72). Left external iliac  lymph node 11 mm (2/101) series, previously 16 mm (2/99). Atherosclerotic  calcination without aneurysm or dissection. REPRODUCTIVE ORGANS: No cemented change in appearance of prostate or seminal  vesicles with no evident soft tissue extension beyond the confines of the  prostate. URINARY BLADDER: Partially distended with diffuse wall thickness unchanged. BONES: Degenerative spine change. No acute fracture or aggressive lesion. ABDOMINAL WALL: No mass or hernia. ADDITIONAL COMMENTS: Catheter of left Port-A-Cath is partially visualized and  traverses expected course to terminate in the mid SVC. Lesion (description):     Location (series/slice):  Size   1. Posterior mediastinal lymph node   2/27            1.1 x 1.4 cm, previously 2.3 x 2.0 cm (2/26)  2. Left upper paraesophageal lymph node  2/9           2 mm, previously 5 mm (2/9)   3. Right external iliac lymphadenopathy 2/21                3.8 x 3.2 cm (2/98)  4. Left external iliac lymphadenopathy  2/99              11 mm, previously 16 mm (2/99)   IMPRESSION:  1. Findings consistent with treatment response, with interval decrease in size  of mediastinal, retroperitoneal, and pelvic lymph nodes and no CT evidence of  new metastatic disease within the thorax, abdomen, or pelvis.   2. Unchanged circumferential bladder wall thickening, which may represent acute  or chronic cystitis.        Assessment & Plan:   Tona Haile is a 76 y.o. 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 1. Metastatic adenocarcinoma of prostate, high grade: Initial pathologist at [address] [place] [address] called this \"Large cell neuroendocrine carcinoma,\" and based on diffuse disease on PET, patient was started on treatment with Carbo-Etoposide-Atezolizumab (thinking this was large cell carcinoma of the lung.) However FoundationOne testing identified TMPRSS2-ERG fusion, which is primarily seen in prostate cancers. This along with elevated PSA of 38 prompted sending of pathology specimen to [facility] for review. Their opinion and IHC staining is consistent with high grade adenocarcinoma of prostate. Treatment options now include: Carboplatin + Cabazitaxel vs single agent Docetaxel. Pt signed consent for Docetaxel. CT on 10/29/20 showed good response to treatment. Bone scan 9/1/20 negative. Supportive medications: EMLA cream, zofran, compazine, dexamethasone  -- Lupron (3 month depot) #1 given 8/31/20. #2 next due today 11/24/20, then #3 due 2/18/20. Pt forgot about the injection given at our office and he received a 1 month Lupron injection at [facility] on 9/28/20. Discussed with Dr. Kristin Herman and Dr. Kimani Rosas of [facility] that pt wants to get ADT here rather than at [facility] to consolidate appointments. -- Proceed with cycle 6 of Docetaxel today. (10% dose reduction taken with C5 due to neuropathy.)      Castrate sensitive disease usually treated with 6 cycles of Docetaxel rather than 10, but pt has atypical disease. Will consider taking a break from chemo after C8 or C10 based on side effects, symptoms. -- Return on 12/17/20 for C7 of Docetaxel, MD visit. -- Decrease Dex premeds to 4mg once daily the day before and the day after chemo. -- Next CT and bone scan due in 3 months, 1/2020. -- Prednisone 5mg/d except on treatment days.      2. Hyperglycemia: 2/2 to steroid premedications. Decreased dexamethasone dose. Xiomy Santoro assisting with mgmt of steroid induced hyperglycemia. BG runs 118-140 usually, but up to 277 this am.     3. Leukocytosis: Likely 2/2 to prednisone and dexamethasone. Denies infections or fevers. Monitor. 4. 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 Still able to button and zipper clothing. He is dropping objects more often. 5. Bladder wall thickening: Seen on CT scan, which may represent acute or chronic cystitis. UA negative. Forwarded CT scan to urology. 6. H/o Genital Herpes: On suppression with Acyclovir. 7. Rash: Developed on 8/19/20 (C1 docetaxel on 8/10/20.) Rash present on arms, legs and shoulders is present but improving.   -- Prednisone 5mg/d. -- Betamethasone 0.05% ointment to affected areas bid PRN. 8. Renal insufficiency: Likely 2/2 to decreased PO intake. Encouraged better oral hydration. 9. Depression/anxiety: 2/2 to #1. I am also concerned that extra firmagon dose could have caused emotional lability. -- Following with Palliative. -- Zoloft 50mg/d. 10. Hypertension: BP increased likely due to weight gain. Encouraged low carbohydrate diet and exercise. Monitor. Emotional well being: Pt is coping well with his/her disease and has excellent support. Significant other: Satya Xiomaras - [phone]. I appreciate the opportunity to participate in Mr. Deon tabor.       Signed By: Olayinka Denton MD     November 24, 2020

## 2020-11-23 ENCOUNTER — TELEPHONE (OUTPATIENT)
Dept: ONCOLOGY | Age: 65
End: 2020-11-23

## 2020-11-23 NOTE — TELEPHONE ENCOUNTER
[address] at [place]  [phone]        11/23/20 9:34 AM Patient arrived to clinic. He stated he thought his infusion and MD visits were scheduled for today, but patient is actually scheduled for tomorrow. Patient took dexamethasone yesterday and is inquiring if he should take another dose today. Advised this nurse would defer to Dr. John Martinez and call patient back. He verbalized understanding. 10:37 AM Called patient and advised of Cata's, NP, response. He verbalized understanding. No further questions or concerns at this time.

## 2020-11-24 ENCOUNTER — OFFICE VISIT (OUTPATIENT)
Dept: ONCOLOGY | Age: 65
End: 2020-11-24
Payer: MEDICARE

## 2020-11-24 ENCOUNTER — HOSPITAL ENCOUNTER (OUTPATIENT)
Dept: INFUSION THERAPY | Age: 65
Discharge: HOME | End: 2020-11-24
Payer: MEDICARE

## 2020-11-24 VITALS
TEMPERATURE: 98.1 F | BODY MASS INDEX: 30.38 KG/M2 | SYSTOLIC BLOOD PRESSURE: 172 MMHG | WEIGHT: 212.2 LBS | DIASTOLIC BLOOD PRESSURE: 99 MMHG | HEIGHT: 70 IN | HEART RATE: 69 BPM | OXYGEN SATURATION: 97 % | RESPIRATION RATE: 18 BRPM

## 2020-11-24 VITALS
DIASTOLIC BLOOD PRESSURE: 97 MMHG | OXYGEN SATURATION: 97 % | WEIGHT: 212.3 LBS | SYSTOLIC BLOOD PRESSURE: 158 MMHG | RESPIRATION RATE: 18 BRPM | TEMPERATURE: 98.1 F | BODY MASS INDEX: 30.39 KG/M2 | HEART RATE: 69 BPM | HEIGHT: 70 IN

## 2020-11-24 DIAGNOSIS — G62.9 NEUROPATHY: ICD-10-CM

## 2020-11-24 DIAGNOSIS — C79.82 METASTATIC ADENOCARCINOMA TO PROSTATE (HCC): ICD-10-CM

## 2020-11-24 DIAGNOSIS — C77.9 REGIONAL LYMPH NODE METASTASIS PRESENT (HCC): ICD-10-CM

## 2020-11-24 DIAGNOSIS — Z51.11 CHEMOTHERAPY MANAGEMENT, ENCOUNTER FOR: ICD-10-CM

## 2020-11-24 DIAGNOSIS — C61 ADENOCARCINOMA OF PROSTATE, STAGE 4 (HCC): Primary | ICD-10-CM

## 2020-11-24 DIAGNOSIS — R59.0 MEDIASTINAL LYMPHADENOPATHY: ICD-10-CM

## 2020-11-24 DIAGNOSIS — C34.00 MALIGNANT NEOPLASM OF HILUS OF LUNG, UNSPECIFIED LATERALITY (HCC): ICD-10-CM

## 2020-11-24 LAB
ALBUMIN SERPL-MCNC: 3.8 G/DL (ref 3.5–5)
ALBUMIN/GLOB SERPL: 1.1 {RATIO} (ref 1.1–2.2)
ALP SERPL-CCNC: 86 U/L (ref 45–117)
ALT SERPL-CCNC: 56 U/L (ref 12–78)
ANION GAP SERPL CALC-SCNC: 5 MMOL/L (ref 5–15)
AST SERPL-CCNC: 89 U/L (ref 15–37)
BASO+EOS+MONOS # BLD AUTO: 0.4 K/UL (ref 0.2–1.2)
BASO+EOS+MONOS NFR BLD AUTO: 3 % (ref 3.2–16.9)
BILIRUB SERPL-MCNC: 0.5 MG/DL (ref 0.2–1)
BUN SERPL-MCNC: 21 MG/DL (ref 6–20)
BUN/CREAT SERPL: 15 (ref 12–20)
CALCIUM SERPL-MCNC: 9.5 MG/DL (ref 8.5–10.1)
CHLORIDE SERPL-SCNC: 99 MMOL/L (ref 97–108)
CO2 SERPL-SCNC: 31 MMOL/L (ref 21–32)
CREAT SERPL-MCNC: 1.39 MG/DL (ref 0.7–1.3)
DIFFERENTIAL METHOD BLD: ABNORMAL
ERYTHROCYTE [DISTWIDTH] IN BLOOD BY AUTOMATED COUNT: 17.8 % (ref 11.8–15.8)
GLOBULIN SER CALC-MCNC: 3.5 G/DL (ref 2–4)
GLUCOSE SERPL-MCNC: 141 MG/DL (ref 65–100)
HCT VFR BLD AUTO: 32.9 % (ref 36.6–50.3)
HGB BLD-MCNC: 11.2 G/DL (ref 12.1–17)
LYMPHOCYTES # BLD: 2 K/UL (ref 0.8–3.5)
LYMPHOCYTES NFR BLD: 16 % (ref 12–49)
MCH RBC QN AUTO: 30.1 PG (ref 26–34)
MCHC RBC AUTO-ENTMCNC: 34 G/DL (ref 30–36.5)
MCV RBC AUTO: 88.4 FL (ref 80–99)
NEUTS SEG # BLD: 9.9 K/UL (ref 1.8–8)
NEUTS SEG NFR BLD: 80 % (ref 32–75)
PLATELET # BLD AUTO: 312 K/UL (ref 150–400)
POTASSIUM SERPL-SCNC: 3.3 MMOL/L (ref 3.5–5.1)
PROT SERPL-MCNC: 7.3 G/DL (ref 6.4–8.2)
PSA SERPL-MCNC: 0.2 NG/ML (ref 0.01–4)
RBC # BLD AUTO: 3.72 M/UL (ref 4.1–5.7)
SODIUM SERPL-SCNC: 135 MMOL/L (ref 136–145)
WBC # BLD AUTO: 12.3 K/UL (ref 4.1–11.1)

## 2020-11-24 PROCEDURE — 99215 OFFICE O/P EST HI 40 MIN: CPT | Performed by: INTERNAL MEDICINE

## 2020-11-24 PROCEDURE — 96413 CHEMO IV INFUSION 1 HR: CPT

## 2020-11-24 PROCEDURE — 1101F PT FALLS ASSESS-DOCD LE1/YR: CPT | Performed by: INTERNAL MEDICINE

## 2020-11-24 PROCEDURE — 74011250636 HC RX REV CODE- 250/636: Performed by: INTERNAL MEDICINE

## 2020-11-24 PROCEDURE — 74011250636 HC RX REV CODE- 250/636: Performed by: NURSE PRACTITIONER

## 2020-11-24 PROCEDURE — 36415 COLL VENOUS BLD VENIPUNCTURE: CPT

## 2020-11-24 PROCEDURE — G8536 NO DOC ELDER MAL SCRN: HCPCS | Performed by: INTERNAL MEDICINE

## 2020-11-24 PROCEDURE — 80053 COMPREHEN METABOLIC PANEL: CPT

## 2020-11-24 PROCEDURE — G0463 HOSPITAL OUTPT CLINIC VISIT: HCPCS | Performed by: NURSE PRACTITIONER

## 2020-11-24 PROCEDURE — G8427 DOCREV CUR MEDS BY ELIG CLIN: HCPCS | Performed by: INTERNAL MEDICINE

## 2020-11-24 PROCEDURE — G9717 DOC PT DX DEP/BP F/U NT REQ: HCPCS | Performed by: INTERNAL MEDICINE

## 2020-11-24 PROCEDURE — 74011000258 HC RX REV CODE- 258: Performed by: NURSE PRACTITIONER

## 2020-11-24 PROCEDURE — 84153 ASSAY OF PSA TOTAL: CPT

## 2020-11-24 PROCEDURE — G8417 CALC BMI ABV UP PARAM F/U: HCPCS | Performed by: INTERNAL MEDICINE

## 2020-11-24 PROCEDURE — 96401 CHEMO ANTI-NEOPL SQ/IM: CPT

## 2020-11-24 PROCEDURE — 85025 COMPLETE CBC W/AUTO DIFF WBC: CPT

## 2020-11-24 PROCEDURE — 96375 TX/PRO/DX INJ NEW DRUG ADDON: CPT

## 2020-11-24 PROCEDURE — 3017F COLORECTAL CA SCREEN DOC REV: CPT | Performed by: INTERNAL MEDICINE

## 2020-11-24 RX ORDER — DEXAMETHASONE SODIUM PHOSPHATE 4 MG/ML
8 INJECTION, SOLUTION INTRA-ARTICULAR; INTRALESIONAL; INTRAMUSCULAR; INTRAVENOUS; SOFT TISSUE ONCE
Status: COMPLETED | OUTPATIENT
Start: 2020-11-24 | End: 2020-11-24

## 2020-11-24 RX ORDER — SODIUM CHLORIDE 9 MG/ML
25 INJECTION, SOLUTION INTRAVENOUS CONTINUOUS
Status: DISCONTINUED | OUTPATIENT
Start: 2020-11-24 | End: 2020-11-25 | Stop reason: HOSPADM

## 2020-11-24 RX ADMIN — SODIUM CHLORIDE 147 MG: 900 INJECTION, SOLUTION INTRAVENOUS at 14:16

## 2020-11-24 RX ADMIN — LEUPROLIDE ACETATE 22.5 MG: KIT SUBCUTANEOUS at 15:40

## 2020-11-24 RX ADMIN — DEXAMETHASONE SODIUM PHOSPHATE 8 MG: 4 INJECTION INTRA-ARTICULAR; INTRALESIONAL; INTRAMUSCULAR; INTRAVENOUS; SOFT TISSUE at 13:32

## 2020-11-24 RX ADMIN — SODIUM CHLORIDE 25 ML/HR: 900 INJECTION, SOLUTION INTRAVENOUS at 13:29

## 2020-11-24 NOTE — PROGRESS NOTES
Chief Complaint   Patient presents with    Follow-up     Giovana Zuniga is a pleasant 76year old male who presents today as a follow up for prostate cancer. He denies any pain at this time.

## 2020-11-24 NOTE — PROGRESS NOTES
[facility] VISIT NOTE  Date: 2020    Name: Liam Bains    MRN: [id number]         : 1955    1115  Mr. Matthieu Martinez Arrived ambulatory and in no distress for C6D1 of Taxotere  And Lupron Regimen. Assessment was completed, no acute issues at this time, no new complaints voiced. Left chest wall port accessed without difficulty, labs drawn & sent for processing. Patient proceeded to appointment with Dr. April Feliz. Chemotherapy Flowsheet 2020   Cycle C6D1   Date 2020   Drug / Regimen Taxotere + Lupron   Pre Meds given   Notes given + Lupron       Vitals:  Patient Vitals for the past 12 hrs:   Temp Pulse Resp BP SpO2   20 1521  69  (!) 172/99    20 1116 98.1 °F (36.7 °C) 69 18 (!) 158/97 97 %     MD aware of patient's blood pressure. Lab results were obtained and reviewed. Recent Results (from the past 12 hour(s))   CBC WITH 3 PART DIFF    Collection Time: 20 11:25 AM   Result Value Ref Range    WBC 12.3 (H) 4.1 - 11.1 K/uL    RBC 3.72 (L) 4.10 - 5.70 M/uL    HGB 11.2 (L) 12.1 - 17.0 g/dL    HCT 32.9 (L) 36.6 - 50.3 %    MCV 88.4 80.0 - 99.0 FL    MCH 30.1 26.0 - 34.0 PG    MCHC 34.0 30.0 - 36.5 g/dL    RDW 17.8 (H) 11.8 - 15.8 %    PLATELET 920 779 - 262 K/uL    NEUTROPHILS 80 (H) 32 - 75 %    MIXED CELLS 3 (L) 3.2 - 16.9 %    LYMPHOCYTES 16 12 - 49 %    ABS. NEUTROPHILS 9.9 (H) 1.8 - 8.0 K/UL    ABS. MIXED CELLS 0.4 0.2 - 1.2 K/uL    ABS.  LYMPHOCYTES 2.0 0.8 - 3.5 K/UL    DF AUTOMATED     METABOLIC PANEL, COMPREHENSIVE    Collection Time: 20 11:25 AM   Result Value Ref Range    Sodium 135 (L) 136 - 145 mmol/L    Potassium 3.3 (L) 3.5 - 5.1 mmol/L    Chloride 99 97 - 108 mmol/L    CO2 31 21 - 32 mmol/L    Anion gap 5 5 - 15 mmol/L    Glucose 141 (H) 65 - 100 mg/dL    BUN 21 (H) 6 - 20 MG/DL    Creatinine 1.39 (H) 0.70 - 1.30 MG/DL    BUN/Creatinine ratio 15 12 - 20      GFR est AA >60 >60 ml/min/1.73m2    GFR est non-AA 51 (L) >60 ml/min/1.73m2 Calcium 9.5 8.5 - 10.1 MG/DL    Bilirubin, total 0.5 0.2 - 1.0 MG/DL    ALT (SGPT) 56 12 - 78 U/L    AST (SGOT) 89 (H) 15 - 37 U/L    Alk. phosphatase 86 45 - 117 U/L    Protein, total 7.3 6.4 - 8.2 g/dL    Albumin 3.8 3.5 - 5.0 g/dL    Globulin 3.5 2.0 - 4.0 g/dL    A-G Ratio 1.1 1.1 - 2.2     PSA, DIAGNOSTIC (PROSTATE SPECIFIC AG)    Collection Time: 11/24/20 11:25 AM   Result Value Ref Range    Prostate Specific Ag 0.2 0.01 - 4.0 ng/mL       Medications received:  Medications Administered     0.9% sodium chloride infusion     Admin Date  11/24/2020 Action  New Bag Dose  25 mL/hr Rate  25 mL/hr Route  IntraVENous Administered By  Manfred Godinez RN          dexamethasone (DECADRON) 4 mg/mL injection 8 mg     Admin Date  11/24/2020 Action  Given Dose  8 mg Route  IntraVENous Administered By  Manfred Godinez RN          DOCEtaxeL (TAXOTERE) 147 mg in 0.9% sodium chloride 250 mL, overfill volume 25 mL chemo infusion     Admin Date  11/24/2020 Action  New Bag Dose  147 mg Route  IntraVENous Administered By  Manfred Godinez RN          leuprolide (ELIGARD 3 MONTH) injection 22.5 mg     Admin Date  11/24/2020 Action  Given Dose  22.5 mg Route  SubCUTAneous Administered By  Manfred Godinez RN                Mr. Reena Rutledge tolerated treatment well and was discharged from [place] [address] in stable condition at 1540. Port de-accessed, flushed & heparinized per protocol. He is to return on  December 17, 2020 at 0900 for his next appointment.     Day Malloy RN  November 24, 2020    Future Appointments:  Future Appointments   Date Time Provider Ayala Kidd   12/17/2020  9:00 AM SS INF2 CH3 <4H [facility]   12/17/2020  9:45 AM Tonya Leon NP [facility] BS AMB   12/17/2020 11:30 AM Sherri Murray MD P[facility] BS AMB   1/7/2021  9:00 AM SS INF2 CH3 <4H [facility] Angela Dudley

## 2020-11-24 NOTE — PROGRESS NOTES
Please notify pt that his potassium level is slightly low. Ask him to include either bananas, tomatoes, potatoes in his diet once every 1-2 days.

## 2020-11-24 NOTE — PATIENT INSTRUCTIONS
Dexamethasone instructions: 
 
I want you to further cut down on Dexamethasone to 4mg once day on the day before and the day after chemotherapy

## 2020-11-24 NOTE — PROGRESS NOTES
Called patient and advised of lab results per Dr. Lizz Snowden. He verbalized understanding. No further questions or concerns at this time.

## 2020-12-04 ENCOUNTER — TELEPHONE (OUTPATIENT)
Dept: PALLATIVE CARE | Age: 65
End: 2020-12-04

## 2020-12-04 DIAGNOSIS — E87.6 HYPOKALEMIA: ICD-10-CM

## 2020-12-04 NOTE — TELEPHONE ENCOUNTER
The patient states [facility] is having a hard time getting in touch with someone to authorize his Sertraline refill. [facility] can be reached at [phone].

## 2020-12-04 NOTE — TELEPHONE ENCOUNTER
Call placed to [facility], sertraline on file with 2 refills. Authorized a fill, script to be ready for pickup this afternoon. Patient notified.

## 2020-12-07 RX ORDER — POTASSIUM CHLORIDE 1500 MG/1
TABLET, FILM COATED, EXTENDED RELEASE ORAL
Qty: 90 TAB | Refills: 0 | Status: SHIPPED | OUTPATIENT
Start: 2020-12-07 | End: 2021-04-23

## 2020-12-08 RX ORDER — SERTRALINE HYDROCHLORIDE 50 MG/1
50 TABLET, FILM COATED ORAL DAILY
Qty: 90 TAB | Refills: 0 | Status: SHIPPED | OUTPATIENT
Start: 2020-12-08 | End: 2021-03-23 | Stop reason: SDUPTHER

## 2020-12-11 RX ORDER — EPINEPHRINE 1 MG/ML
0.3 INJECTION, SOLUTION, CONCENTRATE INTRAVENOUS AS NEEDED
Status: CANCELLED | OUTPATIENT
Start: 2020-12-17

## 2020-12-11 RX ORDER — HYDROCORTISONE SODIUM SUCCINATE 100 MG/2ML
100 INJECTION, POWDER, FOR SOLUTION INTRAMUSCULAR; INTRAVENOUS AS NEEDED
Status: CANCELLED | OUTPATIENT
Start: 2020-12-17

## 2020-12-11 RX ORDER — ALBUTEROL SULFATE 0.83 MG/ML
2.5 SOLUTION RESPIRATORY (INHALATION) AS NEEDED
Status: CANCELLED
Start: 2020-12-17

## 2020-12-11 RX ORDER — DIPHENHYDRAMINE HYDROCHLORIDE 50 MG/ML
50 INJECTION, SOLUTION INTRAMUSCULAR; INTRAVENOUS AS NEEDED
Status: CANCELLED
Start: 2020-12-17

## 2020-12-11 RX ORDER — DIPHENHYDRAMINE HYDROCHLORIDE 50 MG/ML
25 INJECTION, SOLUTION INTRAMUSCULAR; INTRAVENOUS AS NEEDED
Status: CANCELLED
Start: 2020-12-17

## 2020-12-11 RX ORDER — SODIUM CHLORIDE 9 MG/ML
25 INJECTION, SOLUTION INTRAVENOUS CONTINUOUS
Status: CANCELLED | OUTPATIENT
Start: 2020-12-17

## 2020-12-11 RX ORDER — DEXAMETHASONE SODIUM PHOSPHATE 4 MG/ML
8 INJECTION, SOLUTION INTRA-ARTICULAR; INTRALESIONAL; INTRAMUSCULAR; INTRAVENOUS; SOFT TISSUE ONCE
Status: CANCELLED | OUTPATIENT
Start: 2020-12-17

## 2020-12-11 RX ORDER — ONDANSETRON 2 MG/ML
8 INJECTION INTRAMUSCULAR; INTRAVENOUS AS NEEDED
Status: CANCELLED | OUTPATIENT
Start: 2020-12-17

## 2020-12-11 RX ORDER — ACETAMINOPHEN 325 MG/1
650 TABLET ORAL AS NEEDED
Status: CANCELLED
Start: 2020-12-17

## 2020-12-11 NOTE — PROGRESS NOTES
[employer]  Medical Oncology at [facility]  [phone]    Hematology / Oncology Established Visit    Reason for Visit:   Elif Morales is a 76 y.o. male who is seen for follow up of neuroendocrine carcinoma. Initially referred by Dr. 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 Flow cytometry negative for lymphoproliferative disorder. FoundationOne: MS Stable, TMB 0, MDM4 amplification, MYC amplification, TMPRSS2 TMPRSS2-ERD fusion, CEBPA 1311fs*10, ERBB4 amplification - equivocal, MCL1 amplification, [id number] amplification, RAD21 amplification. See scanned report for full info. Abbreviated [facility] Consultation:  Final diagnosis: Right external iliac node: Metastatic prostate adenocarcinoma. Comment: Histologic sections of the right external iliac node show sheets of cells with minimal eosinophilic cytoplasm and variable cytologic atypia. Some areas show acinar formation. The cells have predominantly round nuclei with vesicular chromatin and prominent nucleoli. Some areas show significantly more atypia with more irregular nuclear borders, hyperchromatic nyclei, and increase nuclear to cytoplasmic ratio. Frequent mitotic figures are identified. A provided pane of IHC stains is reviewed and demonstrates that the malignant cells show strong, diffuse expression of pan-cytokeratin and focal expression of TTF. CK7, CK20, chromogranin, and synaptophysin are negative in the lesional cells. CD3 and CD20 are negative in the lesions cells and positive in the background lymphocytes.  An abbreviated FoundationOne report was included, which included a TMPRSS2-ERG fusion (among other nonspecific abnormalities), which si found in approximately 50% of prostate cancers. Additional IHC studies performed at [facility] showed that the malignant cells have immunoreactivity with antibodies for PSA and PSAP, consistent with a diagnosis of metastatic prostate adenocarcinoma. Per report, flow cytometry negative for lymphoma. Overall, the histomorphology, immunophenotype and genomic findings in this case are diagnostic of metastatic prostate adenocarcinoma. The acinar formation and cytology were suggestive of this diagnosis, and the diffuse PSA and PSAP positivity as well as the TMPRSS2-ERG fusion confirmed the diagnosis. This fusion is the most common genomic alteration in prostate carcinoma and can be detected in over half of the cases, yet is not seen with any frequency in other types of carcinoma. Prior Treatment:   1. Carbo-Etoposide-Atezolizumab x 2 cycles, 6/29/20-7/20/20. 2. Docetaxel x 6 cycles, 8/10/20-11/24/20. Current Treatment: Lupron every 12 weeks    History of Present Illness:   Jossy Watt is a 76 y.o. male who comes in for evaluation of poorly differentiated neuroendocrine carcinoma. Pt noticed a nontender bulge in his LLQ in 5/2020. He thought this was a hernia and was evaluated by Dr. Sweta Rosenbaum. CT on 5/20/20 was notable for extensive lymphadenopathy in abd/pelvis. Pt underwent robot assisted excisional Right external iliac LN biopsy 5/29/20, which showed poorly differentiated large cell neuroendocrine carcinoma. He reports intentional weight loss with dietary changes and exercise, losing 40-lbs in past 8 months. No n/v/d, melena/hematochezia, cough, SOB. No fevers, chills, sweats. [employer] nonsmoker. Mother had breast cancer diagnosed age < 48. Twin brother had melanoma diagnosed aged late 46s. He does not think he has ever had a colonoscopy or PSA screening prior to current diagnosis. Interval History:  Patient here for follow up. Here for C7 of docetaxel.  Reports an improved mood and attributes this to decreased dose of steroids. He has a pruritic rash present on arms, legs and trunk. He is applying lotion daily with some improvement in symptoms. Reports ongoing neuropathy in fingertips of bilateral hands, right worse than left. He is having difficulty with zippers or buttons. Reports he is constantly dropping objects including his car keys. Having trouble typing. Good appetite and maintained weight since the last visit. Reports constipation but controlled with daily stool softener. No diarrhea. No fevers or chills. No SOB or CP. No past medical history on file. 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 Has 2 children, 27 and 32. 1 lives in [place]. Family History   Problem Relation Age of Onset    Cancer Mother         breast    Cancer Father         prostate    Cancer Brother         melanoma    COPD Brother      Current Outpatient Medications   Medication Sig    sertraline (ZOLOFT) 50 mg tablet Take 1 Tab by mouth daily.  potassium chloride SR (K-TAB) 20 mEq tablet TAKE 1 TABLET BY MOUTH EVERY DAY    predniSONE (DELTASONE) 5 mg tablet Take 1 Tab by mouth daily.  glucose blood VI test strips (blood glucose test) strip Use to check blood sugars four times daily    lancets misc Use to check blood sugars four times daily    OneTouch Ultra2 Meter misc USE TO CHECK BLOOD SUGARS TWICE DAILY    betamethasone dipropionate (DIPROLENE) 0.05 % topical lotion Apply  to affected area See Admin Instructions. Apply a thin film to affect areas (arms, legs, trunk) once or twice daily. Avoid face. Wash hands.     degarelix (Firmagon) 120 mg solr injection by SubCUTAneous route once.  dexAMETHasone (DECADRON) 4 mg tablet Take 8mg (2 tabs) twice daily the day BEFORE chemotherapy and take 8mg (2 tabs) twice daily the day AFTER chemotherapy    ondansetron hcl (ZOFRAN) 8 mg tablet Take 1 Tab by mouth every eight (8) hours as needed for Nausea or Vomiting.  lidocaine-prilocaine (EMLA) topical cream Apply a dime size amount to port site 30-60 minutes before access on treatment days to numb port site.  prochlorperazine (Compazine) 10 mg tablet Take 0.5 Tabs by mouth every six (6) hours as needed for Nausea or Vomiting.  dextroamphetamine-amphetamine (ADDERALL) 10 mg tablet TAKE 1 TABLET TWICE A DAY FOR 30 DAYS    acyclovir (ZOVIRAX) 400 mg tablet Take 400 mg by mouth two (2) times a day. No current facility-administered medications for this visit. No Known Allergies     Review of Systems: A complete review of systems was obtained, negative except as described above. Physical Exam:     Visit Vitals  BP (!) 146/82   Pulse 75   Temp 98.1 °F (36.7 °C)   Resp 18   Ht 5' 10\" (1.778 m)   Wt 213 lb 6.5 oz (96.8 kg)   SpO2 98%   BMI 30.62 kg/m²     ECOG PS: 0  General: Well developed, no acute distress  Eyes: PERRLA, EOMI, anicteric sclerae  HENT: Atraumatic, OP clear, TMs intact without erythema  Neck: Supple, Left supraclavicular LN no longer palpable. Lymphatic: No cervical, supraclavicular, axillary or inguinal adenopathy  Respiratory: CTAB, normal respiratory effort  CV: Normal rate, regular rhythm, no murmurs, trace edema present bilateral lower extremities  GI: firm, nontender, mild distention, no hepatomegaly, no splenomegaly. Prior L lower abdomen 5cm mass no longer palpable. MS: Normal gait and station. Digits without clubbing or cyanosis. Skin: Maculopapular rash present on arms, legs, trunk. No ecchymoses, or petechiae. Normal temperature, turgor, and texture. Neuro/Psych: Alert, oriented. 5/5 strength in all 4 extremities.  Appropriate affect, normal judgment/insight. Results:     Lab Results   Component Value Date/Time    WBC 10.6 2020 09:45 AM    HGB 11.2 (L) 2020 09:45 AM    HCT 33.0 (L) 2020 09:45 AM    PLATELET 777  09:45 AM    MCV 89.9 2020 09:45 AM    ABS. NEUTROPHILS 8.3 (H) 2020 09:45 AM     Lab Results   Component Value Date/Time    Sodium 138 2020 09:45 AM    Potassium 3.4 (L) 2020 09:45 AM    Chloride 102 2020 09:45 AM    CO2 30 2020 09:45 AM    Glucose 105 (H) 2020 09:45 AM    BUN 16 2020 09:45 AM    Creatinine 1.35 (H) 2020 09:45 AM    GFR est AA >60 2020 09:45 AM    GFR est non-AA 53 (L) 2020 09:45 AM    Calcium 8.7 2020 09:45 AM    Creatinine (POC) 1.3 10/29/2020 11:59 AM     Lab Results   Component Value Date/Time    Bilirubin, total 0.5 2020 11:25 AM    ALT (SGPT) 56 2020 11:25 AM    Alk. phosphatase 86 2020 11:25 AM    Protein, total 7.3 2020 11:25 AM    Albumin 3.8 2020 11:25 AM    Globulin 3.5 2020 11:25 AM     No results found for: IRON, FE, TIBC, IBCT, PSAT, FERR    No results found for: B12LT, FOL, RBCF  Lab Results   Component Value Date/Time    TSH 1.72 2020 09:58 AM     No results found for: HAMAT, HAAB, HABT, HAAT, HBSAG, HBSB, HBSAT, HBABN, HBCM, HBCAB, HBCAT, XBCABS, HBEAB, HBEAG, XHEPCS, 182514, HBEGLT, HBCMLT, HBCLT, HBEBLT, [id number], [id number], HAVMLT, [id number], HBCMLT, [id number], HCGAT     20: Chromogranin 42    20: PSA 38.6  20: PSA 13.2   8/10/20: PSA 1.2  20: PSA 0.5  20: PSA 0.4  10/15/20: PSA 0.3  20: PSA 0.3  20: 0.2    Imagin/20/20 CT abd/pelvis with IV contrast:  Impression:  1. No findings to suggest gross abdominal wall hernia or flank hernia. 2. Extensive adenopathy throughout the abdomen and pelvis as described. Findings are concerning for possible metastatic disease or lymphoma.  Recommend follow up or comparison with prior studies. 3. Other findings as described. 6/18/20 PET:  FINDINGS:  HEAD/NECK: No apparent foci of abnormal hypermetabolism. Cerebral evaluation is  limited by normal intense activity. CHEST: Hypermetabolic lymphadenopathy at the base of the left neck and in the  left supraclavicular region is noted. Hypermetabolic superior mediastinal,  prevascular, right paratracheal, subcarinal, and bilateral hilar lymph  lymphadenopathy, maximum SUV of the subcarinal lymph node is 5.7. Small but  hypermetabolic soft tissue nodule left anterior chest wall. ABDOMEN/PELVIS: Hypermetabolic retrocrural, left para-aortic, aortocaval,  bilateral common iliac, bilateral external iliac, and bilateral inguinal  lymphadenopathy, maximum SUV 5.8 of an aortocaval lymph node. Hypermetabolic  mesenteric lymph node left lower quadrant, maximum SUV 12.3. SKELETON: No foci of abnormal hypermetabolism in the axial and visualized  appendicular skeleton. IMPRESSION: Hypermetabolic lymphadenopathy involving the left neck and left  supraclavicular region, mediastinum, bilateral hilum, retroperitoneum,  mesentery, and pelvis as described above. Hypermetabolic soft tissue nodule left  chest.    Brain MRI 6/27/20: IMPRESSION:  There is no evidence of intracranial metastatic disease. Mild chronic microvascular ischemic change. No intracranial mass, hemorrhage or evidence of acute infarction. CT c/a/p 8/3/20: IMPRESSION:  Chest:  1. Findings consistent with partial treatment response, with interval decrease  in size of mediastinal and hilar lymph nodes, and interval decrease in size of  left chest wall nodule. Abdomen/pelvis:   1. Findings consistent with partial treatment response, with interval decrease  in size of retroperitoneal lymph nodes. 2. Unchanged circumferential bladder wall thickening, which may represent acute  or chronic cystitis. Correlate with urinalysis.   RECIST   TARGET LESIONS:      Lesion (description)         Location (series/slice)                Size            1. Posterior mediastinal lymph node    series 2 image 26    2.3 x 2.0 cm, previously 3.3 x 2.2 cm  2. Left upper paraesophageal lymph node    series 2 image 9    5 mm, previously 15 mm. 3. Right external iliac lymphadenopathy    series 2 image 98    3.8 x 3.2 cm, previously 6.3 x 4.7 cm  4. Left external iliac lymphadenopathy    series 2 image 99    1.6 cm, previously 2.5 cm    9/1/20 Bone Scan:  FINDINGS: There is physiologic uptake of radiotracer in the skeleton and soft tissues. There is no evidence of fracture, osteomyelitis or bone tumor. There is no pattern of skeletal metastases. IMPRESSION: Normal Whole Body Nuclear Bone Scan.    10/29/20 CT c/a/p:  Lesion (description):     Location (series/slice):  Size   1. Posterior mediastinal lymph node   2/27            1.1 x 1.4 cm, previously 2.3 x 2.0 cm (2/26)  2. Left upper paraesophageal lymph node  2/9           2 mm, previously 5 mm (2/9)   3. Right external iliac lymphadenopathy 2/21                3.8 x 3.2 cm (2/98)  4. Left external iliac lymphadenopathy  2/99              11 mm, previously 16 mm (2/99)   IMPRESSION:  1. Findings consistent with treatment response, with interval decrease in size  of mediastinal, retroperitoneal, and pelvic lymph nodes and no CT evidence of  new metastatic disease within the thorax, abdomen, or pelvis. 2. Unchanged circumferential bladder wall thickening, which may represent acute  or chronic cystitis.        Assessment & Plan:   Rosalva Phipps is a 76 y.o. 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      1. Metastatic adenocarcinoma of prostate, high grade: Initial pathologist at [address] [place] [address] called this \"Large cell neuroendocrine carcinoma,\" and based on diffuse disease on PET, patient was started on treatment with Carbo-Etoposide-Atezolizumab (thinking this was large cell carcinoma of the lung.) However FoundationOne testing identified TMPRSS2-ERG fusion, which is primarily seen in prostate cancers. This along with elevated PSA of 38 prompted sending of pathology specimen to [facility] for review. Their opinion and IHC staining is consistent with high grade adenocarcinoma of prostate. Treatment options now include: Carboplatin + Cabazitaxel vs single agent Docetaxel. Pt signed consent for Docetaxel. CT on 10/29/20 showed good response to treatment. Bone scan 9/1/20 negative. Supportive medications: EMLA cream, zofran, compazine, dexamethasone  -- Lupron (3 month depot) #1 given 8/31/20. #2 on 11/24/20, #3 due 2/18/20. Pt forgot about the injection given at our office and he received a 1 month Lupron injection at [facility] on 9/28/20. Discussed with Dr. Khadra An and Dr. Nabor Gill of [facility] that pt wants to get ADT here rather than at [facility] to consolidate appointments. -- HOLD cycle 7 of Docetaxel today given worsening neuropathy. Castrate sensitive disease usually treated with 6 cycles of Docetaxel rather than 10, but pt has atypical disease. -- Will now continue ADT alone, monitor PSA and scans periodically. Would consider starting Zytiga at time of  progression on scans or symptomatic PSA progression. -- Return in 2 months for labs/portflush, Lupron, review CT scan, MD/NP visit. -- Discontinue dexamethasone pre/post medications and Prednisone. -- Next CT and bone scan due 1/25/21. 2. Hyperglycemia: 2/2 to steroid premedications. Decreased dexamethasone dose. Leo Rosenberg assisting with mgmt of steroid induced hyperglycemia. BG runs 118-140 at home and 105 with today's labs. 3. H/o leukocytosis: Likely 2/2 to prednisone and dexamethasone. Denies infections or fevers. Resolved. 4. 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 Having difficulty with buttons and zippers. He is dropping objects more often. Discontinued Docetaxel.     5. Bladder wall thickening: Seen on CT scan, which may represent acute or chronic cystitis. UA negative. Forwarded CT scan to urology. 6. H/o Genital Herpes: On suppression with Acyclovir. 7. Rash: Developed on 8/19/20 (C1 docetaxel on 8/10/20.) Rash present on arms, legs and trunk was initially improving but worsening after C6 treatment. -- Stop Prednisone 5mg/d. -- Betamethasone 0.05% cream to affected areas bid PRN, refilled 12/17/20.     8. Renal insufficiency: Likely 2/2 to decreased PO intake. Encouraged better oral hydration. 9. Depression/anxiety: 2/2 to #1. I am also concerned that extra firmagon dose could have caused emotional lability. Following with Palliative. On Zoloft 50mg/d. 10. Hypertension: BP increased likely due to weight gain. Encouraged low carbohydrate diet and exercise. Monitor. 11. F/E/N/GI: Encouraged potassium rich foods. -- Potassium 20meq daily. Emotional well being: Pt is coping well with his/her disease and has excellent support. Significant other: Jonatan Marino - [phone]. I appreciate the opportunity to participate in Mr. Caren Travis care. I have personally seen and evaluated the patient in conjunction with Hernan Ambriz NP. I find the patient's history and physical exam are consistent with the NP's documentation. I agree with the above assessment and plan, which I have edited if needed.       Signed By: Wilfredo Lombardi MD     December 17, 2020

## 2020-12-17 ENCOUNTER — HOSPITAL ENCOUNTER (OUTPATIENT)
Dept: INFUSION THERAPY | Age: 65
Discharge: HOME | End: 2020-12-17
Payer: MEDICARE

## 2020-12-17 ENCOUNTER — OFFICE VISIT (OUTPATIENT)
Dept: ONCOLOGY | Age: 65
End: 2020-12-17
Payer: MEDICARE

## 2020-12-17 VITALS
RESPIRATION RATE: 18 BRPM | TEMPERATURE: 98.1 F | HEART RATE: 75 BPM | SYSTOLIC BLOOD PRESSURE: 146 MMHG | WEIGHT: 213.41 LBS | DIASTOLIC BLOOD PRESSURE: 82 MMHG | HEIGHT: 70 IN | OXYGEN SATURATION: 98 % | BODY MASS INDEX: 30.55 KG/M2

## 2020-12-17 VITALS
HEIGHT: 70 IN | OXYGEN SATURATION: 98 % | RESPIRATION RATE: 18 BRPM | WEIGHT: 213.5 LBS | TEMPERATURE: 98.1 F | SYSTOLIC BLOOD PRESSURE: 146 MMHG | DIASTOLIC BLOOD PRESSURE: 82 MMHG | HEART RATE: 75 BPM | BODY MASS INDEX: 30.56 KG/M2

## 2020-12-17 DIAGNOSIS — G62.0 CHEMOTHERAPY-INDUCED PERIPHERAL NEUROPATHY (HCC): ICD-10-CM

## 2020-12-17 DIAGNOSIS — T45.1X5A CHEMOTHERAPY-INDUCED PERIPHERAL NEUROPATHY (HCC): ICD-10-CM

## 2020-12-17 DIAGNOSIS — C61 ADENOCARCINOMA OF PROSTATE, STAGE 4 (HCC): Primary | ICD-10-CM

## 2020-12-17 DIAGNOSIS — R21 RASH: ICD-10-CM

## 2020-12-17 DIAGNOSIS — E87.6 HYPOKALEMIA: ICD-10-CM

## 2020-12-17 DIAGNOSIS — C34.00 MALIGNANT NEOPLASM OF HILUS OF LUNG, UNSPECIFIED LATERALITY (HCC): ICD-10-CM

## 2020-12-17 DIAGNOSIS — C77.9 REGIONAL LYMPH NODE METASTASIS PRESENT (HCC): ICD-10-CM

## 2020-12-17 DIAGNOSIS — R59.0 MEDIASTINAL LYMPHADENOPATHY: ICD-10-CM

## 2020-12-17 DIAGNOSIS — Z51.11 CHEMOTHERAPY MANAGEMENT, ENCOUNTER FOR: ICD-10-CM

## 2020-12-17 LAB
ALBUMIN SERPL-MCNC: 3.6 G/DL (ref 3.5–5)
ALBUMIN/GLOB SERPL: 1.1 {RATIO} (ref 1.1–2.2)
ALP SERPL-CCNC: 89 U/L (ref 45–117)
ALT SERPL-CCNC: 68 U/L (ref 12–78)
ANION GAP SERPL CALC-SCNC: 6 MMOL/L (ref 5–15)
AST SERPL-CCNC: 122 U/L (ref 15–37)
BASO+EOS+MONOS # BLD AUTO: 0.6 K/UL (ref 0.2–1.2)
BASO+EOS+MONOS NFR BLD AUTO: 5 % (ref 3.2–16.9)
BILIRUB SERPL-MCNC: 0.5 MG/DL (ref 0.2–1)
BUN SERPL-MCNC: 16 MG/DL (ref 6–20)
BUN/CREAT SERPL: 12 (ref 12–20)
CALCIUM SERPL-MCNC: 8.7 MG/DL (ref 8.5–10.1)
CHLORIDE SERPL-SCNC: 102 MMOL/L (ref 97–108)
CO2 SERPL-SCNC: 30 MMOL/L (ref 21–32)
CREAT SERPL-MCNC: 1.35 MG/DL (ref 0.7–1.3)
DIFFERENTIAL METHOD BLD: ABNORMAL
ERYTHROCYTE [DISTWIDTH] IN BLOOD BY AUTOMATED COUNT: 18.1 % (ref 11.8–15.8)
GLOBULIN SER CALC-MCNC: 3.2 G/DL (ref 2–4)
GLUCOSE SERPL-MCNC: 105 MG/DL (ref 65–100)
HCT VFR BLD AUTO: 33 % (ref 36.6–50.3)
HGB BLD-MCNC: 11.2 G/DL (ref 12.1–17)
LYMPHOCYTES # BLD: 1.7 K/UL (ref 0.8–3.5)
LYMPHOCYTES NFR BLD: 16 % (ref 12–49)
MCH RBC QN AUTO: 30.5 PG (ref 26–34)
MCHC RBC AUTO-ENTMCNC: 33.9 G/DL (ref 30–36.5)
MCV RBC AUTO: 89.9 FL (ref 80–99)
NEUTS SEG # BLD: 8.3 K/UL (ref 1.8–8)
NEUTS SEG NFR BLD: 79 % (ref 32–75)
PLATELET # BLD AUTO: 255 K/UL (ref 150–400)
POTASSIUM SERPL-SCNC: 3.4 MMOL/L (ref 3.5–5.1)
PROT SERPL-MCNC: 6.8 G/DL (ref 6.4–8.2)
PSA SERPL-MCNC: 0.1 NG/ML (ref 0.01–4)
RBC # BLD AUTO: 3.67 M/UL (ref 4.1–5.7)
SODIUM SERPL-SCNC: 138 MMOL/L (ref 136–145)
WBC # BLD AUTO: 10.6 K/UL (ref 4.1–11.1)

## 2020-12-17 PROCEDURE — 80053 COMPREHEN METABOLIC PANEL: CPT

## 2020-12-17 PROCEDURE — 77030016057 HC NDL HUBR APOL -B

## 2020-12-17 PROCEDURE — 1101F PT FALLS ASSESS-DOCD LE1/YR: CPT | Performed by: INTERNAL MEDICINE

## 2020-12-17 PROCEDURE — G8536 NO DOC ELDER MAL SCRN: HCPCS | Performed by: INTERNAL MEDICINE

## 2020-12-17 PROCEDURE — G8417 CALC BMI ABV UP PARAM F/U: HCPCS | Performed by: INTERNAL MEDICINE

## 2020-12-17 PROCEDURE — G8427 DOCREV CUR MEDS BY ELIG CLIN: HCPCS | Performed by: INTERNAL MEDICINE

## 2020-12-17 PROCEDURE — 85025 COMPLETE CBC W/AUTO DIFF WBC: CPT

## 2020-12-17 PROCEDURE — G9717 DOC PT DX DEP/BP F/U NT REQ: HCPCS | Performed by: INTERNAL MEDICINE

## 2020-12-17 PROCEDURE — 74011250636 HC RX REV CODE- 250/636: Performed by: INTERNAL MEDICINE

## 2020-12-17 PROCEDURE — G0463 HOSPITAL OUTPT CLINIC VISIT: HCPCS | Performed by: NURSE PRACTITIONER

## 2020-12-17 PROCEDURE — 36415 COLL VENOUS BLD VENIPUNCTURE: CPT

## 2020-12-17 PROCEDURE — 99214 OFFICE O/P EST MOD 30 MIN: CPT | Performed by: INTERNAL MEDICINE

## 2020-12-17 PROCEDURE — 36592 COLLECT BLOOD FROM PICC: CPT

## 2020-12-17 PROCEDURE — 84153 ASSAY OF PSA TOTAL: CPT

## 2020-12-17 PROCEDURE — 3017F COLORECTAL CA SCREEN DOC REV: CPT | Performed by: INTERNAL MEDICINE

## 2020-12-17 RX ORDER — BETAMETHASONE DIPROPIONATE 0.5 MG/G
CREAM TOPICAL 2 TIMES DAILY
Qty: 45 G | Refills: 0 | Status: SHIPPED | OUTPATIENT
Start: 2020-12-17 | End: 2021-10-28

## 2020-12-17 RX ORDER — HEPARIN 100 UNIT/ML
300-500 SYRINGE INTRAVENOUS AS NEEDED
Status: ACTIVE | OUTPATIENT
Start: 2020-12-17 | End: 2020-12-17

## 2020-12-17 RX ORDER — SODIUM CHLORIDE 9 MG/ML
10 INJECTION INTRAMUSCULAR; INTRAVENOUS; SUBCUTANEOUS AS NEEDED
Status: ACTIVE | OUTPATIENT
Start: 2020-12-17 | End: 2020-12-17

## 2020-12-17 RX ORDER — SODIUM CHLORIDE 0.9 % (FLUSH) 0.9 %
10 SYRINGE (ML) INJECTION AS NEEDED
Status: DISPENSED | OUTPATIENT
Start: 2020-12-17 | End: 2020-12-17

## 2020-12-17 RX ADMIN — SODIUM CHLORIDE 10 ML: 9 INJECTION INTRAMUSCULAR; INTRAVENOUS; SUBCUTANEOUS at 09:30

## 2020-12-17 RX ADMIN — Medication 10 ML: at 11:12

## 2020-12-17 RX ADMIN — Medication 500 UNITS: at 11:12

## 2020-12-17 NOTE — PATIENT INSTRUCTIONS
1. Please get CT AND bone scan late January (around 1/25/21) 2. We will see you back in clinic on 2/18/20 to review the CT scan, check your labs (including PSA), and lupron. 3. STOP PREDNISONE and dexamethasone.

## 2020-12-17 NOTE — PROGRESS NOTES
[facility] Progress Note    Date: 2020    Name: Samina Hale    MRN: [id number]         : 1955    Mr. Rod Hull Arrived ambulatory and in no distress for C7D1 of  Taxotere Regimen. Assessment was completed, no acute issues at this time, no new complaints voiced. right chest wall port accessed without difficulty, labs drawn & sent for processing. Chemotherapy Flowsheet 2020   Cycle C7D1   Date 2020   Drug / Regimen Taxotere   Pre Meds -   Notes [address] [address]        Patient proceed to appointment with Dr. Nelly Dunlap at which time it was decided to hold today's treatment r/t neuropathy and skin condition. Mr. Horace Geiger vitals were reviewed. Visit Vitals  BP (!) 146/82 (BP 1 Location: Left arm, BP Patient Position: Sitting)   Pulse 75   Temp 98.1 °F (36.7 °C)   Resp 18   Ht 5' 10\" (1.778 m)   Wt 96.8 kg (213 lb 8 oz)   SpO2 98%   BMI 30.63 kg/m²       Lab results were obtained and reviewed. Recent Results (from the past 12 hour(s))   CBC WITH 3 PART DIFF    Collection Time: 20  9:45 AM   Result Value Ref Range    WBC 10.6 4.1 - 11.1 K/uL    RBC 3.67 (L) 4.10 - 5.70 M/uL    HGB 11.2 (L) 12.1 - 17.0 g/dL    HCT 33.0 (L) 36.6 - 50.3 %    MCV 89.9 80.0 - 99.0 FL    MCH 30.5 26.0 - 34.0 PG    MCHC 33.9 30.0 - 36.5 g/dL    RDW 18.1 (H) 11.8 - 15.8 %    PLATELET 580 834 - 945 K/uL    NEUTROPHILS 79 (H) 32 - 75 %    MIXED CELLS 5 3.2 - 16.9 %    LYMPHOCYTES 16 12 - 49 %    ABS. NEUTROPHILS 8.3 (H) 1.8 - 8.0 K/UL    ABS. MIXED CELLS 0.6 0.2 - 1.2 K/uL    ABS.  LYMPHOCYTES 1.7 0.8 - 3.5 K/UL    DF AUTOMATED     METABOLIC PANEL, COMPREHENSIVE    Collection Time: 20  9:45 AM   Result Value Ref Range    Sodium 138 136 - 145 mmol/L    Potassium 3.4 (L) 3.5 - 5.1 mmol/L    Chloride 102 97 - 108 mmol/L    CO2 30 21 - 32 mmol/L    Anion gap 6 5 - 15 mmol/L    Glucose 105 (H) 65 - 100 mg/dL    BUN 16 6 - 20 MG/DL    Creatinine 1.35 (H) 0.70 - 1.30 MG/DL    BUN/Creatinine ratio 12 12 - 20      GFR est AA >60 >60 ml/min/1.73m2    GFR est non-AA 53 (L) >60 ml/min/1.73m2    Calcium 8.7 8.5 - 10.1 MG/DL    Bilirubin, total 0.5 0.2 - 1.0 MG/DL    ALT (SGPT) 68 12 - 78 U/L    AST (SGOT) 122 (H) 15 - 37 U/L    Alk. phosphatase 89 45 - 117 U/L    Protein, total 6.8 6.4 - 8.2 g/dL    Albumin 3.6 3.5 - 5.0 g/dL    Globulin 3.2 2.0 - 4.0 g/dL    A-G Ratio 1.1 1.1 - 2.2         Medications:  Medications Administered     0.9% sodium chloride injection 10 mL     Admin Date  12/17/2020 Action  Given Dose  10 mL Route  IntraVENous Administered By  [facility] [employer] [place], [place]          heparin (porcine) pf 300-500 Units     Admin Date  12/17/2020 Action  Given Dose  500 Units Route  InterCATHeter Administered By  [facility] [employer] [place], [place]          saline peripheral flush soln 10 mL     Admin Date  12/17/2020 Action  Given Dose  10 mL Route  InterCATHeter Administered By  [facility] [employer], [place]                Mr. Jacquelin Desir was discharged from [place] [address] in stable condition at 1115. Port de-accessed, flushed & heparinized per protocol. He is to return on January 7 at 0900 for his next appointment.     [facility]  December 17, 2020

## 2020-12-17 NOTE — PROGRESS NOTES
Chief Complaint   Patient presents with    Follow-up     Steve Cazares Is a pleasant 76year old male who presents today as a follow up for prostate cancer. He denies any pain at this time.

## 2021-01-07 ENCOUNTER — APPOINTMENT (OUTPATIENT)
Dept: INFUSION THERAPY | Age: 66
End: 2021-01-07

## 2021-01-14 ENCOUNTER — OFFICE VISIT (OUTPATIENT)
Dept: PALLATIVE CARE | Age: 66
End: 2021-01-14
Payer: MEDICARE

## 2021-01-14 ENCOUNTER — NURSE NAVIGATOR (OUTPATIENT)
Dept: PALLATIVE CARE | Age: 66
End: 2021-01-14

## 2021-01-14 VITALS
RESPIRATION RATE: 20 BRPM | TEMPERATURE: 98.6 F | OXYGEN SATURATION: 99 % | BODY MASS INDEX: 30.02 KG/M2 | HEART RATE: 64 BPM | SYSTOLIC BLOOD PRESSURE: 129 MMHG | WEIGHT: 209.2 LBS | DIASTOLIC BLOOD PRESSURE: 81 MMHG

## 2021-01-14 DIAGNOSIS — F32.9 REACTIVE DEPRESSION: Primary | ICD-10-CM

## 2021-01-14 DIAGNOSIS — F41.9 ANXIETY: ICD-10-CM

## 2021-01-14 DIAGNOSIS — G62.0 PERIPHERAL NEUROPATHY DUE TO CHEMOTHERAPY (HCC): ICD-10-CM

## 2021-01-14 DIAGNOSIS — C79.82 METASTATIC ADENOCARCINOMA TO PROSTATE (HCC): ICD-10-CM

## 2021-01-14 DIAGNOSIS — T45.1X5A PERIPHERAL NEUROPATHY DUE TO CHEMOTHERAPY (HCC): ICD-10-CM

## 2021-01-14 DIAGNOSIS — R26.81 GAIT INSTABILITY: ICD-10-CM

## 2021-01-14 DIAGNOSIS — R53.83 FATIGUE, UNSPECIFIED TYPE: ICD-10-CM

## 2021-01-14 DIAGNOSIS — C79.82 METASTATIC ADENOCARCINOMA TO PROSTATE (HCC): Primary | ICD-10-CM

## 2021-01-14 PROCEDURE — 3017F COLORECTAL CA SCREEN DOC REV: CPT | Performed by: INTERNAL MEDICINE

## 2021-01-14 PROCEDURE — 1101F PT FALLS ASSESS-DOCD LE1/YR: CPT | Performed by: INTERNAL MEDICINE

## 2021-01-14 PROCEDURE — G9717 DOC PT DX DEP/BP F/U NT REQ: HCPCS | Performed by: INTERNAL MEDICINE

## 2021-01-14 PROCEDURE — G8427 DOCREV CUR MEDS BY ELIG CLIN: HCPCS | Performed by: INTERNAL MEDICINE

## 2021-01-14 PROCEDURE — 99214 OFFICE O/P EST MOD 30 MIN: CPT | Performed by: INTERNAL MEDICINE

## 2021-01-14 PROCEDURE — G8536 NO DOC ELDER MAL SCRN: HCPCS | Performed by: INTERNAL MEDICINE

## 2021-01-14 PROCEDURE — G8417 CALC BMI ABV UP PARAM F/U: HCPCS | Performed by: INTERNAL MEDICINE

## 2021-01-14 NOTE — PATIENT INSTRUCTIONS
Dear Mary Ann Mendoza , It was a pleasure seeing you today in [facility]. We will see you again on  at 10:30am to coordinate with your return to see Dr. Chiquita Cleveland. If labs or imaging tests have been ordered for you today, please call the office  at [phone] 48 hours after completion to obtain the results. Your described symptoms were: Fatigue: 3 Drowsiness: 3 Depression: 3 Pain: 5(hands and fingers) Anxiety: 4 Nausea: 0 Anorexia: 1 Dyspnea: 0 [facility] Well-Bein Constipation: No  
Other Problem (Comment): 0 This is the plan we talked about: 1. Mood 
-Continue sertraline to 50-mg daily 2. Balance problems 
-I'm concerned about your falls 
-referred you to the [address] [place] [address] 3. Chemotherapy-induced peripheral neuropathy 
-We discussed different medications we could try (gabapentin, amitriptyline) to help with the sharp pain in your fingertips 
-You're going to give this some time to see if it improves now that you're stopped chemotherapy 4. Fatigue 
-This is chronic and unchanged 5. Metastatic prostate cancer 
-You are currently being treated with Firmagon and prednisone under the care of Dr. Chiquita Cleveland. This is what you have shared with us about Advance Care Planning: 
 
  Primary Decision Maker: Mishel Crawford - [phone] Advance Care Planning 2020 Patient's Healthcare Decision Maker is: Verbal statement (Legal Next of Kin remains as decision maker) Confirm Advance Directive None Patient Would Like to Complete Advance Directive No  
 
 
 
 
The Palliative Medicine Team is here to support you and your family. Sincerely, Maylene Dakin, MD and the Palliative Medicine Team 
  
 Care instructions adapted under license by [employer] (which disclaims liability or warranty for this information). If you have questions about a medical condition or this instruction, always ask your healthcare professional. [address] any warranty or liability for your use of this information. Learning About Neuropathy From Chemotherapy What is neuropathy? Chemotherapy, or chemo, can cause damage to the nerves. This damage is called peripheral neuropathy (say \"[id number]-[id number]-[id number]-rul noo-RAW-puh-thee\"). It can affect the nerves that control your sense of touch, how you feel pain and temperature, and your muscle strength. What are the symptoms? Some common symptoms of neuropathy are: · Numbness, tightness, and tingling. This usually starts in the fingers and toes. · Loss of feeling. · Burning, shooting, or stabbing pain in the legs, hands, and feet. Often the pain is worse at night. · Weakness and loss of balance. · A change in how sensitive you are to touch or temperature. You may sense them more or less than normal. 
· Bowel problems, such as constipation. Neuropathy from chemo usually builds slowly over a few months. You may have your worst symptoms right after a chemo treatment. Then they may improve a bit until the next treatment. After you finish chemo, your symptoms may improve or go away. This may take as much as a year or more. In some cases, some of the nerve damage may be permanent. How is neuropathy treated? For treating pain, there is no single treatment that works for everyone. You and your doctor may want to try different things. · Your doctor may recommend medicines. These include pain relievers, certain kinds of antidepressants such as amitriptyline, and certain kinds of antiseizure medicine such as gabapentin. · Be safe with medicines. Take your medicines exactly as prescribed. Call your doctor if you think you are having a problem with your medicine. · You may try therapies such as acupuncture, physical therapy, and transcutaneous electrical nerve stimulation (TENS). And you can try relaxation techniques such as deep breathing. Tell your doctor right away about any new or changing symptoms during chemo. Your doctor may be able to change your chemo treatment to help prevent nerve damage. What can you do at home? Avoiding injury Be careful to avoid injury. · When your feet or legs feel numb, it's easier to lose your balance and fall. Try to avoid walking on uneven surfaces. At home: 
? Keep floors and stairs clear of throw rugs and clutter. ? Install sturdy handrails on stairways. ? Put grab bars near your shower, bathtub, and toilet. ? If you need one, use a cane, walker, or wheelchair. · To avoid burning yourself: ? Use pot holders. ? Avoid hot water and steam when you cook. ? Always check water from a hot faucet or shower using a part of your body that can feel temperature normally, such as your elbow. · Check your feet, legs, hands, and arms every day for skin and nail problems that can get worse without your feeling them. If you need to, have someone else check for you. ? Look at all parts of your feet. Be sure to check your toes. ? If needed, use a handheld mirror to inspect your feet. Or you can attach a magnifying mirror to the bathroom wall near the baseboard. · Avoid walking with bare feet if they are affected. · Try wearing gloves to protect your hands when you work outside. Healthy lifestyle Eat a balanced diet. Also, avoid alcohol and smoking. They can make neuropathy worse. Follow-up care is a key part of your treatment and safety. Be sure to make and go to all appointments, and call your doctor if you are having problems. It's also a good idea to know your test results and keep a list of the medicines you take. Where can you learn more? Go to http://www.gray.com/ Enter O237 in the search box to learn more about \"Learning About Neuropathy From Chemotherapy. \" Current as of: April 29, 2020               Content Version: 12.6 © 8440-6112 [employer]. Care instructions adapted under license by [employer] (which disclaims liability or warranty for this information). If you have questions about a medical condition or this instruction, always ask your healthcare professional. [address] any warranty or liability for your use of this information.

## 2021-01-14 NOTE — PROGRESS NOTES
Palliative Medicine Office Visit  Palliative Medicine Nurse Check In  [phone]    Patient Name: Mary Ann Mendoza  YOB: 1955       Date of Office Visit:     Patient states: \"  \"    From Check In Sheet (scanned in Media):  Has a medical provider talked with you about cardiopulmonary resuscitation (CPR)? [] Yes   [] No   [] Unable to obtain    Nurse reminder to complete or update ACP FlowSheet:    Is ACP on the Problem List?    [] Yes    [] No  IF ACP Document is ON FILE; Nurse to place ACP on Problem List     Is there an ACP Note in Chart Review/Note? [] Yes    [] No   If NO: [address] Planning 12/17/2020   Patient's Healthcare Decision Maker is: Verbal statement (Legal Next of Kin remains as decision maker)   Confirm Advance Directive None   Patient Would Like to Complete Advance Directive No       Is there anything that we should know about you as a person in order to provide you the best care possible? Have you been to the ER, urgent care clinic since your last visit? [] Yes   [x] No   [] Unable to obtain    Have you been hospitalized since your last visit? [] Yes   [x] No   [] Unable to obtain    Have you seen or consulted any other health care providers outside of the [address] [place] [address] since your last visit?    [] Yes   [x] No   [] Unable to obtain    Functional status (describe):         Last BM:      accessed (date): 1/14/2021    Bottle review (for opioid pain medication):  Medication 1:   Date filled:   Directions:   # filled:   # left:   # pills taking per day:  Last dose taken:    Medication 2:   Date filled:   Directions:   # filled:   # left:   # pills taking per day:  Last dose taken:    Medication 3:   Date filled:   Directions:   # filled:   # left:   # pills taking per day:  Last dose taken:    Medication 4:   Date filled:   Directions:   # filled:   # left:   # pills taking per day:  Last dose taken:

## 2021-01-14 NOTE — PROGRESS NOTES
Palliative Medicine Outpatient Services  [place]: [phone] ([phone])    Patient Name: Ming Leung  YOB: 1955    Date of Current Visit: 01/14/21  Location of Current Visit:    [] [facility] Office  [x] [facility] Office  [] [facility] Office  [] Home  []Synchronous real-time A/V virtual visit    Date of Initial Visit: 11/4/2020   Referral from: Da Menendez MD  Primary Care Physician: Navjot De Leon MD      SUMMARY:   Ming Leung is a 76y.o. year old with a  history of metastatic prostate cancer to lymph nodes, who was referred to Palliative Medicine by Dr. Jose Robledo for symptom management and psychosocial support. He was initially diagnosed in 6/2020 and is currently being treated with Firmagon and prednisone. The patients social history includes: he is a retired  with a previous career as a [employer]. He is . He has a significant other, Nancy Mejía, who previously lived with him prior to moving out-of-state to care for family during the pandemic. He enjoys biking and hiking. Palliative Medicine is addressing the following current patient/family concerns: depression; anxiety; fatigue; chemotherapy-induced peripheral neuropathy; advance care planning. Initial Referral Intake note reviewed   PALLIATIVE DIAGNOSES:       ICD-10-CM ICD-9-CM    1. Reactive depression  F32.9 300.4    2. Anxiety  F41.9 300.00    3. Gait instability  R26.81 781.2    4. Peripheral neuropathy due to chemotherapy (HCC)  G62.0 357.7     T45. 1X5A E933.1    5. Fatigue, unspecified type  R53.83 780.79    6. Metastatic adenocarcinoma to prostate [facility])  C79.82 198.82           PLAN:   Patient Instructions     Dear Ming Leung ,    It was a pleasure seeing you today in [facility]. We will see you again on Thursday February 18th at 10:30am to coordinate with your return to see Dr. Jose Robledo.     If labs or imaging tests have been ordered for you today, please call the office  at [phone] 48 hours after completion to obtain the results. Your described symptoms were: Fatigue: 3 Drowsiness: 3   Depression: 3 Pain: 5(hands and fingers)   Anxiety: 4 Nausea: 0   Anorexia: 1 Dyspnea: 0   Best Well-Bein Constipation: No   Other Problem (Comment): 0       This is the plan we talked about:    1. Mood  -Continue sertraline to 50-mg daily    2. Balance problems  -I'm concerned about your falls  -referred you to the [address] [place] [address]     3. Chemotherapy-induced peripheral neuropathy  -We discussed different medications we could try (gabapentin, amitriptyline) to help with the sharp pain in your fingertips  -You're going to give this some time to see if it improves now that you're stopped chemotherapy    4. Fatigue  -This is chronic and unchanged    5. Metastatic prostate cancer  -You are currently being treated with Firmagon and prednisone under the care of Dr. Moisés Gaona. This is what you have shared with us about Advance Care Planning:      Primary Decision Maker: Emy Tena - [phone]  Advance Care Planning 2020   Patient's Healthcare Decision Maker is: Verbal statement (Legal Next of Kin remains as decision maker)   Confirm Advance Directive None   Patient Would Like to Complete Advance Directive No           The Palliative Medicine Team is here to support you and your family. Sincerely,      Adriana Yip MD and the Palliative Medicine Team     Care instructions adapted under license by [employer] (which disclaims liability or warranty for this information). If you have questions about a medical condition or this instruction, always ask your healthcare professional. [place] [address] any warranty or liability for your use of this information. Learning About Neuropathy From Chemotherapy  What is neuropathy? Chemotherapy, or chemo, can cause damage to the nerves.  This damage is called peripheral neuropathy (say \"qrk-QOGV-gz-rul noo-RAW-puaryan-theramón\"). It can affect the nerves that control your sense of touch, how you feel pain and temperature, and your muscle strength. What are the symptoms? Some common symptoms of neuropathy are:  · Numbness, tightness, and tingling. This usually starts in the fingers and toes. · Loss of feeling. · Burning, shooting, or stabbing pain in the legs, hands, and feet. Often the pain is worse at night. · Weakness and loss of balance. · A change in how sensitive you are to touch or temperature. You may sense them more or less than normal.  · Bowel problems, such as constipation. Neuropathy from chemo usually builds slowly over a few months. You may have your worst symptoms right after a chemo treatment. Then they may improve a bit until the next treatment. After you finish chemo, your symptoms may improve or go away. This may take as much as a year or more. In some cases, some of the nerve damage may be permanent. How is neuropathy treated? For treating pain, there is no single treatment that works for everyone. You and your doctor may want to try different things. · Your doctor may recommend medicines. These include pain relievers, certain kinds of antidepressants such as amitriptyline, and certain kinds of antiseizure medicine such as gabapentin. · Be safe with medicines. Take your medicines exactly as prescribed. Call your doctor if you think you are having a problem with your medicine. · You may try therapies such as acupuncture, physical therapy, and transcutaneous electrical nerve stimulation (TENS). And you can try relaxation techniques such as deep breathing. Tell your doctor right away about any new or changing symptoms during chemo. Your doctor may be able to change your chemo treatment to help prevent nerve damage. What can you do at home? Avoiding injury  Be careful to avoid injury. · When your feet or legs feel numb, it's easier to lose your balance and fall.  Try to avoid walking on uneven surfaces. At home:  ? Keep floors and stairs clear of throw rugs and clutter. ? Install sturdy handrails on stairways. ? Put grab bars near your shower, bathtub, and toilet. ? If you need one, use a cane, walker, or wheelchair. · To avoid burning yourself:  ? Use pot holders. ? Avoid hot water and steam when you cook. ? Always check water from a hot faucet or shower using a part of your body that can feel temperature normally, such as your elbow. · Check your feet, legs, hands, and arms every day for skin and nail problems that can get worse without your feeling them. If you need to, have someone else check for you. ? Look at all parts of your feet. Be sure to check your toes. ? If needed, use a handheld mirror to inspect your feet. Or you can attach a magnifying mirror to the bathroom wall near the baseboard. · Avoid walking with bare feet if they are affected. · Try wearing gloves to protect your hands when you work outside. Healthy lifestyle  Eat a balanced diet. Also, avoid alcohol and smoking. They can make neuropathy worse. Follow-up care is a key part of your treatment and safety. Be sure to make and go to all appointments, and call your doctor if you are having problems. It's also a good idea to know your test results and keep a list of the medicines you take. Where can you learn more? Go to http://www.gray.com/  Enter I262 in the search box to learn more about \"Learning About Neuropathy From Chemotherapy. \"  Current as of: April 29, 2020               Content Version: 12.6  © 2633-1495 [employer]. Care instructions adapted under license by [employer] (which disclaims liability or warranty for this information).  If you have questions about a medical condition or this instruction, always ask your healthcare professional. [address] any warranty or liability for your use of this information. GOALS OF CARE / TREATMENT PREFERENCES:   [====Goals of Care====]  GOALS OF CARE:  Patient / health care proxy stated goals: See Patient Instructions / Summary    TREATMENT PREFERENCES:   Code Status:  [x] Attempt Resuscitation       [] Do Not Attempt Resuscitation    Advance Care Planning:  [x] The Pall [facility] Interdisciplinary Team has updated the ACP Navigator with Decision Maker and Patient Capacity      Primary Decision MakerRonie Boone - [phone]  Advance Care Planning 12/17/2020   Patient's Healthcare Decision Maker is: Verbal statement (Legal Next of Kin remains as decision maker)   Confirm Advance Directive None   Patient Would Like to Complete Advance Directive No       Other:  (If patient appropriate for POST, consider using PALLPOST smart phrase here)    The palliative care team has discussed with patient / health care proxy about goals of care / treatment preferences for patient.  [====Goals of Care====]     PRESCRIPTIONS GIVEN:     No orders of the defined types were placed in this encounter. FOLLOW UP:     Future Appointments   Date Time Provider Ayala Kidd   2/1/2021 10:30 AM [facility] NM INJ RM 1 [facility]   2/1/2021 12:00 PM [facility] CT 1 SFMRCT [facility]   2/1/2021  1:30 PM [facility] NM RM 2 [facility]MRNM [facility]   2/18/2021  9:00 AM SS INF7 CH2 <1H [facility]   2/18/2021  9:15 AM Nico Leon NP [facility] BS AMB   2/18/2021 10:30 AM Marlene Pierre MD [facility] BS [facility]           PHYSICIANS INVOLVED IN CARE:   Patient Care Team:  Sarah Mendoza MD as PCP - General (Family Medicine)  Sarah Mendoza MD as PCP - [facility] Empaneled Provider  Daniel Muniz MD (Hematology and Oncology)  Akilah Vee MD (Palliative Medicine)  Akilah Vee MD as Physician (Palliative Medicine)       HISTORY:   Reviewed patient-completed ESAS and advance care planning form.   Reviewed patient record in prescription monitoring program.    CHIEF COMPLAINT: Chief Complaint   Patient presents with    Depression       HPI/SUBJECTIVE:    The patient is: [x] Verbal / [] Nonverbal     He's doing OK. He saw Dr. Juan F Horton and [place] last month and they decided to stop his chemo, his PSA is where they want it and he was having side effects (fatigue, peripheral neuropathy). His son came in from [place] to visit for the holidays. He, his son and his ex-wife had a family gathering which was pleasant. Katie Montalvo is still in [place] with family, he visited last month, usually rides the train up once a month. He's been unsteady on his feet. He's fallen a few times. He moved his bedroom downstairs so he won't have to climb the stairs as often. He's now using hiking poles to help him with balance. The sharp pain in his fingers is the same, maybe a little better in his left hand since he stopped chemo. His appetite is good. He's had no nausea or vomiting. He's moving his bowels regularly. From IV 11/4/2020:  He was diagnosed with prostate cancer last May. It was a shock, he didn't feel sick. He thought he had a hernia, then found out he has prostate cancer and that it had spread. Then his girlfriend, Katie Montalvo, moved out to go be with her family (out-of-state) to help with their care during the pandemic. He started chemo last summer. That's been going OK except for fatigue and now he has numbness in her fingertips. He sometimes drops things. He's not as active as he used to be. He likes to bike, he used to bike 20 miles a day. He also likes to hike. He doesn't have the energy to do much these day. He and Katie Montalvo used to do these things together. His appetite is good. He's gained ~20# since his cancer diagnosis (on steroids). This is discouraging as he had worked hard to lose weight. He has no pain. He's moving his bowels regularly.     Clinical Pain Assessment (nonverbal scale for nonverbal patients):   [++++ Clinical Pain Assessment++++]  [++++Pain Severity++++]: Pain: 5(hands and fingers)  [++++Pain Character++++]:  [++++Pain Duration++++]:  [++++Pain Effect++++]:  [++++Pain Factors++++]:  [++++Pain Frequency++++]:  [++++Pain Location++++]:  [++++ Clinical Pain Assessment++++]       FUNCTIONAL ASSESSMENT:     Palliative Performance Scale (PPS):  PPS: 70       PSYCHOSOCIAL/SPIRITUAL SCREENING:     Any spiritual / Islam concerns:  [] Yes /  [x] No    Caregiver Burnout:  [] Yes /  [] No /  [x] No Caregiver Present      Anticipatory grief assessment:   [x] Normal  / [] Maladaptive       ESAS Anxiety: Anxiety: 4    ESAS Depression: Depression: 3       REVIEW OF SYSTEMS:     The following systems were [x] reviewed / [] unable to be reviewed  Systems: constitutional, ears/nose/mouth/throat, respiratory, gastrointestinal, genitourinary, musculoskeletal, integumentary, neurologic, psychiatric, endocrine. Positive findings noted below. Modified ESAS Completed by: provider   Fatigue: 3 Drowsiness: 3   Depression: 3 Pain: 5(hands and fingers)   Anxiety: 4 Nausea: 0   Anorexia: 1 Dyspnea: 0   Best Well-Bein Constipation: No   Other Problem (Comment): 0          PHYSICAL EXAM:     Wt Readings from Last 3 Encounters:   21 209 lb 3.2 oz (94.9 kg)   20 213 lb 8 oz (96.8 kg)   20 213 lb 6.5 oz (96.8 kg)     Blood pressure 129/81, pulse 64, temperature 98.6 °F (37 °C), temperature source Oral, resp. rate 20, weight 209 lb 3.2 oz (94.9 kg), SpO2 99 %.   Last bowel movement: See Nursing Note    Constitutional: well-nourished, hiking poles at side  Eyes: pupils equal, anicteric  ENMT: no nasal discharge, moist mucous membranes  Cardiovascular: regular rhythm, no peripheral edema  Respiratory: breathing not labored, symmetric  Gastrointestinal: soft non-tender, +bowel sounds  Musculoskeletal: no deformity, no tenderness to palpation  Skin: warm, dry  Neurologic: following commands, moving all extremities  Psychiatric: full affect, no hallucinations  Other:       HISTORY:     No past medical history on file. Past Surgical History:   Procedure Laterality Date    HX APPENDECTOMY  1964    HX OTHER SURGICAL      subcutaneous cyst of forehead    HX VASECTOMY        Family History   Problem Relation Age of Onset    Cancer Mother         breast    Cancer Father         prostate    Cancer Brother         melanoma    COPD Brother       History reviewed, no pertinent family history. 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 Apply a thin layer to arms and legs twice daily. Use sparingly. Avoid face and sun.  sertraline (ZOLOFT) 50 mg tablet Take 1 Tab by mouth daily.  potassium chloride SR (K-TAB) 20 mEq tablet TAKE 1 TABLET BY MOUTH EVERY DAY    degarelix (Firmagon) 120 mg solr injection by SubCUTAneous route once.  dexAMETHasone (DECADRON) 4 mg tablet Take 8mg (2 tabs) twice daily the day BEFORE chemotherapy and take 8mg (2 tabs) twice daily the day AFTER chemotherapy    lidocaine-prilocaine (EMLA) topical cream Apply a dime size amount to port site 30-60 minutes before access on treatment days to numb port site.  dextroamphetamine-amphetamine (ADDERALL) 10 mg tablet TAKE 1 TABLET TWICE A DAY FOR 30 DAYS    acyclovir (ZOVIRAX) 400 mg tablet Take 400 mg by mouth two (2) times a day.  predniSONE (DELTASONE) 5 mg tablet Take 1 Tab by mouth daily.     glucose blood VI test strips (blood glucose test) strip Use to check blood sugars four times daily    lancets misc Use to check blood sugars four times daily    OneTouch Ultra2 Meter misc USE TO CHECK BLOOD SUGARS TWICE DAILY  ondansetron hcl (ZOFRAN) 8 mg tablet Take 1 Tab by mouth every eight (8) hours as needed for Nausea or Vomiting.  prochlorperazine (Compazine) 10 mg tablet Take 0.5 Tabs by mouth every six (6) hours as needed for Nausea or Vomiting. No current facility-administered medications for this visit. LAB DATA REVIEWED:     Lab Results   Component Value Date/Time    WBC 10.6 12/17/2020 09:45 AM    HGB 11.2 (L) 12/17/2020 09:45 AM    PLATELET 455 37/55/3587 09:45 AM     Lab Results   Component Value Date/Time    Sodium 138 12/17/2020 09:45 AM    Potassium 3.4 (L) 12/17/2020 09:45 AM    Chloride 102 12/17/2020 09:45 AM    CO2 30 12/17/2020 09:45 AM    BUN 16 12/17/2020 09:45 AM    Creatinine 1.35 (H) 12/17/2020 09:45 AM    Calcium 8.7 12/17/2020 09:45 AM      Lab Results   Component Value Date/Time    Alk.  phosphatase 89 12/17/2020 09:45 AM    Protein, total 6.8 12/17/2020 09:45 AM    Albumin 3.6 12/17/2020 09:45 AM    Globulin 3.2 12/17/2020 09:45 AM     No results found for: INR, PTMR, PTP, PT1, PT2, APTT, INREXT, INREXT   No results found for: IRON, FE, TIBC, IBCT, PSAT, FERR        CONTROLLED SUBSTANCES SAFETY ASSESSMENT (IF ON CONTROLLED SUBSTANCES):     Reviewed opioid safety handout:  [] Yes   [] No  24 hour opioid dose >150mg morphine equivalent/day:  [] Yes   [] No  Benzodiazepines:  [] Yes   [] No  Sleep apnea:  [] Yes   [] No  Urine Toxicology Testing within last 6 months:  [] Yes   [] No  History of or new aberrant medication taking behaviors:  [] Yes   [] No  Has Narcan been prescribed [] Yes   [] No          Total time:   Counseling / coordination time:   > 50% counseling / coordination?:

## 2021-01-14 NOTE — PROGRESS NOTES
Palliative Medicine  Nursing Note  [phone]  Fax [phone]      Telephone Call  Patient Name: Brooke Bonds  YOB: [birth year]        Primary Decision Maker: Bradley Martinez - [phone]   Advance Care Planning 12/17/2020   Patient's Healthcare Decision Maker is: Verbal statement (Legal Next of Kin remains as decision maker)   Confirm Advance Directive None   Patient Would Like to Complete Advance Directive No     Per Dr. Juan Carlos Sotelo, order placed for Physical Therapy with Harsh Quiroz at [facility] [place]. Call placed to PT office to inform of the above.     Kip Mcfarland RN  Palliative Medicine

## 2021-02-01 ENCOUNTER — HOSPITAL ENCOUNTER (OUTPATIENT)
Dept: NUCLEAR MEDICINE | Age: 66
Discharge: HOME | End: 2021-02-01
Attending: NURSE PRACTITIONER
Payer: MEDICARE

## 2021-02-01 ENCOUNTER — HOSPITAL ENCOUNTER (OUTPATIENT)
Dept: CT IMAGING | Age: 66
Discharge: HOME | End: 2021-02-01
Attending: NURSE PRACTITIONER
Payer: MEDICARE

## 2021-02-01 DIAGNOSIS — C61 ADENOCARCINOMA OF PROSTATE, STAGE 4 (HCC): ICD-10-CM

## 2021-02-01 PROCEDURE — 74177 CT ABD & PELVIS W/CONTRAST: CPT

## 2021-02-01 PROCEDURE — 74011000636 HC RX REV CODE- 636: Performed by: RADIOLOGY

## 2021-02-01 PROCEDURE — 78306 BONE IMAGING WHOLE BODY: CPT

## 2021-02-01 RX ORDER — HEPARIN 100 UNIT/ML
500 SYRINGE INTRAVENOUS
Status: DISCONTINUED | OUTPATIENT
Start: 2021-02-01 | End: 2021-02-02 | Stop reason: HOSPADM

## 2021-02-01 RX ORDER — HEPARIN 100 UNIT/ML
SYRINGE INTRAVENOUS
Status: DISCONTINUED
Start: 2021-02-01 | End: 2021-02-02 | Stop reason: HOSPADM

## 2021-02-01 RX ADMIN — IOPAMIDOL 100 ML: 755 INJECTION, SOLUTION INTRAVENOUS at 12:05

## 2021-02-12 NOTE — PROGRESS NOTES
[employer]  Medical Oncology at [facility]  [phone]    Hematology / Oncology Established Visit    Reason for Visit:   Sanford Niño is a 76 y.o. male who is seen for follow up of neuroendocrine carcinoma. Initially referred by Dr. Cyndy Jamil. 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 Flow cytometry negative for lymphoproliferative disorder. FoundationOne: MS Stable, TMB 0, MDM4 amplification, MYC amplification, TMPRSS2 TMPRSS2-ERD fusion, CEBPA 1311fs*10, ERBB4 amplification - equivocal, MCL1 amplification, RNG0P2Q amplification, RAD21 amplification. See scanned report for full info. Abbreviated [facility] Pathology Consultation:  Final diagnosis: Right external iliac node: Metastatic prostate adenocarcinoma. Comment: Histologic sections of the right external iliac node show sheets of cells with minimal eosinophilic cytoplasm and variable cytologic atypia. Some areas show acinar formation. The cells have predominantly round nuclei with vesicular chromatin and prominent nucleoli. Some areas show significantly more atypia with more irregular nuclear borders, hyperchromatic nyclei, and increase nuclear to cytoplasmic ratio. Frequent mitotic figures are identified. A provided pane of IHC stains is reviewed and demonstrates that the malignant cells show strong, diffuse expression of pan-cytokeratin and focal expression of TTF. CK7, CK20, chromogranin, and synaptophysin are negative in the lesional cells. CD3 and CD20 are negative in the lesions cells and positive in the background lymphocytes.  An abbreviated FoundationOne report was included, which included a TMPRSS2-ERG fusion (among other nonspecific abnormalities), which si found in approximately 50% of prostate cancers. Additional IHC studies performed at [facility] showed that the malignant cells have immunoreactivity with antibodies for PSA and PSAP, consistent with a diagnosis of metastatic prostate adenocarcinoma. Per report, flow cytometry negative for lymphoma. Overall, the histomorphology, immunophenotype and genomic findings in this case are diagnostic of metastatic prostate adenocarcinoma. The acinar formation and cytology were suggestive of this diagnosis, and the diffuse PSA and PSAP positivity as well as the TMPRSS2-ERG fusion confirmed the diagnosis. This fusion is the most common genomic alteration in prostate carcinoma and can be detected in over half of the cases, yet is not seen with any frequency in other types of carcinoma. Prior Treatment:   1. Carbo-Etoposide-Atezolizumab x 2 cycles, 6/29/20-7/20/20. 2. Docetaxel x 6 cycles, 8/10/20-11/24/20. Current Treatment: Lupron every 12 weeks    History of Present Illness:   Ming Leung is a 76 y.o. male who comes in for evaluation of poorly differentiated neuroendocrine carcinoma. Pt noticed a nontender bulge in his LLQ in 5/2020. He thought this was a hernia and was evaluated by Dr. Edgard Melendez. CT on 5/20/20 was notable for extensive lymphadenopathy in abd/pelvis. Pt underwent robot assisted excisional Right external iliac LN biopsy 5/29/20, which showed poorly differentiated large cell neuroendocrine carcinoma. He reports intentional weight loss with dietary changes and exercise, losing 40-lbs in past 8 months. No n/v/d, melena/hematochezia, cough, SOB. No fevers, chills, sweats. [employer] nonsmoker. Mother had breast cancer diagnosed age < 48. Twin brother had melanoma diagnosed aged late 46s. He does not think he has ever had a colonoscopy or PSA screening prior to current diagnosis. Interval History:  Patient here for follow up. Has been on break from chemotherapy with last dose received 11/24/20. Neuropathy is present in fingers and hands. Reports fingertips are almost completely numb. No neuropathy in feet. Still dropping objects. Reports dysequilibrium especially when turning to the right. He has required walking sticks to help stabilize gait. Fingernail beds are black. Reports intermittent fingernail throbbing. Reports it was the middle of the night and he thought he heard the doorbell and missed the last step, falling to the floor. No LOC. He did not injure himself or hit his head. Reports dry skin present on arms/legs. Reports blisters on legs at times. Good appetite and maintained weight since the last visit. Reports decreased hearing in his left ear. No vision changes. No diarrhea, constipation. No fevers or chills. No SOB or CP. He is trying to stay active biking and hiking. No past medical history on file. 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 Has 2 children, [place] and 32. 1 lives in [place]. Family History   Problem Relation Age of Onset    Cancer Mother         breast    Cancer Father         prostate    Cancer Brother         melanoma    COPD Brother      Current Outpatient Medications   Medication Sig    betamethasone dipropionate (DIPROSONE) 0.05 % topical cream Apply  to affected area two (2) times a day. Apply a thin layer to arms and legs twice daily. Use sparingly. Avoid face and sun.  sertraline (ZOLOFT) 50 mg tablet Take 1 Tab by mouth daily.  potassium chloride SR (K-TAB) 20 mEq tablet TAKE 1 TABLET BY MOUTH EVERY DAY    predniSONE (DELTASONE) 5 mg tablet Take 1 Tab by mouth daily.     glucose blood VI test strips (blood glucose test) strip Use to check blood sugars four times daily    lancets misc Use to check blood sugars four times daily    OneTouch Ultra2 Meter misc USE TO CHECK BLOOD SUGARS TWICE DAILY    degarelix (Firmagon) 120 mg solr injection by SubCUTAneous route once.  dexAMETHasone (DECADRON) 4 mg tablet Take 8mg (2 tabs) twice daily the day BEFORE chemotherapy and take 8mg (2 tabs) twice daily the day AFTER chemotherapy    ondansetron hcl (ZOFRAN) 8 mg tablet Take 1 Tab by mouth every eight (8) hours as needed for Nausea or Vomiting.  lidocaine-prilocaine (EMLA) topical cream Apply a dime size amount to port site 30-60 minutes before access on treatment days to numb port site.  prochlorperazine (Compazine) 10 mg tablet Take 0.5 Tabs by mouth every six (6) hours as needed for Nausea or Vomiting.  dextroamphetamine-amphetamine (ADDERALL) 10 mg tablet TAKE 1 TABLET TWICE A DAY FOR 30 DAYS    acyclovir (ZOVIRAX) 400 mg tablet Take 400 mg by mouth two (2) times a day. No current facility-administered medications for this visit. No Known Allergies     Review of Systems: A complete review of systems was obtained, negative except as described above. Physical Exam:     Visit Vitals  BP (!) 169/113   Pulse 69   Temp 97.6 °F (36.4 °C)   Resp 18   Ht 5' 10\" (1.778 m)   Wt 212 lb 11.9 oz (96.5 kg)   SpO2 99%   BMI 30.53 kg/m²     ECOG PS: 0  General: Well developed, no acute distress  Eyes: PERRLA, EOMI, anicteric sclerae  HENT: Atraumatic, OP clear, TMs intact without erythema  Neck: Supple, Left supraclavicular LN no longer palpable. Lymphatic: No cervical, supraclavicular, axillary or inguinal adenopathy  Respiratory: CTAB, normal respiratory effort  CV: Normal rate, regular rhythm, no murmurs, trace edema present bilateral lower extremities  GI: firm, nontender, mild distention, no hepatomegaly, no splenomegaly. Prior L lower abdomen 5cm mass no longer palpable.    MS: Normal gait and station. Digits without clubbing or cyanosis. Skin: Dry skin present on arms, legs, trunk. Fingernail beds are dark. No ecchymoses, or petechiae. Normal temperature, turgor, and texture. Neuro/Psych: Alert, oriented. 5/5 strength in all 4 extremities. Appropriate affect, normal judgment/insight. Results:     Lab Results   Component Value Date/Time    WBC 8.5 2021 10:29 AM    HGB 12.6 2021 10:29 AM    HCT 36.5 (L) 2021 10:29 AM    PLATELET 240  10:29 AM    MCV 88.0 2021 10:29 AM    ABS. NEUTROPHILS 6.1 2021 10:29 AM     Lab Results   Component Value Date/Time    Sodium 138 2020 09:45 AM    Potassium 3.4 (L) 2020 09:45 AM    Chloride 102 2020 09:45 AM    CO2 30 2020 09:45 AM    Glucose 105 (H) 2020 09:45 AM    BUN 16 2020 09:45 AM    Creatinine 1.35 (H) 2020 09:45 AM    GFR est AA >60 2020 09:45 AM    GFR est non-AA 53 (L) 2020 09:45 AM    Calcium 8.7 2020 09:45 AM    Creatinine (POC) 1.3 10/29/2020 11:59 AM     Lab Results   Component Value Date/Time    Bilirubin, total 0.5 2020 09:45 AM    ALT (SGPT) 68 2020 09:45 AM    Alk.  phosphatase 89 2020 09:45 AM    Protein, total 6.8 2020 09:45 AM    Albumin 3.6 2020 09:45 AM    Globulin 3.2 2020 09:45 AM     No results found for: IRON, FE, TIBC, IBCT, PSAT, FERR    No results found for: B12LT, FOL, RBCF  Lab Results   Component Value Date/Time    TSH 1.72 2020 09:58 AM     No results found for: HAMAT, HAAB, HABT, HAAT, HBSAG, HBSB, HBSAT, HBABN, HBCM, HBCAB, HBCAT, XBCABS, HBEAB, HBEAG, XHEPCS, 329309, HBEGLT, [place], HBCLT, HBEBLT, [id number], [id number], HAVMLT, [id number], HBCMLT, [id number], HCGAT     20: Chromogranin 42    20: PSA 38.6  20: PSA 13.2   8/10/20: PSA 1.2  20: PSA 0.5  20: PSA 0.4  10/15/20: PSA 0.3  20: PSA 0.3  20: 0.2  21: PSA PENDING     Imagin/20/20 CT abd/pelvis with IV contrast:  Impression:  1. No findings to suggest gross abdominal wall hernia or flank hernia. 2. Extensive adenopathy throughout the abdomen and pelvis as described. Findings are concerning for possible metastatic disease or lymphoma. Recommend follow up or comparison with prior studies. 3. Other findings as described. 6/18/20 PET:  FINDINGS:  HEAD/NECK: No apparent foci of abnormal hypermetabolism. Cerebral evaluation is  limited by normal intense activity. CHEST: Hypermetabolic lymphadenopathy at the base of the left neck and in the  left supraclavicular region is noted. Hypermetabolic superior mediastinal,  prevascular, right paratracheal, subcarinal, and bilateral hilar lymph  lymphadenopathy, maximum SUV of the subcarinal lymph node is 5.7. Small but  hypermetabolic soft tissue nodule left anterior chest wall. ABDOMEN/PELVIS: Hypermetabolic retrocrural, left para-aortic, aortocaval,  bilateral common iliac, bilateral external iliac, and bilateral inguinal  lymphadenopathy, maximum SUV 5.8 of an aortocaval lymph node. Hypermetabolic  mesenteric lymph node left lower quadrant, maximum SUV 12.3. SKELETON: No foci of abnormal hypermetabolism in the axial and visualized  appendicular skeleton. IMPRESSION: Hypermetabolic lymphadenopathy involving the left neck and left  supraclavicular region, mediastinum, bilateral hilum, retroperitoneum,  mesentery, and pelvis as described above. Hypermetabolic soft tissue nodule left  chest.    Brain MRI 6/27/20: IMPRESSION:  There is no evidence of intracranial metastatic disease. Mild chronic microvascular ischemic change. No intracranial mass, hemorrhage or evidence of acute infarction. CT c/a/p 8/3/20: IMPRESSION:  Chest:  1. Findings consistent with partial treatment response, with interval decrease  in size of mediastinal and hilar lymph nodes, and interval decrease in size of  left chest wall nodule. Abdomen/pelvis:   1.  Findings consistent with partial treatment response, with interval decrease  in size of retroperitoneal lymph nodes. 2. Unchanged circumferential bladder wall thickening, which may represent acute  or chronic cystitis. Correlate with urinalysis. RECIST   TARGET LESIONS:      Lesion (description)         Location (series/slice)                Size            1. Posterior mediastinal lymph node    series 2 image 26    2.3 x 2.0 cm, previously 3.3 x 2.2 cm  2. Left upper paraesophageal lymph node    series 2 image 9    5 mm, previously 15 mm. 3. Right external iliac lymphadenopathy    series 2 image 98    3.8 x 3.2 cm, previously 6.3 x 4.7 cm  4. Left external iliac lymphadenopathy    series 2 image 99    1.6 cm, previously 2.5 cm    9/1/20 Bone Scan:  FINDINGS: There is physiologic uptake of radiotracer in the skeleton and soft tissues. There is no evidence of fracture, osteomyelitis or bone tumor. There is no pattern of skeletal metastases. IMPRESSION: Normal Whole Body Nuclear Bone Scan.    10/29/20 CT c/a/p:  Lesion (description):     Location (series/slice):  Size   1. Posterior mediastinal lymph node   2/27            1.1 x 1.4 cm, previously 2.3 x 2.0 cm (2/26)  2. Left upper paraesophageal lymph node  2/9           2 mm, previously 5 mm (2/9)   3. Right external iliac lymphadenopathy 2/21                3.8 x 3.2 cm (2/98)  4. Left external iliac lymphadenopathy  2/99              11 mm, previously 16 mm (2/99)   IMPRESSION:  1. Findings consistent with treatment response, with interval decrease in size  of mediastinal, retroperitoneal, and pelvic lymph nodes and no CT evidence of  new metastatic disease within the thorax, abdomen, or pelvis. 2. Unchanged circumferential bladder wall thickening, which may represent acute  or chronic cystitis.      2/1/21 CT c/a/p:  RECIST   TARGET LESIONS:  Lesion (description)         Location (series/slice)                Size      1.  Left upper esophageal lymph node    series 2 image 10    1 mm previously measuring 2 mm  2. Azygos esophageal lymph node    2, 30    7 x 7 mm previously measuring 11 x 14 mm  3. Right external iliac lymph node    2, 102    27 x 23 mm previously 27 x 31 mm  4. Left external iliac lymph node    2, 102    8 mm previously 11 mm  NONTARGET LESIONS:  None  IMPRESSION  1. Further decrease size of adenopathy as described. 2.  No evidence of new metastatic disease. 2/1/21 Bone scan:  Normal whole-body nuclear bone scan. Assessment & Plan:   Gloria Michele is a 76 y.o. 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 1. Metastatic adenocarcinoma of prostate, high grade: Initial pathologist at [address] [place] [address] called this \"Large cell neuroendocrine carcinoma,\" and based on diffuse disease on PET, patient was started on treatment with Carbo-Etoposide-Atezolizumab (thinking this was large cell carcinoma of the lung.) However FoundationOne testing identified TMPRSS2-ERG fusion, which is primarily seen in prostate cancers. This along with elevated PSA of 38 prompted sending of pathology specimen to [facility] for review. Their opinion and IHC staining is consistent with high grade adenocarcinoma of prostate. Treatment options now include: Carboplatin + Cabazitaxel vs single agent Docetaxel. Pt signed consent for Docetaxel. CT on 2/1/21 showed good response to treatment. Bone scan 2/1/21 negative. Supportive medications: EMLA cream, zofran, compazine, dexamethasone  -- Lupron (3 month depot) #1 given 8/31/20. #2 on 11/24/20, #3 given 2/18/20. Pt forgot about the injection given at our office and he received a 1 month Lupron injection at [facility] on 9/28/20. Discussed with Dr. Deno Millard and Dr. Alfredo Carvalho of [facility] that pt wants to get ADT here rather than at [facility] to consolidate appointments. -- Will now continue ADT alone, monitor PSA and scans periodically. Would consider starting Zytiga at time of progression on scans or symptomatic PSA progression.    -- Return in 6 weeks for CBC, CMP, PSA, testosterone and MD/NP visit  -- Plan on next CT and bone scan at 12 weeks from last scan, approx 5/6/21  -- Return again in 12 weeks for labs/portflush, Lupron, review CT scan, MD/NP visit. 2. H/o Hyperglycemia: Was due to due to dexamethasone. Manjit Robles assisting with mgmt of steroid induced hyperglycemia. BG runs 118-140. 3. 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 Difficulty with buttons and zippers. He is dropping objects more often. 4. Bladder wall thickening: Seen on CT scan, which may represent acute or chronic cystitis. UA negative. Forwarded CT scan to urology. 2/1/21 CT normal.      6. H/o Genital Herpes: On suppression with Acyclovir. 7. Rash: 2/2 to docetaxel. Rash present on arms, legs and trunk. . Betamethasone 0.05% cream to affected areas bid PRN. Add Ceravu lotion. 8. Renal insufficiency: Likely 2/2 to decreased PO intake. Encouraged better oral hydration. 9. Depression/anxiety: 2/2 to #1. I am also concerned that extra firmagon dose could have caused emotional lability. Following with Palliative. On Zoloft 50mg/d. 10. Hypertension: BP elevated on current and last visit. Had previously recommended lifestyle modification, but now starting Amlodipine 5mg/d. 11. F/E/N/GI: Encouraged potassium rich foods. Potassium 20meq daily. 12. 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 Reduced hearing in right ear. -- Referral to ENT. -- Brain MRI  -- Home health for PT/OT eval     Emotional well being: Pt is coping well with his/her disease and has excellent support. Significant other: Rod Nathan - [phone]. I appreciate the opportunity to participate in Mr. Gauri atbor. I have personally seen and evaluated the patient in conjunction with Magy Mercado NP.  I find the patient's history and physical exam are consistent with the NP's documentation. I agree with the above assessment and plan, which I have edited if needed. I recommend that you get the vaccine. It is important to know that patients with cancer who are immune suppressed were not included in the vaccine trials. The benefit of the vaccine, as well as the potential side effects, are unknown in patients such as yourself. However, we believe the potential benefit of preventing a fatal COVID19 infection outweighs the uncertain risks. The [place] CDC recommendations state:  Immunocompromised individuals may still receive COVID-19 vaccination if they have no contraindications to vaccination. However, they should be counseled about the unknown vaccine safety profile and effectiveness in immunocompromised populations, as well as the potential for reduced immune responses and the need to continue to follow all current guidance to protect themselves against COVID-19.     For more information, you can read the American Society of Clinical Oncology recommendations here:  PeakCam.ca      You can register with the [employer] [facility] to receive the vaccine here:  [email or web address]://[email or web address].preregister. [place].gov      [facility] will notify patients via [employer] when they have vaccine available. You may also be able to receive the vaccine from other locations, such as pharmacies or other hospital systems.     Signed By: Bryan Patel MD     February 18, 2021

## 2021-02-17 RX ORDER — HEPARIN 100 UNIT/ML
500 SYRINGE INTRAVENOUS AS NEEDED
Status: CANCELLED | OUTPATIENT
Start: 2021-04-01

## 2021-02-17 RX ORDER — SODIUM CHLORIDE 9 MG/ML
10 INJECTION INTRAMUSCULAR; INTRAVENOUS; SUBCUTANEOUS AS NEEDED
Status: CANCELLED | OUTPATIENT
Start: 2021-04-01

## 2021-02-17 RX ORDER — SODIUM CHLORIDE 0.9 % (FLUSH) 0.9 %
5-10 SYRINGE (ML) INJECTION AS NEEDED
Status: CANCELLED | OUTPATIENT
Start: 2021-04-01

## 2021-02-18 ENCOUNTER — TELEPHONE (OUTPATIENT)
Dept: ONCOLOGY | Age: 66
End: 2021-02-18

## 2021-02-18 ENCOUNTER — OFFICE VISIT (OUTPATIENT)
Dept: ONCOLOGY | Age: 66
End: 2021-02-18
Payer: MEDICARE

## 2021-02-18 ENCOUNTER — HOSPITAL ENCOUNTER (OUTPATIENT)
Dept: INFUSION THERAPY | Age: 66
Discharge: HOME | End: 2021-02-18
Payer: MEDICARE

## 2021-02-18 ENCOUNTER — OFFICE VISIT (OUTPATIENT)
Dept: PALLATIVE CARE | Age: 66
End: 2021-02-18
Payer: MEDICARE

## 2021-02-18 VITALS
BODY MASS INDEX: 30.46 KG/M2 | TEMPERATURE: 97.6 F | HEART RATE: 69 BPM | DIASTOLIC BLOOD PRESSURE: 113 MMHG | OXYGEN SATURATION: 99 % | WEIGHT: 212.74 LBS | RESPIRATION RATE: 18 BRPM | SYSTOLIC BLOOD PRESSURE: 169 MMHG | HEIGHT: 70 IN

## 2021-02-18 VITALS
DIASTOLIC BLOOD PRESSURE: 113 MMHG | BODY MASS INDEX: 30.45 KG/M2 | TEMPERATURE: 97.6 F | SYSTOLIC BLOOD PRESSURE: 169 MMHG | HEART RATE: 69 BPM | WEIGHT: 212.7 LBS | HEIGHT: 70 IN | RESPIRATION RATE: 18 BRPM | OXYGEN SATURATION: 99 %

## 2021-02-18 VITALS
WEIGHT: 212 LBS | HEART RATE: 70 BPM | DIASTOLIC BLOOD PRESSURE: 104 MMHG | RESPIRATION RATE: 18 BRPM | OXYGEN SATURATION: 92 % | BODY MASS INDEX: 30.42 KG/M2 | SYSTOLIC BLOOD PRESSURE: 183 MMHG

## 2021-02-18 DIAGNOSIS — I10 ESSENTIAL HYPERTENSION: ICD-10-CM

## 2021-02-18 DIAGNOSIS — T45.1X5A PERIPHERAL NEUROPATHY DUE TO CHEMOTHERAPY (HCC): ICD-10-CM

## 2021-02-18 DIAGNOSIS — G62.0 PERIPHERAL NEUROPATHY DUE TO CHEMOTHERAPY (HCC): ICD-10-CM

## 2021-02-18 DIAGNOSIS — F32.9 REACTIVE DEPRESSION: Primary | ICD-10-CM

## 2021-02-18 DIAGNOSIS — C79.82 METASTATIC ADENOCARCINOMA TO PROSTATE (HCC): Primary | ICD-10-CM

## 2021-02-18 DIAGNOSIS — Z71.85 VACCINE COUNSELING: ICD-10-CM

## 2021-02-18 DIAGNOSIS — R42 DYSEQUILIBRIUM: ICD-10-CM

## 2021-02-18 DIAGNOSIS — B35.1 FUNGAL INFECTION OF NAIL: ICD-10-CM

## 2021-02-18 DIAGNOSIS — R26.81 GAIT INSTABILITY: ICD-10-CM

## 2021-02-18 DIAGNOSIS — H91.91 DECREASED HEARING, RIGHT: ICD-10-CM

## 2021-02-18 DIAGNOSIS — C79.82 METASTATIC ADENOCARCINOMA TO PROSTATE (HCC): ICD-10-CM

## 2021-02-18 DIAGNOSIS — Z79.818 ENCOUNTER FOR MONITORING ANDROGEN DEPRIVATION THERAPY: ICD-10-CM

## 2021-02-18 DIAGNOSIS — C61 ADENOCARCINOMA OF PROSTATE, STAGE 4 (HCC): Primary | ICD-10-CM

## 2021-02-18 DIAGNOSIS — R53.83 FATIGUE, UNSPECIFIED TYPE: ICD-10-CM

## 2021-02-18 DIAGNOSIS — F41.9 ANXIETY: ICD-10-CM

## 2021-02-18 LAB
ALBUMIN SERPL-MCNC: 4.1 G/DL (ref 3.5–5)
ALBUMIN/GLOB SERPL: 1.1 {RATIO} (ref 1.1–2.2)
ALP SERPL-CCNC: 103 U/L (ref 45–117)
ALT SERPL-CCNC: 64 U/L (ref 12–78)
ANION GAP SERPL CALC-SCNC: 7 MMOL/L (ref 5–15)
AST SERPL-CCNC: 140 U/L (ref 15–37)
BASO+EOS+MONOS # BLD AUTO: 0.6 K/UL (ref 0.2–1.2)
BASO+EOS+MONOS NFR BLD AUTO: 7 % (ref 3.2–16.9)
BILIRUB SERPL-MCNC: 0.5 MG/DL (ref 0.2–1)
BUN SERPL-MCNC: 19 MG/DL (ref 6–20)
BUN/CREAT SERPL: 14 (ref 12–20)
CALCIUM SERPL-MCNC: 9.1 MG/DL (ref 8.5–10.1)
CHLORIDE SERPL-SCNC: 100 MMOL/L (ref 97–108)
CO2 SERPL-SCNC: 27 MMOL/L (ref 21–32)
CREAT SERPL-MCNC: 1.38 MG/DL (ref 0.7–1.3)
DIFFERENTIAL METHOD BLD: ABNORMAL
ERYTHROCYTE [DISTWIDTH] IN BLOOD BY AUTOMATED COUNT: 16.1 % (ref 11.8–15.8)
GLOBULIN SER CALC-MCNC: 3.7 G/DL (ref 2–4)
GLUCOSE SERPL-MCNC: 121 MG/DL (ref 65–100)
HCT VFR BLD AUTO: 36.5 % (ref 36.6–50.3)
HGB BLD-MCNC: 12.6 G/DL (ref 12.1–17)
LYMPHOCYTES # BLD: 1.8 K/UL (ref 0.8–3.5)
LYMPHOCYTES NFR BLD: 21 % (ref 12–49)
MCH RBC QN AUTO: 30.4 PG (ref 26–34)
MCHC RBC AUTO-ENTMCNC: 34.5 G/DL (ref 30–36.5)
MCV RBC AUTO: 88 FL (ref 80–99)
NEUTS SEG # BLD: 6.1 K/UL (ref 1.8–8)
NEUTS SEG NFR BLD: 72 % (ref 32–75)
PLATELET # BLD AUTO: 198 K/UL (ref 150–400)
POTASSIUM SERPL-SCNC: 3.4 MMOL/L (ref 3.5–5.1)
PROT SERPL-MCNC: 7.8 G/DL (ref 6.4–8.2)
PSA SERPL-MCNC: 0.1 NG/ML (ref 0.01–4)
RBC # BLD AUTO: 4.15 M/UL (ref 4.1–5.7)
SODIUM SERPL-SCNC: 134 MMOL/L (ref 136–145)
WBC # BLD AUTO: 8.5 K/UL (ref 4.1–11.1)

## 2021-02-18 PROCEDURE — 85025 COMPLETE CBC W/AUTO DIFF WBC: CPT

## 2021-02-18 PROCEDURE — 80053 COMPREHEN METABOLIC PANEL: CPT

## 2021-02-18 PROCEDURE — G8755 DIAS BP > OR = 90: HCPCS | Performed by: INTERNAL MEDICINE

## 2021-02-18 PROCEDURE — 99214 OFFICE O/P EST MOD 30 MIN: CPT | Performed by: INTERNAL MEDICINE

## 2021-02-18 PROCEDURE — G9717 DOC PT DX DEP/BP F/U NT REQ: HCPCS | Performed by: INTERNAL MEDICINE

## 2021-02-18 PROCEDURE — G8417 CALC BMI ABV UP PARAM F/U: HCPCS | Performed by: INTERNAL MEDICINE

## 2021-02-18 PROCEDURE — G8427 DOCREV CUR MEDS BY ELIG CLIN: HCPCS | Performed by: INTERNAL MEDICINE

## 2021-02-18 PROCEDURE — G8753 SYS BP > OR = 140: HCPCS | Performed by: INTERNAL MEDICINE

## 2021-02-18 PROCEDURE — 3017F COLORECTAL CA SCREEN DOC REV: CPT | Performed by: INTERNAL MEDICINE

## 2021-02-18 PROCEDURE — 99215 OFFICE O/P EST HI 40 MIN: CPT | Performed by: INTERNAL MEDICINE

## 2021-02-18 PROCEDURE — G0463 HOSPITAL OUTPT CLINIC VISIT: HCPCS | Performed by: NURSE PRACTITIONER

## 2021-02-18 PROCEDURE — 1101F PT FALLS ASSESS-DOCD LE1/YR: CPT | Performed by: INTERNAL MEDICINE

## 2021-02-18 PROCEDURE — 96402 CHEMO HORMON ANTINEOPL SQ/IM: CPT

## 2021-02-18 PROCEDURE — 36415 COLL VENOUS BLD VENIPUNCTURE: CPT

## 2021-02-18 PROCEDURE — G8536 NO DOC ELDER MAL SCRN: HCPCS | Performed by: INTERNAL MEDICINE

## 2021-02-18 PROCEDURE — 84153 ASSAY OF PSA TOTAL: CPT

## 2021-02-18 PROCEDURE — 74011250636 HC RX REV CODE- 250/636: Performed by: NURSE PRACTITIONER

## 2021-02-18 RX ORDER — AMLODIPINE BESYLATE 5 MG/1
5 TABLET ORAL DAILY
Qty: 30 TAB | Refills: 1 | Status: SHIPPED | OUTPATIENT
Start: 2021-02-18 | End: 2021-03-12

## 2021-02-18 RX ADMIN — LEUPROLIDE ACETATE 22.5 MG: KIT at 10:32

## 2021-02-18 NOTE — PROGRESS NOTES
Palliative Medicine Outpatient Services  [place]: [phone] ([phone])    Patient Name: Mary Alcantara  YOB: 1955    Date of Current Visit: 2/18/21  Location of Current Visit:    [] [facility] Office  [x] [facility] Office  [] [id number] [facility] Office  [] Home  []Synchronous real-time A/V virtual visit    Date of Initial Visit: 11/4/2020   Referral from: Sahra Godwin MD  Primary Care Physician: Tucker Arnold MD      SUMMARY:   Mary Alcantara is a 76y.o. year old with a  history of metastatic prostate cancer to lymph nodes, who was referred to Palliative Medicine by Dr. Kwame Oliveira for symptom management and psychosocial support. He was initially diagnosed in 6/2020 and is currently being treated with Firmagon and prednisone. The patients social history includes: he is a retired  with a previous career as a [employer]. He is . He has a significant other, Nuria Coronado, who previously lived with him prior to moving out-of-state to care for family during the pandemic. He enjoys biking and hiking. Palliative Medicine is addressing the following current patient/family concerns: depression; anxiety; fatigue; chemotherapy-induced peripheral neuropathy; advance care planning. Initial Referral Intake note reviewed   PALLIATIVE DIAGNOSES:       ICD-10-CM ICD-9-CM    1. Reactive depression  F32.9 300.4    2. Anxiety  F41.9 300.00    3. Gait instability  R26.81 781.2    4. Peripheral neuropathy due to chemotherapy (HCC)  G62.0 357.7     T45. 1X5A E933.1    5. Fatigue, unspecified type  R53.83 780.79    6. Metastatic adenocarcinoma to prostate [facility])  C79.82 198.82           PLAN:   Patient Instructions     Dear Mary Alcantara ,    It was a pleasure seeing you today in [facility].     We will see you again in 6 weeks to coordinate with your return to see Dr. Kwame Oliveira    If labs or imaging tests have been ordered for you today, please call the office  at [phone] 48 hours after completion to obtain the results. Your described symptoms were: Fatigue: 5 Drowsiness: 3   Depression: 3 Pain: 1   Anxiety: 4 Nausea: 0   Anorexia: 0 Dyspnea: 0   Best Well-Being: 3 Constipation: No   Other Problem (Comment): 0       This is the plan we talked about:    1. Mood  -Continue sertraline to 50-mg daily  -You've been working on changing your perspective about not being able to bike or hike again  -You shared that you're being fit when you started cancer treatment allowed you to do as well as you have  -You're also pleased that you can continue treatment with Lupron alone    2. Balance problems  -You've been trying to exercise more to improve your equilibrium  -You continue to use your walking sticks most of the time  -You haven't had any falls recently  -I will follow up with your referral to see Bailey Barriga PT at the [facility]    3. Chemotherapy-induced peripheral neuropathy  -We discussed different medications we could try (gabapentin, amitriptyline) to help with the sharp pain in your fingertips  -You're chosen to defer taking medications for this now    4. Fatigue  -This is chronic and unchanged    5. Metastatic prostate cancer  -You're currently being treated with Lupron alone    This is what you have shared with us about Advance Care Planning:      Primary Decision Maker: Kinsey Alvarez - [phone]  Advance Care Planning 2/18/2021   Patient's Healthcare Decision Maker is: Verbal statement (Legal Next of Kin remains as decision maker)   Confirm Advance Directive None   Patient Would Like to Complete Advance Directive -           The Palliative Medicine Team is here to support you and your family.        Sincerely,      Andrea Wagner MD and the Palliative Medicine Team                 GOALS OF CARE / TREATMENT PREFERENCES:   [====Goals of Care====]  GOALS OF CARE:  Patient / health care proxy stated goals: See Patient Instructions / Summary    TREATMENT PREFERENCES: Code Status:  [x] Attempt Resuscitation       [] Do Not Attempt Resuscitation    Advance Care Planning:  [x] The Pall Med Interdisciplinary Team has updated the ACP Navigator with Decision Maker and Patient Capacity      Primary Decision Maker: Trevon Moreno - [phone]  Advance Care Planning 2/18/2021   Patient's Healthcare Decision Maker is: Verbal statement (Legal Next of Kin remains as decision maker)   Confirm Advance Directive None   Patient Would Like to Complete Advance Directive -       Other:  (If patient appropriate for POST, consider using PALLPOST smart phrase here)    The palliative care team has discussed with patient / health care proxy about goals of care / treatment preferences for patient.  [====Goals of Care====]     PRESCRIPTIONS GIVEN:     No orders of the defined types were placed in this encounter. FOLLOW UP:     Future Appointments   Date Time Provider Ayala Kidd   2/20/2021  9:00 AM [facility] 1 [facility]   5/13/2021  9:00 AM SS INF7 CH2 <1H [facility]   8/5/2021  9:00 AM SS INF7 CH2 <1H [facility]           PHYSICIANS INVOLVED IN CARE:   Patient Care Team:  Shazia Woody MD as PCP - General (Family Medicine)  Shazia Woody MD as PCP - [facility] Provider  Mylene Diaz MD (Hematology and Oncology)  Bassem Gaytan MD (Palliative Medicine)  Bassem Gaytan MD as Physician (Palliative Medicine)       HISTORY:   Reviewed patient-completed ESAS and advance care planning form. Reviewed patient record in prescription monitoring program.    CHIEF COMPLAINT:   Chief Complaint   Patient presents with    Depression       HPI/SUBJECTIVE:    The patient is: [x] Verbal / [] Nonverbal     He feels pretty good. He's been thinking about things differently, trying to change his perspective about not being able to bike and hike anymore.   Now he thinks that his activity in the past helped him get fit which is why he's done so well with his prostate cancer therapy. He saw Dr. Uvaldo Curiel today. His numbers look good so he's going to continue with Lupron, no chemotherapy. He's happy about that, the side effects were tough. He's been trying to walk more to help with his gait problems. He's still using his hiking sticks for balance. Dr. Uvaldo Curiel ordered a MRI of his brain and referred him to ENT. He's sleeping well at night. His appetite is good. His weight hasn't changed. From IV 11/4/2020:  He was diagnosed with prostate cancer last May. It was a shock, he didn't feel sick. He thought he had a hernia, then found out he has prostate cancer and that it had spread. Then his girlfriend, Imani Brian, moved out to go be with her family (out-of-state) to help with their care during the pandemic. He started chemo last summer. That's been going OK except for fatigue and now he has numbness in her fingertips. He sometimes drops things. He's not as active as he used to be. He likes to bike, he used to bike 20 miles a day. He also likes to hike. He doesn't have the energy to do much these day. He and Imani Brian used to do these things together. His appetite is good. He's gained ~20# since his cancer diagnosis (on steroids). This is discouraging as he had worked hard to lose weight. He has no pain. He's moving his bowels regularly.     Clinical Pain Assessment (nonverbal scale for nonverbal patients):   [++++ Clinical Pain Assessment++++]  [++++Pain Severity++++]: Pain: 1  [++++Pain Character++++]:  [++++Pain Duration++++]:  [++++Pain Effect++++]:  [++++Pain Factors++++]:  [++++Pain Frequency++++]:  [++++Pain Location++++]:  [++++ Clinical Pain Assessment++++]       FUNCTIONAL ASSESSMENT:     Palliative Performance Scale (PPS):  PPS: 70       PSYCHOSOCIAL/SPIRITUAL SCREENING:     Any spiritual / Baptism concerns:  [] Yes /  [x] No    Caregiver Burnout:  [] Yes /  [] No /  [x] No Caregiver Present      Anticipatory grief assessment:   [x] Normal  / [] Maladaptive       ESAS Anxiety: Anxiety: 4    ESAS Depression: Depression: 3       REVIEW OF SYSTEMS:     The following systems were [x] reviewed / [] unable to be reviewed  Systems: constitutional, ears/nose/mouth/throat, respiratory, gastrointestinal, genitourinary, musculoskeletal, integumentary, neurologic, psychiatric, endocrine. Positive findings noted below. Modified ESAS Completed by: provider   Fatigue: 5 Drowsiness: 3   Depression: 3 Pain: 1   Anxiety: 4 Nausea: 0   Anorexia: 0 Dyspnea: 0   Best Well-Being: 3 Constipation: No   Other Problem (Comment): 0          PHYSICAL EXAM:     Wt Readings from Last 3 Encounters:   02/18/21 212 lb (96.2 kg)   02/18/21 212 lb 11.9 oz (96.5 kg)   02/18/21 212 lb 11.2 oz (96.5 kg)     Blood pressure (!) 183/104, pulse 70, resp. rate 18, weight 212 lb (96.2 kg), SpO2 92 %. Last bowel movement: See Nursing Note    Constitutional: well-nourished, hiking poles at side  Eyes: pupils equal, anicteric  ENMT: no nasal discharge, moist mucous membranes  Cardiovascular: regular rhythm, no peripheral edema  Respiratory: breathing not labored, symmetric  Gastrointestinal: soft non-tender, +bowel sounds  Musculoskeletal: no deformity, no tenderness to palpation  Skin: warm, dry; fingernail discoloration  Neurologic: following commands, moving all extremities  Psychiatric: full affect, no hallucinations  Other:       HISTORY:     No past medical history on file. Past Surgical History:   Procedure Laterality Date    HX APPENDECTOMY  1964    HX OTHER SURGICAL      subcutaneous cyst of forehead    HX VASECTOMY        Family History   Problem Relation Age of Onset    Cancer Mother         breast    Cancer Father         prostate    Cancer Brother         melanoma    COPD Brother       History reviewed, no pertinent family history.   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  leuprolide acetate (LUPRON DEPOT IM) by IntraMUSCular route. Every 3 months    betamethasone dipropionate (DIPROSONE) 0.05 % topical cream Apply  to affected area two (2) times a day. Apply a thin layer to arms and legs twice daily. Use sparingly. Avoid face and sun.  sertraline (ZOLOFT) 50 mg tablet Take 1 Tab by mouth daily.  potassium chloride SR (K-TAB) 20 mEq tablet TAKE 1 TABLET BY MOUTH EVERY DAY    lancets misc Use to check blood sugars four times daily    degarelix (Firmagon) 120 mg solr injection by SubCUTAneous route once.  dexAMETHasone (DECADRON) 4 mg tablet Take 8mg (2 tabs) twice daily the day BEFORE chemotherapy and take 8mg (2 tabs) twice daily the day AFTER chemotherapy    ondansetron hcl (ZOFRAN) 8 mg tablet Take 1 Tab by mouth every eight (8) hours as needed for Nausea or Vomiting.  lidocaine-prilocaine (EMLA) topical cream Apply a dime size amount to port site 30-60 minutes before access on treatment days to numb port site.  prochlorperazine (Compazine) 10 mg tablet Take 0.5 Tabs by mouth every six (6) hours as needed for Nausea or Vomiting.  dextroamphetamine-amphetamine (ADDERALL) 10 mg tablet TAKE 1 TABLET TWICE A DAY FOR 30 DAYS    acyclovir (ZOVIRAX) 400 mg tablet Take 400 mg by mouth two (2) times a day.  glucose blood VI test strips (blood glucose test) strip Use to check blood sugars four times daily    OneTouch Ultra2 Meter misc USE TO CHECK BLOOD SUGARS TWICE DAILY     No current facility-administered medications for this visit.            LAB DATA REVIEWED:     Lab Results   Component Value Date/Time    WBC 8.5 02/18/2021 10:29 AM    HGB 12.6 02/18/2021 10:29 AM    PLATELET 132 72/04/0893 10:29 AM     Lab Results   Component Value Date/Time    Sodium 134 (L) 02/18/2021 10:29 AM    Potassium 3.4 (L) 02/18/2021 10:29 AM    Chloride 100 02/18/2021 10:29 AM    CO2 27 02/18/2021 10:29 AM    BUN 19 02/18/2021 10:29 AM    Creatinine 1.38 (H) 02/18/2021 10:29 AM    Calcium 9.1 02/18/2021 10:29 AM      Lab Results   Component Value Date/Time    Alk.  phosphatase 103 02/18/2021 10:29 AM    Protein, total 7.8 02/18/2021 10:29 AM    Albumin 4.1 02/18/2021 10:29 AM    Globulin 3.7 02/18/2021 10:29 AM     No results found for: INR, PTMR, PTP, PT1, PT2, APTT, INREXT, INREXT   No results found for: IRON, FE, TIBC, IBCT, PSAT, FERR        CONTROLLED SUBSTANCES SAFETY ASSESSMENT (IF ON CONTROLLED SUBSTANCES):     Reviewed opioid safety handout:  [] Yes   [] No  24 hour opioid dose >150mg morphine equivalent/day:  [] Yes   [] No  Benzodiazepines:  [] Yes   [] No  Sleep apnea:  [] Yes   [] No  Urine Toxicology Testing within last 6 months:  [] Yes   [] No  History of or new aberrant medication taking behaviors:  [] Yes   [] No  Has Narcan been prescribed [] Yes   [] No          Total time:   Counseling / coordination time:   > 50% counseling / coordination?:

## 2021-02-18 NOTE — PROGRESS NOTES
Palliative Medicine Office Visit  Palliative Medicine Nurse Check In  [phone]    Patient Name: Boyd De  YOB: 1955      Date of Office Visit:     Patient states: \"  \"    From Check In Sheet (scanned in Media):  Has a medical provider talked with you about cardiopulmonary resuscitation (CPR)? [x] Yes   [] No   [] Unable to obtain    Nurse reminder to complete or update ACP FlowSheet:    Is ACP on the Problem List?    [] Yes    [x] No  IF ACP Document is ON FILE; Nurse to place ACP on Problem List     Is there an ACP Note in Chart Review/Note? [] Yes    [x] No   If NO: ALERT PROVIDER       Primary Decision MakerTheron [place] - [phone]  Advance Care Planning 2/18/2021   Patient's Healthcare Decision Maker is: Verbal statement (Legal Next of Kin remains as decision maker)   Confirm Advance Directive None   Patient Would Like to Complete Advance Directive -     Is there anything that we should know about you as a person in order to provide you the best care possible? Have you been to the ER, urgent care clinic since your last visit? [] Yes   [x] No   [] Unable to obtain    Have you been hospitalized since your last visit? [] Yes   [x] No   [] Unable to obtain    Have you seen or consulted any other health care providers outside of the [address] [place] [address] since your last visit?    [] Yes   [x] No   [] Unable to obtain    Functional status (describe):         Last BM:  2/18/2021     accessed (date): 2/18/2021    Bottle review (for opioid pain medication):  Medication 1:   Date filled:   Directions:   # filled:   # left:   # pills taking per day:  Last dose taken:    Medication 2:   Date filled:   Directions:   # filled:   # left:   # pills taking per day:  Last dose taken:    Medication 3:   Date filled:   Directions:   # filled:   # left:   # pills taking per day:  Last dose taken:    Medication 4:   Date filled:   Directions:   # filled:   # left:   # pills taking per day:  Last dose taken:

## 2021-02-18 NOTE — PROGRESS NOTES
Chief Complaint   Patient presents with    Follow-up     Keny Guzman is a pleasant 76year old male who presents today as a follow up for prostate cancer.  He denies pain

## 2021-02-18 NOTE — PATIENT INSTRUCTIONS
Dear Nola Speaker , It was a pleasure seeing you today in [facility]. We will see you again in 6 weeks to coordinate with your return to see Dr. Real Saez If labs or imaging tests have been ordered for you today, please call the office  at [phone] 48 hours after completion to obtain the results. Your described symptoms were: Fatigue: 5 Drowsiness: 3 Depression: 3 Pain: 1 Anxiety: 4 Nausea: 0 Anorexia: 0 Dyspnea: 0 Best Well-Being: 3 Constipation: No  
Other Problem (Comment): 0 This is the plan we talked about: 1. Mood 
-Continue sertraline to 50-mg daily 
-You've been working on changing your perspective about not being able to bike or hike again 
-You shared that you're being fit when you started cancer treatment allowed you to do as well as you have 
-You're also pleased that you can continue treatment with Lupron alone 2. Balance problems 
-You've been trying to exercise more to improve your equilibrium 
-You continue to use your walking sticks most of the time 
-You haven't had any falls recently 
-I will follow up with your referral to see Lulu Lake, PT at the [facility] 3. Chemotherapy-induced peripheral neuropathy 
-We discussed different medications we could try (gabapentin, amitriptyline) to help with the sharp pain in your fingertips 
-You're chosen to defer taking medications for this now 4. Fatigue 
-This is chronic and unchanged 5. Metastatic prostate cancer 
-You're currently being treated with Lupron alone This is what you have shared with us about Advance Care Planning: 
 
  Primary Decision Maker: Yanira Vieira Son - [phone] Advance Care Planning 2/18/2021 Patient's Healthcare Decision Maker is: Verbal statement (Legal Next of Kin remains as decision maker) Confirm Advance Directive None Patient Would Like to Complete Advance Directive - The Palliative Medicine Team is here to support you and your family. Sincerely, Flavio Lopez MD and the Palliative Medicine Team

## 2021-02-18 NOTE — PROGRESS NOTES
[facility] Progress Note    Date: 2021    Name: Andrzej Lindo    MRN: [id number]         : 1955    Mr. Pam Linder Arrived ambulatory and in no distress for C3D1 Lupron  Injection. Assessment was completed, no acute issues at this time, no new complaints voiced. Labs drawn from Left port and sent for processing. Mr. Jennifer Lira vitals were reviewed. Visit Vitals  BP (!) 169/113   Pulse 69   Temp 97.6 °F (36.4 °C)   Resp 18   Ht 5' 10\" (1.778 m)   Wt 96.5 kg (212 lb 11.2 oz)   SpO2 99%   BMI 30.52 kg/m²         Medications:  Medications Administered     leuprolide depot (LUPRON 3 MONTH) injection sykt 22.5 mg     Admin Date  2021 Action  Given Dose  22.5 mg Route  IntraMUSCular Administered By  [place], [place]                Mr. Pam Linder tolerated treatment well and was discharged from [place] [address] in stable condition. He is to return on May 13 at 0900 for his next appointment. MD office made aware of elevated blood pressure and Mr. Pam Linder proceeded to visit with Dr. Jordana Enrique.      [facility]  2021

## 2021-02-19 ENCOUNTER — TELEPHONE (OUTPATIENT)
Dept: ONCOLOGY | Age: 66
End: 2021-02-19

## 2021-02-19 DIAGNOSIS — T45.1X5A PERIPHERAL NEUROPATHY DUE TO CHEMOTHERAPY (HCC): Primary | ICD-10-CM

## 2021-02-19 DIAGNOSIS — G62.0 PERIPHERAL NEUROPATHY DUE TO CHEMOTHERAPY (HCC): Primary | ICD-10-CM

## 2021-02-19 DIAGNOSIS — R26.81 GAIT INSTABILITY: ICD-10-CM

## 2021-02-19 DIAGNOSIS — C79.82 METASTATIC ADENOCARCINOMA TO PROSTATE (HCC): ICD-10-CM

## 2021-02-19 NOTE — TELEPHONE ENCOUNTER
02/19/21 1:38 PM Called patient. Notified of low potassium level. He is taking potassium 20 meq daily. Advised he add food high in K such as sweet potatoes, bananas. He scheduled brain MRI for 2/20/21. Dr. Makayla Solis referred patient to [facility] for PT services. Reviewed patient's PSA level. Will call patient once MRI results. Patient verbalized understanding.

## 2021-02-20 ENCOUNTER — HOSPITAL ENCOUNTER (OUTPATIENT)
Dept: MRI IMAGING | Age: 66
Discharge: HOME | End: 2021-02-20
Attending: NURSE PRACTITIONER
Payer: MEDICARE

## 2021-02-20 VITALS — WEIGHT: 212 LBS | HEIGHT: 70 IN | BODY MASS INDEX: 30.35 KG/M2

## 2021-02-20 DIAGNOSIS — C61 ADENOCARCINOMA OF PROSTATE, STAGE 4 (HCC): ICD-10-CM

## 2021-02-20 DIAGNOSIS — H91.91 DECREASED HEARING, RIGHT: ICD-10-CM

## 2021-02-20 DIAGNOSIS — R42 DYSEQUILIBRIUM: ICD-10-CM

## 2021-02-20 PROCEDURE — 70553 MRI BRAIN STEM W/O & W/DYE: CPT

## 2021-02-20 PROCEDURE — 74011250636 HC RX REV CODE- 250/636: Performed by: NURSE PRACTITIONER

## 2021-02-20 PROCEDURE — A9575 INJ GADOTERATE MEGLUMI 0.1ML: HCPCS | Performed by: NURSE PRACTITIONER

## 2021-02-20 RX ORDER — GADOTERATE MEGLUMINE 376.9 MG/ML
20 INJECTION INTRAVENOUS
Status: COMPLETED | OUTPATIENT
Start: 2021-02-20 | End: 2021-02-20

## 2021-02-20 RX ADMIN — GADOTERATE MEGLUMINE 20 ML: 376.9 INJECTION INTRAVENOUS at 10:13

## 2021-02-22 NOTE — PROGRESS NOTES
Called patient and advised of MRI results per Mickey Rodney NP. He verbalized understanding. No further questions or concerns at this time.

## 2021-02-22 NOTE — PROGRESS NOTES
Please let patient know his Brain MRI is normal and negative for metastatic disease, no evidence of bleeding.

## 2021-02-26 ENCOUNTER — TELEPHONE (OUTPATIENT)
Dept: PALLATIVE CARE | Age: 66
End: 2021-02-26

## 2021-02-26 NOTE — TELEPHONE ENCOUNTER
Called patient to schedule f/u appt for 4/1/21 @ 11:30 am. Need to confirm if patient will come into the office or have Dr. Diallo Manifold see him in infusion.

## 2021-03-12 DIAGNOSIS — I10 ESSENTIAL HYPERTENSION: ICD-10-CM

## 2021-03-12 RX ORDER — AMLODIPINE BESYLATE 5 MG/1
TABLET ORAL
Qty: 30 TAB | Refills: 1 | Status: SHIPPED | OUTPATIENT
Start: 2021-03-12 | End: 2021-04-16

## 2021-03-12 NOTE — TELEPHONE ENCOUNTER
[facility] [employer] at [place] [address]  [phone]        03/12/21 2:20 PM Attempted to call patient via home/cell number listed to verify that he was still taking Amlodipine 5 mg daily. No answer, left message requesting a return call. Also need to inquire if patient is checking any home BPs and what the range of those readings are, if so. Provided office phone number for call back. 03/15/21 11:58 AM Called patient. He verified that he is taking Amlodipine 5 mg daily. Updated him that refill had been sent to local pharmacy on file. Patient has not checked home BP readings, but is seeing ENT today and anticipates having his BP checked. Denies lightheadedness/dizziness, headaches, and chest pain. No further questions or concerns at this time.

## 2021-03-15 ENCOUNTER — TELEPHONE (OUTPATIENT)
Dept: PALLATIVE CARE | Age: 66
End: 2021-03-15

## 2021-03-15 NOTE — TELEPHONE ENCOUNTER
Patient calling to get contact information (name and phone number)) of ENT that MD referred him to. Advised nurse would call him back to discuss.

## 2021-03-23 RX ORDER — SERTRALINE HYDROCHLORIDE 50 MG/1
50 TABLET, FILM COATED ORAL DAILY
Qty: 90 TAB | Refills: 0 | Status: SHIPPED | OUTPATIENT
Start: 2021-03-23 | End: 2021-08-26

## 2021-03-23 NOTE — PROGRESS NOTES
[facility]  Medical Oncology at [place]  [phone]    Hematology / Oncology Established Visit    Reason for Visit:   Jacobo Ram is a 76 y.o. male who is seen for follow up of neuroendocrine carcinoma. Initially referred by Dr. Jess Magdaleno. 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 Flow cytometry negative for lymphoproliferative disorder. FoundationOne: MS Stable, TMB 0, MDM4 amplification, MYC amplification, TMPRSS2 TMPRSS2-ERD fusion, CEBPA 1311fs*10, ERBB4 amplification - equivocal, MCL1 amplification, [id number] amplification, RAD21 amplification. See scanned report for full info. Abbreviated [facility] Pathology Consultation:  Final diagnosis: Right external iliac node: Metastatic prostate adenocarcinoma. Comment: Histologic sections of the right external iliac node show sheets of cells with minimal eosinophilic cytoplasm and variable cytologic atypia. Some areas show acinar formation. The cells have predominantly round nuclei with vesicular chromatin and prominent nucleoli. Some areas show significantly more atypia with more irregular nuclear borders, hyperchromatic nyclei, and increase nuclear to cytoplasmic ratio. Frequent mitotic figures are identified. A provided pane of IHC stains is reviewed and demonstrates that the malignant cells show strong, diffuse expression of pan-cytokeratin and focal expression of TTF. CK7, CK20, chromogranin, and synaptophysin are negative in the lesional cells. CD3 and CD20 are negative in the lesions cells and positive in the background lymphocytes.  An abbreviated FoundationOne report was included, which included a TMPRSS2-ERG fusion (among other nonspecific abnormalities), which si found in approximately 50% of prostate cancers. Additional IHC studies performed at [facility] showed that the malignant cells have immunoreactivity with antibodies for PSA and PSAP, consistent with a diagnosis of metastatic prostate adenocarcinoma. Per report, flow cytometry negative for lymphoma. Overall, the histomorphology, immunophenotype and genomic findings in this case are diagnostic of metastatic prostate adenocarcinoma. The acinar formation and cytology were suggestive of this diagnosis, and the diffuse PSA and PSAP positivity as well as the TMPRSS2-ERG fusion confirmed the diagnosis. This fusion is the most common genomic alteration in prostate carcinoma and can be detected in over half of the cases, yet is not seen with any frequency in other types of carcinoma. Prior Treatment:   1. Carbo-Etoposide-Atezolizumab x 2 cycles, 6/29/20-7/20/20. 2. Docetaxel x 6 cycles, 8/10/20-11/24/20. Current Treatment: Lupron every 12 weeks. History of Present Illness:   Silverio Bernal is a 76 y.o. male who comes in for evaluation of poorly differentiated neuroendocrine carcinoma. Pt noticed a nontender bulge in his LLQ in 5/2020. He thought this was a hernia and was evaluated by Dr. Sofia Verdugo. CT on 5/20/20 was notable for extensive lymphadenopathy in abd/pelvis. Pt underwent robot assisted excisional Right external iliac LN biopsy 5/29/20, which showed poorly differentiated large cell neuroendocrine carcinoma. He reports intentional weight loss with dietary changes and exercise, losing 40-lbs in past 8 months. No n/v/d, melena/hematochezia, cough, SOB. No fevers, chills, sweats. [employer] nonsmoker. Mother had breast cancer diagnosed age < 48. Twin brother had melanoma diagnosed aged late 46s. He does not think he has ever had a colonoscopy or PSA screening prior to current diagnosis. Interval History:  Patient here for follow up and labs. He had a normal Brain MRI. ENT dx with bilateral effusions.  Has tympanostomy in place. Reports hearing has slightly improved. Reports balance has improved and no longer requires walking sticks. Fingernails remain hyperpigmented   Neuropathy present in fingers and hands is slightly better. Reports fingertips are almost completely numb. No neuropathy in feet. Still dropping objects but not as frequently. Reports dry skin present on arms/legs. Good appetite. No diarrhea, constipation. No fevers or chills. No SOB or CP. He is trying to stay active biking and hiking. No past medical history on file. 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 Has 2 children, 27 and 32. 1 lives in [place]. Family History   Problem Relation Age of Onset    Cancer Mother         breast    Cancer Father         prostate    Cancer Brother         melanoma    COPD Brother      Current Outpatient Medications   Medication Sig    sertraline (ZOLOFT) 50 mg tablet Take 1 Tab by mouth daily.  amLODIPine (NORVASC) 5 mg tablet TAKE 1 TABLET BY MOUTH EVERY DAY    leuprolide acetate (LUPRON DEPOT IM) by IntraMUSCular route. Every 3 months    betamethasone dipropionate (DIPROSONE) 0.05 % topical cream Apply  to affected area two (2) times a day. Apply a thin layer to arms and legs twice daily. Use sparingly. Avoid face and sun.     potassium chloride SR (K-TAB) 20 mEq tablet TAKE 1 TABLET BY MOUTH EVERY DAY    glucose blood VI test strips (blood glucose test) strip Use to check blood sugars four times daily    lancets misc Use to check blood sugars four times daily    OneTouch Ultra2 Meter misc USE TO CHECK BLOOD SUGARS TWICE DAILY    degarelix Page [facility] 120 mg solr injection by SubCUTAneous route once.  dexAMETHasone (DECADRON) 4 mg tablet Take 8mg (2 tabs) twice daily the day BEFORE chemotherapy and take 8mg (2 tabs) twice daily the day AFTER chemotherapy    ondansetron hcl (ZOFRAN) 8 mg tablet Take 1 Tab by mouth every eight (8) hours as needed for Nausea or Vomiting.  lidocaine-prilocaine (EMLA) topical cream Apply a dime size amount to port site 30-60 minutes before access on treatment days to numb port site.  prochlorperazine (Compazine) 10 mg tablet Take 0.5 Tabs by mouth every six (6) hours as needed for Nausea or Vomiting.  dextroamphetamine-amphetamine (ADDERALL) 10 mg tablet TAKE 1 TABLET TWICE A DAY FOR 30 DAYS    acyclovir (ZOVIRAX) 400 mg tablet Take 400 mg by mouth two (2) times a day. No current facility-administered medications for this visit. No Known Allergies     Review of Systems: A complete review of systems was obtained, negative except as described above. Physical Exam:     Visit Vitals  BP (!) 129/92   Pulse 69   Temp (!) 96.5 °F (35.8 °C)   Resp 18   Ht 5' 10\" (1.778 m)   Wt 207 lb (93.9 kg)   SpO2 92%   BMI 29.70 kg/m²     ECOG PS: 0  General: Well developed, no acute distress  Eyes: PERRLA, EOMI, anicteric sclerae  HENT: Atraumatic, OP clear, TMs intact without erythema  Neck: Supple, Left supraclavicular LN no longer palpable. Lymphatic: No cervical, supraclavicular, axillary or inguinal adenopathy  Respiratory: CTAB, normal respiratory effort  CV: Normal rate, regular rhythm, no murmurs, trace edema present bilateral lower extremities  GI: firm, nontender, mild distention, no hepatomegaly, no splenomegaly. Prior L lower abdomen 5cm mass no longer palpable. MS: Normal gait and station. Digits without clubbing or cyanosis. Skin: Dry skin present on arms, legs, trunk. Fingernail beds are dark and thickened No ecchymoses, or petechiae. Normal temperature, turgor, and texture. Neuro/Psych: Alert, oriented. 5/5 strength in all 4 extremities. Appropriate affect, normal judgment/insight. Results:     Lab Results   Component Value Date/Time    WBC 8.5 2021 10:29 AM    HGB 12.6 2021 10:29 AM    HCT 36.5 (L) 2021 10:29 AM    PLATELET 848  10:29 AM    MCV 88.0 2021 10:29 AM    ABS. NEUTROPHILS 6.1 2021 10:29 AM     Lab Results   Component Value Date/Time    Sodium 134 (L) 2021 10:29 AM    Potassium 3.4 (L) 2021 10:29 AM    Chloride 100 2021 10:29 AM    CO2 27 2021 10:29 AM    Glucose 121 (H) 2021 10:29 AM    BUN 19 2021 10:29 AM    Creatinine 1.38 (H) 2021 10:29 AM    GFR est AA >60 2021 10:29 AM    GFR est non-AA 52 (L) 2021 10:29 AM    Calcium 9.1 2021 10:29 AM    Creatinine (POC) 1.3 10/29/2020 11:59 AM     Lab Results   Component Value Date/Time    Bilirubin, total 0.5 2021 10:29 AM    ALT (SGPT) 64 2021 10:29 AM    Alk. phosphatase 103 2021 10:29 AM    Protein, total 7.8 2021 10:29 AM    Albumin 4.1 2021 10:29 AM    Globulin 3.7 2021 10:29 AM     No results found for: IRON, FE, TIBC, IBCT, PSAT, FERR    No results found for: B12LT, FOL, RBCF  Lab Results   Component Value Date/Time    TSH 1.72 2020 09:58 AM     No results found for: HAMAT, HAAB, HABT, HAAT, HBSAG, HBSB, HBSAT, HBABN, HBCM, HBCAB, HBCAT, XBCABS, HBEAB, HBEAG, XHEPCS, [id number], HBEGLT, [place], HBCLT, HBEBLT, [id number], [id number], HAVMLT, [id number], HBCMLT, [id number], HCGAT     20: Chromogranin 42    20: PSA 38.6  20: PSA 13.2   8/10/20: PSA 1.2  20: PSA 0.5  20: PSA 0.4  10/15/20: PSA 0.3  20: PSA 0.3  20: 0.2  21: PSA 0.1    3/22/21 PSA 0.066 (PSA ultrasensitive at [facility])       Imagin/20/20 CT abd/pelvis with IV contrast:  Impression:  1. No findings to suggest gross abdominal wall hernia or flank hernia.   2. Extensive adenopathy throughout the abdomen and pelvis as described. Findings are concerning for possible metastatic disease or lymphoma. Recommend follow up or comparison with prior studies. 3. Other findings as described. 6/18/20 PET:  FINDINGS:  HEAD/NECK: No apparent foci of abnormal hypermetabolism. Cerebral evaluation is  limited by normal intense activity. CHEST: Hypermetabolic lymphadenopathy at the base of the left neck and in the  left supraclavicular region is noted. Hypermetabolic superior mediastinal,  prevascular, right paratracheal, subcarinal, and bilateral hilar lymph  lymphadenopathy, maximum SUV of the subcarinal lymph node is 5.7. Small but  hypermetabolic soft tissue nodule left anterior chest wall. ABDOMEN/PELVIS: Hypermetabolic retrocrural, left para-aortic, aortocaval,  bilateral common iliac, bilateral external iliac, and bilateral inguinal  lymphadenopathy, maximum SUV 5.8 of an aortocaval lymph node. Hypermetabolic  mesenteric lymph node left lower quadrant, maximum SUV 12.3. SKELETON: No foci of abnormal hypermetabolism in the axial and visualized  appendicular skeleton. IMPRESSION: Hypermetabolic lymphadenopathy involving the left neck and left  supraclavicular region, mediastinum, bilateral hilum, retroperitoneum,  mesentery, and pelvis as described above. Hypermetabolic soft tissue nodule left  chest.    Brain MRI 6/27/20: IMPRESSION:  There is no evidence of intracranial metastatic disease. Mild chronic microvascular ischemic change. No intracranial mass, hemorrhage or evidence of acute infarction. CT c/a/p 8/3/20: IMPRESSION:  Chest:  1. Findings consistent with partial treatment response, with interval decrease  in size of mediastinal and hilar lymph nodes, and interval decrease in size of  left chest wall nodule. Abdomen/pelvis:   1. Findings consistent with partial treatment response, with interval decrease  in size of retroperitoneal lymph nodes.   2. Unchanged circumferential bladder wall thickening, which may represent acute  or chronic cystitis. Correlate with urinalysis. RECIST   TARGET LESIONS:      Lesion (description)         Location (series/slice)                Size            1. Posterior mediastinal lymph node    series 2 image 26    2.3 x 2.0 cm, previously 3.3 x 2.2 cm  2. Left upper paraesophageal lymph node    series 2 image 9    5 mm, previously 15 mm. 3. Right external iliac lymphadenopathy    series 2 image 98    3.8 x 3.2 cm, previously 6.3 x 4.7 cm  4. Left external iliac lymphadenopathy    series 2 image 99    1.6 cm, previously 2.5 cm    9/1/20 Bone Scan:  FINDINGS: There is physiologic uptake of radiotracer in the skeleton and soft tissues. There is no evidence of fracture, osteomyelitis or bone tumor. There is no pattern of skeletal metastases. IMPRESSION: Normal Whole Body Nuclear Bone Scan.    10/29/20 CT c/a/p:  Lesion (description):     Location (series/slice):  Size   1. Posterior mediastinal lymph node   2/27            1.1 x 1.4 cm, previously 2.3 x 2.0 cm (2/26)  2. Left upper paraesophageal lymph node  2/9           2 mm, previously 5 mm (2/9)   3. Right external iliac lymphadenopathy 2/21                3.8 x 3.2 cm (2/98)  4. Left external iliac lymphadenopathy  2/99              11 mm, previously 16 mm (2/99)   IMPRESSION:  1. Findings consistent with treatment response, with interval decrease in size  of mediastinal, retroperitoneal, and pelvic lymph nodes and no CT evidence of  new metastatic disease within the thorax, abdomen, or pelvis. 2. Unchanged circumferential bladder wall thickening, which may represent acute  or chronic cystitis.      2/1/21 CT c/a/p:  RECIST   TARGET LESIONS:  Lesion (description)         Location (series/slice)                Size      1. Left upper esophageal lymph node    series 2 image 10    1 mm previously measuring 2 mm  2. Azygos esophageal lymph node    2, 30    7 x 7 mm previously measuring 11 x 14 mm  3.  Right external iliac lymph node    2, 102    27 x 23 mm previously 27 x 31 mm  4. Left external iliac lymph node    2, 102    8 mm previously 11 mm  NONTARGET LESIONS:  None  IMPRESSION  1. Further decrease size of adenopathy as described. 2.  No evidence of new metastatic disease. 2/1/21 Bone scan:  Normal whole-body nuclear bone scan.    2/20/21 Brain MRI:  IMPRESSION  No acute intracranial findings no mass    Assessment & Plan:   Carmen Miles is a 76 y.o. 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 1. Metastatic adenocarcinoma of prostate, high grade: Initial pathologist at [address] [place] [address] called this \"Large cell neuroendocrine carcinoma,\" and based on diffuse disease on PET, patient was started on treatment with Carbo-Etoposide-Atezolizumab (thinking this was large cell carcinoma of the lung.) However FoundationOne testing identified TMPRSS2-ERG fusion, which is primarily seen in prostate cancers. This along with elevated PSA of 38 prompted sending of pathology specimen to [facility] for review. Their opinion and IHC staining is consistent with high grade adenocarcinoma of prostate. Treatment options now include: Carboplatin + Cabazitaxel vs single agent Docetaxel. Pt signed consent for Docetaxel. CT on 2/1/21 showed good response to treatment. Bone scan 2/1/21 negative. Supportive medications: EMLA cream, zofran, compazine, dexamethasone  -- Lupron (3 month depot) #1 given 8/31/20. #2 on 11/24/20, #3 given 2/18/20, #4 due 5/13/21. Pt forgot about the injection given at our office and he received a 1 month Lupron injection at [facility] on 9/28/20. Discussed with Dr. Lawrence Young and Dr. Heidi Stout of [facility] that pt wants to get ADT here rather than at [facility] to consolidate appointments. -- Will now continue ADT alone, monitor PSA and scans periodically. Would consider starting Zytiga at time of progression on scans or symptomatic PSA progression. -- Plan on next CT and bone scan at 12 weeks from last scan.  Ordered for 5/10/21. -- Return 5/13/21 for labs/portflush, Lupron, review CT scan, MD/NP visit. 2. H/o Hyperglycemia: Was due to due to dexamethasone. Gretchen Najera assisting with mgmt of steroid induced hyperglycemia. BG runs 118-140. 3. 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 Difficulty with buttons and zippers. He is dropping objects less often. 4. Bladder wall thickening: Seen on CT scan, which may represent acute or chronic cystitis. UA negative. Forwarded CT scan to urology. 2/1/21 CT normal.      6. H/o Genital Herpes: On suppression with Acyclovir. 7. Rash: 2/2 to docetaxel. Rash present on arms, legs and trunk. Betamethasone 0.05% cream to affected areas bid PRN. Add Ceravu lotion. 8. Renal insufficiency: Likely 2/2 to decreased PO intake. Encouraged better oral hydration. 9. Depression/anxiety: 2/2 to #1. I am also concerned that extra firmagon dose could have caused emotional lability. Following with Palliative. On Zoloft 50mg/d. 10. Hypertension:  Had previously recommended lifestyle modification and now better managed on Amlodipine 5mg/d. 11. F/E/N/GI: Encouraged potassium rich foods. Potassium 20meq daily. 12. Dysequilibrium: Brain MRI negative for mets. Dx with bilateral effusions by ENT and balance/hearing improved. Left tympanostomy tube placed. -- Follow up with ENT. -- PT with Velia Moser - will reach out to her given cancelled visits. 13. Hyponychium: Likely 2/2 to fungal infection. Continue OTC antifungal medication and recommend follow up with dermatology. -- Referral to dermatology placed. Emotional well being: Pt is coping well with his/her disease and has excellent support. Significant other: Thersa Leisure - [phone]. I appreciate the opportunity to participate in Mr. Hannah Mcdaniel care. I have personally seen and evaluated the patient in conjunction with Mirian Ferrara NP.  I find the patient's history and physical exam are consistent with the NP's documentation. I agree with the above assessment and plan, which I have edited if needed. Pt continues to have dsyequilibrium and balance problems as detailed above. Regarding his prostate cancer, PSA remains low. Will continue to monitor.       Signed By: Bryan Carranza MD     April 1, 2021

## 2021-03-25 ENCOUNTER — APPOINTMENT (OUTPATIENT)
Dept: PHYSICAL THERAPY | Age: 66
End: 2021-03-25

## 2021-04-01 ENCOUNTER — HOSPITAL ENCOUNTER (OUTPATIENT)
Dept: INFUSION THERAPY | Age: 66
Discharge: HOME | End: 2021-04-01
Payer: MEDICARE

## 2021-04-01 ENCOUNTER — TELEPHONE (OUTPATIENT)
Dept: ONCOLOGY | Age: 66
End: 2021-04-01

## 2021-04-01 ENCOUNTER — OFFICE VISIT (OUTPATIENT)
Dept: ONCOLOGY | Age: 66
End: 2021-04-01
Payer: MEDICARE

## 2021-04-01 ENCOUNTER — OFFICE VISIT (OUTPATIENT)
Dept: PALLATIVE CARE | Age: 66
End: 2021-04-01
Payer: MEDICARE

## 2021-04-01 VITALS
HEART RATE: 69 BPM | SYSTOLIC BLOOD PRESSURE: 129 MMHG | WEIGHT: 207 LBS | OXYGEN SATURATION: 92 % | DIASTOLIC BLOOD PRESSURE: 92 MMHG | RESPIRATION RATE: 18 BRPM | BODY MASS INDEX: 29.63 KG/M2 | HEIGHT: 70 IN | TEMPERATURE: 96.5 F

## 2021-04-01 VITALS
RESPIRATION RATE: 18 BRPM | WEIGHT: 205 LBS | OXYGEN SATURATION: 96 % | SYSTOLIC BLOOD PRESSURE: 125 MMHG | HEART RATE: 69 BPM | TEMPERATURE: 97.8 F | DIASTOLIC BLOOD PRESSURE: 77 MMHG | BODY MASS INDEX: 29.41 KG/M2

## 2021-04-01 VITALS
TEMPERATURE: 96.5 F | HEART RATE: 69 BPM | DIASTOLIC BLOOD PRESSURE: 92 MMHG | SYSTOLIC BLOOD PRESSURE: 129 MMHG | RESPIRATION RATE: 18 BRPM

## 2021-04-01 DIAGNOSIS — G62.9 NEUROPATHY: ICD-10-CM

## 2021-04-01 DIAGNOSIS — C61 ADENOCARCINOMA OF PROSTATE, STAGE 4 (HCC): Primary | ICD-10-CM

## 2021-04-01 DIAGNOSIS — H91.91 DECREASED HEARING, RIGHT: ICD-10-CM

## 2021-04-01 DIAGNOSIS — F41.9 ANXIETY: Primary | ICD-10-CM

## 2021-04-01 DIAGNOSIS — R42 DYSEQUILIBRIUM: ICD-10-CM

## 2021-04-01 DIAGNOSIS — B35.1 NAIL FUNGUS: ICD-10-CM

## 2021-04-01 DIAGNOSIS — C79.82 METASTATIC ADENOCARCINOMA TO PROSTATE (HCC): ICD-10-CM

## 2021-04-01 DIAGNOSIS — R53.83 FATIGUE, UNSPECIFIED TYPE: ICD-10-CM

## 2021-04-01 DIAGNOSIS — G62.0 PERIPHERAL NEUROPATHY DUE TO CHEMOTHERAPY (HCC): ICD-10-CM

## 2021-04-01 DIAGNOSIS — R26.81 GAIT INSTABILITY: ICD-10-CM

## 2021-04-01 DIAGNOSIS — C79.82 METASTATIC ADENOCARCINOMA TO PROSTATE (HCC): Primary | ICD-10-CM

## 2021-04-01 DIAGNOSIS — T45.1X5A PERIPHERAL NEUROPATHY DUE TO CHEMOTHERAPY (HCC): ICD-10-CM

## 2021-04-01 DIAGNOSIS — F32.9 REACTIVE DEPRESSION: ICD-10-CM

## 2021-04-01 LAB
ALBUMIN SERPL-MCNC: 4.5 G/DL (ref 3.5–5)
ALBUMIN/GLOB SERPL: 1.3 {RATIO} (ref 1.1–2.2)
ALP SERPL-CCNC: 88 U/L (ref 45–117)
ALT SERPL-CCNC: 63 U/L (ref 12–78)
ANION GAP SERPL CALC-SCNC: 6 MMOL/L (ref 5–15)
AST SERPL-CCNC: 119 U/L (ref 15–37)
BASOPHILS # BLD: 0.1 K/UL (ref 0–0.1)
BASOPHILS NFR BLD: 1 % (ref 0–1)
BILIRUB SERPL-MCNC: 0.6 MG/DL (ref 0.2–1)
BUN SERPL-MCNC: 20 MG/DL (ref 6–20)
BUN/CREAT SERPL: 15 (ref 12–20)
CALCIUM SERPL-MCNC: 9.4 MG/DL (ref 8.5–10.1)
CHLORIDE SERPL-SCNC: 99 MMOL/L (ref 97–108)
CO2 SERPL-SCNC: 31 MMOL/L (ref 21–32)
CREAT SERPL-MCNC: 1.37 MG/DL (ref 0.7–1.3)
DIFFERENTIAL METHOD BLD: ABNORMAL
EOSINOPHIL # BLD: 0.2 K/UL (ref 0–0.4)
EOSINOPHIL NFR BLD: 3 % (ref 0–7)
ERYTHROCYTE [DISTWIDTH] IN BLOOD BY AUTOMATED COUNT: 15.4 % (ref 11.5–14.5)
GLOBULIN SER CALC-MCNC: 3.4 G/DL (ref 2–4)
GLUCOSE SERPL-MCNC: 141 MG/DL (ref 65–100)
HCT VFR BLD AUTO: 39.3 % (ref 36.6–50.3)
HGB BLD-MCNC: 12.6 G/DL (ref 12.1–17)
IMM GRANULOCYTES # BLD AUTO: 0 K/UL (ref 0–0.04)
IMM GRANULOCYTES NFR BLD AUTO: 1 % (ref 0–0.5)
LYMPHOCYTES # BLD: 1.4 K/UL (ref 0.8–3.5)
LYMPHOCYTES NFR BLD: 21 % (ref 12–49)
MCH RBC QN AUTO: 28.8 PG (ref 26–34)
MCHC RBC AUTO-ENTMCNC: 32.1 G/DL (ref 30–36.5)
MCV RBC AUTO: 89.7 FL (ref 80–99)
MONOCYTES # BLD: 0.4 K/UL (ref 0–1)
MONOCYTES NFR BLD: 6 % (ref 5–13)
NEUTS SEG # BLD: 4.6 K/UL (ref 1.8–8)
NEUTS SEG NFR BLD: 68 % (ref 32–75)
NRBC # BLD: 0 K/UL (ref 0–0.01)
NRBC BLD-RTO: 0 PER 100 WBC
PLATELET # BLD AUTO: 207 K/UL (ref 150–400)
PMV BLD AUTO: 11.2 FL (ref 8.9–12.9)
POTASSIUM SERPL-SCNC: 3.5 MMOL/L (ref 3.5–5.1)
PROT SERPL-MCNC: 7.9 G/DL (ref 6.4–8.2)
PSA SERPL-MCNC: 0.1 NG/ML (ref 0.01–4)
RBC # BLD AUTO: 4.38 M/UL (ref 4.1–5.7)
SODIUM SERPL-SCNC: 136 MMOL/L (ref 136–145)
WBC # BLD AUTO: 6.7 K/UL (ref 4.1–11.1)

## 2021-04-01 PROCEDURE — 80053 COMPREHEN METABOLIC PANEL: CPT

## 2021-04-01 PROCEDURE — G0463 HOSPITAL OUTPT CLINIC VISIT: HCPCS | Performed by: NURSE PRACTITIONER

## 2021-04-01 PROCEDURE — G9717 DOC PT DX DEP/BP F/U NT REQ: HCPCS | Performed by: INTERNAL MEDICINE

## 2021-04-01 PROCEDURE — 1101F PT FALLS ASSESS-DOCD LE1/YR: CPT | Performed by: INTERNAL MEDICINE

## 2021-04-01 PROCEDURE — G8427 DOCREV CUR MEDS BY ELIG CLIN: HCPCS | Performed by: INTERNAL MEDICINE

## 2021-04-01 PROCEDURE — G8752 SYS BP LESS 140: HCPCS | Performed by: INTERNAL MEDICINE

## 2021-04-01 PROCEDURE — 99214 OFFICE O/P EST MOD 30 MIN: CPT | Performed by: INTERNAL MEDICINE

## 2021-04-01 PROCEDURE — 3017F COLORECTAL CA SCREEN DOC REV: CPT | Performed by: INTERNAL MEDICINE

## 2021-04-01 PROCEDURE — G8754 DIAS BP LESS 90: HCPCS | Performed by: INTERNAL MEDICINE

## 2021-04-01 PROCEDURE — 77030016057 HC NDL HUBR APOL -B

## 2021-04-01 PROCEDURE — 36591 DRAW BLOOD OFF VENOUS DEVICE: CPT

## 2021-04-01 PROCEDURE — G8428 CUR MEDS NOT DOCUMENT: HCPCS | Performed by: INTERNAL MEDICINE

## 2021-04-01 PROCEDURE — G8536 NO DOC ELDER MAL SCRN: HCPCS | Performed by: INTERNAL MEDICINE

## 2021-04-01 PROCEDURE — 85025 COMPLETE CBC W/AUTO DIFF WBC: CPT

## 2021-04-01 PROCEDURE — 84153 ASSAY OF PSA TOTAL: CPT

## 2021-04-01 PROCEDURE — G8417 CALC BMI ABV UP PARAM F/U: HCPCS | Performed by: INTERNAL MEDICINE

## 2021-04-01 PROCEDURE — 84403 ASSAY OF TOTAL TESTOSTERONE: CPT

## 2021-04-01 RX ORDER — SODIUM CHLORIDE 9 MG/ML
10 INJECTION INTRAMUSCULAR; INTRAVENOUS; SUBCUTANEOUS AS NEEDED
Status: ACTIVE | OUTPATIENT
Start: 2021-04-01 | End: 2021-04-01

## 2021-04-01 RX ORDER — SODIUM CHLORIDE 0.9 % (FLUSH) 0.9 %
5-10 SYRINGE (ML) INJECTION AS NEEDED
Status: DISPENSED | OUTPATIENT
Start: 2021-04-01 | End: 2021-04-01

## 2021-04-01 RX ORDER — HEPARIN 100 UNIT/ML
500 SYRINGE INTRAVENOUS AS NEEDED
Status: ACTIVE | OUTPATIENT
Start: 2021-04-01 | End: 2021-04-01

## 2021-04-01 NOTE — PATIENT INSTRUCTIONS
1. Please get CT and bone scan around 5/10/21. We will see you back 5/13 for lupron and doctors visit. Will review scans at this visit.

## 2021-04-01 NOTE — PROGRESS NOTES
[address] [facility] Progress Note    Date: 2021    Name: Gabriele Davis    MRN: [id number]         : 1955    Port flush / Labs      Visit Vitals  BP (!) 129/92   Pulse 69   Temp (!) 96.5 °F (35.8 °C)   Resp 18       Mediport was accessed [id number] 0.75inch milner(s) after chloroprep. L chest, single    Blood return: no    blood collected for labs per protocol. Pending in CC     Flushed  NS followed by Heparin 500u    Milner needle(s) removed. Band-Aid applied. Mr. Opal Bautista tolerated the procedure, and had no complaints. Mr. Opal Bautista was discharged from [place] [address] in stable condition. He is to return  for his next appointment . 21.     Robbie Miranda Rn    Robbie Miranda  2021  11:19 AM

## 2021-04-01 NOTE — PROGRESS NOTES
Please let patient know his PSA is 0.1 which is great. No change from 2/18/21 PSA. Kidney function is a little elevated.  Recommend he increase hydration to 50-60 oz daily

## 2021-04-01 NOTE — PROGRESS NOTES
Palliative Medicine Outpatient Services  [place]: [phone] ([phone])    Patient Name: Karly Valentin  YOB: 1955    Date of Current Visit: 04/01/21  Location of Current Visit:    [] [facility] Office  [x] [facility] Office  [] [facility] Office  [] Home  []Synchronous real-time A/V virtual visit    Date of Initial Visit: 11/4/2020   Referral from: Devon Garcia MD  Primary Care Physician: Mo Dominique MD      SUMMARY:   Karly Valentin is a 76y.o. year old with a  history of metastatic prostate cancer to lymph nodes, who was referred to Palliative Medicine by Dr. Antionette Hein for symptom management and psychosocial support. He was initially diagnosed in 6/2020 and is currently being treated with Lupron. The patients social history includes: he is a retired  with a previous career as a [employer]. He is . He has a significant other, Richard Odom, who previously lived with him prior to moving out-of-state to care for family during the pandemic. He enjoys biking and hiking. Palliative Medicine is addressing the following current patient/family concerns: depression; anxiety; fatigue; chemotherapy-induced peripheral neuropathy; advance care planning. Initial Referral Intake note reviewed   PALLIATIVE DIAGNOSES:       ICD-10-CM ICD-9-CM    1. Anxiety  F41.9 300.00    2. Reactive depression  F32.9 300.4    3. Gait instability  R26.81 781.2    4. Peripheral neuropathy due to chemotherapy (HCC)  G62.0 357.7     T45. 1X5A E933.1    5. Fatigue, unspecified type  R53.83 780.79    6. Metastatic adenocarcinoma to prostate [facility])  C79.82 198.82           PLAN:   Patient Instructions     Dear Karly Valentin ,    It was a pleasure seeing you today in [facility].     We will see you again in 12 weeks to coordinate with your return to see Dr. Antionette Hein    If labs or imaging tests have been ordered for you today, please call the office  at [phone] 48 hours after completion to obtain the results. Your described symptoms were: Fatigue: 2 Drowsiness: 2   Depression: 0 Pain: 1   Anxiety: 4 Nausea: 1   Anorexia: 0 Dyspnea: 0   Best Well-Being: 3 Constipation: No   Other Problem (Comment): 0       This is the plan we talked about:    1. 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 Balance problems  -You've seen an ENT specialist who place a tube in your left ear  -Your balance is much improved since then    3. Chemotherapy-induced peripheral neuropathy  -We discussed different medications we could try (gabapentin, amitriptyline) to help with the sharp pain in your fingertips  -You're chosen to defer taking medications for this now    4. Fatigue  -This is chronic and unchanged    5. Metastatic prostate cancer  -You're currently being treated with Lupron alone    This is what you have shared with us about Advance Care Planning:      Primary Decision Maker: Adrien Romeo - [phone]  Advance Care Planning 4/1/2021   Patient's [address] is: Verbal statement (Legal Next of Kin remains as decision maker)   Confirm Advance Directive None   Patient Would Like to Complete Advance Directive -           The Palliative Medicine Team is here to support you and your family.        Sincerely,      Marley Esquivel MD and the Palliative Medicine Team                 GOALS OF CARE / TREATMENT PREFERENCES:   [====Goals of Care====]  GOALS OF CARE:  Patient / health care proxy stated goals: See Patient Instructions / Summary    TREATMENT PREFERENCES:   Code Status:  [x] Attempt Resuscitation       [] Do Not Attempt Resuscitation    Advance Care Planning:  [x] The Pall Med Interdisciplinary Team has updated the ACP Navigator with Decision Maker and Patient Capacity      Primary Decision MakerCcristal Renee - [phone]  Advance Care Planning 4/1/2021   Patient's Healthcare Decision Maker is: Verbal statement (Legal Next of Kin remains as decision maker)   Confirm Advance Directive None   Patient Would Like to Complete Advance Directive -       Other:  (If patient appropriate for POST, consider using PALLPOST smart phrase here)    The palliative care team has discussed with patient / health care proxy about goals of care / treatment preferences for patient.  [====Goals of Care====]     PRESCRIPTIONS GIVEN:     No orders of the defined types were placed in this encounter. FOLLOW UP:     Future Appointments   Date Time Provider Ayala Kidd   4/8/2021  9:00 AM Marco A Reyes, PT [facility]   5/13/2021  9:00 AM SS INF7 CH2 <1H [facility]   5/13/2021  9:15 AM Addie Leon, [facility] AMB   8/5/2021  9:00 AM SS INF7 CH2 <1H [facility]           PHYSICIANS INVOLVED IN CARE:   Patient Care Team:  Jonny Allen MD as PCP - General (Family Medicine)  Jonny Allen MD as PCP - [address] [place] [address] Empaneled Provider  Rad Sharp MD (Hematology and Oncology)  Lucita Abdullahi MD (Palliative Medicine)  Lucita Abdullahi MD as Physician (Palliative Medicine)       HISTORY:   Reviewed patient-completed ESAS and advance care planning form. Reviewed patient record in prescription monitoring program.    CHIEF COMPLAINT:   Chief Complaint   Patient presents with    Anxiety       HPI/SUBJECTIVE:    The patient is: [x] Verbal / [] Nonverbal     He's feeling better.     He went out on his bike recently, rode in a [employer] parking [place] since it'd been so long since he's ridden. He's going to look for some flat, easy rides. 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  He's been asked to consider tutoring and he's thinking about it. He went to a men's only Zoom support group for prostate cancer. He went for a few sessions, now feels he doesn't need that support. He went to see an ENT specialist about his gait problems. He had fluid in his left inner ear, now has a tube. He feels more balanced since this was placed. He's no longer using his hiking poles for balance when he walks. His neuropathy hasn't changed. He's sleeping well at night. He continues to experience mild fatigue during the day. He has no pain aside from the neuropathy. His PSA remains low so he's continuing with Lupron alone for his cancer treatment. He's received his first COVID vaccine and gets the second next week. From IV 11/4/2020:  He was diagnosed with prostate cancer last May. It was a shock, he didn't feel sick. He thought he had a hernia, then found out he has prostate cancer and that it had spread. Then his girlfriend, Faith Duran, moved out to go be with her family (out-of-state) to help with their care during the pandemic. He started chemo last summer. That's been going OK except for fatigue and now he has numbness in her fingertips. He sometimes drops things. He's not as active as he used to be. He likes to bike, he used to bike 20 miles a day. He also likes to hike. He doesn't have the energy to do much these day. He and Faith Duran used to do these things together. His appetite is good. He's gained ~20# since his cancer diagnosis (on steroids). This is discouraging as he had worked hard to lose weight. He has no pain. He's moving his bowels regularly.     Clinical Pain Assessment (nonverbal scale for nonverbal patients):   [++++ Clinical Pain Assessment++++]  [++++Pain Severity++++]: Pain: 1  [++++Pain Character++++]: nerve pain  [++++Pain Duration++++]:  [++++Pain Effect++++]: little  [++++Pain Factors++++]: no provoking factors  [++++Pain Frequency++++]: constant  [++++Pain Location++++]: fingertips  [++++ Clinical Pain Assessment++++]       FUNCTIONAL ASSESSMENT:     Palliative Performance Scale (PPS):  PPS: 70       PSYCHOSOCIAL/SPIRITUAL SCREENING:     Any spiritual / Jewish concerns:  [] Yes /  [x] No    Caregiver Burnout:  [] Yes /  [] No /  [x] No Caregiver Present      Anticipatory grief assessment:   [x] Normal  / [] Maladaptive       ESAS Anxiety: Anxiety: 4    ESAS Depression: Depression: 0       REVIEW OF SYSTEMS:     The following systems were [x] reviewed / [] unable to be reviewed  Systems: constitutional, ears/nose/mouth/throat, respiratory, gastrointestinal, genitourinary, musculoskeletal, integumentary, neurologic, psychiatric, endocrine. Positive findings noted below. Modified ESAS Completed by: provider   Fatigue: 2 Drowsiness: 2   Depression: 0 Pain: 1   Anxiety: 4 Nausea: 1   Anorexia: 0 Dyspnea: 0   Best Well-Being: 3 Constipation: No   Other Problem (Comment): 0          PHYSICAL EXAM:     Wt Readings from Last 3 Encounters:   04/01/21 205 lb (93 kg)   04/01/21 207 lb (93.9 kg)   02/20/21 212 lb (96.2 kg)     Blood pressure 125/77, pulse 69, temperature 97.8 °F (36.6 °C), temperature source Oral, resp. rate 18, weight 205 lb (93 kg), SpO2 96 %.   Last bowel movement: See Nursing Note    Constitutional: appears rested, relaxed  Eyes: pupils equal, anicteric  ENMT: no nasal discharge, moist mucous membranes  Cardiovascular: regular rhythm, no peripheral edema  Respiratory: breathing not labored, symmetric  Gastrointestinal: soft non-tender, +bowel sounds  Musculoskeletal: no deformity, no tenderness to palpation  Skin: warm, dry; fingernail discoloration  Neurologic: following commands, moving all extremities  Psychiatric: full affect, no hallucinations  Other:       HISTORY:     No past medical history on file. Past Surgical History:   Procedure Laterality Date    HX APPENDECTOMY  1964    HX OTHER SURGICAL      subcutaneous cyst of forehead    HX VASECTOMY        Family History   Problem Relation Age of Onset    Cancer Mother         breast    Cancer Father         prostate    Cancer Brother         melanoma    COPD Brother       History reviewed, no pertinent family history. Social History     Tobacco Use    Smoking status: Never Smoker    Smokeless tobacco: Never Used   Substance Use Topics    Alcohol use: Not Currently     Comment: In active recovery from alcohol use disorder     No Known Allergies   Current Outpatient Medications   Medication Sig    sertraline (ZOLOFT) 50 mg tablet Take 1 Tab by mouth daily.  amLODIPine (NORVASC) 5 mg tablet TAKE 1 TABLET BY MOUTH EVERY DAY    leuprolide acetate (LUPRON DEPOT IM) by IntraMUSCular route. Every 3 months    betamethasone dipropionate (DIPROSONE) 0.05 % topical cream Apply  to affected area two (2) times a day. Apply a thin layer to arms and legs twice daily. Use sparingly. Avoid face and sun.  potassium chloride SR (K-TAB) 20 mEq tablet TAKE 1 TABLET BY MOUTH EVERY DAY    degarelix (Firmagon) 120 mg solr injection by SubCUTAneous route once.  dexAMETHasone (DECADRON) 4 mg tablet Take 8mg (2 tabs) twice daily the day BEFORE chemotherapy and take 8mg (2 tabs) twice daily the day AFTER chemotherapy    ondansetron hcl (ZOFRAN) 8 mg tablet Take 1 Tab by mouth every eight (8) hours as needed for Nausea or Vomiting.  lidocaine-prilocaine (EMLA) topical cream Apply a dime size amount to port site 30-60 minutes before access on treatment days to numb port site.  prochlorperazine (Compazine) 10 mg tablet Take 0.5 Tabs by mouth every six (6) hours as needed for Nausea or Vomiting.     dextroamphetamine-amphetamine (ADDERALL) 10 mg tablet TAKE 1 TABLET TWICE A DAY FOR 30 DAYS    acyclovir (ZOVIRAX) 400 mg tablet Take 400 mg by mouth two (2) times a day.  glucose blood VI test strips (blood glucose test) strip Use to check blood sugars four times daily    lancets misc Use to check blood sugars four times daily    OneTouch Ultra2 Meter misc USE TO CHECK BLOOD SUGARS TWICE DAILY     No current facility-administered medications for this visit. Facility-Administered Medications Ordered in Other Visits   Medication Dose Route Frequency    sodium chloride (NS) flush 5-10 mL  5-10 mL IntraVENous PRN    0.9% sodium chloride injection 10 mL  10 mL IntraVENous PRN    heparin (porcine) pf 500 Units  500 Units IntraVENous PRN          LAB DATA REVIEWED:     Lab Results   Component Value Date/Time    WBC 6.7 04/01/2021 10:26 AM    HGB 12.6 04/01/2021 10:26 AM    PLATELET 324 95/17/0168 10:26 AM     Lab Results   Component Value Date/Time    Sodium 136 04/01/2021 10:26 AM    Potassium 3.5 04/01/2021 10:26 AM    Chloride 99 04/01/2021 10:26 AM    CO2 31 04/01/2021 10:26 AM    BUN 20 04/01/2021 10:26 AM    Creatinine 1.37 (H) 04/01/2021 10:26 AM    Calcium 9.4 04/01/2021 10:26 AM      Lab Results   Component Value Date/Time    Alk.  phosphatase 88 04/01/2021 10:26 AM    Protein, total 7.9 04/01/2021 10:26 AM    Albumin 4.5 04/01/2021 10:26 AM    Globulin 3.4 04/01/2021 10:26 AM     No results found for: INR, PTMR, PTP, PT1, PT2, APTT, INREXT, INREXT   No results found for: IRON, FE, TIBC, IBCT, PSAT, FERR        CONTROLLED SUBSTANCES SAFETY ASSESSMENT (IF ON CONTROLLED SUBSTANCES):     Reviewed opioid safety handout:  [] Yes   [] No  24 hour opioid dose >150mg morphine equivalent/day:  [] Yes   [] No  Benzodiazepines:  [] Yes   [] No  Sleep apnea:  [] Yes   [] No  Urine Toxicology Testing within last 6 months:  [] Yes   [] No  History of or new aberrant medication taking behaviors:  [] Yes   [] No  Has Narcan been prescribed [] Yes   [] No          Total time:   Counseling / coordination time:   > 50% counseling / coordination?:

## 2021-04-01 NOTE — PROGRESS NOTES
Palliative Medicine Office Visit  Palliative Medicine Nurse Check In  [phone]    Patient Name: Silverio Bernal  YOB: 1955      Date of Office Visit: 4/1/2021    Patient states: \"  \"    From Check In Sheet (scanned in Media):  Has a medical provider talked with you about cardiopulmonary resuscitation (CPR)? [] Yes   [] No   [] Unable to obtain    Nurse reminder to complete or update ACP FlowSheet:    Is ACP on the Problem List?    [] Yes    [] No  IF ACP Document is ON FILE; Nurse to place ACP on Problem List     Is there an ACP Note in Chart Review/Note? [] Yes    [] No   If NO: ALERT PROVIDER         Is there anything that we should know about you as a person in order to provide you the best care possible? Have you been to the ER, urgent care clinic since your last visit? [] Yes   [x] No   [] Unable to obtain    Have you been hospitalized since your last visit? [] Yes   [x] No   [] Unable to obtain    Have you seen or consulted any other health care providers outside of the [address] [place] [address] since your last visit?    [x] Yes   [] No   [] Unable to obtain    Urology - [facility] Dr. Sukhdev Rangel    Functional status (describe):         Last BM: 3/29/2021     accessed (date): 4/1/2021    Bottle review (for opioid pain medication):  Medication 1:   Date filled:   Directions:   # filled:   # left:   # pills taking per day:  Last dose taken:    Medication 2:   Date filled:   Directions:   # filled:   # left:   # pills taking per day:  Last dose taken:    Medication 3:   Date filled:   Directions:   # filled:   # left:   # pills taking per day:  Last dose taken:    Medication 4:   Date filled:   Directions:   # filled:   # left:   # pills taking per day:  Last dose taken:

## 2021-04-01 NOTE — PATIENT INSTRUCTIONS
Dear Lorenza Hinojosa ,    It was a pleasure seeing you today in [facility]. We will see you again in 12 weeks to coordinate with your return to see Dr. Tobie Duane    If labs or imaging tests have been ordered for you today, please call the office  at [phone] 48 hours after completion to obtain the results. Your described symptoms were: Fatigue: 2 Drowsiness: 2   Depression: 0 Pain: 1   Anxiety: 4 Nausea: 1   Anorexia: 0 Dyspnea: 0   Best Well-Being: 3 Constipation: No   Other Problem (Comment): 0       This is the plan we talked about:    1. 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 Balance problems  -You've seen an ENT specialist who place a tube in your left ear  -Your balance is much improved since then    3. Chemotherapy-induced peripheral neuropathy  -We discussed different medications we could try (gabapentin, amitriptyline) to help with the sharp pain in your fingertips  -You're chosen to defer taking medications for this now    4. Fatigue  -This is chronic and unchanged    5.  Metastatic prostate cancer  -You're currently being treated with Lupron alone    This is what you have shared with us about Advance Care Planning:      Primary Decision Maker: Tessie Allred - [phone]  Advance Care Planning 4/1/2021   Patient's [address] is: Verbal statement (Legal Next of Kin remains as decision maker) Confirm Advance Directive None   Patient Would Like to Complete Advance Directive -           The Palliative Medicine Team is here to support you and your family.        Sincerely,      Isauro Jack MD and the Palliative Medicine Team

## 2021-04-01 NOTE — PROGRESS NOTES
Chief Complaint   Patient presents with    Follow-up     Ridge Jordanveronica is a pleasant 76year old male who presents as a follow up for prostate cancer.  He denies pain

## 2021-04-02 NOTE — PROGRESS NOTES
Called patient and advised of lab results per July Steward NP. Patient voiced understanding. No further questions or concerns at this time.

## 2021-04-04 LAB
COMMENT, TESC2: ABNORMAL
TESTOST FREE SERPL-MCNC: 0.3 PG/ML (ref 6.6–18.1)
TESTOST SERPL-MCNC: <3 NG/DL (ref 264–916)

## 2021-04-08 ENCOUNTER — HOSPITAL ENCOUNTER (OUTPATIENT)
Dept: PHYSICAL THERAPY | Age: 66
Discharge: HOME | End: 2021-04-08
Payer: MEDICARE

## 2021-04-08 PROCEDURE — 97110 THERAPEUTIC EXERCISES: CPT | Performed by: PHYSICAL THERAPIST

## 2021-04-08 PROCEDURE — 97112 NEUROMUSCULAR REEDUCATION: CPT | Performed by: PHYSICAL THERAPIST

## 2021-04-08 PROCEDURE — 97162 PT EVAL MOD COMPLEX 30 MIN: CPT | Performed by: PHYSICAL THERAPIST

## 2021-04-08 PROCEDURE — 97530 THERAPEUTIC ACTIVITIES: CPT | Performed by: PHYSICAL THERAPIST

## 2021-04-08 NOTE — PROGRESS NOTES
PT ONCOLOGY EVAL/DAILY NOTE    Patient Name: Perlita Schmitz  Date:2021  : 1955  [x]  Patient  Verified  Payor: Joan Carranza / Plan: [place] & [place] / Product Type: Medicare /    In time:9:05  Out time:10:20  Total Treatment Time (min): 75  Total Timed Codes (min): 60  1:1 Treatment Time ( [address] only): 60   Visit #:     Treatment Area: Unsteadiness on feet [R26.81]    SUBJECTIVE    Current symptoms/Complaints: Pt reports that he has been having issues with \"my equilibrium\", last week went to an ENT dx with vertigo, tube into L ear to drain fluid, feeling better since this visit. Has had falls down stairs and when bending over and coming back up from standing. He feels like his balance is already improving. Pt denies pain but does report neuropathy into both hands/fingers. He has difficulty with \"everything\" fine motor tasks, experiences cramping, has difficulty writing. Pt has dark nail beds from chemotherapy and has developed a fungus which he was prescribed ointment for. Pt feels \"weak\" can't hold objects, drops a lot of things. Pt enjoys walking and exercise outdoors, has worked outside in [address] [place] [address] for past 26 years, has had R RTC shoulder problems and received \"shots\" and it got better so he could continue working. Pt states that his L leg feels stronger than R which he notices when going up and down stairs. 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 Flow cytometry negative for lymphoproliferative disorder.   [facility]: MS Stable, TMB 0, MDM4 amplification, MYC amplification, TMPRSS2 TMPRSS2-ERD fusion, CEBPA 1311fs*10, ERBB4 amplification - equivocal, MCL1 amplification, KRH5F8C amplification, RAD21 amplification. See scanned report for full info.     Abbreviated [facility] Pathology Consultation:  Final diagnosis: Right external iliac node: Metastatic prostate adenocarcinoma. Comment: Histologic sections of the right external iliac node show sheets of cells with minimal eosinophilic cytoplasm and variable cytologic atypia. Some areas show acinar formation. The cells have predominantly round nuclei with vesicular chromatin and prominent nucleoli. Some areas show significantly more atypia with more irregular nuclear borders, hyperchromatic nyclei, and increase nuclear to cytoplasmic ratio. Frequent mitotic figures are identified. A provided pane of IHC stains is reviewed and demonstrates that the malignant cells show strong, diffuse expression of pan-cytokeratin and focal expression of TTF. CK7, CK20, chromogranin, and synaptophysin are negative in the lesional cells. CD3 and CD20 are negative in the lesions cells and positive in the background lymphocytes. An abbreviated [facility] report was included, which included a TMPRSS2-ERG fusion (among other nonspecific abnormalities), which si found in approximately 50% of prostate cancers. Additional IHC studies performed at [facility] showed that the malignant cells have immunoreactivity with antibodies for PSA and PSAP, consistent with a diagnosis of metastatic prostate adenocarcinoma. Per report, flow cytometry negative for lymphoma. Overall, the histomorphology, immunophenotype and genomic findings in this case are diagnostic of metastatic prostate adenocarcinoma. The acinar formation and cytology were suggestive of this diagnosis, and the diffuse PSA and PSAP positivity as well as the TMPRSS2-ERG fusion confirmed the diagnosis.  This fusion is the most common genomic alteration in prostate carcinoma and can be detected in over half of the cases, yet is not seen with any frequency in other types of carcinoma.      Prior Treatment:   1. Carbo-Etoposide-Atezolizumab x 2 cycles, 6/29/20-7/20/20. 2. Docetaxel x 6 cycles, 8/10/20-11/24/20.      Current Treatment: Lupron every 12 weeks    Referring Provider: Jessica Madera MD   Medical Oncologist: Dr Shukri Ashley   Primary Care Physician: Gualberto Chatterjee MD      Next Appointment: May CT scans     Precautions/Indications: falls, infection         Pain Intensity:0 on a scale of 0-10   Has your activity changed since diagnosis? yes  Limitations/Prior level of functioning: Pt reports that prior to seeing the ENT, he was falling, having trouble with stairs but that this is improving. 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 Dominant Hand: right handed  Personal Goals: \"I like to get my strength and energy back, go hiking, ride my back, go up and down stairs\"  Home Situation (including environment, stairs, family support, if living alone, any DME used at home):  Pt lives in a 2 story home, moved bedroom to 1st floor.  Has 2 sons one in [place] and one in [address] to get into house (3 steps), has tub shower, no grab bars, pt has \"runner rugs\" that \"I need to secure\" Pt has a bike, and elliptical  Work Status: retired  Current meds: see chart, ACTIVE INFUSIONS  Barriers:    [x]N/A []pain []financial []time []transportation []other  Motivation: high  Substance use:   [x]N/A  []Alcohol []Tobacco []other:   Cognition: A & O x 3    Other: difficulty with STM, poor historian    OBJECTIVE    Ports/ Drains: L PAC  Skin/Soft Tissue: Skin of extremities dry, fingernail discoloration (being treated with a anti-fungal ointment)  Vitals: BP=  110/84    SP02=98%  HR=66  Outcome Measures: FOTO to be performed next visit  ROM: B UE's =WFL's except B wrist ext = 45 degrees flex=40 degrees, both with end range p!  B LE's = WFL's except B hip ext=0 degrees, df=3 degrees with report of \"tightness\" at end range  Strength: Grossly 4/5 except B , wrist flex/ext=3-/5 B hip ER/ext=2+/5 DF=3-/5  Mobility/Gait: 5 times sit to stand= 18 seconds        Feet together stance EO=  12 seconds    EC=  7 seconds   SLS = unable to perform. Pt ambulates with WBOS, no a.d and decreased B heel to toe progression x 150 ft             Palpation: N/A  Posture: forward head, B g-h jt IR and protracted scapulae, IR B hips  Breath pattern assessment  Diaphragm: able to initiate, not primary  Anterior chest:primary   Accessory respiratory muscle use: none       30 min Therapeutic Exercise:  [x] See flow sheet :   Rationale: increase ROM, increase strength and improve balance to improve the patients ability to perform ADL's required for return to work and/or community      15 min Therapeutic Activity:  []  See flow sheet :   Rationale: Pt education on infection and fall risk reduction, home modifications, importance of HEP, education on chemo induced peripheral neuropathy       15 min Neuromuscular Re-education:  [x]  See flow sheet :   Rationale: improve coordination, improve balance and increase proprioception  to improve the patients ability to incorporate correct movement patterns into functional ADL's             With   [] TE   [] TA   [] neuro   [] other: Patient Education: [] Review HEP    [] Progressed/Changed HEP based on:   [x] positioning   [x] body mechanics   [x] transfers   [] heat/ice application    [x] other: fall and infection risk reduction     Other Objective/Functional Measures:       Pain Level (0-10 scale) post treatment: 0    ASSESSMENT/Changes in Function: Pt is a 76 y.o male with Stage IV prostate cancer whose chief c/o is weakness and neuropathy in his hands. Pt presents with decreased motion and strength, poor posture and breath pattern, impaired gait and balance.  Patient will benefit from skilled PT services to address functional mobility deficits, address ROM deficits, address strength deficits, analyze and address soft tissue restrictions, analyze and cue movement patterns, analyze and modify body mechanics/ergonomics, assess and modify postural abnormalities, address imbalance/dizziness and instruct in home and community integration to address rehab goals per plan of care          Goals:  See Plan of Care    PLAN  []  Upgrade activities as tolerated     [x]  Continue plan of care  []  Update interventions per flow sheet       []  Discharge due to:_  []  Other:_      Reena Oswald, PT 4/8/2021  9:24 AM

## 2021-04-08 NOTE — PROGRESS NOTES
Physical Therapy at [facility],   a part of  [facility]  [address] [facility] Demetris Michele  Phone: [phone]  Fax: [phone]    Plan of Care/Statement of Necessity for Physical Therapy Services  2-15    Patient name: Ellie Baig  : 1955  Provider#: [id number]  Referral source: Jessica Madera MD      Medical/Treatment Diagnosis: Unsteadiness on feet [R26.81]     Prior Hospitalization: see medical history     Comorbidities: Prostate cancer, vertigo, CIPN  Prior Level of Function: No discomfort or difficulty performing ADL's or falls   Medications: Verified on Patient Summary List  Start of Care: 2021      Onset Date: 3/2021   The Plan of Care and following information is based on the information from the initial evaluation. Assessment/ key information: Pt is a 76 y.o male with Stage IV prostate cancer whose chief c/o is weakness and neuropathy in his hands. Pt presents with decreased motion and strength, poor posture and breath pattern, impaired gait and balance. Patient will benefit from skilled PT services to address functional mobility deficits, address ROM deficits, address strength deficits, analyze and address soft tissue restrictions, analyze and cue movement patterns, analyze and modify body mechanics/ergonomics, assess and modify postural abnormalities, address imbalance/dizziness and instruct in home and community integration to address rehab goals per plan of care    Evaluation Complexity History MEDIUM  Complexity : 1-2 comorbidities / personal factors will impact the outcome/ POC ; Examination MEDIUM Complexity : 3 Standardized tests and measures addressing body structure, function, activity limitation and / or participation in recreation  ;Presentation MEDIUM Complexity : Evolving with changing characteristics  ; Clinical Decision Making MEDIUM Complexity : FOTO score of 26-74  Overall Complexity Rating: MEDIUM    Problem List: decrease ROM, decrease strength, impaired gait/ balance, decrease ADL/ functional abilitiies and decrease activity tolerance   Treatment Plan may include any combination of the following: Therapeutic exercise, Therapeutic activities, Neuromuscular re-education, Physical agent/modality, Gait/balance training, Manual therapy, Patient education, Functional mobility training, Home safety training and Stair training  Patient / Family readiness to learn indicated by: asking questions, trying to perform skills and interest  Persons(s) to be included in education: patient (P)  Barriers to Learning/Limitations: None  Patient Goal (s): be able to use my hands, write and not worry about falling  Patient Self Reported Health Status: fair  Rehabilitation Potential: good    Short Term Goals: To be accomplished in 3 treatments:  1) Pt will be Independent with skin care routine to decrease risk of infection for UE and LE's. 2) Pt will be Independent in don/doff/wearing schedule of light B knee high compression stockings and B compression gloves if needed  3) Pt will know which signs and symptoms to report immediately to physician concerning their condition.     Long Term Goals: To be accomplished in 20 treatments:   1) Pt will be Independent with HEP for core and B LE strengthening, balance, and motion exercises in order to maintain gains achieved in therapy. 3) Pt will utilize correct breath and movement patterns during all transfers and ADL's involving dynamic standing and ambulation. 4) Pt will have improved TUG (<11 secs) , improved 5x sit to stand test to < 11 seconds and SLS (> than 15 secs) scores in order to decrease fall risk and increase pt's participation with activities involving family and returning to a regular exercise program of 150 mins/wk to include walking.   5) Pt will ambulate Independently with appropriate a.d. (if needed)  450ft or > in order to return to community activities and decrease fall risk. 6) Pt will perform sit to stand and supine to sit transfers Independently without LOB in order to decrease fall risk and increase safe mobility. Frequency / Duration: Patient to be seen 1-2 times per week for 20 treatments. Patient/ Caregiver education and instruction: activity modification, exercises and other pt education on fall and infection risk reduction, home safety modifications    [x]  Plan of care has been reviewed with PTA        Certification Period: 4/8/2021-7/8/2021  [address], PT 4/8/2021     ________________________________________________________________________    I certify that the above Therapy Services are being furnished while the patient is under my care. I agree with the treatment plan and certify that this therapy is necessary.     Physician's Signature:____________________  Date:____________Time: _________         Mita Steinberg MD

## 2021-04-12 ENCOUNTER — HOSPITAL ENCOUNTER (OUTPATIENT)
Dept: PHYSICAL THERAPY | Age: 66
Discharge: HOME | End: 2021-04-12
Payer: MEDICARE

## 2021-04-12 PROCEDURE — 97112 NEUROMUSCULAR REEDUCATION: CPT

## 2021-04-12 PROCEDURE — 97110 THERAPEUTIC EXERCISES: CPT

## 2021-04-12 NOTE — PROGRESS NOTES
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 Patient reports no abrasions on knees, but did \"scuff\" his finger.      OBJECTIVE    30 min Therapeutic Exercise:  [x] See flow sheet :   Rationale: increase ROM, increase strength, improve coordination and increase proprioception to improve the patients ability to sit, stand, lift, carry, reach, ambulate and complete ADL's    15 min Neuromuscular Re-education:  [x]  See flow sheet :   Rationale: increase ROM, increase strength, improve coordination, improve balance and increase proprioception  to improve the patients ability to sit, stand, lift, carry, reach, ambulate and complete ADL's            With   [] TE   [] TA   [] Neuro   [] SC   [] other: Patient Education: [x] Review HEP    [] Progressed/Changed HEP based on:   [] positioning   [] body mechanics   [] transfers   [] heat/ice application    [] other:      Other Objective/Functional Measures:    SpO2: 97%, HR 70, BP: 125/98    Foto completed: 61/100    Pain Level (0-10 scale) post treatment: 0    ASSESSMENT/Changes in Function:   Patient did well with UE interventions today with mod verbal and visual cues for proper reproduction. Patient will do well with continued progression to improve functional mobility and ADL's  Patient will continue to benefit from skilled PT services to modify and progress therapeutic interventions, address functional mobility deficits, address ROM deficits, address strength deficits, analyze and address soft tissue restrictions, analyze and cue movement patterns, analyze and modify body mechanics/ergonomics, assess and modify postural abnormalities and address imbalance/dizziness to attain remaining goals. []  See Plan of Care  []  See progress note/recertification  []  See Discharge Summary         Progress towards goals / Updated goals:  Patient making good initial progress towards goals. Will continue to progress as tolerated.      PLAN  [x]  Upgrade activities as tolerated     [x]  Continue plan of care  [x]  Update interventions per flow sheet       []  Discharge due to:_  []  Other:_      Zion Goel, PTA 4/12/2021

## 2021-04-16 ENCOUNTER — APPOINTMENT (OUTPATIENT)
Dept: PHYSICAL THERAPY | Age: 66
End: 2021-04-16
Payer: MEDICARE

## 2021-04-16 DIAGNOSIS — I10 ESSENTIAL HYPERTENSION: ICD-10-CM

## 2021-04-16 RX ORDER — AMLODIPINE BESYLATE 5 MG/1
TABLET ORAL
Qty: 30 TAB | Refills: 1 | Status: SHIPPED | OUTPATIENT
Start: 2021-04-16 | End: 2021-05-17

## 2021-04-19 ENCOUNTER — HOSPITAL ENCOUNTER (OUTPATIENT)
Dept: PHYSICAL THERAPY | Age: 66
Discharge: HOME | End: 2021-04-19
Payer: MEDICARE

## 2021-04-19 PROCEDURE — 97112 NEUROMUSCULAR REEDUCATION: CPT

## 2021-04-19 PROCEDURE — 97110 THERAPEUTIC EXERCISES: CPT

## 2021-04-19 NOTE — PROGRESS NOTES
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 Patient reports he continues to have some episodes of vertigo.     OBJECTIVE    30 min Therapeutic Exercise:  [x] See flow sheet :   Rationale: increase ROM, increase strength, improve coordination and increase proprioception to improve the patients ability to sit, stand, lift, carry, reach, ambulate and complete ADL's    30 min Neuromuscular Re-education:  [x]  See flow sheet :   Rationale: increase ROM, increase strength, improve coordination, improve balance and increase proprioception  to improve the patients ability to sit, stand, lift, carry, reach, ambulate and complete ADL's            With   [] TE   [] TA   [] Neuro   [] SC   [] other: Patient Education: [x] Review HEP    [] Progressed/Changed HEP based on:   [] positioning   [] body mechanics   [] transfers   [] heat/ice application    [] other:      Other Objective/Functional Measures:    Vitals:  BP after bike 137/109, after rest 137/98   HR: 70   SpO2: 93-96%    Mod to max cues for proper form with several exercises    NBOS stance EC/EO: 30 seconds no LOB      Pain Level (0-10 scale) post treatment: 0    ASSESSMENT/Changes in Function: Patient with good tolerance for interventions with mod cues for form and breath coordination. Patient will continue to benefit from skilled PT services to modify and progress therapeutic interventions, address functional mobility deficits, address ROM deficits, address strength deficits, analyze and address soft tissue restrictions, analyze and cue movement patterns, analyze and modify body mechanics/ergonomics, assess and modify postural abnormalities and address imbalance/dizziness to attain remaining goals. []  See Plan of Care  []  See progress note/recertification  []  See Discharge Summary         Progress towards goals / Updated goals:   Patient demonstrates good tolerance for interventions and will do well with continued progression.      PLAN  [x]  Upgrade activities as tolerated     [x]  Continue plan of care  [x]  Update interventions per flow sheet       []  Discharge due to:_  []  Other:_      Allison Arana, PTA 4/19/2021

## 2021-04-23 ENCOUNTER — HOSPITAL ENCOUNTER (OUTPATIENT)
Dept: PHYSICAL THERAPY | Age: 66
Discharge: HOME | End: 2021-04-23
Payer: MEDICARE

## 2021-04-23 DIAGNOSIS — E87.6 HYPOKALEMIA: ICD-10-CM

## 2021-04-23 PROCEDURE — 97110 THERAPEUTIC EXERCISES: CPT

## 2021-04-23 PROCEDURE — 97112 NEUROMUSCULAR REEDUCATION: CPT

## 2021-04-23 RX ORDER — POTASSIUM CHLORIDE 1500 MG/1
TABLET, FILM COATED, EXTENDED RELEASE ORAL
Qty: 90 TAB | Refills: 0 | Status: SHIPPED | OUTPATIENT
Start: 2021-04-23 | End: 2021-08-17

## 2021-04-23 NOTE — PROGRESS NOTES
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 Patient reports he did not realize he was not using the rail until he got to his car. Patient states he continues to get forearm tightness and pain after doing activities including cooking, cleaning, paperwork and computerwork. OBJECTIVE    30 min Therapeutic Exercise:  [x] See flow sheet :   Rationale: increase ROM, increase strength, improve coordination and increase proprioception to improve the patients ability to sit, stand, lift, carry, reach, ambulate and complete ADL's    30 min Neuromuscular Re-education:  [x]  See flow sheet :   Rationale: increase ROM, increase strength, improve coordination, improve balance and increase proprioception  to improve the patients ability to sit, stand, lift, carry, reach, ambulate and complete ADL's            With   [] TE   [] TA   [] Neuro   [] SC   [] other: Patient Education: [x] Review HEP    [x] Progressed/Changed HEP based on:   [] positioning   [x] body mechanics   [] transfers   [] heat/ice application    [x] other: importance of performing HEP as instructed, educated on fluid intake and skin care routine.       Other Objective/Functional Measures:    Vitals:  BP before bike 154/96, after bike 153/100, end of visit 135/100   HR: 63-68   SpO2: 93-96%    Mod to max cues for proper form with several exercises        Pain Level (0-10 scale) post treatment: 0    ASSESSMENT/Changes in Function:   Patient with good tolerance for interventions with mod cues for form and breath coordination. Patient will continue to benefit from skilled PT services to modify and progress therapeutic interventions, address functional mobility deficits, address ROM deficits, address strength deficits, analyze and address soft tissue restrictions, analyze and cue movement patterns, analyze and modify body mechanics/ergonomics, assess and modify postural abnormalities and address imbalance/dizziness to attain remaining goals. []  See Plan of Care  []  See progress note/recertification  []  See Discharge Summary         Progress towards goals / Updated goals:   Patient making slow overall progress towards long term goals. Will continue to encourage patient to perform interventions as instructed to improve ability to perform ADL's without increased symptoms. Short Term Goals: To be accomplished in 3 treatments:  1) Pt will be Independent with skin care routine to decrease risk of infection for UE and LE's. Progressing  2) Pt will be Independent in don/doff/wearing schedule of light B knee high compression stockings and B compression gloves if needed Met  3) Pt will know which signs and symptoms to report immediately to physician concerning their condition. Met     Long Term Goals: To be accomplished in 20 treatments:   1) Pt will be Independent with HEP for core and B LE strengthening, balance, and motion exercises in order to maintain gains achieved in therapy. 3) Pt will utilize correct breath and movement patterns during all transfers and ADL's involving dynamic standing and ambulation.   4) Pt will have improved TUG (<11 secs) , improved 5x sit to stand test to < 11 seconds and SLS (> than 15 secs) scores in order to decrease fall risk and increase pt's participation with activities involving family and returning to a regular exercise program of 150 mins/wk to include walking. 5) Pt will ambulate Independently with appropriate a.d. (if needed)  450ft or > in order to return to community activities and decrease fall risk. 6) Pt will perform sit to stand and supine to sit transfers Independently without LOB in order to decrease fall risk and increase safe mobility.     PLAN  [x]  Upgrade activities as tolerated     [x]  Continue plan of care  [x]  Update interventions per flow sheet       []  Discharge due to:_  []  Other:_      Jacinta Given, PTA 4/23/2021

## 2021-04-28 ENCOUNTER — HOSPITAL ENCOUNTER (OUTPATIENT)
Dept: PHYSICAL THERAPY | Age: 66
Discharge: HOME | End: 2021-04-28
Payer: MEDICARE

## 2021-04-28 PROCEDURE — 97112 NEUROMUSCULAR REEDUCATION: CPT

## 2021-04-28 PROCEDURE — 97110 THERAPEUTIC EXERCISES: CPT

## 2021-04-28 NOTE — PROGRESS NOTES
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 Patient states he is having a more difficult time remembering things like shutting off water to wash dishes, but is also multi-tasking a lot and forgets to set his timer to check things like running water and cooking meals in oven. OBJECTIVE    25 min Therapeutic Exercise:  [x] See flow sheet :   Rationale: increase ROM, increase strength, improve coordination and increase proprioception to improve the patients ability to sit, stand, lift, carry, reach, ambulate and complete ADL's    30 min Neuromuscular Re-education:  [x]  See flow sheet :   Rationale: increase ROM, increase strength, improve coordination, improve balance and increase proprioception  to improve the patients ability to sit, stand, lift, carry, reach, ambulate and complete ADL's            With   [] TE   [] TA   [] Neuro   [] SC   [] other: Patient Education: [x] Review HEP    [x] Progressed/Changed HEP based on:   [] positioning   [x] body mechanics   [] transfers   [] heat/ice application    [x] other: importance of performing HEP as instructed, educated on fluid intake and skin care routine.       Other Objective/Functional Measures:    Vitals:  BP pre exercise: 123/84   HR: 76   SpO2: 98%    Improved exercise tolerance and decreased fatigue/rest breaks        Pain Level (0-10 scale) post treatment: 0    ASSESSMENT/Changes in Function:   Significant improvement in activity tolerance and overall mobility noted today. Pt demonstrates decreased fatigue and decreased LOB with balance interventions. Patient will continue to benefit from skilled PT services to modify and progress therapeutic interventions, address functional mobility deficits, address ROM deficits, address strength deficits, analyze and address soft tissue restrictions, analyze and cue movement patterns, analyze and modify body mechanics/ergonomics, assess and modify postural abnormalities and address imbalance/dizziness to attain remaining goals. []  See Plan of Care  []  See progress note/recertification  []  See Discharge Summary         Progress towards goals / Updated goals:   Patient demonstrates significant improvements since last week. Will go over goals and measurements next visit. Short Term Goals: To be accomplished in 3 treatments:  1) Pt will be Independent with skin care routine to decrease risk of infection for UE and LE's. Progressing  2) Pt will be Independent in don/doff/wearing schedule of light B knee high compression stockings and B compression gloves if needed Met  3) Pt will know which signs and symptoms to report immediately to physician concerning their condition. Met     Long Term Goals: To be accomplished in 20 treatments:   1) Pt will be Independent with HEP for core and B LE strengthening, balance, and motion exercises in order to maintain gains achieved in therapy. 3) Pt will utilize correct breath and movement patterns during all transfers and ADL's involving dynamic standing and ambulation.   4) Pt will have improved TUG (<11 secs) , improved 5x sit to stand test to < 11 seconds and SLS (> than 15 secs) scores in order to decrease fall risk and increase pt's participation with activities involving family and returning to a regular exercise program of 150 mins/wk to include walking. 5) Pt will ambulate Independently with appropriate a.d. (if needed)  450ft or > in order to return to community activities and decrease fall risk. 6) Pt will perform sit to stand and supine to sit transfers Independently without LOB in order to decrease fall risk and increase safe mobility.     PLAN  [x]  Upgrade activities as tolerated     [x]  Continue plan of care  [x]  Update interventions per flow sheet       []  Discharge due to:_  []  Other:_      Shraddha & Linda, PTA 4/28/2021

## 2021-04-30 ENCOUNTER — HOSPITAL ENCOUNTER (OUTPATIENT)
Dept: PHYSICAL THERAPY | Age: 66
Discharge: HOME | End: 2021-04-30
Payer: MEDICARE

## 2021-04-30 PROCEDURE — 97110 THERAPEUTIC EXERCISES: CPT

## 2021-04-30 PROCEDURE — 97112 NEUROMUSCULAR REEDUCATION: CPT

## 2021-04-30 NOTE — PROGRESS NOTES
PT DAILY TREATMENT NOTE - [facility] 2-15    Patient Name: Sierra Sullivan  Date:2021  : 1955  [x]  Patient  Verified  Payor: Mariela Mood / Plan: [place] MEDICARE PART A & B / Product Type: Medicare /    In time:7:00a  Out time: 7:55a  Total Treatment Time (min): 55  Total Timed Codes (min): 55  1:1 Treatment Time ( [address] only): 54   Visit #:  6    Treatment Area: Unsteadiness on feet [R26.81]  Drug-induced polyneuropathy [G62.0]    SUBJECTIVE  Pain Level (0-10 scale): 0   Any medication changes, allergies to medications, adverse drug reactions, diagnosis change, or new procedure performed?: [x] No    [] Yes (see summary sheet for update)  Subjective functional status/changes:   [] No changes reported  Patient reports no falls since last visit and has been doing better with performing less multitasking at home to prevent rushing and forgetting important tasks. OBJECTIVE    25 min Therapeutic Exercise:  [x] See flow sheet :   Rationale: increase ROM, increase strength, improve coordination and increase proprioception to improve the patients ability to sit, stand, lift, carry, reach, ambulate and complete ADL's    30 min Neuromuscular Re-education:  [x]  See flow sheet :   Rationale: increase ROM, increase strength, improve coordination, improve balance and increase proprioception  to improve the patients ability to sit, stand, lift, carry, reach, ambulate and complete ADL's            With   [] TE   [] TA   [] Neuro   [] SC   [] other: Patient Education: [x] Review HEP    [x] Progressed/Changed HEP based on:   [] positioning   [x] body mechanics   [] transfers   [] heat/ice application    [x] other:       Other Objective/Functional Measures:    Vitals:  BP : 160/73 post UBE   HR: 68   SpO2: 98%    Improved exercise tolerance and decreased fatigue/rest breaks    Sharpened Rhomberg:    EO: 30 sec B.  Able to initiate and take step   without hand hold   EC: 5-6 sec B    SLS EO: R-2-3 sec, L-5 sec    Sit to stand x5: 18 seconds  TU sec, 1 LOB with turn, able to self correct   10 sec, no LOB second trial    6MWT :1180 ft        Pain Level (0-10 scale) post treatment: 0    ASSESSMENT/Changes in Function:   Significant improvement in activity tolerance and overall mobility noted today. Pt demonstrates decreased fatigue and decreased LOB with balance interventions. Patient will continue to benefit from skilled PT services to modify and progress therapeutic interventions, address functional mobility deficits, address ROM deficits, address strength deficits, analyze and address soft tissue restrictions, analyze and cue movement patterns, analyze and modify body mechanics/ergonomics, assess and modify postural abnormalities and address imbalance/dizziness to attain remaining goals. []  See Plan of Care  []  See progress note/recertification  []  See Discharge Summary         Progress towards goals / Updated goals:   Patient has been making good progress towards goals with overall improved strength and balance noted. Patient would continue to benefit from continued therapy to further improve strength, endurance and balance to be able to return to walking and other ADL's without LOB. Short Term Goals: To be accomplished in 3 treatments:  1) Pt will be Independent with skin care routine to decrease risk of infection for UE and LE's. Met  2) Pt will be Independent in don/doff/wearing schedule of light B knee high compression stockings and B compression gloves if needed Met  3) Pt will know which signs and symptoms to report immediately to physician concerning their condition. Met     Long Term Goals: To be accomplished in 20 treatments:   1) Pt will be Independent with HEP for core and B LE strengthening, balance, and motion exercises in order to maintain gains achieved in therapy. 3) Pt will utilize correct breath and movement patterns during all transfers and ADL's involving dynamic standing and ambulation. Progressing  4) Pt will have improved TUG (<11 secs) , improved 5x sit to stand test to < 11 seconds and SLS (> than 15 secs) scores in order to decrease fall risk and increase pt's participation with activities involving family and returning to a regular exercise program of 150 mins/wk to include walking. Progressing  5) Pt will ambulate Independently with appropriate a.d. (if needed)  450ft or > in order to return to community activities and decrease fall risk. Met  6) Pt will perform sit to stand and supine to sit transfers Independently without LOB in order to decrease fall risk and increase safe mobility.  Met    PLAN  [x]  Upgrade activities as tolerated     [x]  Continue plan of care  [x]  Update interventions per flow sheet       []  Discharge due to:_  []  Other:_      Chely Juárez, AROLDO 4/30/2021

## 2021-05-05 ENCOUNTER — HOSPITAL ENCOUNTER (OUTPATIENT)
Dept: PHYSICAL THERAPY | Age: 66
Discharge: HOME | End: 2021-05-05
Payer: MEDICARE

## 2021-05-05 PROCEDURE — 97112 NEUROMUSCULAR REEDUCATION: CPT

## 2021-05-05 PROCEDURE — 97110 THERAPEUTIC EXERCISES: CPT

## 2021-05-05 NOTE — PROGRESS NOTES
Physical Therapy at [facility],   a part of  [facility]  [address] [facility], [address]  Phone: [phone]      Fax:  [phone]    Progress Note    Name: Silverio Bernal   : 1955   MD: Isauro Jack MD       Treatment Diagnosis: Unsteadiness on feet [R26.81]  Drug-induced polyneuropathy [G62.0]  Start of Care: 2021    Visits from Start of Care: 7  Missed Visits: 1    Summary of Care:Pt has been receiving PT interventions for CIPN and generalized weakness, and poor endurance that  include balance and therapeutic exercises. Assessment / Recommendations: Pt has been able to advance several exercises with reduced rest breaks. He is making steady improvements in endurance and balance. Goal Status:   1) Pt will be Independent with skin care routine to decrease risk of infection for UE and LE's. Met  2) Pt will be Independent in don/doff/wearing schedule of light B knee high compression stockings and B compression gloves if needed Met  3) Pt will know which signs and symptoms to report immediately to physician concerning their condition. Met   4) Pt will be Independent with HEP for core and B LE strengthening, balance, and motion exercises in order to maintain gains achieved in therapy. 5) Pt will utilize correct breath and movement patterns during all transfers and ADL's involving dynamic standing and ambulation. Progressing  6) Pt will have improved TUG (<11 secs) , improved 5x sit to stand test to < 11 seconds and SLS (> than 15 secs) scores in order to decrease fall risk and increase pt's participation with activities involving family and returning to a regular exercise program of 150 mins/wk to include walking. Progressing  7) Pt will ambulate Independently with appropriate a.d. (if needed)  450ft or > in order to return to community activities and decrease fall risk.  Met  8) Pt will perform sit to stand and supine to sit transfers Independently without LOB in order to decrease fall risk and increase safe mobility.  Met       [address], PT 5/5/2021

## 2021-05-05 NOTE — PROGRESS NOTES
PT DAILY TREATMENT NOTE - [facility] 2-15    Patient Name: Gabriele Davis  Date:2021  : 1955  [x]  Patient  Verified  Payor: Toro Mckinney / Plan: [place] MEDICARE PART A & B / Product Type: Medicare /    In time:10:00a  Out time: 10:45  Total Treatment Time (min): 45  Total Timed Codes (min): 45  1:1 Treatment Time ( [address] only): 40   Visit #:  7    Treatment Area: Unsteadiness on feet [R26.81]  Drug-induced polyneuropathy [G62.0]    SUBJECTIVE  Pain Level (0-10 scale): 0   Any medication changes, allergies to medications, adverse drug reactions, diagnosis change, or new procedure performed?: [x] No    [] Yes (see summary sheet for update)  Subjective functional status/changes:   [] No changes reported  Patient reports he went to the beach and [place] and the most difficulty part was adjusting footing for the short and uneven steps. Patient reports no falls. OBJECTIVE    25 min Therapeutic Exercise:  [x] See flow sheet :   Rationale: increase ROM, increase strength, improve coordination and increase proprioception to improve the patients ability to sit, stand, lift, carry, reach, ambulate and complete ADL's    20 min Neuromuscular Re-education:  [x]  See flow sheet :   Rationale: increase ROM, increase strength, improve coordination, improve balance and increase proprioception  to improve the patients ability to sit, stand, lift, carry, reach, ambulate and complete ADL's            With   [] TE   [] TA   [] Neuro   [] SC   [] other: Patient Education: [x] Review HEP    [x] Progressed/Changed HEP based on:   [] positioning   [x] body mechanics   [] transfers   [] heat/ice application    [x] other:       Other Objective/Functional Measures:    Vitals:  BP : 130/91 pre exercise, 118/86 mid session   HR: 70-78   SpO2: 98%            Pain Level (0-10 scale) post treatment: 0    ASSESSMENT/Changes in Function:   Pt able to advance several exercises and required no rest breaks this visit.  ZACARIAS Flores improvements noted in endurance and balance. Patient will continue to benefit from skilled PT services to modify and progress therapeutic interventions, address functional mobility deficits, address ROM deficits, address strength deficits, analyze and address soft tissue restrictions, analyze and cue movement patterns, analyze and modify body mechanics/ergonomics, assess and modify postural abnormalities and address imbalance/dizziness to attain remaining goals. []  See Plan of Care  []  See progress note/recertification  []  See Discharge Summary         Progress towards goals / Updated goals:   Patient making steady progress towards long term goals and will do well with current POC. Short Term Goals: To be accomplished in 3 treatments:  1) Pt will be Independent with skin care routine to decrease risk of infection for UE and LE's. Met  2) Pt will be Independent in don/doff/wearing schedule of light B knee high compression stockings and B compression gloves if needed Met  3) Pt will know which signs and symptoms to report immediately to physician concerning their condition. Met     Long Term Goals: To be accomplished in 20 treatments:   1) Pt will be Independent with HEP for core and B LE strengthening, balance, and motion exercises in order to maintain gains achieved in therapy. 3) Pt will utilize correct breath and movement patterns during all transfers and ADL's involving dynamic standing and ambulation. Progressing  4) Pt will have improved TUG (<11 secs) , improved 5x sit to stand test to < 11 seconds and SLS (> than 15 secs) scores in order to decrease fall risk and increase pt's participation with activities involving family and returning to a regular exercise program of 150 mins/wk to include walking. Progressing  5) Pt will ambulate Independently with appropriate a.d. (if needed)  450ft or > in order to return to community activities and decrease fall risk.  Met  6) Pt will perform sit to stand and supine to sit transfers Independently without LOB in order to decrease fall risk and increase safe mobility.  Met    PLAN  [x]  Upgrade activities as tolerated     [x]  Continue plan of care  [x]  Update interventions per flow sheet       []  Discharge due to:_  []  Other:_      Rey Toledo, PTA 5/5/2021

## 2021-05-07 ENCOUNTER — HOSPITAL ENCOUNTER (OUTPATIENT)
Dept: PHYSICAL THERAPY | Age: 66
Discharge: HOME | End: 2021-05-07
Payer: MEDICARE

## 2021-05-07 PROCEDURE — 97110 THERAPEUTIC EXERCISES: CPT

## 2021-05-07 PROCEDURE — 97112 NEUROMUSCULAR REEDUCATION: CPT

## 2021-05-07 NOTE — PROGRESS NOTES
PT DAILY TREATMENT NOTE - [facility] 2-15    Patient Name: Destiny Ashley  Date:2021  : 1955  [x]  Patient  Verified  Payor: [place] MEDICARE / Plan: [place] MEDICARE PART A & B / Product Type: Medicare /    In time:10:50a  Out time: 11:45a  Total Treatment Time (min): 55  Total Timed Codes (min): 55  1:1 Treatment Time ( [address] only): 54   Visit #:  8    Treatment Area: Unsteadiness on feet [R26.81]  Drug-induced polyneuropathy [G62.0]    SUBJECTIVE  Pain Level (0-10 scale): 0   Any medication changes, allergies to medications, adverse drug reactions, diagnosis change, or new procedure performed?: [x] No    [] Yes (see summary sheet for update)  Subjective functional status/changes:   [] No changes reported  Patient reports he is able to move his fingers better to manipulate his paper work. Patient reports his balance is getting better and not have as much difficulty with his vertigo. OBJECTIVE    30 min Therapeutic Exercise:  [x] See flow sheet :   Rationale: increase ROM, increase strength, improve coordination and increase proprioception to improve the patients ability to sit, stand, lift, carry, reach, ambulate and complete ADL's    25 min Neuromuscular Re-education:  [x]  See flow sheet :   Rationale: increase ROM, increase strength, improve coordination, improve balance and increase proprioception  to improve the patients ability to sit, stand, lift, carry, reach, ambulate and complete ADL's            With   [] TE   [] TA   [] Neuro   [] SC   [] other: Patient Education: [x] Review HEP    [x] Progressed/Changed HEP based on:   [] positioning   [x] body mechanics   [] transfers   [] heat/ice application    [x] other:       Other Objective/Functional Measures:    Vitals:  BP : 127/98 pre exercise, 118/86 mid session   HR: 70-78   SpO2: 98%      Pain Level (0-10 scale) post treatment: 0    ASSESSMENT/Changes in Function:   Pt able to advance several exercises and required no rest breaks this visit.  ZACARIAS Flores improvements noted in endurance and balance. Patient will continue to benefit from skilled PT services to modify and progress therapeutic interventions, address functional mobility deficits, address ROM deficits, address strength deficits, analyze and address soft tissue restrictions, analyze and cue movement patterns, analyze and modify body mechanics/ergonomics, assess and modify postural abnormalities and address imbalance/dizziness to attain remaining goals. []  See Plan of Care  []  See progress note/recertification  []  See Discharge Summary         Progress towards goals / Updated goals:   Patient making steady progress towards long term goals and will do well with current POC. Short Term Goals: To be accomplished in 3 treatments:  1) Pt will be Independent with skin care routine to decrease risk of infection for UE and LE's. Met  2) Pt will be Independent in don/doff/wearing schedule of light B knee high compression stockings and B compression gloves if needed Met  3) Pt will know which signs and symptoms to report immediately to physician concerning their condition. Met     Long Term Goals: To be accomplished in 20 treatments:   1) Pt will be Independent with HEP for core and B LE strengthening, balance, and motion exercises in order to maintain gains achieved in therapy. 3) Pt will utilize correct breath and movement patterns during all transfers and ADL's involving dynamic standing and ambulation. Progressing  4) Pt will have improved TUG (<11 secs) , improved 5x sit to stand test to < 11 seconds and SLS (> than 15 secs) scores in order to decrease fall risk and increase pt's participation with activities involving family and returning to a regular exercise program of 150 mins/wk to include walking. Progressing  5) Pt will ambulate Independently with appropriate a.d. (if needed)  450ft or > in order to return to community activities and decrease fall risk.  Met  6) Pt will perform sit to stand and supine to sit transfers Independently without LOB in order to decrease fall risk and increase safe mobility.  Met    PLAN  [x]  Upgrade activities as tolerated     [x]  Continue plan of care  [x]  Update interventions per flow sheet       []  Discharge due to:_  []  Other:_      Chely Juárez, AROLDO 5/7/2021

## 2021-05-10 ENCOUNTER — HOSPITAL ENCOUNTER (OUTPATIENT)
Dept: PHYSICAL THERAPY | Age: 66
Discharge: HOME | End: 2021-05-10
Payer: MEDICARE

## 2021-05-10 PROCEDURE — 97112 NEUROMUSCULAR REEDUCATION: CPT

## 2021-05-10 PROCEDURE — 97110 THERAPEUTIC EXERCISES: CPT

## 2021-05-10 NOTE — PROGRESS NOTES
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 Patient states he has the most difficulty with vertigo with turning and continuing to walk and sometimes looses his balance, but is able to catch himself.        OBJECTIVE    30 min Therapeutic Exercise:  [x] See flow sheet :   Rationale: increase ROM, increase strength, improve coordination and increase proprioception to improve the patients ability to sit, stand, lift, carry, reach, ambulate and complete ADL's    25 min Neuromuscular Re-education:  [x]  See flow sheet :   Rationale: increase ROM, increase strength, improve coordination, improve balance and increase proprioception  to improve the patients ability to sit, stand, lift, carry, reach, ambulate and complete ADL's            With   [] TE   [] TA   [] Neuro   [] SC   [] other: Patient Education: [x] Review HEP    [x] Progressed/Changed HEP based on:   [] positioning   [x] body mechanics   [] transfers   [] heat/ice application    [x] other:       Other Objective/Functional Measures:    Vitals:  BP : 145/104 pre exercise, no changes mid session   HR: 70-78   SpO2: 98%      Pain Level (0-10 scale) post treatment: 0    ASSESSMENT/Changes in Function:   Pt able to advance several exercises and required no rest breaks this visit. Great improvements noted in endurance and balance. Patient will continue to benefit from skilled PT services to modify and progress therapeutic interventions, address functional mobility deficits, address ROM deficits, address strength deficits, analyze and address soft tissue restrictions, analyze and cue movement patterns, analyze and modify body mechanics/ergonomics, assess and modify postural abnormalities and address imbalance/dizziness to attain remaining goals. []  See Plan of Care  []  See progress note/recertification  []  See Discharge Summary         Progress towards goals / Updated goals:   Patient making steady progress towards long term goals and will do well with current POC. Short Term Goals: To be accomplished in 3 treatments:  1) Pt will be Independent with skin care routine to decrease risk of infection for UE and LE's. Met  2) Pt will be Independent in don/doff/wearing schedule of light B knee high compression stockings and B compression gloves if needed Met  3) Pt will know which signs and symptoms to report immediately to physician concerning their condition. Met     Long Term Goals: To be accomplished in 20 treatments:   1) Pt will be Independent with HEP for core and B LE strengthening, balance, and motion exercises in order to maintain gains achieved in therapy. 3) Pt will utilize correct breath and movement patterns during all transfers and ADL's involving dynamic standing and ambulation. Progressing  4) Pt will have improved TUG (<11 secs) , improved 5x sit to stand test to < 11 seconds and SLS (> than 15 secs) scores in order to decrease fall risk and increase pt's participation with activities involving family and returning to a regular exercise program of 150 mins/wk to include walking.  Progressing  5) Pt will ambulate Independently with appropriate a.d. (if needed)  450ft or > in order to return to community activities and decrease fall risk. Met  6) Pt will perform sit to stand and supine to sit transfers Independently without LOB in order to decrease fall risk and increase safe mobility.  Met    PLAN  [x]  Upgrade activities as tolerated     [x]  Continue plan of care  [x]  Update interventions per flow sheet       []  Discharge due to:_  []  Other:_      Leslie Middleton, PTA 5/10/2021

## 2021-05-11 ENCOUNTER — HOSPITAL ENCOUNTER (OUTPATIENT)
Dept: NUCLEAR MEDICINE | Age: 66
Discharge: HOME | End: 2021-05-11
Attending: NURSE PRACTITIONER
Payer: MEDICARE

## 2021-05-11 ENCOUNTER — HOSPITAL ENCOUNTER (OUTPATIENT)
Dept: CT IMAGING | Age: 66
Discharge: HOME | End: 2021-05-11
Attending: NURSE PRACTITIONER
Payer: MEDICARE

## 2021-05-11 DIAGNOSIS — C61 ADENOCARCINOMA OF PROSTATE, STAGE 4 (HCC): ICD-10-CM

## 2021-05-11 PROCEDURE — 74011000636 HC RX REV CODE- 636: Performed by: RADIOLOGY

## 2021-05-11 PROCEDURE — A9503 TC99M MEDRONATE: HCPCS

## 2021-05-11 PROCEDURE — 74177 CT ABD & PELVIS W/CONTRAST: CPT

## 2021-05-11 RX ADMIN — IOPAMIDOL 100 ML: 755 INJECTION, SOLUTION INTRAVENOUS at 12:06

## 2021-05-11 NOTE — PROGRESS NOTES
[facility]  Medical Oncology at [facility]  [phone]    Hematology / Oncology Established Visit    Reason for Visit:   Perlita Schmitz is a 76 y.o. male who is seen for follow up of neuroendocrine carcinoma. Initially referred by Dr. Keon Kebede. 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 Flow cytometry negative for lymphoproliferative disorder. FoundationOne: MS Stable, TMB 0, MDM4 amplification, MYC amplification, TMPRSS2 TMPRSS2-ERD fusion, CEBPA 1311fs*10, ERBB4 amplification - equivocal, MCL1 amplification, AMS2F6Y amplification, RAD21 amplification. See scanned report for full info. Abbreviated [facility] Pathology Consultation:  Final diagnosis: Right external iliac node: Metastatic prostate adenocarcinoma. Comment: Histologic sections of the right external iliac node show sheets of cells with minimal eosinophilic cytoplasm and variable cytologic atypia. Some areas show acinar formation. The cells have predominantly round nuclei with vesicular chromatin and prominent nucleoli. Some areas show significantly more atypia with more irregular nuclear borders, hyperchromatic nyclei, and increase nuclear to cytoplasmic ratio. Frequent mitotic figures are identified. A provided pane of IHC stains is reviewed and demonstrates that the malignant cells show strong, diffuse expression of pan-cytokeratin and focal expression of TTF. CK7, CK20, chromogranin, and synaptophysin are negative in the lesional cells. CD3 and CD20 are negative in the lesions cells and positive in the background lymphocytes.  An abbreviated FoundationOne report was included, which included a TMPRSS2-ERG fusion (among other nonspecific abnormalities), which si found in approximately 50% of prostate cancers. Additional IHC studies performed at [facility] showed that the malignant cells have immunoreactivity with antibodies for PSA and PSAP, consistent with a diagnosis of metastatic prostate adenocarcinoma. Per report, flow cytometry negative for lymphoma. Overall, the histomorphology, immunophenotype and genomic findings in this case are diagnostic of metastatic prostate adenocarcinoma. The acinar formation and cytology were suggestive of this diagnosis, and the diffuse PSA and PSAP positivity as well as the TMPRSS2-ERG fusion confirmed the diagnosis. This fusion is the most common genomic alteration in prostate carcinoma and can be detected in over half of the cases, yet is not seen with any frequency in other types of carcinoma. Prior Treatment:   1. Carbo-Etoposide-Atezolizumab x 2 cycles, 6/29/20-7/20/20. 2. Docetaxel x 6 cycles, 8/10/20-11/24/20. Current Treatment: Lupron every 12 weeks. History of Present Illness:   Wing Ramirez is a 76 y.o. male who comes in for evaluation of poorly differentiated prostate cancer. Pt noticed a nontender bulge in his LLQ in 5/2020. He thought this was a hernia and was evaluated by Dr. Corie Figueroa. CT on 5/20/20 was notable for extensive lymphadenopathy in abd/pelvis. Pt underwent robot assisted excisional Right external iliac LN biopsy 5/29/20, which showed poorly differentiated large cell neuroendocrine carcinoma. However, pathology reviewed at [facility] and felt to be poorly differentiated prostate adenocarcinoma. He reports intentional weight loss with dietary changes and exercise, losing 40-lbs in past 8 months. No n/v/d, melena/hematochezia, cough, SOB. No fevers, chills, sweats. [employer] nonsmoker. Mother had breast cancer diagnosed age < 48. Twin brother had melanoma diagnosed aged late 46s. He does not think he has ever had a colonoscopy or PSA screening prior to current diagnosis.     Interval History:  Patient here for follow up of prostate cancer, labs, lupron and review of imaging. Reports decreased hearing hearing in both ears. Has tympanostomy in place and plans to see ENT tomorrow. Still has neuropathy in hands but reports improvement. Hands intermittently cramp. Fingernails remain hyperpigmented. Using topical antifungal cream.   Reports stable appetite. Weight is stable. No diarrhea, constipation. No fevers or chills. No SOB or CP. No reports of dysuria or hematuria. He is trying to stay active biking and hiking. PMHx: Prostate cancer, HTN  PSurgHx: appendectomy, vasectomy, subcutaneous cyst removal on forehead  SHx:  Never smoker, no EtOH currently after prior alcohol abuse. Unmarried. Has 2 children, 27 and 32. 1 lives in [place]. FHx:  Mother had breast cancer, father had prostate cancer, brother has melanoma, COPD. Review of Systems: A complete review of systems was obtained, negative except as described above. Physical Exam:     Visit Vitals  /89   Pulse 68   Temp 97 °F (36.1 °C)   Resp 18   Ht 5' 10\" (1.778 m)   Wt 207 lb 10.8 oz (94.2 kg)   SpO2 96%   BMI 29.80 kg/m²     ECOG PS: 0  General: Well developed, no acute distress  Eyes: PERRLA, EOMI, anicteric sclerae  HENT: Atraumatic, OP clear, TMs intact without erythema  Neck: Supple, Left supraclavicular LN no longer palpable. Lymphatic: No cervical, supraclavicular, axillary or inguinal adenopathy  Respiratory: CTAB, normal respiratory effort  CV: Normal rate, regular rhythm, no murmurs, trace edema present bilateral lower extremities  GI: firm, nontender, mild distention, no hepatomegaly, no splenomegaly. Prior L lower abdomen 5cm mass no longer palpable. MS: Normal gait and station. Digits without clubbing or cyanosis. Skin:  Fingernail beds are dark and thickened No ecchymoses, or petechiae. Normal temperature, turgor, and texture. Neuro/Psych: Alert, oriented. 5/5 strength in all 4 extremities.  Appropriate affect, normal judgment/insight. Results:     Lab Results   Component Value Date/Time    WBC 6.1 2021 09:46 AM    HGB 11.8 (L) 2021 09:46 AM    HCT 35.9 (L) 2021 09:46 AM    PLATELET 482  09:46 AM    MCV 89.5 2021 09:46 AM    ABS. NEUTROPHILS 4.0 2021 09:46 AM     Lab Results   Component Value Date/Time    Sodium 134 (L) 2021 09:46 AM    Potassium 3.5 2021 09:46 AM    Chloride 99 2021 09:46 AM    CO2 31 2021 09:46 AM    Glucose 135 (H) 2021 09:46 AM    BUN 19 2021 09:46 AM    Creatinine 1.42 (H) 2021 09:46 AM    GFR est AA >60 2021 09:46 AM    GFR est non-AA 50 (L) 2021 09:46 AM    Calcium 8.6 2021 09:46 AM    Creatinine (POC) 1.3 10/29/2020 11:59 AM     Lab Results   Component Value Date/Time    Bilirubin, total 0.7 2021 09:46 AM    ALT (SGPT) 63 2021 09:46 AM    Alk. phosphatase 76 2021 09:46 AM    Protein, total 7.6 2021 09:46 AM    Albumin 4.1 2021 09:46 AM    Globulin 3.5 2021 09:46 AM     No results found for: IRON, FE, TIBC, IBCT, PSAT, FERR    No results found for: B12LT, FOL, RBCF  Lab Results   Component Value Date/Time    TSH 1.72 2020 09:58 AM     No results found for: HAMAT, HAAB, HABT, HAAT, HBSAG, HBSB, HBSAT, HBABN, HBCM, HBCAB, HBCAT, XBCABS, HBEAB, HBEAG, XHEPCS, [id number], HBEGLT, [place], HBCLT, HBEBLT, [id number], [id number], [address] [place], [place] [id number], [facility], [id number], HCGAT     20: Chromogranin 42    20: PSA 38.6  20: PSA 13.2   8/10/20: PSA 1.2  20: PSA 0.5  20: PSA 0.4  10/15/20: PSA 0.3  20: PSA 0.3  20: 0.2  21: PSA 0.1    3/22/21 PSA 0.066 (PSA ultrasensitive at [facility])   21 PSA 0.1, testosterone < 3  21 PSA PENDING     Imagin/20/20 CT abd/pelvis with IV contrast:  Impression:  1. No findings to suggest gross abdominal wall hernia or flank hernia.   2. Extensive adenopathy throughout the abdomen and pelvis as described. Findings are concerning for possible metastatic disease or lymphoma. Recommend follow up or comparison with prior studies. 3. Other findings as described. 6/18/20 PET:  FINDINGS:  HEAD/NECK: No apparent foci of abnormal hypermetabolism. Cerebral evaluation is  limited by normal intense activity. CHEST: Hypermetabolic lymphadenopathy at the base of the left neck and in the  left supraclavicular region is noted. Hypermetabolic superior mediastinal,  prevascular, right paratracheal, subcarinal, and bilateral hilar lymph  lymphadenopathy, maximum SUV of the subcarinal lymph node is 5.7. Small but  hypermetabolic soft tissue nodule left anterior chest wall. ABDOMEN/PELVIS: Hypermetabolic retrocrural, left para-aortic, aortocaval,  bilateral common iliac, bilateral external iliac, and bilateral inguinal  lymphadenopathy, maximum SUV 5.8 of an aortocaval lymph node. Hypermetabolic  mesenteric lymph node left lower quadrant, maximum SUV 12.3. SKELETON: No foci of abnormal hypermetabolism in the axial and visualized  appendicular skeleton. IMPRESSION: Hypermetabolic lymphadenopathy involving the left neck and left  supraclavicular region, mediastinum, bilateral hilum, retroperitoneum,  mesentery, and pelvis as described above. Hypermetabolic soft tissue nodule left  chest.    Brain MRI 6/27/20: IMPRESSION:  There is no evidence of intracranial metastatic disease. Mild chronic microvascular ischemic change. No intracranial mass, hemorrhage or evidence of acute infarction. CT c/a/p 8/3/20: IMPRESSION:  Chest:  1. Findings consistent with partial treatment response, with interval decrease  in size of mediastinal and hilar lymph nodes, and interval decrease in size of  left chest wall nodule. Abdomen/pelvis:   1. Findings consistent with partial treatment response, with interval decrease  in size of retroperitoneal lymph nodes.   2. Unchanged circumferential bladder wall thickening, which may represent acute  or chronic cystitis. Correlate with urinalysis. RECIST   TARGET LESIONS:      Lesion (description)         Location (series/slice)                Size            1. Posterior mediastinal lymph node    series 2 image 26    2.3 x 2.0 cm, previously 3.3 x 2.2 cm  2. Left upper paraesophageal lymph node    series 2 image 9    5 mm, previously 15 mm. 3. Right external iliac lymphadenopathy    series 2 image 98    3.8 x 3.2 cm, previously 6.3 x 4.7 cm  4. Left external iliac lymphadenopathy    series 2 image 99    1.6 cm, previously 2.5 cm    9/1/20 Bone Scan:  FINDINGS: There is physiologic uptake of radiotracer in the skeleton and soft tissues. There is no evidence of fracture, osteomyelitis or bone tumor. There is no pattern of skeletal metastases. IMPRESSION: Normal Whole Body Nuclear Bone Scan.    10/29/20 CT c/a/p:  Lesion (description):     Location (series/slice):  Size   1. Posterior mediastinal lymph node   2/27            1.1 x 1.4 cm, previously 2.3 x 2.0 cm (2/26)  2. Left upper paraesophageal lymph node  2/9           2 mm, previously 5 mm (2/9)   3. Right external iliac lymphadenopathy 2/21                3.8 x 3.2 cm (2/98)  4. Left external iliac lymphadenopathy  2/99              11 mm, previously 16 mm (2/99)   IMPRESSION:  1. Findings consistent with treatment response, with interval decrease in size  of mediastinal, retroperitoneal, and pelvic lymph nodes and no CT evidence of  new metastatic disease within the thorax, abdomen, or pelvis. 2. Unchanged circumferential bladder wall thickening, which may represent acute  or chronic cystitis.      2/1/21 CT c/a/p:  RECIST   TARGET LESIONS:  Lesion (description)         Location (series/slice)                Size      1. Left upper esophageal lymph node    series 2 image 10    1 mm previously measuring 2 mm  2. Azygos esophageal lymph node    2, 30    7 x 7 mm previously measuring 11 x 14 mm  3.  Right external iliac lymph node    2, 102    27 x 23 mm previously 27 x 31 mm  4. Left external iliac lymph node    2, 102    8 mm previously 11 mm  NONTARGET LESIONS:  None  IMPRESSION  1. Further decrease size of adenopathy as described. 2.  No evidence of new metastatic disease. 2/1/21 Bone scan:  Normal whole-body nuclear bone scan.    2/20/21 Brain MRI:  IMPRESSION  No acute intracranial findings no mass    5/11/21 CT c/a/p:  RECIST   TARGET LESIONS:  Lesion (description)         Location (series/slice)                Size      1. Upper left paraesophageal lymph node    series 2 image 13    2 mm unchanged  2. Azygoesophageal lymph node    series 2 image 33 measuring 7 x 5 mm     unchanged  3. Right external iliac lymph node    series 2 image 103    27 x 22 mm unchanged  4. Left external iliac lymph node    series 2 image 104    12 x 7 mm unchanged  NONTARGET LESIONS:  None  IMPRESSION  1. No evidence of new metastatic disease. Stable normal-sized lymph nodes in  the mediastinum and enlarged right external iliac lymph node. 2.  Other incidental findings as above    5/11/21 Bone scan:  FINDINGS: Physiologic uptake of radiotracer is seen in the skeleton and soft  tissues. There is no evidence for fracture or metastatic disease. Incidental  renal imaging shows no abnormality. IMPRESSION  No definite evidence of bony metastatic disease. Assessment & Plan:   Leroy Rodriguez is a 76 y.o. 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 1. Metastatic adenocarcinoma of prostate, high grade: Initial pathologist at [address] [place] [address] called this \"Large cell neuroendocrine carcinoma,\" and based on diffuse disease on PET, patient was started on treatment with Carbo-Etoposide-Atezolizumab (thinking this was large cell carcinoma of the lung.) However FoundationOne testing identified TMPRSS2-ERG fusion, which is primarily seen in prostate cancers. This along with elevated PSA of 38 prompted sending of pathology specimen to [facility] for review. Their opinion and IHC staining is consistent with high grade adenocarcinoma of prostate. Treatment options now include: Carboplatin + Cabazitaxel vs single agent Docetaxel. Pt signed consent for Docetaxel. CT on 2/1/21 showed good response to treatment. Bone scan 2/1/21 negative. CT 5/11/21 shows stable disease and bone scan negative for disease. Supportive medications: EMLA cream, zofran, compazine, dexamethasone  -- Lupron (3 month depot) #1 given 8/31/20. #2 on 11/24/20, #3 given 2/18/20, #4 due today 5/13/21, #5 due 8/5/21. Pt forgot about the injection given at our office and he received a 1 month Lupron injection at [facility] on 9/28/20. Discussed with Dr. Qasim Bill and Dr. Inge Sigala of [facility] that pt wants to get ADT here rather than at [facility] to consolidate appointments. -- Will now continue ADT alone, monitor PSA and scans periodically. Would consider starting Zytiga at time of progression on scans or symptomatic PSA progression. -- Plan on next CT and bone scan at 12 weeks from last scan. -- Return in 12 weeks for labs/portflush, Lupron, review CT scan, MD/NP visit. 2. H/o Hyperglycemia:   Was due to due to dexamethasone. Wolfgang Pyle assisting with mgmt of steroid induced hyperglycemia. BG runs 118-140. 3. 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 Difficulty with buttons and zippers. He is dropping objects less often. 4. Bladder wall thickening: Seen on CT scan, which may represent acute or chronic cystitis. UA negative. Forwarded CT scan to urology. 2/1/21 CT normal; 5/11/21 CT showed mild nonspecific urinary bladder wall thickening.   -- Forwarded CT scan to urology. 6. H/o Genital Herpes: On suppression with Acyclovir. 7. Renal insufficiency:   Likely 2/2 to decreased PO intake. Encouraged better oral hydration. 8. Depression/anxiety:   2/2 to #1. On Zoloft 50mg/d. Following with Palliative     9.  HTN:   Recommended lifestyle modification, but now controlled on Amlodipine 5mg/d. 10. Dysequilibrium: / Ear effusions:  Brain MRI negative for mets. Seeing ENT for bilateral effusions, Left tympanostomy tube placed and balance improved. Hearing worse today. Continues PT with [facility]. -- Follow up with ENT tomorrow to evaluate decreased hearing. 11. Hyponychium: Likely 2/2 to fungal infection. Continue OTC antifungal medication and recommend follow up with dermatology. -- Referral to dermatology placed. Emotional well being: Pt is coping well with his/her disease and has excellent support. Significant other: Kenyetta Schwab - [phone]. I appreciate the opportunity to participate in Mr. Rolly tabor. I have personally seen and evaluated the patient in conjunction with Ann Mcguire NP. I find the patient's history and physical exam are consistent with the NP's documentation. I agree with the above assessment and plan, which I have edited if needed. Pt has stable disease and will continue with Lupron and serial imaging every 3 months.      Signed By: Kemi Priest MD     May 13, 2021

## 2021-05-12 ENCOUNTER — HOSPITAL ENCOUNTER (OUTPATIENT)
Dept: PHYSICAL THERAPY | Age: 66
Discharge: HOME | End: 2021-05-12
Payer: MEDICARE

## 2021-05-12 PROBLEM — C34.00 MALIGNANT NEOPLASM OF HILUS OF LUNG (HCC): Status: RESOLVED | Noted: 2020-06-22 | Resolved: 2021-05-12

## 2021-05-12 PROCEDURE — 97110 THERAPEUTIC EXERCISES: CPT

## 2021-05-12 PROCEDURE — 97112 NEUROMUSCULAR REEDUCATION: CPT

## 2021-05-12 NOTE — PROGRESS NOTES
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 Patient reports he is scheduling a follow up with ENT as he is still having hearing and vertigo problems and feels this is more related to his hearing. Patient reports he has had a few episodes of vertigo and near falls with turning but is able to prevent the falls now.       OBJECTIVE    30 min Therapeutic Exercise:  [x] See flow sheet :   Rationale: increase ROM, increase strength, improve coordination and increase proprioception to improve the patients ability to sit, stand, lift, carry, reach, ambulate and complete ADL's    25 min Neuromuscular Re-education:  [x]  See flow sheet :   Rationale: increase ROM, increase strength, improve coordination, improve balance and increase proprioception  to improve the patients ability to sit, stand, lift, carry, reach, ambulate and complete ADL's            With   [] TE   [] TA   [] Neuro   [] SC   [] other: Patient Education: [x] Review HEP    [x] Progressed/Changed HEP based on:   [] positioning   [x] body mechanics   [] transfers   [] heat/ice application    [x] other:       Other Objective/Functional Measures: Vitals:  BP : 112/95 HR: 78   SpO2: 98%      Pain Level (0-10 scale) post treatment: 0    ASSESSMENT/Changes in Function:   Patient able to perform various walking activities without LOB including step overs, hurdles, uneven surfaces and turns. Patient will continue to benefit from skilled PT services to modify and progress therapeutic interventions, address functional mobility deficits, address ROM deficits, address strength deficits, analyze and address soft tissue restrictions, analyze and cue movement patterns, analyze and modify body mechanics/ergonomics, assess and modify postural abnormalities and address imbalance/dizziness to attain remaining goals. []  See Plan of Care  []  See progress note/recertification  []  See Discharge Summary         Progress towards goals / Updated goals:   Patient will do well with continued therapy focused on balance and strengthening to reach remaining goals. Short Term Goals: To be accomplished in 3 treatments:  1) Pt will be Independent with skin care routine to decrease risk of infection for UE and LE's. Met  2) Pt will be Independent in don/doff/wearing schedule of light B knee high compression stockings and B compression gloves if needed Met  3) Pt will know which signs and symptoms to report immediately to physician concerning their condition. Met     Long Term Goals: To be accomplished in 20 treatments:   1) Pt will be Independent with HEP for core and B LE strengthening, balance, and motion exercises in order to maintain gains achieved in therapy. 3) Pt will utilize correct breath and movement patterns during all transfers and ADL's involving dynamic standing and ambulation.  Progressing  4) Pt will have improved TUG (<11 secs) , improved 5x sit to stand test to < 11 seconds and SLS (> than 15 secs) scores in order to decrease fall risk and increase pt's participation with activities involving family and returning to a regular exercise program of 150 mins/wk to include walking. Progressing  5) Pt will ambulate Independently with appropriate a.d. (if needed)  450ft or > in order to return to community activities and decrease fall risk. Met  6) Pt will perform sit to stand and supine to sit transfers Independently without LOB in order to decrease fall risk and increase safe mobility.  Met    PLAN  [x]  Upgrade activities as tolerated     [x]  Continue plan of care  [x]  Update interventions per flow sheet       []  Discharge due to:_  []  Other:_      June Nation, PTA 5/12/2021

## 2021-05-13 ENCOUNTER — HOSPITAL ENCOUNTER (OUTPATIENT)
Dept: INFUSION THERAPY | Age: 66
Discharge: HOME | End: 2021-05-13
Payer: MEDICARE

## 2021-05-13 ENCOUNTER — OFFICE VISIT (OUTPATIENT)
Dept: ONCOLOGY | Age: 66
End: 2021-05-13
Payer: MEDICARE

## 2021-05-13 ENCOUNTER — TELEPHONE (OUTPATIENT)
Dept: ONCOLOGY | Age: 66
End: 2021-05-13

## 2021-05-13 VITALS
OXYGEN SATURATION: 96 % | DIASTOLIC BLOOD PRESSURE: 89 MMHG | BODY MASS INDEX: 29.73 KG/M2 | SYSTOLIC BLOOD PRESSURE: 138 MMHG | WEIGHT: 207.67 LBS | TEMPERATURE: 97 F | HEIGHT: 70 IN | HEART RATE: 68 BPM | RESPIRATION RATE: 18 BRPM

## 2021-05-13 VITALS
BODY MASS INDEX: 29.72 KG/M2 | WEIGHT: 207.6 LBS | TEMPERATURE: 97 F | HEIGHT: 70 IN | HEART RATE: 68 BPM | DIASTOLIC BLOOD PRESSURE: 89 MMHG | RESPIRATION RATE: 18 BRPM | SYSTOLIC BLOOD PRESSURE: 138 MMHG

## 2021-05-13 DIAGNOSIS — B35.1 NAIL FUNGUS: ICD-10-CM

## 2021-05-13 DIAGNOSIS — C79.82 METASTATIC ADENOCARCINOMA TO PROSTATE (HCC): Primary | ICD-10-CM

## 2021-05-13 DIAGNOSIS — C61 ADENOCARCINOMA OF PROSTATE, STAGE 4 (HCC): Primary | ICD-10-CM

## 2021-05-13 DIAGNOSIS — B35.1 ONYCHOMYCOSIS: ICD-10-CM

## 2021-05-13 DIAGNOSIS — G62.9 NEUROPATHY: ICD-10-CM

## 2021-05-13 DIAGNOSIS — B35.1 ONYCHOMYCOSIS: Primary | ICD-10-CM

## 2021-05-13 DIAGNOSIS — Z79.818 ENCOUNTER FOR MONITORING ANDROGEN DEPRIVATION THERAPY: ICD-10-CM

## 2021-05-13 DIAGNOSIS — R42 DYSEQUILIBRIUM: ICD-10-CM

## 2021-05-13 LAB
ALBUMIN SERPL-MCNC: 4.1 G/DL (ref 3.5–5)
ALBUMIN/GLOB SERPL: 1.2 {RATIO} (ref 1.1–2.2)
ALP SERPL-CCNC: 76 U/L (ref 45–117)
ALT SERPL-CCNC: 63 U/L (ref 12–78)
ANION GAP SERPL CALC-SCNC: 4 MMOL/L (ref 5–15)
AST SERPL-CCNC: 111 U/L (ref 15–37)
BASOPHILS # BLD: 0.1 K/UL (ref 0–0.1)
BASOPHILS NFR BLD: 2 % (ref 0–1)
BILIRUB SERPL-MCNC: 0.7 MG/DL (ref 0.2–1)
BUN SERPL-MCNC: 19 MG/DL (ref 6–20)
BUN/CREAT SERPL: 13 (ref 12–20)
CALCIUM SERPL-MCNC: 8.6 MG/DL (ref 8.5–10.1)
CHLORIDE SERPL-SCNC: 99 MMOL/L (ref 97–108)
CO2 SERPL-SCNC: 31 MMOL/L (ref 21–32)
CREAT SERPL-MCNC: 1.42 MG/DL (ref 0.7–1.3)
DIFFERENTIAL METHOD BLD: ABNORMAL
EOSINOPHIL # BLD: 0.2 K/UL (ref 0–0.4)
EOSINOPHIL NFR BLD: 3 % (ref 0–7)
ERYTHROCYTE [DISTWIDTH] IN BLOOD BY AUTOMATED COUNT: 15 % (ref 11.5–14.5)
GLOBULIN SER CALC-MCNC: 3.5 G/DL (ref 2–4)
GLUCOSE SERPL-MCNC: 135 MG/DL (ref 65–100)
HCT VFR BLD AUTO: 35.9 % (ref 36.6–50.3)
HGB BLD-MCNC: 11.8 G/DL (ref 12.1–17)
IMM GRANULOCYTES # BLD AUTO: 0.1 K/UL (ref 0–0.04)
IMM GRANULOCYTES NFR BLD AUTO: 1 % (ref 0–0.5)
LYMPHOCYTES # BLD: 1.4 K/UL (ref 0.8–3.5)
LYMPHOCYTES NFR BLD: 22 % (ref 12–49)
MCH RBC QN AUTO: 29.4 PG (ref 26–34)
MCHC RBC AUTO-ENTMCNC: 32.9 G/DL (ref 30–36.5)
MCV RBC AUTO: 89.5 FL (ref 80–99)
MONOCYTES # BLD: 0.4 K/UL (ref 0–1)
MONOCYTES NFR BLD: 7 % (ref 5–13)
NEUTS SEG # BLD: 4 K/UL (ref 1.8–8)
NEUTS SEG NFR BLD: 65 % (ref 32–75)
NRBC # BLD: 0 K/UL (ref 0–0.01)
NRBC BLD-RTO: 0 PER 100 WBC
PLATELET # BLD AUTO: 191 K/UL (ref 150–400)
PMV BLD AUTO: 11 FL (ref 8.9–12.9)
POTASSIUM SERPL-SCNC: 3.5 MMOL/L (ref 3.5–5.1)
PROT SERPL-MCNC: 7.6 G/DL (ref 6.4–8.2)
PSA SERPL-MCNC: 0.1 NG/ML (ref 0.01–4)
RBC # BLD AUTO: 4.01 M/UL (ref 4.1–5.7)
SODIUM SERPL-SCNC: 134 MMOL/L (ref 136–145)
WBC # BLD AUTO: 6.1 K/UL (ref 4.1–11.1)

## 2021-05-13 PROCEDURE — G8417 CALC BMI ABV UP PARAM F/U: HCPCS | Performed by: INTERNAL MEDICINE

## 2021-05-13 PROCEDURE — G8754 DIAS BP LESS 90: HCPCS | Performed by: INTERNAL MEDICINE

## 2021-05-13 PROCEDURE — G9717 DOC PT DX DEP/BP F/U NT REQ: HCPCS | Performed by: INTERNAL MEDICINE

## 2021-05-13 PROCEDURE — 99214 OFFICE O/P EST MOD 30 MIN: CPT | Performed by: INTERNAL MEDICINE

## 2021-05-13 PROCEDURE — 96402 CHEMO HORMON ANTINEOPL SQ/IM: CPT

## 2021-05-13 PROCEDURE — 36415 COLL VENOUS BLD VENIPUNCTURE: CPT

## 2021-05-13 PROCEDURE — 3017F COLORECTAL CA SCREEN DOC REV: CPT | Performed by: INTERNAL MEDICINE

## 2021-05-13 PROCEDURE — 1101F PT FALLS ASSESS-DOCD LE1/YR: CPT | Performed by: INTERNAL MEDICINE

## 2021-05-13 PROCEDURE — G8427 DOCREV CUR MEDS BY ELIG CLIN: HCPCS | Performed by: INTERNAL MEDICINE

## 2021-05-13 PROCEDURE — G0463 HOSPITAL OUTPT CLINIC VISIT: HCPCS | Performed by: NURSE PRACTITIONER

## 2021-05-13 PROCEDURE — G8536 NO DOC ELDER MAL SCRN: HCPCS | Performed by: INTERNAL MEDICINE

## 2021-05-13 PROCEDURE — 85025 COMPLETE CBC W/AUTO DIFF WBC: CPT

## 2021-05-13 PROCEDURE — 80053 COMPREHEN METABOLIC PANEL: CPT

## 2021-05-13 PROCEDURE — 74011250636 HC RX REV CODE- 250/636: Performed by: NURSE PRACTITIONER

## 2021-05-13 PROCEDURE — G8752 SYS BP LESS 140: HCPCS | Performed by: INTERNAL MEDICINE

## 2021-05-13 PROCEDURE — 84153 ASSAY OF PSA TOTAL: CPT

## 2021-05-13 RX ORDER — CICLOPIROX 80 MG/ML
SOLUTION TOPICAL
Qty: 6.6 ML | Refills: 0 | Status: SHIPPED | OUTPATIENT
Start: 2021-05-13 | End: 2021-10-28 | Stop reason: SDUPTHER

## 2021-05-13 RX ADMIN — LEUPROLIDE ACETATE 22.5 MG: KIT at 10:03

## 2021-05-13 NOTE — TELEPHONE ENCOUNTER
[address] at [facility] 88  [phone]        05/13/21 12:56 PM Attempted to call patient via home/cell number listed. No answer, left message requesting return call. Provided office phone number as well. Would like to notify patient that Alissa Townsend NP, escribed prescription for ciclopirox (topical medication). Patient should apply medication every night and stop using the OTC antifungal cream. If irritation occurs, patient should stop using medication and notify office. Would also like to review the following instructions with patient regarding how to use cilopirox: 1. Be sure that you have trimmed your nails properly before your first treatment. 2.Use the applicator brush attached to the bottle cap to apply ciclopirox topical solution evenly to all affected nails. Also apply the solution to the underside of the nail and the skin beneath it if you can reach these areas. 3.Wipe off the bottle cap and neck and replace the cap tightly on the bottle. 4.Let the solution dry for about 30 seconds   5. When it is time for your next dose, apply ciclopirox topical solution over the medication that is already on your nails. 6.Once a week, remove all the ciclopirox from your nail(s) with a cotton square or tissue soaked with rubbing alcohol.      2:56 PM Patient returned call. Reviewed above with patient. Will also send "[employer]" message so patient has copy of instructions. Patient voiced understanding. No further questions or concerns at this time.

## 2021-05-13 NOTE — PROGRESS NOTES
[facility] Short Visit Progress Note     Patient admitted to [address] [place] [address] for  Lupron ambulatory in stable condition. Assessment completed. No new concerns voiced. Covid Screening      1. Do you have any symptoms of COVID-19? SOB, coughing, fever, or generally not feeling well ? NO  2. Have you been exposed to COVID-19 recently? NO  3. Have you had any recent contact with family/friend that has a pending COVID test? NO    Vital Signs:  Visit Vitals  /89   Pulse 68   Temp 97 °F (36.1 °C)   Resp 18   Ht 5' 10\" (1.778 m)   Wt 94.2 kg (207 lb 9.6 oz)   BMI 29.79 kg/m²           Lab Results:  Recent Results (from the past 12 hour(s))   CBC WITH AUTOMATED DIFF    Collection Time: 05/13/21  9:46 AM   Result Value Ref Range    WBC 6.1 4.1 - 11.1 K/uL    RBC 4.01 (L) 4.10 - 5.70 M/uL    HGB 11.8 (L) 12.1 - 17.0 g/dL    HCT 35.9 (L) 36.6 - 50.3 %    MCV 89.5 80.0 - 99.0 FL    MCH 29.4 26.0 - 34.0 PG    MCHC 32.9 30.0 - 36.5 g/dL    RDW 15.0 (H) 11.5 - 14.5 %    PLATELET 471 050 - 136 K/uL    MPV 11.0 8.9 - 12.9 FL    NRBC 0.0 0  WBC    ABSOLUTE NRBC 0.00 0.00 - 0.01 K/uL    NEUTROPHILS 65 32 - 75 %    LYMPHOCYTES 22 12 - 49 %    MONOCYTES 7 5 - 13 %    EOSINOPHILS 3 0 - 7 %    BASOPHILS 2 (H) 0 - 1 %    IMMATURE GRANULOCYTES 1 (H) 0.0 - 0.5 %    ABS. NEUTROPHILS 4.0 1.8 - 8.0 K/UL    ABS. LYMPHOCYTES 1.4 0.8 - 3.5 K/UL    ABS. MONOCYTES 0.4 0.0 - 1.0 K/UL    ABS. EOSINOPHILS 0.2 0.0 - 0.4 K/UL    ABS. BASOPHILS 0.1 0.0 - 0.1 K/UL    ABS. IMM.  GRANS. 0.1 (H) 0.00 - 0.04 K/UL    DF AUTOMATED     METABOLIC PANEL, COMPREHENSIVE    Collection Time: 05/13/21  9:46 AM   Result Value Ref Range    Sodium 134 (L) 136 - 145 mmol/L    Potassium 3.5 3.5 - 5.1 mmol/L    Chloride 99 97 - 108 mmol/L    CO2 31 21 - 32 mmol/L    Anion gap 4 (L) 5 - 15 mmol/L    Glucose 135 (H) 65 - 100 mg/dL    BUN 19 6 - 20 MG/DL    Creatinine 1.42 (H) 0.70 - 1.30 MG/DL    BUN/Creatinine ratio 13 12 - 20      GFR est AA >60 >60 ml/min/1.73m2    GFR est non-AA 50 (L) >60 ml/min/1.73m2    Calcium 8.6 8.5 - 10.1 MG/DL    Bilirubin, total 0.7 0.2 - 1.0 MG/DL    ALT (SGPT) 63 12 - 78 U/L    AST (SGOT) 111 (H) 15 - 37 U/L    Alk. phosphatase 76 45 - 117 U/L    Protein, total 7.6 6.4 - 8.2 g/dL    Albumin 4.1 3.5 - 5.0 g/dL    Globulin 3.5 2.0 - 4.0 g/dL    A-G Ratio 1.2 1.1 - 2.2             Medications:  Medications Administered     leuprolide depot (LUPRON 3 MONTH) injection sykt 22.5 mg     Admin Date  05/13/2021 Action  Given Dose  22.5 mg Route  IntraMUSCular Administered By  Alex Ritter RN                 Patient tolerated treatment well. Patient discharged from [place] [address] ambulatory in no distress. Patient aware of next appointment.     Future Appointments   Date Time Provider Ayala Kidd   5/17/2021 10:45 AM [facility]   5/19/2021 10:00 AM Chong Shields [place]   5/24/2021 11:30 AM Anay Loja [place]   5/26/2021 10:00 AM [facility]   7/1/2021 10:30 AM Richard Block MD PCS BS AMB   8/5/2021  9:00 AM SS INF7 CH2 <1H [facility]

## 2021-05-13 NOTE — PROGRESS NOTES
Kidney function mildly elevated at 1.42. Recommend he increase hydration with water to 64 oz water daily. Glucose level mildly elevated but he was not fasting. Should establish with primary care provider so they check track glucose level and check cholesterol (if not already established). All other labs stable.

## 2021-05-13 NOTE — PROGRESS NOTES
Chief Complaint   Patient presents with    Follow-up     Dylon Goldman is a pleasant 76year old male who presents as a follow up for neuroendocrine cancer.  He reports hand pain

## 2021-05-14 ENCOUNTER — APPOINTMENT (OUTPATIENT)
Dept: PHYSICAL THERAPY | Age: 66
End: 2021-05-14
Payer: MEDICARE

## 2021-05-15 DIAGNOSIS — I10 ESSENTIAL HYPERTENSION: ICD-10-CM

## 2021-05-17 ENCOUNTER — HOSPITAL ENCOUNTER (OUTPATIENT)
Dept: PHYSICAL THERAPY | Age: 66
Discharge: HOME | End: 2021-05-17
Payer: MEDICARE

## 2021-05-17 PROCEDURE — 97110 THERAPEUTIC EXERCISES: CPT

## 2021-05-17 PROCEDURE — 97112 NEUROMUSCULAR REEDUCATION: CPT

## 2021-05-17 RX ORDER — AMLODIPINE BESYLATE 5 MG/1
TABLET ORAL
Qty: 30 TAB | Refills: 1 | Status: SHIPPED | OUTPATIENT
Start: 2021-05-17 | End: 2021-06-14
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
## 2021-05-18 DIAGNOSIS — C61 ADENOCARCINOMA OF PROSTATE, STAGE 4 (HCC): Primary | ICD-10-CM

## 2021-05-19 ENCOUNTER — HOSPITAL ENCOUNTER (OUTPATIENT)
Dept: PHYSICAL THERAPY | Age: 66
Discharge: HOME | End: 2021-05-19
Payer: MEDICARE

## 2021-05-19 PROCEDURE — 97112 NEUROMUSCULAR REEDUCATION: CPT

## 2021-05-19 PROCEDURE — 97110 THERAPEUTIC EXERCISES: CPT

## 2021-05-19 NOTE — PROGRESS NOTES
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        OBJECTIVE    30 min Therapeutic Exercise:  [x] See flow sheet :   Rationale: increase ROM, increase strength, improve coordination and increase proprioception to improve the patients ability to sit, stand, lift, carry, reach, ambulate and complete ADL's    25 min Neuromuscular Re-education:  [x]  See flow sheet :   Rationale: increase ROM, increase strength, improve coordination, improve balance and increase proprioception  to improve the patients ability to sit, stand, lift, carry, reach, ambulate and complete ADL's            With   [] TE   [] TA   [] Neuro   [] SC   [] other: Patient Education: [x] Review HEP    [x] Progressed/Changed HEP based on:   [] positioning   [x] body mechanics   [] transfers   [] heat/ice application    [x] other:       Other Objective/Functional Measures:    BP: 133/81, HR 88      Pain Level (0-10 scale) post treatment: 0    ASSESSMENT/Changes in Function:     Patient will continue to benefit from skilled PT services to modify and progress therapeutic interventions, address functional mobility deficits, address ROM deficits, address strength deficits, analyze and address soft tissue restrictions, analyze and cue movement patterns, analyze and modify body mechanics/ergonomics, assess and modify postural abnormalities and address imbalance/dizziness to attain remaining goals. []  See Plan of Care  []  See progress note/recertification  []  See Discharge Summary         Progress towards goals / Updated goals:   Patient making steady progress towards remaining goals with improvements noted in balance, strength and endurance. Patient continues to have most difficulty with walking turns secondary to increased dizziness. Will monitor symptoms next visit after tube placement surgery to see if this improves balance and ability to complete turns without increased symptoms. Short Term Goals: To be accomplished in 3 treatments:  1) Pt will be Independent with skin care routine to decrease risk of infection for UE and LE's. Met  2) Pt will be Independent in don/doff/wearing schedule of light B knee high compression stockings and B compression gloves if needed Met  3) Pt will know which signs and symptoms to report immediately to physician concerning their condition. Met     Long Term Goals: To be accomplished in 20 treatments:   1) Pt will be Independent with HEP for core and B LE strengthening, balance, and motion exercises in order to maintain gains achieved in therapy. 3) Pt will utilize correct breath and movement patterns during all transfers and ADL's involving dynamic standing and ambulation. Progressing  4) Pt will have improved TUG (<11 secs) , improved 5x sit to stand test to < 11 seconds and SLS (> than 15 secs) scores in order to decrease fall risk and increase pt's participation with activities involving family and returning to a regular exercise program of 150 mins/wk to include walking. Progressing  5) Pt will ambulate Independently with appropriate a.d. (if needed)  450ft or > in order to return to community activities and decrease fall risk.  Met  6) Pt will perform sit to stand and supine to sit transfers Independently without LOB in order to decrease fall risk and increase safe mobility.  Met    PLAN  [x]  Upgrade activities as tolerated     [x]  Continue plan of care  [x]  Update interventions per flow sheet       []  Discharge due to:_  []  Other:_      Elif Ruvalcaba, PTA 5/19/2021

## 2021-05-24 ENCOUNTER — HOSPITAL ENCOUNTER (OUTPATIENT)
Dept: PHYSICAL THERAPY | Age: 66
Discharge: HOME | End: 2021-05-24
Payer: MEDICARE

## 2021-05-24 ENCOUNTER — APPOINTMENT (OUTPATIENT)
Dept: PHYSICAL THERAPY | Age: 66
End: 2021-05-24
Payer: MEDICARE

## 2021-05-24 PROCEDURE — 97112 NEUROMUSCULAR REEDUCATION: CPT

## 2021-05-24 PROCEDURE — 97110 THERAPEUTIC EXERCISES: CPT

## 2021-05-24 NOTE — PROGRESS NOTES
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 Patient reports he does have occasional dizziness with turns, but has gotten much better and less frequent.        OBJECTIVE    30 min Therapeutic Exercise:  [x] See flow sheet :   Rationale: increase ROM, increase strength, improve coordination and increase proprioception to improve the patients ability to sit, stand, lift, carry, reach, ambulate and complete ADL's    25 min Neuromuscular Re-education:  [x]  See flow sheet :   Rationale: increase ROM, increase strength, improve coordination, improve balance and increase proprioception  to improve the patients ability to sit, stand, lift, carry, reach, ambulate and complete ADL's            With   [] TE   [] TA   [] Neuro   [] SC   [] other: Patient Education: [x] Review HEP    [x] Progressed/Changed HEP based on:   [] positioning   [x] body mechanics   [] transfers   [] heat/ice application    [x] other:       Other Objective/Functional Measures:    BP: 123/84      Pain Level (0-10 scale) post treatment: 0    ASSESSMENT/Changes in Function:     Patient will continue to benefit from skilled PT services to modify and progress therapeutic interventions, address functional mobility deficits, address ROM deficits, address strength deficits, analyze and address soft tissue restrictions, analyze and cue movement patterns, analyze and modify body mechanics/ergonomics, assess and modify postural abnormalities and address imbalance/dizziness to attain remaining goals. []  See Plan of Care  []  See progress note/recertification  []  See Discharge Summary         Progress towards goals / Updated goals:   Patient did well with all interventions with no LOB or dizziness. Will look to discharge next visit. Short Term Goals: To be accomplished in 3 treatments:  1) Pt will be Independent with skin care routine to decrease risk of infection for UE and LE's. Met  2) Pt will be Independent in don/doff/wearing schedule of light B knee high compression stockings and B compression gloves if needed Met  3) Pt will know which signs and symptoms to report immediately to physician concerning their condition. Met     Long Term Goals: To be accomplished in 20 treatments:   1) Pt will be Independent with HEP for core and B LE strengthening, balance, and motion exercises in order to maintain gains achieved in therapy. 3) Pt will utilize correct breath and movement patterns during all transfers and ADL's involving dynamic standing and ambulation. Progressing  4) Pt will have improved TUG (<11 secs) , improved 5x sit to stand test to < 11 seconds and SLS (> than 15 secs) scores in order to decrease fall risk and increase pt's participation with activities involving family and returning to a regular exercise program of 150 mins/wk to include walking. Progressing  5) Pt will ambulate Independently with appropriate a.d. (if needed)  450ft or > in order to return to community activities and decrease fall risk. Met  6) Pt will perform sit to stand and supine to sit transfers Independently without LOB in order to decrease fall risk and increase safe mobility.  Met    PLAN  [x]  Upgrade activities as tolerated     [x]  Continue plan of care  [x]  Update interventions per flow sheet       []  Discharge due to:_  []  Other:_      Tequila Andrews PTA 5/24/2021

## 2021-05-26 ENCOUNTER — HOSPITAL ENCOUNTER (OUTPATIENT)
Dept: PHYSICAL THERAPY | Age: 66
Discharge: HOME | End: 2021-05-26
Payer: MEDICARE

## 2021-05-26 PROCEDURE — 97110 THERAPEUTIC EXERCISES: CPT

## 2021-05-26 PROCEDURE — 97112 NEUROMUSCULAR REEDUCATION: CPT

## 2021-05-26 NOTE — PROGRESS NOTES
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       OBJECTIVE    30 min Therapeutic Exercise:  [x] See flow sheet :   Rationale: increase ROM, increase strength, improve coordination and increase proprioception to improve the patients ability to sit, stand, lift, carry, reach, ambulate and complete ADL's    25 min Neuromuscular Re-education:  [x]  See flow sheet :   Rationale: increase ROM, increase strength, improve coordination, improve balance and increase proprioception  to improve the patients ability to sit, stand, lift, carry, reach, ambulate and complete ADL's            With   [] TE   [] TA   [] Neuro   [] SC   [] other: Patient Education: [x] Review HEP    [x] Progressed/Changed HEP based on:   [] positioning   [x] body mechanics   [] transfers   [] heat/ice application    [x] other:       Other Objective/Functional Measures:    SLS: 10 seconds B on second trial  Sharpened Rhomberg: EO-30 seconds B, able to initiate step without UE and LOB, EC- 25-30 seconds  5x sit to stand: 12 seconds  TUG: 10 seconds, no LOB  6MWT: 1200ft        Pain Level (0-10 scale) post treatment: 0    ASSESSMENT/Changes in Function:      []  See Plan of Care  []  See progress note/recertification  [x] See Discharge Summary         Progress towards goals / Updated goals: The patient has completed 14 physical therapy visits and has met all short and long term goals. The patient scored a 65% on the FOTO  survey, an improvement from initial evaluation score of 61%. He has been able to return to enjoying outdoor activities with no LOB and minimal fatigue compared to start of therapy. The patient has maximized therapeutic benefit at this time and is ready for discharge to independent [facility]. Short Term Goals: To be accomplished in 3 treatments:  1) Pt will be Independent with skin care routine to decrease risk of infection for UE and LE's. Met  2) Pt will be Independent in don/doff/wearing schedule of light B knee high compression stockings and B compression gloves if needed Met  3) Pt will know which signs and symptoms to report immediately to physician concerning their condition. Met     Long Term Goals: To be accomplished in 20 treatments:   1) Pt will be Independent with HEP for core and B LE strengthening, balance, and motion exercises in order to maintain gains achieved in therapy. Met  3) Pt will utilize correct breath and movement patterns during all transfers and ADL's involving dynamic standing and ambulation. Met  4) Pt will have improved TUG (<11 secs) , improved 5x sit to stand test to < 11 seconds and SLS (> than 15 secs) scores in order to decrease fall risk and increase pt's participation with activities involving family and returning to a regular exercise program of 150 mins/wk to include walking. Nearly Met  5) Pt will ambulate Independently with appropriate a.d. (if needed)  450ft or > in order to return to community activities and decrease fall risk. Met  6) Pt will perform sit to stand and supine to sit transfers Independently without LOB in order to decrease fall risk and increase safe mobility.  Met    PLAN  []  Upgrade activities as tolerated     []  Continue plan of care  []  Update interventions per flow sheet       [x]  Discharge due to:_  []  Other:_      Woody Peppers, PTA 5/26/2021

## 2021-05-28 NOTE — PROGRESS NOTES
Physical Therapy at [facility],   a part of [facility]  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  The patient scored a 65% on the FOTO  survey, an improvement from initial evaluation score of 61%.  He has been able to return to enjoying outdoor activities with no LOB and minimal fatigue compared to start of therapy. The patient has maximized therapeutic benefit at this time and is ready for discharge to independent [facility].     SLS: 10 seconds B on second trial  Sharpened Rhomberg: EO-30 seconds B, able to initiate step without UE and LOB, EC- 25-30 seconds  5x sit to stand: 12 seconds  TUG: 10 seconds, no LOB  6MWT: 1200ft, good bilateral arm swing, heel to toe progression, able to navigate stationary and mobile obstacles in clinic      Short Term Goals: To be accomplished in 3 treatments:  1) Pt will be Independent with skin care routine to decrease risk of infection for UE and LE's.  Met  2) Pt will be Independent in don/doff/wearing schedule of light B knee high compression stockings and B compression gloves if needed Met  3) Pt will know which signs and symptoms to report immediately to physician concerning their condition. Met     Long Term Goals: To be accomplished in 20 treatments:   1) Pt will be Independent with HEP for core and B LE strengthening, balance, and motion exercises in order to maintain gains achieved in therapy. Met  3) Pt will utilize correct breath and movement patterns during all transfers and ADL's involving dynamic standing and ambulation. Met  4) Pt will have improved TUG (<11 secs) , improved 5x sit to stand test to < 11 seconds and SLS (> than 15 secs) scores in order to decrease fall risk and increase pt's participation with activities involving family and returning to a regular exercise program of 150 mins/wk to include walking. Nearly Met  5) Pt will ambulate Independently with appropriate a.d. (if needed)  450ft or > in order to return to community activities and decrease fall risk. Met  6) Pt will perform sit to stand and supine to sit transfers Independently without LOB in order to decrease fall risk and increase safe mobility.  Met         RECOMMENDATIONS:  [x]Discontinue therapy: [x]Patient has reached or is progressing toward set goals     []Patient is non-compliant or has abdicated     []Due to lack of appreciable progress towards set goals     []Other  [address], PT 5/28/2021

## 2021-06-13 DIAGNOSIS — I10 ESSENTIAL HYPERTENSION: ICD-10-CM

## 2021-06-14 RX ORDER — AMLODIPINE BESYLATE 5 MG/1
TABLET ORAL
Qty: 30 TABLET | Refills: 1 | Status: SHIPPED | OUTPATIENT
Start: 2021-06-14 | End: 2021-08-27

## 2021-07-01 ENCOUNTER — OFFICE VISIT (OUTPATIENT)
Dept: PALLATIVE CARE | Age: 66
End: 2021-07-01
Payer: MEDICARE

## 2021-07-01 VITALS
OXYGEN SATURATION: 98 % | SYSTOLIC BLOOD PRESSURE: 118 MMHG | DIASTOLIC BLOOD PRESSURE: 80 MMHG | TEMPERATURE: 98.4 F | WEIGHT: 207 LBS | RESPIRATION RATE: 20 BRPM | BODY MASS INDEX: 29.7 KG/M2 | HEART RATE: 75 BPM

## 2021-07-01 DIAGNOSIS — R53.83 FATIGUE, UNSPECIFIED TYPE: ICD-10-CM

## 2021-07-01 DIAGNOSIS — F41.9 ANXIETY: ICD-10-CM

## 2021-07-01 DIAGNOSIS — Z71.89 COUNSELING REGARDING ADVANCE CARE PLANNING AND GOALS OF CARE: ICD-10-CM

## 2021-07-01 DIAGNOSIS — T45.1X5A PERIPHERAL NEUROPATHY DUE TO CHEMOTHERAPY (HCC): ICD-10-CM

## 2021-07-01 DIAGNOSIS — F32.9 REACTIVE DEPRESSION: Primary | ICD-10-CM

## 2021-07-01 DIAGNOSIS — G62.0 PERIPHERAL NEUROPATHY DUE TO CHEMOTHERAPY (HCC): ICD-10-CM

## 2021-07-01 DIAGNOSIS — R26.81 GAIT INSTABILITY: ICD-10-CM

## 2021-07-01 PROCEDURE — G9717 DOC PT DX DEP/BP F/U NT REQ: HCPCS | Performed by: INTERNAL MEDICINE

## 2021-07-01 PROCEDURE — 1101F PT FALLS ASSESS-DOCD LE1/YR: CPT | Performed by: INTERNAL MEDICINE

## 2021-07-01 PROCEDURE — 3017F COLORECTAL CA SCREEN DOC REV: CPT | Performed by: INTERNAL MEDICINE

## 2021-07-01 PROCEDURE — G8752 SYS BP LESS 140: HCPCS | Performed by: INTERNAL MEDICINE

## 2021-07-01 PROCEDURE — G8536 NO DOC ELDER MAL SCRN: HCPCS | Performed by: INTERNAL MEDICINE

## 2021-07-01 PROCEDURE — G8417 CALC BMI ABV UP PARAM F/U: HCPCS | Performed by: INTERNAL MEDICINE

## 2021-07-01 PROCEDURE — 99214 OFFICE O/P EST MOD 30 MIN: CPT | Performed by: INTERNAL MEDICINE

## 2021-07-01 PROCEDURE — G8754 DIAS BP LESS 90: HCPCS | Performed by: INTERNAL MEDICINE

## 2021-07-01 PROCEDURE — G8427 DOCREV CUR MEDS BY ELIG CLIN: HCPCS | Performed by: INTERNAL MEDICINE

## 2021-07-01 NOTE — PROGRESS NOTES
Palliative Medicine Office Visit  Palliative Medicine Nurse Check In  [phone]    Patient Name: Arvind Santiago  YOB: 1955      Date of Office Visit: 7/1/2021    Patient states: \"  \"    From Check In Sheet (scanned in Media):  Has a medical provider talked with you about cardiopulmonary resuscitation (CPR)? [x] Yes   [] No   [] Unable to obtain    Nurse reminder to complete or update ACP FlowSheet:    Is ACP on the Problem List?    [] Yes    [x] No  IF ACP Document is ON FILE; Nurse to place ACP on Problem List     Is there an ACP Note in Chart Review/Note? [] Yes    [x] No   If NO: ALERT PROVIDER       Primary Decision MakerRenaramón Serafina - [phone]  Advance Care Planning 7/1/2021   Patient's Healthcare Decision Maker is: Verbal statement (Legal Next of Kin remains as decision maker)   Confirm Advance Directive None   Patient Would Like to Complete Advance Directive -       Is there anything that we should know about you as a person in order to provide you the best care possible? Have you been to the ER, urgent care clinic since your last visit? [] Yes   [x] No   [] Unable to obtain    Have you been hospitalized since your last visit? [] Yes   [x] No   [] Unable to obtain    Have you seen or consulted any other health care providers outside of the [address] [place] [address] since your last visit?    [x] Yes   [] No   [] Unable to obtain     Dr. Rafiq Abbasi Urology   Dr. Martine Sparks ENT    Functional status (describe):         Last BM: 6/30/2021     accessed (date): 7/1/2021    Bottle review (for opioid pain medication):  Medication 1:   Date filled:   Directions:   # filled:   # left:   # pills taking per day:  Last dose taken:    Medication 2:   Date filled:   Directions:   # filled:   # left:   # pills taking per day:  Last dose taken:    Medication 3:   Date filled:   Directions:   # filled:   # left:   # pills taking per day:  Last dose taken:    Medication 4:   Date filled:   Directions:   # filled:   # left:   # pills taking per day:  Last dose taken:

## 2021-07-01 NOTE — PATIENT INSTRUCTIONS
Dear Wilberto Morrison ,    It was a pleasure seeing you today in [facility]. We will see you again in 12 weeks in our office. If labs or imaging tests have been ordered for you today, please call the office  at [phone] 48 hours after completion to obtain the results. Your described symptoms were: Fatigue: 3 Drowsiness: 3   Depression: 3 Pain: 1   Anxiety: 2 Nausea: 0   Anorexia: 3 Dyspnea: 3   Best Well-Being: 3 Constipation: No   Other Problem (Comment): 0       This is the plan we talked about:    1. Depression/anxiety  -Continue sertraline to 50-mg daily  -You participated in [employer] groups through the [facility] in the past and found these helpful  -You've started going out to enjoy live music and going to the local [employer]  -You feel supported by your family and friends  -Carito Niño not interested in pursuing individual counseling or participating in a support group at this time    2. Chemotherapy-induced peripheral neuropathy  -This is chronic and unchanged  -We discussed different medications we could try (gabapentin, amitriptyline) to help with the sharp pain in your fingertips  -You're chosen to defer taking medications for this now    3. Fatigue  -This is chronic and unchanged  -You're sleeping well at night  -You're attempting to increase your physical activity    4. Gait instability   -This improved after you saw an ENT and had tubes placed to drain the fluid from your middle ear    5.  Advance care planning  -Today we updated your primary and secondary medical decision makers  -We also began a conversation about your wishes for hospitalization, use of a ventilator and attempts at resuscitation/CPR if you were to be seriously ill and thought not likely to recover  -You're unsure at this point about whether you would want any of these interventions in this clinical scenario  -We briefly reviewed the [place] Directive form and gave you a copy to bring home   -We can look at this together at your next visit    6. Metastatic prostate cancer  -You're currently being treated with Lupron and Firmagon under the care of your urologist and Dr. Jacob Javed    This is what you have shared with us about Advance Care Planning:      Primary Decision Maker: Misty Morales - [place] - [phone]    Secondary Decision Maker: [facility] - [phone]  Advance Care Planning 7/1/2021   Patient's Healthcare Decision Maker is: Verbal statement (Legal Next of Kin remains as decision maker)   Confirm Advance Directive None   Patient Would Like to Complete Advance Directive -           The Palliative Medicine Team is here to support you and your family.        Sincerely,      Miladis Garcia MD and the Palliative Medicine Team

## 2021-07-01 NOTE — PROGRESS NOTES
Palliative Medicine Outpatient Services  [facility]: [phone])    Patient Name: Shawanda Siu  YOB: 1955    Date of Current Visit: 07/01/21  Location of Current Visit:    [] [facility] Office  [x] [facility] Office  [] [facility] Office  [] Home  []Synchronous real-time A/V virtual visit    Date of Initial Visit: 11/4/2020   Referral from: Flaquita Delgadillo MD  Primary Care Physician: Florinda Woodward MD      SUMMARY:   Shawanda Siu is a 78y.o. year old with a  history of metastatic prostate cancer to lymph nodes, who was referred to Palliative Medicine by Dr. Marianne Rosas for symptom management and psychosocial support. He was initially diagnosed in 6/2020 and is currently being treated with Lupron. The patients social history includes: he is a retired  with a previous career as a [employer]. He is . He has a significant other, Raymundo Harris, who previously lived with him prior to moving out-of-state to care for family during the pandemic. He enjoys biking and hiking. Palliative Medicine is addressing the following current patient/family concerns: depression; anxiety; fatigue; chemotherapy-induced peripheral neuropathy; advance care planning. Initial Referral Intake note reviewed   PALLIATIVE DIAGNOSES:       ICD-10-CM ICD-9-CM    1. Reactive depression  F32.9 300.4    2. Anxiety  F41.9 300.00    3. Peripheral neuropathy due to chemotherapy (HCC)  G62.0 357.7     T45. 1X5A E933.1    4. Fatigue, unspecified type  R53.83 780.79    5. Gait instability  R26.81 781.2    6. Counseling regarding advance care planning and goals of care  Z71.89 V65.49           PLAN:   Patient Instructions     Dear Shawanda Siu ,    It was a pleasure seeing you today in [facility]. We will see you again in 12 weeks in our office. If labs or imaging tests have been ordered for you today, please call the office  at [phone] 48 hours after completion to obtain the results. Your described symptoms were: Fatigue: 3 Drowsiness: 3   Depression: 3 Pain: 1   Anxiety: 2 Nausea: 0   Anorexia: 3 Dyspnea: 3   Best Well-Being: 3 Constipation: No   Other Problem (Comment): 0       This is the plan we talked about:    1. Depression/anxiety  -Continue sertraline to 50-mg daily  -You participated in [employer] [address] groups through the [facility] in the past and found these helpful  -You've started going out to enjoy live music and going to the local [employer]  -You feel supported by your family and friends  -Maggie Henry not interested in pursuing individual counseling or participating in a support group at this time    2. Chemotherapy-induced peripheral neuropathy  -This is chronic and unchanged  -We discussed different medications we could try (gabapentin, amitriptyline) to help with the sharp pain in your fingertips  -You're chosen to defer taking medications for this now    3. Fatigue  -This is chronic and unchanged  -You're sleeping well at night  -You're attempting to increase your physical activity    4. Gait instability   -This improved after you saw an ENT and had tubes placed to drain the fluid from your middle ear    5. Advance care planning  -Today we updated your primary and secondary medical decision makers  -We also began a conversation about your wishes for hospitalization, use of a ventilator and attempts at resuscitation/CPR if you were to be seriously ill and thought not likely to recover  -You're unsure at this point about whether you would want any of these interventions in this clinical scenario  -We briefly reviewed the [place] Directive form and gave you a copy to bring home   -We can look at this together at your next visit    6.  Metastatic prostate cancer  -You're currently being treated with Lupron and Firmagon under the care of your urologist and Dr. Hortencia Bañuelos    This is what you have shared with us about Advance Care Planning:      Primary Decision Maker: Christina Brown - [phone]    Secondary Decision Maker: Jaime Charles Ex-Spouse - [phone]  Advance Care Planning 7/1/2021   Patient's Healthcare Decision Maker is: Verbal statement (Legal Next of Kin remains as decision maker)   Confirm Advance Directive None   Patient Would Like to Complete Advance Directive -           The Palliative Medicine Team is here to support you and your family. Sincerely,      Logan Mixon MD and the Palliative Medicine Team                 GOALS OF CARE / TREATMENT PREFERENCES:   [====Goals of Care====]  GOALS OF CARE:  Patient / health care proxy stated goals: See Patient Instructions / Summary    TREATMENT PREFERENCES:   Code Status:  [x] Attempt Resuscitation       [] Do Not Attempt Resuscitation    Advance Care Planning:  [x] The [facility] Interdisciplinary Team has updated the ACP Navigator with Decision Maker and Patient Capacity      Primary Decision MakerEual [facility] - [phone]    Secondary Decision Maker: Jaime DillardSpouse - [phone]  Advance Care Planning 7/1/2021   Patient's Healthcare Decision Maker is: Verbal statement (Legal Next of Kin remains as decision maker)   Confirm Advance Directive None   Patient Would Like to Complete Advance Directive -       Other:  (If patient appropriate for POST, consider using PALLPOST smart phrase here)    The palliative care team has discussed with patient / health care proxy about goals of care / treatment preferences for patient.  [====Goals of Care====]     PRESCRIPTIONS GIVEN:     No orders of the defined types were placed in this encounter. FOLLOW UP:     Future Appointments   Date Time Provider Ayala Kidd   7/29/2021  7:30 AM [facility] NM INJ RM 1 [facility]NM [facility]   7/29/2021  9:30 AM [facility] CT 2 SFMRCT [facility]   7/29/2021 10:30 AM [facility] NM RM 2 [facility]NM [facility]   8/5/2021  9:00 AM SS INF7 CH2 <1H R[facility]ICS [facility]   8/5/2021  9:15 AM Radha Leon, KSENIA ONCSF BS AMB PHYSICIANS INVOLVED IN CARE:   Patient Care Team:  Inna Watters MD as PCP - General (Family Medicine)  Inna Watters MD as PCP - [facility] Empaneled Provider  Rad Henriquez MD (Hematology and Oncology)  Joan Diehl MD (Palliative Medicine)  Joan Diehl MD as Physician (Palliative Medicine)       HISTORY:   Reviewed patient-completed ESAS and advance care planning form. Reviewed patient record in prescription monitoring program.    CHIEF COMPLAINT:   Chief Complaint   Patient presents with    Depression       HPI/SUBJECTIVE:    The patient is: [x] Verbal / [] Nonverbal        He and Dora Sanchez are [employer]. \"  He feels very sad about this, questions himself a lot. He's getting out more now that things are opening back up. He's been to the coffee shop, sits there drinking coffee. He's gone for a few bike rides. He and his ex-wife, Rodriguez Pop, talk frequently. She's selling her house and may move in with him for a while. He went to see an ENT specialist about his gait problems. He had fluid in his left inner ear, now has a tube. He feels more balanced since this was placed. He's no longer using his hiking poles for balance when he walks. His neuropathy hasn't changed. He's sleeping well at night. He continues to experience mild fatigue during the day. He has no pain aside from the neuropathy. His PSA remains low so he's continuing with Lupron alone for his cancer treatment. From IV 11/4/2020:  He was diagnosed with prostate cancer last May. It was a shock, he didn't feel sick. He thought he had a hernia, then found out he has prostate cancer and that it had spread. Then his girlfriend, Dora Sanchez, moved out to go be with her family (out-of-state) to help with their care during the pandemic. He started chemo last summer. That's been going OK except for fatigue and now he has numbness in her fingertips. He sometimes drops things.     He's not as active as he used to be.  He likes to bike, he used to bike 20 miles a day. He also likes to hike. He doesn't have the energy to do much these day. He and Neil Higginbotham used to do these things together. His appetite is good. He's gained ~20# since his cancer diagnosis (on steroids). This is discouraging as he had worked hard to lose weight. He has no pain. He's moving his bowels regularly. Clinical Pain Assessment (nonverbal scale for nonverbal patients):   [++++ Clinical Pain Assessment++++]  [++++Pain Severity++++]: Pain: 1  [++++Pain Character++++]: nerve pain  [++++Pain Duration++++]:  [++++Pain Effect++++]: little  [++++Pain Factors++++]: no provoking factors  [++++Pain Frequency++++]: constant  [++++Pain Location++++]: fingertips  [++++ Clinical Pain Assessment++++]       FUNCTIONAL ASSESSMENT:     Palliative Performance Scale (PPS):  PPS: 70       PSYCHOSOCIAL/SPIRITUAL SCREENING:     Any spiritual / Baptism concerns:  [] Yes /  [x] No    Caregiver Burnout:  [] Yes /  [] No /  [x] No Caregiver Present      Anticipatory grief assessment:   [x] Normal  / [] Maladaptive       ESAS Anxiety: Anxiety: 2    ESAS Depression: Depression: 3       REVIEW OF SYSTEMS:     The following systems were [x] reviewed / [] unable to be reviewed  Systems: constitutional, ears/nose/mouth/throat, respiratory, gastrointestinal, genitourinary, musculoskeletal, integumentary, neurologic, psychiatric, endocrine. Positive findings noted below. Modified ESAS Completed by: provider   Fatigue: 3 Drowsiness: 3   Depression: 3 Pain: 1   Anxiety: 2 Nausea: 0   Anorexia: 3 Dyspnea: 3   Best Well-Being: 3 Constipation: No   Other Problem (Comment): 0          PHYSICAL EXAM:     Wt Readings from Last 3 Encounters:   07/01/21 207 lb (93.9 kg)   05/13/21 207 lb 9.6 oz (94.2 kg)   05/13/21 207 lb 10.8 oz (94.2 kg)     Blood pressure 118/80, pulse 75, temperature 98.4 °F (36.9 °C), temperature source Oral, resp.  rate 20, weight 207 lb (93.9 kg), SpO2 98 %.  Last bowel movement: See Nursing Note    Constitutional: appears rested, relaxed  Eyes: pupils equal, anicteric  ENMT: no nasal discharge, moist mucous membranes  Cardiovascular: regular rhythm, no peripheral edema  Respiratory: breathing not labored, symmetric  Gastrointestinal: soft non-tender, +bowel sounds  Musculoskeletal: no deformity, no tenderness to palpation  Skin: warm, dry; fingernail discoloration  Neurologic: following commands, moving all extremities  Psychiatric: full affect, no hallucinations  Other:       HISTORY:     History reviewed. No pertinent past medical history. Past Surgical History:   Procedure Laterality Date    HX APPENDECTOMY  1964    HX OTHER SURGICAL      subcutaneous cyst of forehead    HX VASECTOMY        Family History   Problem Relation Age of Onset    Cancer Mother         breast    Cancer Father         prostate    Cancer Brother         melanoma    COPD Brother       History reviewed, no pertinent family history. Social History     Tobacco Use    Smoking status: Never Smoker    Smokeless tobacco: Never Used   Substance Use Topics    Alcohol use: Not Currently     Comment: In active recovery from alcohol use disorder     No Known Allergies   Current Outpatient Medications   Medication Sig    amLODIPine (NORVASC) 5 mg tablet TAKE 1 TABLET BY MOUTH EVERY DAY    ciclopirox (PENLAC) 8 % solution Apply a thin film to affected fingernails at bedtime. Remove with alcohol every 7 days.  potassium chloride SR (K-TAB) 20 mEq tablet TAKE 1 TABLET BY MOUTH EVERY DAY    sertraline (ZOLOFT) 50 mg tablet Take 1 Tab by mouth daily.  leuprolide acetate (LUPRON DEPOT IM) by IntraMUSCular route. Every 3 months    degarelix (Firmagon) 120 mg solr injection by SubCUTAneous route once.  dextroamphetamine-amphetamine (ADDERALL) 10 mg tablet TAKE 1 TABLET TWICE A DAY FOR 30 DAYS    acyclovir (ZOVIRAX) 400 mg tablet Take 400 mg by mouth two (2) times a day.     betamethasone dipropionate (DIPROSONE) 0.05 % topical cream Apply  to affected area two (2) times a day. Apply a thin layer to arms and legs twice daily. Use sparingly. Avoid face and sun. (Patient not taking: Reported on 7/1/2021)    glucose blood VI test strips (blood glucose test) strip Use to check blood sugars four times daily    lancets misc Use to check blood sugars four times daily    OneTouch Ultra2 Meter misc USE TO CHECK BLOOD SUGARS TWICE DAILY    dexAMETHasone (DECADRON) 4 mg tablet Take 8mg (2 tabs) twice daily the day BEFORE chemotherapy and take 8mg (2 tabs) twice daily the day AFTER chemotherapy (Patient not taking: Reported on 7/1/2021)    ondansetron hcl (ZOFRAN) 8 mg tablet Take 1 Tab by mouth every eight (8) hours as needed for Nausea or Vomiting. (Patient not taking: Reported on 7/1/2021)    lidocaine-prilocaine (EMLA) topical cream Apply a dime size amount to port site 30-60 minutes before access on treatment days to numb port site. (Patient not taking: Reported on 7/1/2021)    prochlorperazine (Compazine) 10 mg tablet Take 0.5 Tabs by mouth every six (6) hours as needed for Nausea or Vomiting. (Patient not taking: Reported on 7/1/2021)     No current facility-administered medications for this visit. LAB DATA REVIEWED:     Lab Results   Component Value Date/Time    WBC 6.1 05/13/2021 09:46 AM    HGB 11.8 (L) 05/13/2021 09:46 AM    PLATELET 678 67/77/4843 09:46 AM     Lab Results   Component Value Date/Time    Sodium 134 (L) 05/13/2021 09:46 AM    Potassium 3.5 05/13/2021 09:46 AM    Chloride 99 05/13/2021 09:46 AM    CO2 31 05/13/2021 09:46 AM    BUN 19 05/13/2021 09:46 AM    Creatinine 1.42 (H) 05/13/2021 09:46 AM    Calcium 8.6 05/13/2021 09:46 AM      Lab Results   Component Value Date/Time    Alk.  phosphatase 76 05/13/2021 09:46 AM    Protein, total 7.6 05/13/2021 09:46 AM    Albumin 4.1 05/13/2021 09:46 AM    Globulin 3.5 05/13/2021 09:46 AM     No results found for: INR, PTMR, PTP, PT1, PT2, APTT, INREXT, INREXT   No results found for: IRON, FE, TIBC, IBCT, PSAT, FERR        CONTROLLED SUBSTANCES SAFETY ASSESSMENT (IF ON CONTROLLED SUBSTANCES):     Reviewed opioid safety handout:  [] Yes   [] No  24 hour opioid dose >150mg morphine equivalent/day:  [] Yes   [] No  Benzodiazepines:  [] Yes   [] No  Sleep apnea:  [] Yes   [] No  Urine Toxicology Testing within last 6 months:  [] Yes   [] No  History of or new aberrant medication taking behaviors:  [] Yes   [] No  Has Narcan been prescribed [] Yes   [] No          Total time:   Counseling / coordination time:   > 50% counseling / coordination?:

## 2021-07-01 NOTE — ACP (ADVANCE CARE PLANNING)
Advance Care Planning     Advance Care Planning (ACP) Physician/NP/PA Conversation      Date of Conversation: 7/1/2021  Conducted with: Patient with Decision Making Capacity    Healthcare Decision Maker:     Primary Decision Maker: Esteban Hernández - [phone]    Secondary Decision Maker: Watson Garcia - Ex-Spouse - [phone]      Care Preferences:    Hospitalization: \"If your health worsens and it becomes clear that your chance of recovery is unlikely, what would be your preference regarding hospitalization? \"  The patient is unsure. Ventilation: \"If you were unable to breathe on your own and your chance of recovery was unlikely, what would be your preference about the use of a ventilator (breathing machine) if it was available to you? \"   The patient is unsure. It would be acceptable for a ventilator to be used as a temporary measure to allow recovery to occur. Resuscitation: \"In the event your heart stopped as a result of an underlying serious health condition, would you want attempts to be made to restart your heart, or would you prefer a natural death? \"   The patient is unsure.         Conversation Outcomes / Follow-Up Plan:   ACP in process - information provided, considering goals and options  Reviewed DNR/DNI and patient elects Full Code (Attempt Resuscitation)     Length of Voluntary ACP Conversation in minutes:  20 minutes    Ale Estevez MD

## 2021-07-26 NOTE — PROGRESS NOTES
[facility] Progress Note    Date: 2020    Name: Jossy Watt    MRN: [id number]         : 1955    1130. Mr. Citlalli Munoz Arrived ambulatory and in no distress for C2D3 of Etoposide Regimen. Assessment was completed, no acute issues at this time, no new complaints voiced. L chest wall port accessed without difficulty, labs drawn & sent for processing. Chemotherapy Flowsheet 2020   Cycle C2D3   Date 2020   Drug / Regimen Etoposide   Pre Meds -   Notes -       Mr. Sánchez's vitals were reviewed. Patient Vitals for the past 12 hrs:   Temp Pulse Resp BP   20 1348  63  144/84   20 1139 98.2 °F (36.8 °C) 65 18 148/82       Medications:  Medications Administered     0.9% sodium chloride infusion     Admin Date  2020 Action  New Bag Dose  25 mL/hr Rate  25 mL/hr Route  IntraVENous Administered By  Saud Boyle RN          dexamethasone (DECADRON) 4 mg/mL injection 8 mg     Admin Date  2020 Action  Given Dose  8 mg Route  IntraVENous Administered By  Saud Boyle RN          etoposide (VEPESID) 204 mg in 0.9% sodium chloride 500 mL, overfill volume 50 mL chemo infusion     Admin Date  2020 Action  New Bag Dose  204 mg Rate  560.2 mL/hr Route  IntraVENous Administered By  Saud Boyle RN          heparin (porcine) pf 300-500 Units     Admin Date  2020 Action  Given Dose  500 Units Route  InterCATHeter Administered By  Saud Boyle RN          saline peripheral flush soln 10 mL     Admin Date  2020 Action  Given Dose  10 mL Route  InterCATHeter Administered By  Saud Boyle RN              1172. Mr. Citlalli Munoz tolerated treatment well and was discharged from [place] [address] in stable condition. Port de-accessed, flushed & heparinized per protocol. He is to return on  08/10/20 for his next appointment.     Calista Castillo RN  2020
No

## 2021-07-26 NOTE — PROGRESS NOTES
[employer]  Medical Oncology at [facility]  [phone]    Hematology / Oncology Established Visit    Reason for Visit:   Nakul Zambrano is a 78 y.o. male who is seen for follow up of neuroendocrine carcinoma. Initially referred by Dr. Nicolle Paulino. 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 Flow cytometry negative for lymphoproliferative disorder. FoundationOne: MS Stable, TMB 0, MDM4 amplification, MYC amplification, TMPRSS2 TMPRSS2-ERD fusion, CEBPA 1311fs*10, ERBB4 amplification - equivocal, MCL1 amplification, DHM5A5Q amplification, RAD21 amplification. See scanned report for full info. Abbreviated [facility] Consultation:  Final diagnosis: Right external iliac node: Metastatic prostate adenocarcinoma. Comment: Histologic sections of the right external iliac node show sheets of cells with minimal eosinophilic cytoplasm and variable cytologic atypia. Some areas show acinar formation. The cells have predominantly round nuclei with vesicular chromatin and prominent nucleoli. Some areas show significantly more atypia with more irregular nuclear borders, hyperchromatic nyclei, and increase nuclear to cytoplasmic ratio. Frequent mitotic figures are identified. A provided pane of IHC stains is reviewed and demonstrates that the malignant cells show strong, diffuse expression of pan-cytokeratin and focal expression of TTF. CK7, CK20, chromogranin, and synaptophysin are negative in the lesional cells. CD3 and CD20 are negative in the lesions cells and positive in the background lymphocytes.  An abbreviated FoundationOne report was included, which included a TMPRSS2-ERG fusion (among other nonspecific abnormalities), which si found in approximately 50% of prostate cancers. Additional IHC studies performed at [facility] showed that the malignant cells have immunoreactivity with antibodies for PSA and PSAP, consistent with a diagnosis of metastatic prostate adenocarcinoma. Per report, flow cytometry negative for lymphoma. Overall, the histomorphology, immunophenotype and genomic findings in this case are diagnostic of metastatic prostate adenocarcinoma. The acinar formation and cytology were suggestive of this diagnosis, and the diffuse PSA and PSAP positivity as well as the TMPRSS2-ERG fusion confirmed the diagnosis. This fusion is the most common genomic alteration in prostate carcinoma and can be detected in over half of the cases, yet is not seen with any frequency in other types of carcinoma. Prior Treatment:   1. Carbo-Etoposide-Atezolizumab x 2 cycles, 6/29/20-7/20/20. 2. Docetaxel x 6 cycles, 8/10/20-11/24/20. Current Treatment: Lupron every 12 weeks. History of Present Illness:   José Miguel Bazan is a 78 y.o. male who comes in for follow up of poorly differentiated prostate cancer. Pt noticed a nontender bulge in his LLQ in 5/2020. He thought this was a hernia and was evaluated by Dr. Katina Mann. CT on 5/20/20 was notable for extensive lymphadenopathy in abd/pelvis. Pt underwent robot assisted excisional Right external iliac LN biopsy 5/29/20, which showed poorly differentiated large cell neuroendocrine carcinoma. However, pathology reviewed at [facility] and felt to be poorly differentiated prostate adenocarcinoma. He reports intentional weight loss with dietary changes and exercise, losing 40-lbs in past 8 months. No n/v/d, melena/hematochezia, cough, SOB. No fevers, chills, sweats. [employer] nonsmoker. Mother had breast cancer diagnosed age < 48. Twin brother had melanoma diagnosed aged late 46s. He does not think he has ever had a colonoscopy or PSA screening prior to current diagnosis.     Interval History:  Patient here for follow up of prostate cancer, labs, lupron and review of imaging. He states his hearing is much better. Is following with ENT. Neuropathy is unchanged from prior visit. Hyperpigmented nails are growing out with more normal appearing nail coming in. No n/v/d, CP, SOB. Has not been doing as much biking due to fatigue, but did ride 8 miles recently and did well. PMHx: Prostate cancer, HTN  PSurgHx: appendectomy, vasectomy, subcutaneous cyst removal on forehead  SHx:  Never smoker, no EtOH currently after prior alcohol abuse. Unmarried. Has 2 children, 27 and 32. 1 lives in [place]. FHx:  Mother had breast cancer, father had prostate cancer, brother has melanoma, COPD. Review of Systems: A complete review of systems was obtained, negative except as described above. Physical Exam:     Visit Vitals  BP (!) 151/91   Pulse (!) 57   Temp 97.2 °F (36.2 °C)   Resp 8   Ht 5' 10\" (1.778 m)   Wt 209 lb 7 oz (95 kg)   SpO2 95%   BMI 30.05 kg/m²     ECOG PS: 0  General: Well developed, no acute distress  Eyes: PERRLA, EOMI, anicteric sclerae  HENT: Atraumatic, OP clear, TMs intact without erythema  Neck: Supple, Left supraclavicular LN no longer palpable. Lymphatic: No cervical, supraclavicular, axillary or inguinal adenopathy  Respiratory: CTAB, normal respiratory effort  CV: Normal rate, regular rhythm, no murmurs, trace edema present bilateral lower extremities  GI: firm, nontender, mild distention, no hepatomegaly, no splenomegaly. Prior L lower abdomen 5cm mass no longer palpable. MS: Normal gait and station. Digits without clubbing or cyanosis. Skin:  Fingernails dark, thickened at distal half and more normal at proximal half. No ecchymoses, or petechiae. Normal temperature, turgor, and texture. Neuro/Psych: Alert, oriented. 5/5 strength in all 4 extremities. Appropriate affect, normal judgment/insight.     Results:     Lab Results   Component Value Date/Time    WBC 6.1 05/13/2021 09:46 AM    HGB 11.8 (L) 2021 09:46 AM    HCT 35.9 (L) 2021 09:46 AM    PLATELET 544  09:46 AM    MCV 89.5 2021 09:46 AM    ABS. NEUTROPHILS 4.0 2021 09:46 AM     Lab Results   Component Value Date/Time    Sodium 134 (L) 2021 09:46 AM    Potassium 3.5 2021 09:46 AM    Chloride 99 2021 09:46 AM    CO2 31 2021 09:46 AM    Glucose 135 (H) 2021 09:46 AM    BUN 19 2021 09:46 AM    Creatinine 1.42 (H) 2021 09:46 AM    GFR est AA >60 2021 09:46 AM    GFR est non-AA 50 (L) 2021 09:46 AM    Calcium 8.6 2021 09:46 AM    Creatinine (POC) 1.3 10/29/2020 11:59 AM     Lab Results   Component Value Date/Time    Bilirubin, total 0.7 2021 09:46 AM    ALT (SGPT) 63 2021 09:46 AM    Alk. phosphatase 76 2021 09:46 AM    Protein, total 7.6 2021 09:46 AM    Albumin 4.1 2021 09:46 AM    Globulin 3.5 2021 09:46 AM     No results found for: IRON, FE, TIBC, IBCT, PSAT, FERR    No results found for: B12LT, FOL, RBCF  Lab Results   Component Value Date/Time    TSH 1.72 2020 09:58 AM     No results found for: HAMAT, HAAB, HABT, HAAT, HBSAG, HBSB, HBSAT, HBABN, HBCM, HBCAB, HBCAT, XBCABS, HBEAB, HBEAG, XHEPCS, [id number], HBEGLT, [place], HBCLT, HBEBLT, [id number], [id number], [address] [place] [address], [id number], HBCMLT, [id number], HCGAT     20: Chromogranin 42    20: PSA 38.6  20: PSA 13.2   8/10/20: PSA 1.2  20: PSA 0.5  20: PSA 0.4  10/15/20: PSA 0.3  20: PSA 0.3  20: 0.2  21: PSA 0.1    3/22/21 PSA 0.066 (PSA ultrasensitive at [facility])   21 PSA 0.1, testosterone < 3  21 PSA 0.1  21 PSA pending    Imagin/20/20 CT abd/pelvis with IV contrast:  Impression:  1. No findings to suggest gross abdominal wall hernia or flank hernia. 2. Extensive adenopathy throughout the abdomen and pelvis as described. Findings are concerning for possible metastatic disease or lymphoma.  Recommend follow up or comparison with prior studies. 3. Other findings as described. 6/18/20 PET:  FINDINGS:  HEAD/NECK: No apparent foci of abnormal hypermetabolism. Cerebral evaluation is  limited by normal intense activity. CHEST: Hypermetabolic lymphadenopathy at the base of the left neck and in the  left supraclavicular region is noted. Hypermetabolic superior mediastinal,  prevascular, right paratracheal, subcarinal, and bilateral hilar lymph  lymphadenopathy, maximum SUV of the subcarinal lymph node is 5.7. Small but  hypermetabolic soft tissue nodule left anterior chest wall. ABDOMEN/PELVIS: Hypermetabolic retrocrural, left para-aortic, aortocaval,  bilateral common iliac, bilateral external iliac, and bilateral inguinal  lymphadenopathy, maximum SUV 5.8 of an aortocaval lymph node. Hypermetabolic  mesenteric lymph node left lower quadrant, maximum SUV 12.3. SKELETON: No foci of abnormal hypermetabolism in the axial and visualized  appendicular skeleton. IMPRESSION: Hypermetabolic lymphadenopathy involving the left neck and left  supraclavicular region, mediastinum, bilateral hilum, retroperitoneum,  mesentery, and pelvis as described above. Hypermetabolic soft tissue nodule left  chest.    Brain MRI 6/27/20: IMPRESSION:  There is no evidence of intracranial metastatic disease. Mild chronic microvascular ischemic change. No intracranial mass, hemorrhage or evidence of acute infarction. CT c/a/p 8/3/20: IMPRESSION:  Chest:  1. Findings consistent with partial treatment response, with interval decrease  in size of mediastinal and hilar lymph nodes, and interval decrease in size of  left chest wall nodule. Abdomen/pelvis:   1. Findings consistent with partial treatment response, with interval decrease  in size of retroperitoneal lymph nodes. 2. Unchanged circumferential bladder wall thickening, which may represent acute  or chronic cystitis. Correlate with urinalysis.   RECIST   TARGET LESIONS:      Lesion (description) Location (series/slice)                Size            1. Posterior mediastinal lymph node    series 2 image 26    2.3 x 2.0 cm, previously 3.3 x 2.2 cm  2. Left upper paraesophageal lymph node    series 2 image 9    5 mm, previously 15 mm. 3. Right external iliac lymphadenopathy    series 2 image 98    3.8 x 3.2 cm, previously 6.3 x 4.7 cm  4. Left external iliac lymphadenopathy    series 2 image 99    1.6 cm, previously 2.5 cm    9/1/20 Bone Scan:  FINDINGS: There is physiologic uptake of radiotracer in the skeleton and soft tissues. There is no evidence of fracture, osteomyelitis or bone tumor. There is no pattern of skeletal metastases. IMPRESSION: Normal Whole Body Nuclear Bone Scan.    10/29/20 CT c/a/p:  Lesion (description):     Location (series/slice):  Size   1. Posterior mediastinal lymph node   2/27            1.1 x 1.4 cm, previously 2.3 x 2.0 cm (2/26)  2. Left upper paraesophageal lymph node  2/9           2 mm, previously 5 mm (2/9)   3. Right external iliac lymphadenopathy 2/21                3.8 x 3.2 cm (2/98)  4. Left external iliac lymphadenopathy  2/99              11 mm, previously 16 mm (2/99)   IMPRESSION:  1. Findings consistent with treatment response, with interval decrease in size  of mediastinal, retroperitoneal, and pelvic lymph nodes and no CT evidence of  new metastatic disease within the thorax, abdomen, or pelvis. 2. Unchanged circumferential bladder wall thickening, which may represent acute  or chronic cystitis.      2/1/21 CT c/a/p:  RECIST   TARGET LESIONS:  Lesion (description)         Location (series/slice)                Size      1. Left upper esophageal lymph node    series 2 image 10    1 mm previously measuring 2 mm  2. Azygos esophageal lymph node    2, 30    7 x 7 mm previously measuring 11 x 14 mm  3. Right external iliac lymph node    2, 102    27 x 23 mm previously 27 x 31 mm  4.  Left external iliac lymph node    2, 102    8 mm previously 11 mm  NONTARGET LESIONS:  None  IMPRESSION  1. Further decrease size of adenopathy as described. 2.  No evidence of new metastatic disease. 2/1/21 Bone scan:  Normal whole-body nuclear bone scan.    2/20/21 Brain MRI:  IMPRESSION  No acute intracranial findings no mass    5/11/21 CT c/a/p:  RECIST   TARGET LESIONS:  Lesion (description)         Location (series/slice)                Size      1. Upper left paraesophageal lymph node    series 2 image 13    2 mm unchanged  2. Azygoesophageal lymph node    series 2 image 33 measuring 7 x 5 mm     unchanged  3. Right external iliac lymph node    series 2 image 103    27 x 22 mm unchanged  4. Left external iliac lymph node    series 2 image 104    12 x 7 mm unchanged  NONTARGET LESIONS:  None  IMPRESSION  1. No evidence of new metastatic disease. Stable normal-sized lymph nodes in  the mediastinum and enlarged right external iliac lymph node. 2.  Other incidental findings as above    5/11/21 Bone scan:  FINDINGS: Physiologic uptake of radiotracer is seen in the skeleton and soft  tissues. There is no evidence for fracture or metastatic disease. Incidental  renal imaging shows no abnormality. IMPRESSION  No definite evidence of bony metastatic disease. 7/29/21 CT ch/abd/pelvis:  RECIST   TARGET LESIONS:  Lesion (description)         Location (series/slice)                Size      1.  azygo-esophageal lymph node, unchanged    series 2, image 29    9 mm  2. Right external iliac lymph node, decreased    series 2, image 100    16 mm  NONTARGET LESIONS:  IMPRESSION  1. No evidence of new metastatic disease. Stable to slight decrease in size of  several target lymph nodes as described above. 2.  Unchanged nonacute findings as above. 7/29/21 Bone scan:  IMPRESSION  Normal whole-body nuclear bone scan. Assessment & Plan:   Wilberto Morrison is a 78 y.o. 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      1. Metastatic adenocarcinoma of prostate, high grade: Initial pathologist at [address] [place] [address] called this \"Large cell neuroendocrine carcinoma,\" and based on diffuse disease on PET, patient was started on treatment with Carbo-Etoposide-Atezolizumab (thinking this was large cell carcinoma of the lung.) However [facility] testing identified TMPRSS2-ERG fusion, which is primarily seen in prostate cancers. This along with elevated PSA of 38 prompted sending of pathology specimen to [facility] for review. Their opinion and IHC staining is consistent with high grade adenocarcinoma of prostate. Treatment options now include: Carboplatin + Cabazitaxel vs single agent Docetaxel. Pt signed consent for Docetaxel. 7/29/21 CT shows response to treatment and bone scan negative for disease. Supportive medications: EMLA cream, zofran, compazine, dexamethasone  -- Lupron (3 month depot) #4 given 5/13/21, #5 due today, 8/5/21, #6 due 10/28/21. Previously discussed with Dr. Krystyna Espinosa and Dr. Yohana Cueva of [facility] that pt wants to get ADT here rather than at [facility] to consolidate appointments. -- Continue ADT alone, monitor PSA and scans periodically. Would consider starting Zytiga at time of progression on scans or symptomatic PSA progression. -- Plan on next CT and bone scan at 12 weeks from last scan, due 10/21/21. -- Return in 12 weeks for labs/portflush, Lupron, review CT/bone scan, MD/NP visit. 2. 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 Difficulty with buttons and zippers. He is dropping objects less often. 3. Bladder wall thickening: Seen on CT scan, which may represent acute or chronic cystitis. UA negative. Forwarded CT scan to urology. 2/1/21 CT normal; 5/11/21 CT showed mild nonspecific urinary bladder wall thickening. 4. H/o Genital Herpes: On suppression with Acyclovir. 5. Renal insufficiency:   Likely 2/2 to decreased PO intake. Encouraged better oral hydration. 6. Depression/anxiety:   2/2 to #1. On Zoloft 50mg/d. Following with Palliative    7. Dysequilibrium: / Ear effusions:  Brain MRI negative for mets. Following with ENT for bilateral effusions, Left tympanostomy tube placed and balance improved. Hearing improving. Continues PT with [facility]. 8. Hyponychium: Likely 2/2 to fungal infection. Continue antifungal medication. Previously placed referral to dermatology. Emotional well being: Pt is coping well with his/her disease and has excellent support. Significant other: Lennox Schmitt - [phone]. I appreciate the opportunity to participate in [facility].      Signed By: Luz López MD     August 5, 2021

## 2021-07-29 ENCOUNTER — HOSPITAL ENCOUNTER (OUTPATIENT)
Dept: CT IMAGING | Age: 66
Discharge: HOME | End: 2021-07-29
Attending: NURSE PRACTITIONER
Payer: MEDICARE

## 2021-07-29 ENCOUNTER — HOSPITAL ENCOUNTER (OUTPATIENT)
Dept: NUCLEAR MEDICINE | Age: 66
Discharge: HOME | End: 2021-07-29
Attending: NURSE PRACTITIONER
Payer: MEDICARE

## 2021-07-29 DIAGNOSIS — C61 ADENOCARCINOMA OF PROSTATE, STAGE 4 (HCC): ICD-10-CM

## 2021-07-29 PROCEDURE — 78306 BONE IMAGING WHOLE BODY: CPT

## 2021-07-29 PROCEDURE — 74011000636 HC RX REV CODE- 636: Performed by: RADIOLOGY

## 2021-07-29 PROCEDURE — 74177 CT ABD & PELVIS W/CONTRAST: CPT

## 2021-07-29 RX ADMIN — IOPAMIDOL 100 ML: 755 INJECTION, SOLUTION INTRAVENOUS at 10:48

## 2021-08-05 ENCOUNTER — OFFICE VISIT (OUTPATIENT)
Dept: ONCOLOGY | Age: 66
End: 2021-08-05
Payer: MEDICARE

## 2021-08-05 ENCOUNTER — HOSPITAL ENCOUNTER (OUTPATIENT)
Dept: INFUSION THERAPY | Age: 66
Discharge: HOME | End: 2021-08-05
Payer: MEDICARE

## 2021-08-05 VITALS
WEIGHT: 209.4 LBS | BODY MASS INDEX: 29.98 KG/M2 | RESPIRATION RATE: 8 BRPM | OXYGEN SATURATION: 95 % | HEART RATE: 57 BPM | DIASTOLIC BLOOD PRESSURE: 91 MMHG | TEMPERATURE: 97.2 F | HEIGHT: 70 IN | SYSTOLIC BLOOD PRESSURE: 151 MMHG

## 2021-08-05 VITALS
WEIGHT: 209.44 LBS | DIASTOLIC BLOOD PRESSURE: 91 MMHG | TEMPERATURE: 97.2 F | RESPIRATION RATE: 8 BRPM | SYSTOLIC BLOOD PRESSURE: 151 MMHG | HEIGHT: 70 IN | BODY MASS INDEX: 29.98 KG/M2 | HEART RATE: 57 BPM | OXYGEN SATURATION: 95 %

## 2021-08-05 DIAGNOSIS — C79.82 METASTATIC ADENOCARCINOMA TO PROSTATE (HCC): Primary | ICD-10-CM

## 2021-08-05 DIAGNOSIS — G62.0 CHEMOTHERAPY-INDUCED PERIPHERAL NEUROPATHY (HCC): ICD-10-CM

## 2021-08-05 DIAGNOSIS — B35.1 NAIL FUNGUS: ICD-10-CM

## 2021-08-05 DIAGNOSIS — Z79.818 ENCOUNTER FOR MONITORING ANDROGEN DEPRIVATION THERAPY: ICD-10-CM

## 2021-08-05 DIAGNOSIS — C61 ADENOCARCINOMA OF PROSTATE, STAGE 4 (HCC): Primary | ICD-10-CM

## 2021-08-05 DIAGNOSIS — T45.1X5A CHEMOTHERAPY-INDUCED PERIPHERAL NEUROPATHY (HCC): ICD-10-CM

## 2021-08-05 DIAGNOSIS — R42 DYSEQUILIBRIUM: ICD-10-CM

## 2021-08-05 LAB
ALBUMIN SERPL-MCNC: 4.2 G/DL (ref 3.5–5)
ALBUMIN/GLOB SERPL: 1.2 {RATIO} (ref 1.1–2.2)
ALP SERPL-CCNC: 90 U/L (ref 45–117)
ALT SERPL-CCNC: 72 U/L (ref 12–78)
ANION GAP SERPL CALC-SCNC: 6 MMOL/L (ref 5–15)
AST SERPL-CCNC: 123 U/L (ref 15–37)
BASOPHILS # BLD: 0.1 K/UL (ref 0–0.1)
BASOPHILS NFR BLD: 1 % (ref 0–1)
BILIRUB SERPL-MCNC: 0.5 MG/DL (ref 0.2–1)
BUN SERPL-MCNC: 27 MG/DL (ref 6–20)
BUN/CREAT SERPL: 21 (ref 12–20)
CALCIUM SERPL-MCNC: 8.6 MG/DL (ref 8.5–10.1)
CHLORIDE SERPL-SCNC: 104 MMOL/L (ref 97–108)
CO2 SERPL-SCNC: 28 MMOL/L (ref 21–32)
CREAT SERPL-MCNC: 1.3 MG/DL (ref 0.7–1.3)
DIFFERENTIAL METHOD BLD: ABNORMAL
EOSINOPHIL # BLD: 0.2 K/UL (ref 0–0.4)
EOSINOPHIL NFR BLD: 3 % (ref 0–7)
ERYTHROCYTE [DISTWIDTH] IN BLOOD BY AUTOMATED COUNT: 14.6 % (ref 11.5–14.5)
GLOBULIN SER CALC-MCNC: 3.4 G/DL (ref 2–4)
GLUCOSE SERPL-MCNC: 90 MG/DL (ref 65–100)
HCT VFR BLD AUTO: 35 % (ref 36.6–50.3)
HGB BLD-MCNC: 11.4 G/DL (ref 12.1–17)
IMM GRANULOCYTES # BLD AUTO: 0.1 K/UL (ref 0–0.04)
IMM GRANULOCYTES NFR BLD AUTO: 1 % (ref 0–0.5)
LYMPHOCYTES # BLD: 1.8 K/UL (ref 0.8–3.5)
LYMPHOCYTES NFR BLD: 27 % (ref 12–49)
MCH RBC QN AUTO: 30.2 PG (ref 26–34)
MCHC RBC AUTO-ENTMCNC: 32.6 G/DL (ref 30–36.5)
MCV RBC AUTO: 92.6 FL (ref 80–99)
MONOCYTES # BLD: 0.5 K/UL (ref 0–1)
MONOCYTES NFR BLD: 7 % (ref 5–13)
NEUTS SEG # BLD: 4 K/UL (ref 1.8–8)
NEUTS SEG NFR BLD: 61 % (ref 32–75)
NRBC # BLD: 0 K/UL (ref 0–0.01)
NRBC BLD-RTO: 0 PER 100 WBC
PLATELET # BLD AUTO: 180 K/UL (ref 150–400)
PMV BLD AUTO: 11.7 FL (ref 8.9–12.9)
POTASSIUM SERPL-SCNC: 3.4 MMOL/L (ref 3.5–5.1)
PROT SERPL-MCNC: 7.6 G/DL (ref 6.4–8.2)
PSA SERPL-MCNC: 0.2 NG/ML (ref 0.01–4)
RBC # BLD AUTO: 3.78 M/UL (ref 4.1–5.7)
SODIUM SERPL-SCNC: 138 MMOL/L (ref 136–145)
WBC # BLD AUTO: 6.6 K/UL (ref 4.1–11.1)

## 2021-08-05 PROCEDURE — 77030012965 HC NDL HUBR BBMI -A

## 2021-08-05 PROCEDURE — 1101F PT FALLS ASSESS-DOCD LE1/YR: CPT | Performed by: NURSE PRACTITIONER

## 2021-08-05 PROCEDURE — G0463 HOSPITAL OUTPT CLINIC VISIT: HCPCS | Performed by: INTERNAL MEDICINE

## 2021-08-05 PROCEDURE — G9717 DOC PT DX DEP/BP F/U NT REQ: HCPCS | Performed by: NURSE PRACTITIONER

## 2021-08-05 PROCEDURE — G0463 HOSPITAL OUTPT CLINIC VISIT: HCPCS | Performed by: NURSE PRACTITIONER

## 2021-08-05 PROCEDURE — G8755 DIAS BP > OR = 90: HCPCS | Performed by: NURSE PRACTITIONER

## 2021-08-05 PROCEDURE — 80053 COMPREHEN METABOLIC PANEL: CPT

## 2021-08-05 PROCEDURE — 85025 COMPLETE CBC W/AUTO DIFF WBC: CPT

## 2021-08-05 PROCEDURE — G8536 NO DOC ELDER MAL SCRN: HCPCS | Performed by: NURSE PRACTITIONER

## 2021-08-05 PROCEDURE — G8417 CALC BMI ABV UP PARAM F/U: HCPCS | Performed by: NURSE PRACTITIONER

## 2021-08-05 PROCEDURE — 84153 ASSAY OF PSA TOTAL: CPT

## 2021-08-05 PROCEDURE — 3017F COLORECTAL CA SCREEN DOC REV: CPT | Performed by: NURSE PRACTITIONER

## 2021-08-05 PROCEDURE — 99215 OFFICE O/P EST HI 40 MIN: CPT | Performed by: INTERNAL MEDICINE

## 2021-08-05 PROCEDURE — G8427 DOCREV CUR MEDS BY ELIG CLIN: HCPCS | Performed by: NURSE PRACTITIONER

## 2021-08-05 PROCEDURE — G8753 SYS BP > OR = 140: HCPCS | Performed by: NURSE PRACTITIONER

## 2021-08-05 PROCEDURE — 36415 COLL VENOUS BLD VENIPUNCTURE: CPT

## 2021-08-05 PROCEDURE — 96402 CHEMO HORMON ANTINEOPL SQ/IM: CPT

## 2021-08-05 PROCEDURE — 74011250636 HC RX REV CODE- 250/636: Performed by: NURSE PRACTITIONER

## 2021-08-05 RX ADMIN — LEUPROLIDE ACETATE 22.5 MG: KIT at 09:24

## 2021-08-05 NOTE — PROGRESS NOTES
Chief Complaint   Patient presents with    Follow-up     Cadence Jones is a pleasant 78year old male who presents as a follow up for prostate cancer.  He denies pain

## 2021-08-05 NOTE — PROGRESS NOTES
Outpatient Infusion [facility] Progress Note    0900 Pt admit to [facility] for port flush + labs + Lupron ambulatory in stable condition. Assessment completed. No new concerns voiced. Patient reports that he is overall doing well. He recently had tubes placed in his ears to help with fluid management. No other complaints at this time. Port accessed without difficulty. Labs drawn and sent for processing. Port flushed, heparinized and de-accessed per protocol. Lupron ordered from pharmacy and administered in the [facility]. Prior to treatment, patient was screened for COVID 19. Denies any signs or symptoms of COVID. Denies any known physical contact with anyone exposed to, diagnosed with or with pending or positive COVID test. Denies any pending or positive COVID test themself. Visit Vitals  BP (!) 151/91 (BP 1 Location: Right arm, BP Patient Position: Sitting)   Pulse (!) 57   Temp 97.2 °F (36.2 °C)   Resp 8   Ht 5' 10\" (1.778 m)   Wt 95 kg (209 lb 6.4 oz)   SpO2 95%   BMI 30.05 kg/m²       Medications Administered     leuprolide depot (LUPRON 3 MONTH) injection sykt 22.5 mg     Admin Date  08/05/2021 Action  Given Dose  22.5 mg Route  IntraMUSCular Administered By  Mo Neat                0930 Pt tolerated treatment well. D/c home ambulatory in no distress. Pt aware of next appointment scheduled.       Yelena Parada RN

## 2021-08-05 NOTE — PROGRESS NOTES
Your prostate antigen went up slightly from 0.1 to 0.2 but overall it is still very low and we will continue to monitor it with monthly labs.

## 2021-08-16 DIAGNOSIS — E87.6 HYPOKALEMIA: ICD-10-CM

## 2021-08-17 RX ORDER — POTASSIUM CHLORIDE 1500 MG/1
TABLET, FILM COATED, EXTENDED RELEASE ORAL
Qty: 90 TABLET | Refills: 0 | Status: SHIPPED | OUTPATIENT
Start: 2021-08-17 | End: 2021-11-22

## 2021-08-26 RX ORDER — SERTRALINE HYDROCHLORIDE 50 MG/1
TABLET, FILM COATED ORAL
Qty: 90 TABLET | Refills: 0 | Status: SHIPPED | OUTPATIENT
Start: 2021-08-26 | End: 2021-11-24 | Stop reason: SDUPTHER

## 2021-08-27 DIAGNOSIS — I10 ESSENTIAL HYPERTENSION: ICD-10-CM

## 2021-08-27 RX ORDER — AMLODIPINE BESYLATE 5 MG/1
TABLET ORAL
Qty: 30 TABLET | Refills: 1 | Status: SHIPPED | OUTPATIENT
Start: 2021-08-27 | End: 2021-09-20

## 2021-09-09 RX ORDER — SODIUM CHLORIDE 9 MG/ML
10 INJECTION INTRAMUSCULAR; INTRAVENOUS; SUBCUTANEOUS AS NEEDED
Status: CANCELLED | OUTPATIENT
Start: 2021-09-16

## 2021-09-09 RX ORDER — HEPARIN 100 UNIT/ML
500 SYRINGE INTRAVENOUS AS NEEDED
Status: CANCELLED | OUTPATIENT
Start: 2021-09-16

## 2021-09-09 RX ORDER — SODIUM CHLORIDE 0.9 % (FLUSH) 0.9 %
5-10 SYRINGE (ML) INJECTION AS NEEDED
Status: CANCELLED | OUTPATIENT
Start: 2021-09-16

## 2021-09-16 ENCOUNTER — HOSPITAL ENCOUNTER (OUTPATIENT)
Dept: INFUSION THERAPY | Age: 66
End: 2021-09-16

## 2021-09-19 DIAGNOSIS — I10 ESSENTIAL HYPERTENSION: ICD-10-CM

## 2021-09-20 RX ORDER — AMLODIPINE BESYLATE 5 MG/1
TABLET ORAL
Qty: 30 TABLET | Refills: 1 | Status: SHIPPED | OUTPATIENT
Start: 2021-09-20 | End: 2021-10-20

## 2021-10-14 ENCOUNTER — HOSPITAL ENCOUNTER (OUTPATIENT)
Dept: INFUSION THERAPY | Age: 66
End: 2021-10-14

## 2021-10-20 DIAGNOSIS — I10 ESSENTIAL HYPERTENSION: ICD-10-CM

## 2021-10-20 RX ORDER — AMLODIPINE BESYLATE 5 MG/1
TABLET ORAL
Qty: 30 TABLET | Refills: 1 | Status: SHIPPED | OUTPATIENT
Start: 2021-10-20 | End: 2021-11-14

## 2021-10-26 NOTE — PROGRESS NOTES
[facility]  Medical Oncology at [facility]  [phone]    Hematology / Oncology Established Visit    Reason for Visit:   Wilton Mascorro is a 78 y.o. male who is seen for follow up of neuroendocrine carcinoma. Initially referred by Dr. Sherryle Barbara. 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 Flow cytometry negative for lymphoproliferative disorder. FoundationOne: MS Stable, TMB 0, MDM4 amplification, MYC amplification, TMPRSS2 TMPRSS2-ERD fusion, CEBPA 1311fs*10, ERBB4 amplification - equivocal, MCL1 amplification, CHT8X5S amplification, RAD21 amplification. See scanned report for full info. Abbreviated [facility] Pathology Consultation:  Final diagnosis: Right external iliac node: Metastatic prostate adenocarcinoma. Comment: Histologic sections of the right external iliac node show sheets of cells with minimal eosinophilic cytoplasm and variable cytologic atypia. Some areas show acinar formation. The cells have predominantly round nuclei with vesicular chromatin and prominent nucleoli. Some areas show significantly more atypia with more irregular nuclear borders, hyperchromatic nyclei, and increase nuclear to cytoplasmic ratio. Frequent mitotic figures are identified. A provided pane of IHC stains is reviewed and demonstrates that the malignant cells show strong, diffuse expression of pan-cytokeratin and focal expression of TTF. CK7, CK20, chromogranin, and synaptophysin are negative in the lesional cells. CD3 and CD20 are negative in the lesions cells and positive in the background lymphocytes.  An abbreviated FoundationOne report was included, which included a TMPRSS2-ERG fusion (among other nonspecific abnormalities), which si found in approximately 50% of prostate cancers. Additional IHC studies performed at [facility] showed that the malignant cells have immunoreactivity with antibodies for PSA and PSAP, consistent with a diagnosis of metastatic prostate adenocarcinoma. Per report, flow cytometry negative for lymphoma. Overall, the histomorphology, immunophenotype and genomic findings in this case are diagnostic of metastatic prostate adenocarcinoma. The acinar formation and cytology were suggestive of this diagnosis, and the diffuse PSA and PSAP positivity as well as the TMPRSS2-ERG fusion confirmed the diagnosis. This fusion is the most common genomic alteration in prostate carcinoma and can be detected in over half of the cases, yet is not seen with any frequency in other types of carcinoma. Prior Treatment:   1. Carbo-Etoposide-Atezolizumab x 2 cycles, 6/29/20-7/20/20. 2. Docetaxel x 6 cycles, 8/10/20-11/24/20. Current Treatment: Lupron every 12 weeks. History of Present Illness:   Gloria Michele is a 78 y.o. male who comes in for follow up of poorly differentiated prostate cancer. Pt noticed a nontender bulge in his LLQ in 5/2020. He thought this was a hernia and was evaluated by Dr. Max Mills. CT on 5/20/20 was notable for extensive lymphadenopathy in abd/pelvis. Pt underwent robot assisted excisional Right external iliac LN biopsy 5/29/20, which showed poorly differentiated large cell neuroendocrine carcinoma. However, pathology reviewed at [facility] and felt to be poorly differentiated prostate adenocarcinoma. He reports intentional weight loss with dietary changes and exercise, losing 40-lbs in past 8 months. No n/v/d, melena/hematochezia, cough, SOB. No fevers, chills, sweats. [employer] nonsmoker. Mother had breast cancer diagnosed age < 48. Twin brother had melanoma diagnosed aged late 46s. He does not think he has ever had a colonoscopy or PSA screening prior to current diagnosis.     Interval History:  Patient here for follow up of prostate cancer, labs, lupron and review of imaging. Reports vertigo has mostly resolved, only present with quick movements. He is walking and biking for exercise, but going slow. Gets tired even with tasks like taking off a sweatshirt at times. Hyperpigmented nails are growing out with more normal appearing nail coming in. No n/v/d, CP, SOB. Eating and hydrating well. Did gain 10 lbs since the last visit. PMHx: Prostate cancer, HTN  PSurgHx: appendectomy, vasectomy, subcutaneous cyst removal on forehead  SHx:  Never smoker, no EtOH currently after prior alcohol abuse. Unmarried. Has 2 children, 27 and 32. 1 lives in [place]. FHx:  Mother had breast cancer, father had prostate cancer, brother has melanoma, COPD. Review of Systems: A complete review of systems was obtained, negative except as described above. Physical Exam:     Visit Vitals  BP (!) 144/78   Pulse 69   Temp (!) 96 °F (35.6 °C)   Resp 16   Ht 5' 10\" (1.778 m)   Wt 220 lb (99.8 kg)   SpO2 95%   BMI 31.57 kg/m²     ECOG PS: 0  General: Well developed, no acute distress  Eyes: Anicteric sclerae  HENT: Atraumatic, face mask in place  Neck: Supple, Left supraclavicular LN no longer palpable. Lymphatic: No cervical, supraclavicular, axillary or inguinal adenopathy  Respiratory: CTAB, normal respiratory effort  CV: Normal rate, regular rhythm, no murmurs, trace edema present bilateral lower extremities  GI: firm, nontender, mild distention, no hepatomegaly, no splenomegaly. Prior L lower abdomen 5cm mass no longer palpable. MS: Normal gait and station. Digits without clubbing or cyanosis. Skin:  Fingernails dark, thickened at distal half and more normal at proximal half. No ecchymoses, or petechiae. Normal temperature, turgor. Diffuse dry skin on arms, legs and scalp. Healed incision on right shoulder. Neuro/Psych: Alert, oriented. Moves all 4 extremities. Appropriate affect, normal judgment/insight.     Results:     Lab Results   Component Value Date/Time    WBC 6.3 10/28/2021 09:22 AM    HGB 11.9 (L) 10/28/2021 09:22 AM    HCT 36.0 (L) 10/28/2021 09:22 AM    PLATELET 977  09:22 AM    MCV 90.7 10/28/2021 09:22 AM    ABS. NEUTROPHILS 4.3 10/28/2021 09:22 AM     Lab Results   Component Value Date/Time    Sodium 135 (L) 10/28/2021 09:22 AM    Potassium 3.6 10/28/2021 09:22 AM    Chloride 100 10/28/2021 09:22 AM    CO2 31 10/28/2021 09:22 AM    Glucose 137 (H) 10/28/2021 09:22 AM    BUN 23 (H) 10/28/2021 09:22 AM    Creatinine 1.37 (H) 10/28/2021 09:22 AM    GFR est AA >60 10/28/2021 09:22 AM    GFR est non-AA 52 (L) 10/28/2021 09:22 AM    Calcium 9.1 10/28/2021 09:22 AM    Creatinine (POC) 1.3 10/29/2020 11:59 AM     Lab Results   Component Value Date/Time    Bilirubin, total 0.6 10/28/2021 09:22 AM    ALT (SGPT) 58 10/28/2021 09:22 AM    Alk. phosphatase 68 10/28/2021 09:22 AM    Protein, total 7.8 10/28/2021 09:22 AM    Albumin 4.2 10/28/2021 09:22 AM    Globulin 3.6 10/28/2021 09:22 AM     No results found for: IRON, FE, TIBC, IBCT, PSAT, FERR    No results found for: B12LT, FOL, RBCF  Lab Results   Component Value Date/Time    TSH 1.72 2020 09:58 AM     No results found for: HAMAT, HAAB, HABT, HAAT, HBSAG, HBSB, HBSAT, HBABN, HBCM, HBCAB, HBCAT, XBCABS, HBEAB, HBEAG, XHEPCS, [id number], HBEGLT, [place], HBCLT, HBEBLT, [id number], [id number], HAVMLT, [id number], HBCMLT, [id number], HCGAT     20: Chromogranin 42    20: PSA 38.6  20: PSA 13.2   8/10/20: PSA 1.2  20: PSA 0.5  20: PSA 0.4  10/15/20: PSA 0.3  20: PSA 0.3  20: 0.2  21: PSA 0.1    3/22/21 PSA 0.066 ([facility])   21 PSA 0.1, testosterone < 3  21 PSA 0.1  21 PSA 0.2    Imagin/20/20 CT abd/pelvis with IV contrast:  Impression:  1. No findings to suggest gross abdominal wall hernia or flank hernia. 2. Extensive adenopathy throughout the abdomen and pelvis as described.  Findings are concerning for possible metastatic disease or lymphoma. Recommend follow up or comparison with prior studies. 3. Other findings as described. 6/18/20 PET:  FINDINGS:  HEAD/NECK: No apparent foci of abnormal hypermetabolism. Cerebral evaluation is  limited by normal intense activity. CHEST: Hypermetabolic lymphadenopathy at the base of the left neck and in the  left supraclavicular region is noted. Hypermetabolic superior mediastinal,  prevascular, right paratracheal, subcarinal, and bilateral hilar lymph  lymphadenopathy, maximum SUV of the subcarinal lymph node is 5.7. Small but  hypermetabolic soft tissue nodule left anterior chest wall. ABDOMEN/PELVIS: Hypermetabolic retrocrural, left para-aortic, aortocaval,  bilateral common iliac, bilateral external iliac, and bilateral inguinal  lymphadenopathy, maximum SUV 5.8 of an aortocaval lymph node. Hypermetabolic  mesenteric lymph node left lower quadrant, maximum SUV 12.3. SKELETON: No foci of abnormal hypermetabolism in the axial and visualized  appendicular skeleton. IMPRESSION: Hypermetabolic lymphadenopathy involving the left neck and left  supraclavicular region, mediastinum, bilateral hilum, retroperitoneum,  mesentery, and pelvis as described above. Hypermetabolic soft tissue nodule left  chest.    Brain MRI 6/27/20: IMPRESSION:  There is no evidence of intracranial metastatic disease. Mild chronic microvascular ischemic change. No intracranial mass, hemorrhage or evidence of acute infarction. CT c/a/p 8/3/20: IMPRESSION:  Chest:  1. Findings consistent with partial treatment response, with interval decrease  in size of mediastinal and hilar lymph nodes, and interval decrease in size of  left chest wall nodule. Abdomen/pelvis:   1. Findings consistent with partial treatment response, with interval decrease  in size of retroperitoneal lymph nodes.   2. Unchanged circumferential bladder wall thickening, which may represent acute  or chronic cystitis. Correlate with urinalysis. RECIST   TARGET LESIONS:      Lesion (description)         Location (series/slice)                Size            1. Posterior mediastinal lymph node    series 2 image 26    2.3 x 2.0 cm, previously 3.3 x 2.2 cm  2. Left upper paraesophageal lymph node    series 2 image 9    5 mm, previously 15 mm. 3. Right external iliac lymphadenopathy    series 2 image 98    3.8 x 3.2 cm, previously 6.3 x 4.7 cm  4. Left external iliac lymphadenopathy    series 2 image 99    1.6 cm, previously 2.5 cm    9/1/20 Bone Scan:  FINDINGS: There is physiologic uptake of radiotracer in the skeleton and soft tissues. There is no evidence of fracture, osteomyelitis or bone tumor. There is no pattern of skeletal metastases. IMPRESSION: Normal Whole Body Nuclear Bone Scan.    10/29/20 CT c/a/p:  Lesion (description):     Location (series/slice):  Size   1. Posterior mediastinal lymph node   2/27            1.1 x 1.4 cm, previously 2.3 x 2.0 cm (2/26)  2. Left upper paraesophageal lymph node  2/9           2 mm, previously 5 mm (2/9)   3. Right external iliac lymphadenopathy 2/21                3.8 x 3.2 cm (2/98)  4. Left external iliac lymphadenopathy  2/99              11 mm, previously 16 mm (2/99)   IMPRESSION:  1. Findings consistent with treatment response, with interval decrease in size  of mediastinal, retroperitoneal, and pelvic lymph nodes and no CT evidence of  new metastatic disease within the thorax, abdomen, or pelvis. 2. Unchanged circumferential bladder wall thickening, which may represent acute  or chronic cystitis.      2/1/21 CT c/a/p:  RECIST   TARGET LESIONS:  Lesion (description)         Location (series/slice)                Size      1. Left upper esophageal lymph node    series 2 image 10    1 mm previously measuring 2 mm  2. Azygos esophageal lymph node    2, 30    7 x 7 mm previously measuring 11 x 14 mm  3.  Right external iliac lymph node    2, 102    27 x 23 mm previously 27 x 31 mm  4. Left external iliac lymph node    2, 102    8 mm previously 11 mm  NONTARGET LESIONS:  None  IMPRESSION  1. Further decrease size of adenopathy as described. 2.  No evidence of new metastatic disease. 2/1/21 Bone scan:  Normal whole-body nuclear bone scan.    2/20/21 Brain MRI:  IMPRESSION  No acute intracranial findings no mass    5/11/21 CT c/a/p:  RECIST   TARGET LESIONS:  Lesion (description)         Location (series/slice)                Size      1. Upper left paraesophageal lymph node    series 2 image 13    2 mm unchanged  2. Azygoesophageal lymph node    series 2 image 33 measuring 7 x 5 mm     unchanged  3. Right external iliac lymph node    series 2 image 103    27 x 22 mm unchanged  4. Left external iliac lymph node    series 2 image 104    12 x 7 mm unchanged  NONTARGET LESIONS:  None  IMPRESSION  1. No evidence of new metastatic disease. Stable normal-sized lymph nodes in  the mediastinum and enlarged right external iliac lymph node. 2.  Other incidental findings as above    5/11/21 Bone scan:  FINDINGS: Physiologic uptake of radiotracer is seen in the skeleton and soft  tissues. There is no evidence for fracture or metastatic disease. Incidental  renal imaging shows no abnormality. IMPRESSION  No definite evidence of bony metastatic disease. 7/29/21 CT ch/abd/pelvis:  RECIST   TARGET LESIONS:  Lesion (description)         Location (series/slice)                Size      1.  azygo-esophageal lymph node, unchanged    series 2, image 29    9 mm  2. Right external iliac lymph node, decreased    series 2, image 100    16 mm  NONTARGET LESIONS:  IMPRESSION  1. No evidence of new metastatic disease. Stable to slight decrease in size of  several target lymph nodes as described above. 2.  Unchanged nonacute findings as above. 7/29/21 Bone scan:  IMPRESSION  Normal whole-body nuclear bone scan.     10/27/21 CT ch/abd/pelv:  RECIST:  Target:  Azygo-esophageal recess LN (2,33) 9mm  Right external iliac lymph node               (2,104) 16 mm  Nontarget:  IMPRESSION  No interval change. 10/2721 bone scan:   FINDINGS: Physiologic uptake of radiotracer is seen in the skeleton and soft  tissues. There is no evidence for fracture or metastatic disease. Incidental  renal imaging shows no abnormality. IMPRESSION  Normal whole-body nuclear bone scan. Assessment & Plan:   Kati Reinoso is a 78 y.o. 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 1. Metastatic adenocarcinoma of prostate, high grade: Initial pathologist at [address] [place] [address] called this \"Large cell neuroendocrine carcinoma,\" and based on diffuse disease on PET, patient was started on treatment with Carbo-Etoposide-Atezolizumab (thinking this was large cell carcinoma of the lung.) However FoundationOne testing identified TMPRSS2-ERG fusion, which is primarily seen in prostate cancers. This along with elevated PSA of 38 prompted sending of pathology specimen to [facility] for review. Their opinion and IHC staining is consistent with high grade adenocarcinoma of prostate. Treatment options now include: Carboplatin + Cabazitaxel vs single agent Docetaxel. Pt signed consent for Docetaxel. 10/27/21 CT showed stable disease and bone scan negative for disease. Supportive medications: EMLA cream, zofran, compazine, dexamethasone  -- Lupron (3 month depot) #5 given 8/5/21, #6 due today, 10/28/21. Previously discussed with Dr. Micah Stoddard and Dr. Evelyne Amor of [facility] that pt wants to get ADT here rather than at [facility] to consolidate appointments. -- Continue ADT alone, monitor PSA and scans periodically. Would consider starting Zytiga at time of progression on scans or symptomatic PSA progression. -- Plan on next CT and bone scan at 12 weeks from last scan, due 1/19/22. After this scan will order imaging based on symptoms and PSA.    -- Return in 12 weeks for labs/portflush, Lupron, review CT/bone scan, MD/NP visit. 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 Difficulty with buttons and zippers. He is dropping objects less often. 3. H/o Genital Herpes: On suppression with Acyclovir. 4. Renal insufficiency:   Likely 2/2 to decreased PO intake. Encouraged better oral hydration. 5. Depression/anxiety:   2/2 to #1. On Zoloft 50mg/d. Following with Palliative    6. Dysequilibrium / Ear effusions:    Brain MRI negative for mets. Following with ENT for bilateral effusions, Left tympanostomy tube placed and balance and hearing improved. 7. Hyponychium:   Likely 2/2 to fungal infection. Continue topical antifungal medication. Refilled today. Advised follow up with dermatology if not improving. 8. H/o Melanoma:   Removed from right shoulder. Incision site healed well. -- Request note from Dr. Michell Alvarez. 10. Health maintenance:   Discussed health food options, diet and exercise. 9. Elevated AST:   No liver lesions or abdominal pain. May be due to inflammation but unclear etiology at this time. Will monitor with repeat labs in 12 weeks. Emotional well being: Pt is coping well with his/her disease and has excellent support. Significant other: Yessenia Garcia - [phone]. I appreciate the opportunity to participate in [facility]. I have personally seen and evaluated the patient in conjunction with Chico Barcenas NP. I find the patient's history and physical exam are consistent with the NP's documentation. I agree with the above assessment and plan, which I have modified as needed. CT imaging and bone scan show no evidence of disease. Will continue on Lupron. Will complete imaging again in 3 months. At that time, can move to only doing surveillance imaging as needed based on symptoms or PSA progression.     Signed By: Ulices Gleason MD     October 28, 2021

## 2021-10-27 ENCOUNTER — HOSPITAL ENCOUNTER (OUTPATIENT)
Dept: CT IMAGING | Age: 66
Discharge: HOME | End: 2021-10-27
Attending: INTERNAL MEDICINE
Payer: MEDICARE

## 2021-10-27 ENCOUNTER — HOSPITAL ENCOUNTER (OUTPATIENT)
Dept: NUCLEAR MEDICINE | Age: 66
Discharge: HOME | End: 2021-10-27
Attending: INTERNAL MEDICINE
Payer: MEDICARE

## 2021-10-27 DIAGNOSIS — C61 ADENOCARCINOMA OF PROSTATE, STAGE 4 (HCC): ICD-10-CM

## 2021-10-27 PROCEDURE — 78306 BONE IMAGING WHOLE BODY: CPT

## 2021-10-27 PROCEDURE — 74177 CT ABD & PELVIS W/CONTRAST: CPT

## 2021-10-27 PROCEDURE — 74011000636 HC RX REV CODE- 636: Performed by: RADIOLOGY

## 2021-10-27 RX ADMIN — IOPAMIDOL 100 ML: 755 INJECTION, SOLUTION INTRAVENOUS at 12:00

## 2021-10-28 ENCOUNTER — HOSPITAL ENCOUNTER (OUTPATIENT)
Dept: INFUSION THERAPY | Age: 66
Discharge: HOME | End: 2021-10-28
Payer: MEDICARE

## 2021-10-28 ENCOUNTER — OFFICE VISIT (OUTPATIENT)
Dept: ONCOLOGY | Age: 66
End: 2021-10-28
Payer: MEDICARE

## 2021-10-28 VITALS
DIASTOLIC BLOOD PRESSURE: 78 MMHG | RESPIRATION RATE: 16 BRPM | OXYGEN SATURATION: 95 % | SYSTOLIC BLOOD PRESSURE: 144 MMHG | HEART RATE: 69 BPM | TEMPERATURE: 96 F

## 2021-10-28 VITALS
HEIGHT: 70 IN | TEMPERATURE: 96 F | HEART RATE: 69 BPM | OXYGEN SATURATION: 95 % | DIASTOLIC BLOOD PRESSURE: 78 MMHG | WEIGHT: 220 LBS | RESPIRATION RATE: 16 BRPM | BODY MASS INDEX: 31.5 KG/M2 | SYSTOLIC BLOOD PRESSURE: 144 MMHG

## 2021-10-28 DIAGNOSIS — C61 ADENOCARCINOMA OF PROSTATE, STAGE 4 (HCC): Primary | ICD-10-CM

## 2021-10-28 DIAGNOSIS — Z79.818 ENCOUNTER FOR MONITORING ANDROGEN DEPRIVATION THERAPY: ICD-10-CM

## 2021-10-28 DIAGNOSIS — R74.01 ELEVATED AST (SGOT): ICD-10-CM

## 2021-10-28 DIAGNOSIS — B35.1 ONYCHOMYCOSIS: ICD-10-CM

## 2021-10-28 DIAGNOSIS — I10 ESSENTIAL HYPERTENSION: ICD-10-CM

## 2021-10-28 DIAGNOSIS — C79.82 METASTATIC ADENOCARCINOMA TO PROSTATE (HCC): Primary | ICD-10-CM

## 2021-10-28 LAB
ALBUMIN SERPL-MCNC: 4.2 G/DL (ref 3.5–5)
ALBUMIN/GLOB SERPL: 1.2 {RATIO} (ref 1.1–2.2)
ALP SERPL-CCNC: 68 U/L (ref 45–117)
ALT SERPL-CCNC: 58 U/L (ref 12–78)
ANION GAP SERPL CALC-SCNC: 4 MMOL/L (ref 5–15)
AST SERPL-CCNC: 150 U/L (ref 15–37)
BASOPHILS # BLD: 0.1 K/UL (ref 0–0.1)
BASOPHILS NFR BLD: 1 % (ref 0–1)
BILIRUB SERPL-MCNC: 0.6 MG/DL (ref 0.2–1)
BUN SERPL-MCNC: 23 MG/DL (ref 6–20)
BUN/CREAT SERPL: 17 (ref 12–20)
CALCIUM SERPL-MCNC: 9.1 MG/DL (ref 8.5–10.1)
CHLORIDE SERPL-SCNC: 100 MMOL/L (ref 97–108)
CO2 SERPL-SCNC: 31 MMOL/L (ref 21–32)
CREAT SERPL-MCNC: 1.37 MG/DL (ref 0.7–1.3)
DIFFERENTIAL METHOD BLD: ABNORMAL
EOSINOPHIL # BLD: 0.3 K/UL (ref 0–0.4)
EOSINOPHIL NFR BLD: 4 % (ref 0–7)
ERYTHROCYTE [DISTWIDTH] IN BLOOD BY AUTOMATED COUNT: 14.9 % (ref 11.5–14.5)
GLOBULIN SER CALC-MCNC: 3.6 G/DL (ref 2–4)
GLUCOSE SERPL-MCNC: 137 MG/DL (ref 65–100)
HCT VFR BLD AUTO: 36 % (ref 36.6–50.3)
HGB BLD-MCNC: 11.9 G/DL (ref 12.1–17)
IMM GRANULOCYTES # BLD AUTO: 0 K/UL (ref 0–0.04)
IMM GRANULOCYTES NFR BLD AUTO: 1 % (ref 0–0.5)
LYMPHOCYTES # BLD: 1.3 K/UL (ref 0.8–3.5)
LYMPHOCYTES NFR BLD: 20 % (ref 12–49)
MCH RBC QN AUTO: 30 PG (ref 26–34)
MCHC RBC AUTO-ENTMCNC: 33.1 G/DL (ref 30–36.5)
MCV RBC AUTO: 90.7 FL (ref 80–99)
MONOCYTES # BLD: 0.4 K/UL (ref 0–1)
MONOCYTES NFR BLD: 6 % (ref 5–13)
NEUTS SEG # BLD: 4.3 K/UL (ref 1.8–8)
NEUTS SEG NFR BLD: 68 % (ref 32–75)
NRBC # BLD: 0 K/UL (ref 0–0.01)
NRBC BLD-RTO: 0 PER 100 WBC
PLATELET # BLD AUTO: 187 K/UL (ref 150–400)
PMV BLD AUTO: 11.4 FL (ref 8.9–12.9)
POTASSIUM SERPL-SCNC: 3.6 MMOL/L (ref 3.5–5.1)
PROT SERPL-MCNC: 7.8 G/DL (ref 6.4–8.2)
PSA SERPL-MCNC: 0.4 NG/ML (ref 0.01–4)
RBC # BLD AUTO: 3.97 M/UL (ref 4.1–5.7)
SODIUM SERPL-SCNC: 135 MMOL/L (ref 136–145)
WBC # BLD AUTO: 6.3 K/UL (ref 4.1–11.1)

## 2021-10-28 PROCEDURE — G0463 HOSPITAL OUTPT CLINIC VISIT: HCPCS | Performed by: NURSE PRACTITIONER

## 2021-10-28 PROCEDURE — G9717 DOC PT DX DEP/BP F/U NT REQ: HCPCS | Performed by: INTERNAL MEDICINE

## 2021-10-28 PROCEDURE — G8754 DIAS BP LESS 90: HCPCS | Performed by: INTERNAL MEDICINE

## 2021-10-28 PROCEDURE — 1101F PT FALLS ASSESS-DOCD LE1/YR: CPT | Performed by: INTERNAL MEDICINE

## 2021-10-28 PROCEDURE — 84403 ASSAY OF TOTAL TESTOSTERONE: CPT

## 2021-10-28 PROCEDURE — 85025 COMPLETE CBC W/AUTO DIFF WBC: CPT

## 2021-10-28 PROCEDURE — 84153 ASSAY OF PSA TOTAL: CPT

## 2021-10-28 PROCEDURE — G8753 SYS BP > OR = 140: HCPCS | Performed by: INTERNAL MEDICINE

## 2021-10-28 PROCEDURE — 99215 OFFICE O/P EST HI 40 MIN: CPT | Performed by: INTERNAL MEDICINE

## 2021-10-28 PROCEDURE — G8417 CALC BMI ABV UP PARAM F/U: HCPCS | Performed by: INTERNAL MEDICINE

## 2021-10-28 PROCEDURE — 80053 COMPREHEN METABOLIC PANEL: CPT

## 2021-10-28 PROCEDURE — 3017F COLORECTAL CA SCREEN DOC REV: CPT | Performed by: INTERNAL MEDICINE

## 2021-10-28 PROCEDURE — G8536 NO DOC ELDER MAL SCRN: HCPCS | Performed by: INTERNAL MEDICINE

## 2021-10-28 PROCEDURE — G8427 DOCREV CUR MEDS BY ELIG CLIN: HCPCS | Performed by: INTERNAL MEDICINE

## 2021-10-28 PROCEDURE — 96402 CHEMO HORMON ANTINEOPL SQ/IM: CPT

## 2021-10-28 PROCEDURE — 36415 COLL VENOUS BLD VENIPUNCTURE: CPT

## 2021-10-28 PROCEDURE — 74011250636 HC RX REV CODE- 250/636: Performed by: NURSE PRACTITIONER

## 2021-10-28 RX ORDER — CICLOPIROX 80 MG/ML
SOLUTION TOPICAL
Qty: 6.6 ML | Refills: 1 | Status: SHIPPED | OUTPATIENT
Start: 2021-10-28

## 2021-10-28 RX ADMIN — LEUPROLIDE ACETATE 22.5 MG: KIT at 11:00

## 2021-10-28 NOTE — PROGRESS NOTES
[facility] Progress Note    Date: 2021    Name: Nola Tee    MRN: [id number]         : 1955    Mr. Bassem Whitehead Arrived ambulatory and in no distress for cycle 6 day 1 of Lupron regimen. Assessment was completed, patient feeling fatigued/weak, issues with nail beds and recently had skin cancer removed on his back. Left chest port accessed without difficulty, labs drawn and in process. Chemotherapy Flowsheet 10/28/2021   Cycle C6D1   Date 10/28/2021   Drug / Regimen Lupron   Dosage -   Time Up -   Time Down -   Pre Hydration -   Post Hydration -   Pre Meds -   Notes RGM          Proceeded to appt with     Namita Juanjose Michele vitals were reviewed. Visit Vitals  BP (!) 144/78   Pulse 69   Temp (!) 96 °F (35.6 °C)   Resp 16   SpO2 95%       Lab results were obtained and reviewed. Recent Results (from the past 12 hour(s))   CBC WITH AUTOMATED DIFF    Collection Time: 10/28/21  9:22 AM   Result Value Ref Range    WBC 6.3 4.1 - 11.1 K/uL    RBC 3.97 (L) 4.10 - 5.70 M/uL    HGB 11.9 (L) 12.1 - 17.0 g/dL    HCT 36.0 (L) 36.6 - 50.3 %    MCV 90.7 80.0 - 99.0 FL    MCH 30.0 26.0 - 34.0 PG    MCHC 33.1 30.0 - 36.5 g/dL    RDW 14.9 (H) 11.5 - 14.5 %    PLATELET 811 229 - 958 K/uL    MPV 11.4 8.9 - 12.9 FL    NRBC 0.0 0  WBC    ABSOLUTE NRBC 0.00 0.00 - 0.01 K/uL    NEUTROPHILS 68 32 - 75 %    LYMPHOCYTES 20 12 - 49 %    MONOCYTES 6 5 - 13 %    EOSINOPHILS 4 0 - 7 %    BASOPHILS 1 0 - 1 %    IMMATURE GRANULOCYTES 1 (H) 0.0 - 0.5 %    ABS. NEUTROPHILS 4.3 1.8 - 8.0 K/UL    ABS. LYMPHOCYTES 1.3 0.8 - 3.5 K/UL    ABS. MONOCYTES 0.4 0.0 - 1.0 K/UL    ABS. EOSINOPHILS 0.3 0.0 - 0.4 K/UL    ABS. BASOPHILS 0.1 0.0 - 0.1 K/UL    ABS. IMM.  GRANS. 0.0 0.00 - 0.04 K/UL    DF AUTOMATED     METABOLIC PANEL, COMPREHENSIVE    Collection Time: 10/28/21  9:22 AM   Result Value Ref Range    Sodium 135 (L) 136 - 145 mmol/L    Potassium 3.6 3.5 - 5.1 mmol/L    Chloride 100 97 - 108 mmol/L    CO2 31 21 - 32 mmol/L    Anion gap 4 (L) 5 - 15 mmol/L    Glucose 137 (H) 65 - 100 mg/dL    BUN 23 (H) 6 - 20 MG/DL    Creatinine 1.37 (H) 0.70 - 1.30 MG/DL    BUN/Creatinine ratio 17 12 - 20      GFR est AA >60 >60 ml/min/1.73m2    GFR est non-AA 52 (L) >60 ml/min/1.73m2    Calcium 9.1 8.5 - 10.1 MG/DL    Bilirubin, total 0.6 0.2 - 1.0 MG/DL    ALT (SGPT) 58 12 - 78 U/L    AST (SGOT) 150 (H) 15 - 37 U/L    Alk. phosphatase 68 45 - 117 U/L    Protein, total 7.8 6.4 - 8.2 g/dL    Albumin 4.2 3.5 - 5.0 g/dL    Globulin 3.6 2.0 - 4.0 g/dL    A-G Ratio 1.2 1.1 - 2.2         Pre-medications  were administered as ordered and chemotherapy was initiated. Medications Administered     leuprolide depot (LUPRON 3 MONTH) injection sykt 22.5 mg     Admin Date  10/28/2021 Action  Given Dose  22.5 mg Route  IntraMUSCular Administered By  Efe Meyer      Mr. Kierra Nair tolerated treatment well and was discharged from [place] [id number] in stable condition. He is to return on 1/20/22 for his next appointment.     Benny Lewis  October 28, 2021

## 2021-10-28 NOTE — PROGRESS NOTES
Chief Complaint   Patient presents with    Follow-up     Matthew Mitchell is a pleasant 78year old male who presents as a follow up for prostate cancer.  He reports hand pain

## 2021-10-28 NOTE — PATIENT INSTRUCTIONS
1. For dry skin: start applying a thick lotion once daily, such as CeraVe. Any lotion with hyaluronic acid (this is hydrating). 2. If nailbeds not improving, follow up with dermatology.

## 2021-10-29 LAB
TESTOST FREE SERPL-MCNC: <0.2 PG/ML (ref 6.6–18.1)
TESTOST SERPL-MCNC: <3 NG/DL (ref 264–916)

## 2021-11-19 DIAGNOSIS — E87.6 HYPOKALEMIA: ICD-10-CM

## 2021-11-22 RX ORDER — POTASSIUM CHLORIDE 1500 MG/1
TABLET, FILM COATED, EXTENDED RELEASE ORAL
Qty: 90 TABLET | Refills: 0 | Status: SHIPPED | OUTPATIENT
Start: 2021-11-22 | End: 2022-04-28

## 2021-11-24 RX ORDER — SERTRALINE HYDROCHLORIDE 50 MG/1
50 TABLET, FILM COATED ORAL DAILY
Qty: 90 TABLET | Refills: 0 | Status: SHIPPED | OUTPATIENT
Start: 2021-11-24 | End: 2022-02-20

## 2022-01-17 ENCOUNTER — HOSPITAL ENCOUNTER (OUTPATIENT)
Dept: NUCLEAR MEDICINE | Age: 67
Discharge: HOME | End: 2022-01-17
Attending: NURSE PRACTITIONER
Payer: MEDICARE

## 2022-01-17 ENCOUNTER — HOSPITAL ENCOUNTER (OUTPATIENT)
Dept: CT IMAGING | Age: 67
Discharge: HOME | End: 2022-01-17
Attending: NURSE PRACTITIONER
Payer: MEDICARE

## 2022-01-17 DIAGNOSIS — C61 ADENOCARCINOMA OF PROSTATE, STAGE 4 (HCC): ICD-10-CM

## 2022-01-17 PROCEDURE — 78306 BONE IMAGING WHOLE BODY: CPT

## 2022-01-17 PROCEDURE — 74011000636 HC RX REV CODE- 636: Performed by: NURSE PRACTITIONER

## 2022-01-17 PROCEDURE — 74177 CT ABD & PELVIS W/CONTRAST: CPT

## 2022-01-17 RX ADMIN — IOPAMIDOL 100 ML: 755 INJECTION, SOLUTION INTRAVENOUS at 12:51

## 2022-01-17 NOTE — PROGRESS NOTES
[address] [place] [address] Oncology at [address] [place] [address]  [phone]    Hematology / Oncology Established Visit    Reason for Visit:   Mary Ann Fisher is a 78 y.o. male who is seen for follow up of neuroendocrine carcinoma. Initially referred by Dr. Alexei Phelps. 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 Flow cytometry negative for lymphoproliferative disorder. FoundationOne: MS Stable, TMB 0, MDM4 amplification, MYC amplification, TMPRSS2 TMPRSS2-ERD fusion, CEBPA 1311fs*10, ERBB4 amplification - equivocal, MCL1 amplification, LOH1X3X amplification, RAD21 amplification. See scanned report for full info. Abbreviated [facility] Pathology Consultation:  Final diagnosis: Right external iliac node: Metastatic prostate adenocarcinoma. Comment: Histologic sections of the right external iliac node show sheets of cells with minimal eosinophilic cytoplasm and variable cytologic atypia. Some areas show acinar formation. The cells have predominantly round nuclei with vesicular chromatin and prominent nucleoli. Some areas show significantly more atypia with more irregular nuclear borders, hyperchromatic nyclei, and increase nuclear to cytoplasmic ratio. Frequent mitotic figures are identified. A provided pane of IHC stains is reviewed and demonstrates that the malignant cells show strong, diffuse expression of pan-cytokeratin and focal expression of TTF. CK7, CK20, chromogranin, and synaptophysin are negative in the lesional cells. CD3 and CD20 are negative in the lesions cells and positive in the background lymphocytes.  An abbreviated FoundationOne report was included, which included a TMPRSS2-ERG fusion (among other nonspecific abnormalities), which si found in approximately 50% of prostate cancers. Additional IHC studies performed at [facility] showed that the malignant cells have immunoreactivity with antibodies for PSA and PSAP, consistent with a diagnosis of metastatic prostate adenocarcinoma. Per report, flow cytometry negative for lymphoma. Overall, the histomorphology, immunophenotype and genomic findings in this case are diagnostic of metastatic prostate adenocarcinoma. The acinar formation and cytology were suggestive of this diagnosis, and the diffuse PSA and PSAP positivity as well as the TMPRSS2-ERG fusion confirmed the diagnosis. This fusion is the most common genomic alteration in prostate carcinoma and can be detected in over half of the cases, yet is not seen with any frequency in other types of carcinoma. Prior Treatment:   1. Carbo-Etoposide-Atezolizumab x 2 cycles, 6/29/20-7/20/20. 2. Docetaxel x 6 cycles, 8/10/20-11/24/20. Current Treatment: Lupron every 12 weeks. History of Present Illness:   Vadim Miles is a 78 y.o. male who comes in for follow up of poorly differentiated prostate cancer. Pt noticed a nontender bulge in his LLQ in 5/2020. He thought this was a hernia and was evaluated by Dr. Neel Padgett. CT on 5/20/20 was notable for extensive lymphadenopathy in abd/pelvis. Pt underwent robot assisted excisional Right external iliac LN biopsy 5/29/20, which showed poorly differentiated large cell neuroendocrine carcinoma. However, pathology reviewed at [facility] and felt to be poorly differentiated prostate adenocarcinoma. He reports intentional weight loss with dietary changes and exercise, losing 40-lbs in past 8 months. No n/v/d, melena/hematochezia, cough, SOB. No fevers, chills, sweats. [employer] nonsmoker. Mother had breast cancer diagnosed age < 48. Twin brother had melanoma diagnosed aged late 46s. He does not think he has ever had a colonoscopy or PSA screening prior to current diagnosis.     Interval History:  Patient here for follow up of prostate cancer, labs, lupron and review of imaging. Pt denies any new complaints. No worsening or improvement in neuropathy. He is trying to alter his activities to more safely deal with neuropathy. Still feels mildly off balance at times. Denies any n/v/d or abdominal pain. PMHx: Prostate cancer, HTN  PSurgHx: appendectomy, vasectomy, subcutaneous cyst removal on forehead  SHx:  Never smoker, no EtOH currently after prior alcohol abuse. Unmarried. Has 2 children, 27 and 32. 1 lives in [place]. FHx:  Mother had breast cancer, father had prostate cancer, brother has melanoma, COPD. Review of Systems: A complete review of systems was obtained, negative except as described above. Physical Exam:     Visit Vitals  BP (!) 162/100   Pulse (!) 57   Temp 97.1 °F (36.2 °C)   Resp 16   Ht 5' 10\" (1.778 m)   Wt 217 lb 9.5 oz (98.7 kg)   SpO2 95%   BMI 31.22 kg/m²     ECOG PS: 0  General: Well developed, no acute distress  Eyes: Anicteric sclerae  HENT: Atraumatic, face mask in place  Neck: Supple,  Lymphatic: No cervical, supraclavicular adenopathy  Respiratory: CTAB, normal respiratory effort  CV: Normal rate, regular rhythm, no murmurs, trace edema present bilateral lower extremities  GI: firm, nontender, mild distention, no hepatomegaly, no splenomegaly. Prior L lower abdomen 5cm mass no longer palpable. MS: Normal gait and station. Digits without clubbing or cyanosis. Skin:  Fingernails dark, thickened at distal half and more normal at proximal half. No ecchymoses, or petechiae. Normal temperature, turgor. Diffuse dry skin on arms, legs and scalp. Healed incision on right shoulder. Neuro/Psych: Alert, oriented. Moves all 4 extremities. Appropriate affect, normal judgment/insight.     Results:     Lab Results   Component Value Date/Time    WBC 6.7 01/20/2022 09:06 AM    HGB 11.2 (L) 01/20/2022 09:06 AM    HCT 34.5 (L) 01/20/2022 09:06 AM    PLATELET 529 86/67/6318 09:06 AM    MCV 92.5 2022 09:06 AM    ABS. NEUTROPHILS 4.5 2022 09:06 AM     Lab Results   Component Value Date/Time    Sodium 138 2022 09:06 AM    Potassium 3.5 2022 09:06 AM    Chloride 103 2022 09:06 AM    CO2 30 2022 09:06 AM    Glucose 79 2022 09:06 AM    BUN 26 (H) 2022 09:06 AM    Creatinine 1.38 (H) 2022 09:06 AM    GFR est AA >60 2022 09:06 AM    GFR est non-AA 51 (L) 2022 09:06 AM    Calcium 9.2 2022 09:06 AM    Creatinine (POC) 1.3 10/29/2020 11:59 AM     Lab Results   Component Value Date/Time    Bilirubin, total 0.5 2022 09:06 AM    ALT (SGPT) 52 2022 09:06 AM    Alk. phosphatase 83 2022 09:06 AM    Protein, total 7.8 2022 09:06 AM    Albumin 4.3 2022 09:06 AM    Globulin 3.5 2022 09:06 AM     No results found for: IRON, FE, TIBC, IBCT, PSAT, FERR    No results found for: B12LT, FOL, RBCF  Lab Results   Component Value Date/Time    TSH 1.72 2020 09:58 AM     No results found for: HAMAT, HAAB, HABT, HAAT, HBSAG, HBSB, HBSAT, HBABN, HBCM, HBCAB, HBCAT, XBCABS, HBEAB, HBEAG, XHEPCS, 171401, HBEGLT, [place], HBCLT, HBEBLT, [id number], [id number], HAVMLT, [id number], HBCMLT, [id number], HCGAT     20: Chromogranin 42    20: PSA 38.6  20: PSA 13.2   8/10/20: PSA 1.2  20: PSA 0.5  20: PSA 0.4  10/15/20: PSA 0.3  20: PSA 0.3  20: 0.2  21: PSA 0.1    3/22/21 PSA 0.066 (PSA ultrasensitive at [facility])   21 PSA 0.1, testosterone < 3  21 PSA 0.1  21 PSA 0.2  10/28/21: PSA 0.4  22: PSA 2.3    Imagin/20/20 CT abd/pelvis with IV contrast:  Impression:  1. No findings to suggest gross abdominal wall hernia or flank hernia. 2. Extensive adenopathy throughout the abdomen and pelvis as described. Findings are concerning for possible metastatic disease or lymphoma. Recommend follow up or comparison with prior studies. 3. Other findings as described.     20 PET: FINDINGS:  HEAD/NECK: No apparent foci of abnormal hypermetabolism. Cerebral evaluation is  limited by normal intense activity. CHEST: Hypermetabolic lymphadenopathy at the base of the left neck and in the  left supraclavicular region is noted. Hypermetabolic superior mediastinal,  prevascular, right paratracheal, subcarinal, and bilateral hilar lymph  lymphadenopathy, maximum SUV of the subcarinal lymph node is 5.7. Small but  hypermetabolic soft tissue nodule left anterior chest wall. ABDOMEN/PELVIS: Hypermetabolic retrocrural, left para-aortic, aortocaval,  bilateral common iliac, bilateral external iliac, and bilateral inguinal  lymphadenopathy, maximum SUV 5.8 of an aortocaval lymph node. Hypermetabolic  mesenteric lymph node left lower quadrant, maximum SUV 12.3. SKELETON: No foci of abnormal hypermetabolism in the axial and visualized  appendicular skeleton. IMPRESSION: Hypermetabolic lymphadenopathy involving the left neck and left  supraclavicular region, mediastinum, bilateral hilum, retroperitoneum,  mesentery, and pelvis as described above. Hypermetabolic soft tissue nodule left  chest.    Brain MRI 6/27/20: IMPRESSION:  There is no evidence of intracranial metastatic disease. Mild chronic microvascular ischemic change. No intracranial mass, hemorrhage or evidence of acute infarction. CT c/a/p 8/3/20: IMPRESSION:  Chest:  1. Findings consistent with partial treatment response, with interval decrease  in size of mediastinal and hilar lymph nodes, and interval decrease in size of  left chest wall nodule. Abdomen/pelvis:   1. Findings consistent with partial treatment response, with interval decrease  in size of retroperitoneal lymph nodes. 2. Unchanged circumferential bladder wall thickening, which may represent acute  or chronic cystitis. Correlate with urinalysis.   RECIST   TARGET LESIONS:      Lesion (description)         Location (series/slice)                Size            1. Posterior mediastinal lymph node    series 2 image 26    2.3 x 2.0 cm, previously 3.3 x 2.2 cm  2. Left upper paraesophageal lymph node    series 2 image 9    5 mm, previously 15 mm. 3. Right external iliac lymphadenopathy    series 2 image 98    3.8 x 3.2 cm, previously 6.3 x 4.7 cm  4. Left external iliac lymphadenopathy    series 2 image 99    1.6 cm, previously 2.5 cm    9/1/20 Bone Scan:  FINDINGS: There is physiologic uptake of radiotracer in the skeleton and soft tissues. There is no evidence of fracture, osteomyelitis or bone tumor. There is no pattern of skeletal metastases. IMPRESSION: Normal Whole Body Nuclear Bone Scan.    10/29/20 CT c/a/p:  Lesion (description):     Location (series/slice):  Size   1. Posterior mediastinal lymph node   2/27            1.1 x 1.4 cm, previously 2.3 x 2.0 cm (2/26)  2. Left upper paraesophageal lymph node  2/9           2 mm, previously 5 mm (2/9)   3. Right external iliac lymphadenopathy 2/21                3.8 x 3.2 cm (2/98)  4. Left external iliac lymphadenopathy  2/99              11 mm, previously 16 mm (2/99)   IMPRESSION:  1. Findings consistent with treatment response, with interval decrease in size  of mediastinal, retroperitoneal, and pelvic lymph nodes and no CT evidence of  new metastatic disease within the thorax, abdomen, or pelvis. 2. Unchanged circumferential bladder wall thickening, which may represent acute  or chronic cystitis.      2/1/21 CT c/a/p:  RECIST   TARGET LESIONS:  Lesion (description)         Location (series/slice)                Size      1. Left upper esophageal lymph node    series 2 image 10    1 mm previously measuring 2 mm  2. Azygos esophageal lymph node    2, 30    7 x 7 mm previously measuring 11 x 14 mm  3. Right external iliac lymph node    2, 102    27 x 23 mm previously 27 x 31 mm  4. Left external iliac lymph node    2, 102    8 mm previously 11 mm  NONTARGET LESIONS:  None  IMPRESSION  1.   Further decrease size of adenopathy as described. 2.  No evidence of new metastatic disease. 2/1/21 Bone scan:  Normal whole-body nuclear bone scan.    2/20/21 Brain MRI:  IMPRESSION  No acute intracranial findings no mass    5/11/21 CT c/a/p:  RECIST   TARGET LESIONS:  Lesion (description)         Location (series/slice)                Size      1. Upper left paraesophageal lymph node    series 2 image 13    2 mm unchanged  2. Azygoesophageal lymph node    series 2 image 33 measuring 7 x 5 mm     unchanged  3. Right external iliac lymph node    series 2 image 103    27 x 22 mm unchanged  4. Left external iliac lymph node    series 2 image 104    12 x 7 mm unchanged  NONTARGET LESIONS:  None  IMPRESSION  1. No evidence of new metastatic disease. Stable normal-sized lymph nodes in  the mediastinum and enlarged right external iliac lymph node. 2.  Other incidental findings as above    5/11/21 Bone scan:  FINDINGS: Physiologic uptake of radiotracer is seen in the skeleton and soft  tissues. There is no evidence for fracture or metastatic disease. Incidental  renal imaging shows no abnormality. IMPRESSION  No definite evidence of bony metastatic disease. 7/29/21 CT ch/abd/pelvis:  RECIST   TARGET LESIONS:  Lesion (description)         Location (series/slice)                Size      1.  azygo-esophageal lymph node, unchanged    series 2, image 29    9 mm  2. Right external iliac lymph node, decreased    series 2, image 100    16 mm  NONTARGET LESIONS:  IMPRESSION  1. No evidence of new metastatic disease. Stable to slight decrease in size of  several target lymph nodes as described above. 2.  Unchanged nonacute findings as above. 7/29/21 Bone scan:  IMPRESSION  Normal whole-body nuclear bone scan. 10/27/21 CT ch/abd/pelv:  RECIST:  Target:  Azygo-esophageal recess LN                (2,33) 9mm  Right external iliac lymph node               (2,104) 16 mm  Nontarget:  IMPRESSION  No interval change.     10/2721 bone scan: FINDINGS: Physiologic uptake of radiotracer is seen in the skeleton and soft  tissues. There is no evidence for fracture or metastatic disease. Incidental  renal imaging shows no abnormality. IMPRESSION  Normal whole-body nuclear bone scan. 1/17/22 CT ch/abd/pelv:  IMPRESSION  1. Right lower paratracheal lymph node previously measured is now normal in size. 2.  Right external iliac lymph node is enlarged, stable at 14 mm in short axis dimension. 3.  No osseous metastatic disease. 4.  2 new foci of groundglass in the right upper lungs as noted above. Attention on follow-up recommended. RECIST:  Right external iliac lymph node, 2:107, 14 mm, previously 16 mm.    1/17/22 Bone Scan:  IMPRESSION  No significant change. No definite evidence of bony metastatic disease. Assessment & Plan:   Yaima Dockery is a 78 y.o. 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 1. Metastatic adenocarcinoma of prostate, high grade: Initial pathologist at [address] [place] [address] called this \"Large cell neuroendocrine carcinoma,\" and based on diffuse disease on PET, patient was started on treatment with Carbo-Etoposide-Atezolizumab (thinking this was large cell carcinoma of the lung.) However FoundationOne testing identified TMPRSS2-ERG fusion, which is primarily seen in prostate cancers. This along with elevated PSA of 38 prompted sending of pathology specimen to [facility] for review. Their opinion and IHC staining is consistent with high grade adenocarcinoma of prostate. Treatment options now include: Carboplatin + Cabazitaxel vs single agent Docetaxel. Pt signed consent for Docetaxel. 1/17/22 CT shows stable disease and bone scan negative for metastases. Discussed with patient he may developing \"PSA-only progression,\" but imaging negative. Supportive medications: EMLA cream.   -- Lupron (3 month depot)  #6 given 10/28/21, #7 due today 1/20/22. -- Continue ADT alone, monitor PSA and scans periodically.  Would consider starting Zytiga at time of progression on scans or symptomatic PSA progression. -- Repeat PSA in 6 weeks, due 3/3/22 - call patient with results. -- Repeat CT scan in 3 months before follow up visit, due 4/2022. -- Return in 12 weeks for labs/Tip moss MD/NP visit and review imaging. 2. Chemotherapy induced neuropathy:  Grade 1. Present in bilateral fingertips. He is dropping objects less often. 3. H/o Genital Herpes: On suppression with Acyclovir. 4. Renal insufficiency:   Likely has mild underlying kidney dysfunction. Continue good PO hydration. Advised follow up with PCP for management. 5. Depression/anxiety:   2/2 to #1. On Zoloft 50mg/d. Following with Palliative    6. Dysequilibrium / Ear effusions:    Improving. Past Brain MRI negative for mets. Left tympanostomy tube placed by ENT for bilat effusions. 7. Hyponychium:   Improving. Likely 2/2 to fungal infection. Continue topical antifungal medication. Advised follow up with dermatology if not improving. 8. Health maintenance:   Discussed healthy food options, diet and exercise. Never had screening colonoscopy - reasonable to do now given prognosis from metastatic prostate cancer measured in years rather than months. -- Refer to GI for colonoscopy. Emotional well being: Pt is coping well with his/her disease and has excellent support. [facility] [phone]. I appreciate the opportunity to participate in Mr. John tabor. I personally provided the entire service today.     Signed By: Trell Becker MD     January 20, 2022

## 2022-01-20 ENCOUNTER — TELEPHONE (OUTPATIENT)
Dept: ONCOLOGY | Age: 67
End: 2022-01-20

## 2022-01-20 ENCOUNTER — HOSPITAL ENCOUNTER (OUTPATIENT)
Dept: INFUSION THERAPY | Age: 67
Discharge: HOME | End: 2022-01-20
Payer: MEDICARE

## 2022-01-20 ENCOUNTER — OFFICE VISIT (OUTPATIENT)
Dept: ONCOLOGY | Age: 67
End: 2022-01-20
Payer: MEDICARE

## 2022-01-20 VITALS
RESPIRATION RATE: 16 BRPM | BODY MASS INDEX: 31.15 KG/M2 | HEART RATE: 57 BPM | OXYGEN SATURATION: 95 % | WEIGHT: 217.59 LBS | DIASTOLIC BLOOD PRESSURE: 100 MMHG | HEIGHT: 70 IN | SYSTOLIC BLOOD PRESSURE: 162 MMHG | TEMPERATURE: 97.1 F

## 2022-01-20 VITALS
WEIGHT: 217.5 LBS | SYSTOLIC BLOOD PRESSURE: 162 MMHG | HEART RATE: 57 BPM | BODY MASS INDEX: 31.14 KG/M2 | TEMPERATURE: 97.1 F | HEIGHT: 70 IN | DIASTOLIC BLOOD PRESSURE: 100 MMHG | RESPIRATION RATE: 16 BRPM

## 2022-01-20 DIAGNOSIS — C61 ADENOCARCINOMA OF PROSTATE, STAGE 4 (HCC): Primary | ICD-10-CM

## 2022-01-20 DIAGNOSIS — Z79.818 ENCOUNTER FOR MONITORING ANDROGEN DEPRIVATION THERAPY: ICD-10-CM

## 2022-01-20 DIAGNOSIS — B35.1 ONYCHOMYCOSIS: ICD-10-CM

## 2022-01-20 DIAGNOSIS — C61 PROSTATE CANCER METASTATIC TO INTRAABDOMINAL LYMPH NODE (HCC): ICD-10-CM

## 2022-01-20 DIAGNOSIS — T45.1X5A CHEMOTHERAPY-INDUCED PERIPHERAL NEUROPATHY (HCC): ICD-10-CM

## 2022-01-20 DIAGNOSIS — C79.82 METASTATIC ADENOCARCINOMA TO PROSTATE (HCC): Primary | ICD-10-CM

## 2022-01-20 DIAGNOSIS — G62.0 CHEMOTHERAPY-INDUCED PERIPHERAL NEUROPATHY (HCC): ICD-10-CM

## 2022-01-20 DIAGNOSIS — C77.2 PROSTATE CANCER METASTATIC TO INTRAABDOMINAL LYMPH NODE (HCC): ICD-10-CM

## 2022-01-20 DIAGNOSIS — Z00.00 HEALTHCARE MAINTENANCE: ICD-10-CM

## 2022-01-20 LAB
ALBUMIN SERPL-MCNC: 4.3 G/DL (ref 3.5–5)
ALBUMIN/GLOB SERPL: 1.2 {RATIO} (ref 1.1–2.2)
ALP SERPL-CCNC: 83 U/L (ref 45–117)
ALT SERPL-CCNC: 52 U/L (ref 12–78)
ANION GAP SERPL CALC-SCNC: 5 MMOL/L (ref 5–15)
AST SERPL-CCNC: 137 U/L (ref 15–37)
BASOPHILS # BLD: 0.1 K/UL (ref 0–0.1)
BASOPHILS NFR BLD: 1 % (ref 0–1)
BILIRUB SERPL-MCNC: 0.5 MG/DL (ref 0.2–1)
BUN SERPL-MCNC: 26 MG/DL (ref 6–20)
BUN/CREAT SERPL: 19 (ref 12–20)
CALCIUM SERPL-MCNC: 9.2 MG/DL (ref 8.5–10.1)
CHLORIDE SERPL-SCNC: 103 MMOL/L (ref 97–108)
CO2 SERPL-SCNC: 30 MMOL/L (ref 21–32)
CREAT SERPL-MCNC: 1.38 MG/DL (ref 0.7–1.3)
DIFFERENTIAL METHOD BLD: ABNORMAL
EOSINOPHIL # BLD: 0.2 K/UL (ref 0–0.4)
EOSINOPHIL NFR BLD: 3 % (ref 0–7)
ERYTHROCYTE [DISTWIDTH] IN BLOOD BY AUTOMATED COUNT: 14.6 % (ref 11.5–14.5)
GLOBULIN SER CALC-MCNC: 3.5 G/DL (ref 2–4)
GLUCOSE SERPL-MCNC: 79 MG/DL (ref 65–100)
HCT VFR BLD AUTO: 34.5 % (ref 36.6–50.3)
HGB BLD-MCNC: 11.2 G/DL (ref 12.1–17)
IMM GRANULOCYTES # BLD AUTO: 0.1 K/UL (ref 0–0.04)
IMM GRANULOCYTES NFR BLD AUTO: 1 % (ref 0–0.5)
LYMPHOCYTES # BLD: 1.3 K/UL (ref 0.8–3.5)
LYMPHOCYTES NFR BLD: 19 % (ref 12–49)
MCH RBC QN AUTO: 30 PG (ref 26–34)
MCHC RBC AUTO-ENTMCNC: 32.5 G/DL (ref 30–36.5)
MCV RBC AUTO: 92.5 FL (ref 80–99)
MONOCYTES # BLD: 0.5 K/UL (ref 0–1)
MONOCYTES NFR BLD: 8 % (ref 5–13)
NEUTS SEG # BLD: 4.5 K/UL (ref 1.8–8)
NEUTS SEG NFR BLD: 68 % (ref 32–75)
NRBC # BLD: 0 K/UL (ref 0–0.01)
NRBC BLD-RTO: 0 PER 100 WBC
PLATELET # BLD AUTO: 169 K/UL (ref 150–400)
PMV BLD AUTO: 11.3 FL (ref 8.9–12.9)
POTASSIUM SERPL-SCNC: 3.5 MMOL/L (ref 3.5–5.1)
PROT SERPL-MCNC: 7.8 G/DL (ref 6.4–8.2)
PSA SERPL-MCNC: 2.3 NG/ML (ref 0.01–4)
RBC # BLD AUTO: 3.73 M/UL (ref 4.1–5.7)
SODIUM SERPL-SCNC: 138 MMOL/L (ref 136–145)
WBC # BLD AUTO: 6.7 K/UL (ref 4.1–11.1)

## 2022-01-20 PROCEDURE — G0463 HOSPITAL OUTPT CLINIC VISIT: HCPCS | Performed by: NURSE PRACTITIONER

## 2022-01-20 PROCEDURE — 3017F COLORECTAL CA SCREEN DOC REV: CPT | Performed by: INTERNAL MEDICINE

## 2022-01-20 PROCEDURE — 36415 COLL VENOUS BLD VENIPUNCTURE: CPT

## 2022-01-20 PROCEDURE — G9717 DOC PT DX DEP/BP F/U NT REQ: HCPCS | Performed by: INTERNAL MEDICINE

## 2022-01-20 PROCEDURE — 1101F PT FALLS ASSESS-DOCD LE1/YR: CPT | Performed by: INTERNAL MEDICINE

## 2022-01-20 PROCEDURE — G8536 NO DOC ELDER MAL SCRN: HCPCS | Performed by: INTERNAL MEDICINE

## 2022-01-20 PROCEDURE — G8417 CALC BMI ABV UP PARAM F/U: HCPCS | Performed by: INTERNAL MEDICINE

## 2022-01-20 PROCEDURE — 84153 ASSAY OF PSA TOTAL: CPT

## 2022-01-20 PROCEDURE — 96402 CHEMO HORMON ANTINEOPL SQ/IM: CPT

## 2022-01-20 PROCEDURE — 84403 ASSAY OF TOTAL TESTOSTERONE: CPT

## 2022-01-20 PROCEDURE — 74011250636 HC RX REV CODE- 250/636: Performed by: NURSE PRACTITIONER

## 2022-01-20 PROCEDURE — 85025 COMPLETE CBC W/AUTO DIFF WBC: CPT

## 2022-01-20 PROCEDURE — 80053 COMPREHEN METABOLIC PANEL: CPT

## 2022-01-20 PROCEDURE — 99215 OFFICE O/P EST HI 40 MIN: CPT | Performed by: INTERNAL MEDICINE

## 2022-01-20 PROCEDURE — G8427 DOCREV CUR MEDS BY ELIG CLIN: HCPCS | Performed by: INTERNAL MEDICINE

## 2022-01-20 RX ADMIN — LEUPROLIDE ACETATE 22.5 MG: KIT at 09:10

## 2022-01-20 NOTE — TELEPHONE ENCOUNTER
01/20/22 1:16 PM Left VM to return call.  Needs repeat PSA in 6 weeks - can be [facility] or [facility]

## 2022-01-20 NOTE — PROGRESS NOTES
[facility] Progress Note    Date: 2022    Name: Tiffanie Burnham    MRN: [id number]         : 1955    Mr. Myla Schwarz Arrived ambulatory and in no distress for C7D1 Lupron Injection. Assessment was completed, no acute issues at this time, no new complaints voiced. Labs drawn peripherally and sent for processing. Mr. Marcelina Calderon vitals were reviewed. Patient denies any s/s from elevated blood pressure. He denies chest pain or headache. Visit Vitals  BP (!) 162/100   Pulse (!) 57   Temp 97.1 °F (36.2 °C)   Resp 16   Ht 5' 10\" (1.778 m)   Wt 98.7 kg (217 lb 8 oz)   BMI 31.21 kg/m²         Medications:  Medications Administered     leuprolide depot (LUPRON 3 MONTH) injection sykt 22.5 mg     Admin Date  2022 Action  Given Dose  22.5 mg Route  IntraMUSCular Administered By  Susan [place], [place]                Mr. Myla Schwarz tolerated treatment well and was discharged from [place] [address] in stable condition. He is to return on  at 0900 for his next appointment.     [facility]  2022

## 2022-01-20 NOTE — PROGRESS NOTES
Chief Complaint   Patient presents with    Follow-up     Meeta Allen is a pleasant 78year old male who presents as a follow up for prostate cancer.  He reports hand pain

## 2022-01-21 ENCOUNTER — TELEPHONE (OUTPATIENT)
Dept: ONCOLOGY | Age: 67
End: 2022-01-21

## 2022-01-21 DIAGNOSIS — C61 ADENOCARCINOMA OF PROSTATE, STAGE 4 (HCC): Primary | ICD-10-CM

## 2022-01-21 NOTE — TELEPHONE ENCOUNTER
01/21/22 12:45 PM Called pt and advised he needs to repeat PSA in 6 weeks around 2/4/22 and repeat CT/bone scan in April before follow up appt. Provided scheduling number for patient. He verbalized understanding.

## 2022-01-22 LAB
TESTOST FREE SERPL-MCNC: 0.3 PG/ML (ref 6.6–18.1)
TESTOST SERPL-MCNC: <3 NG/DL (ref 264–916)

## 2022-02-20 RX ORDER — SERTRALINE HYDROCHLORIDE 50 MG/1
TABLET, FILM COATED ORAL
Qty: 90 TABLET | Refills: 0 | Status: SHIPPED | OUTPATIENT
Start: 2022-02-20 | End: 2022-09-06 | Stop reason: SDUPTHER

## 2022-03-18 PROBLEM — C61 ADENOCARCINOMA OF PROSTATE, STAGE 4 (HCC): Status: ACTIVE | Noted: 2020-08-05

## 2022-03-18 PROBLEM — F41.9 ANXIETY: Status: ACTIVE | Noted: 2020-11-04

## 2022-03-19 PROBLEM — F32.9 REACTIVE DEPRESSION: Status: ACTIVE | Noted: 2020-11-04

## 2022-03-19 PROBLEM — G62.0 PERIPHERAL NEUROPATHY DUE TO CHEMOTHERAPY (HCC): Status: ACTIVE | Noted: 2020-11-04

## 2022-03-19 PROBLEM — C7A.8 LARGE CELL NEUROENDOCRINE CARCINOMA (HCC): Status: ACTIVE | Noted: 2020-06-22

## 2022-03-19 PROBLEM — R59.0 MEDIASTINAL LYMPHADENOPATHY: Status: ACTIVE | Noted: 2020-08-05

## 2022-03-19 PROBLEM — C77.9 REGIONAL LYMPH NODE METASTASIS PRESENT (HCC): Status: ACTIVE | Noted: 2020-08-05

## 2022-03-19 PROBLEM — T45.1X5A PERIPHERAL NEUROPATHY DUE TO CHEMOTHERAPY (HCC): Status: ACTIVE | Noted: 2020-11-04

## 2022-03-20 PROBLEM — C79.82 METASTATIC ADENOCARCINOMA TO PROSTATE (HCC): Status: ACTIVE | Noted: 2020-07-29

## 2022-04-04 ENCOUNTER — TELEPHONE (OUTPATIENT)
Dept: ONCOLOGY | Age: 67
End: 2022-04-04

## 2022-04-04 NOTE — TELEPHONE ENCOUNTER
[address] [facility]  [phone]      04/04/22 9:50 AM Return call placed to patient. Patient states he was out of town and returning home via train. Patient states he was carrying two bags while walking down stairs and fell forward. Patient initially denied hitting head, but shared he tapped his head. Patient describes having contusion to left forearm but otherwise denies injury. Denies LOC. Patient went to out of state ER for evaluation. Bandaging applied to left forearm. Patient states he was advised there was no evidence of fractures or concussion; however, patient stated he did not undergo any imaging. Patient en route home now and will be rescheduling whole body scan and CT chest/abd/pelv, which were originally scheduled for today. Encouraged patient to call office back if further assistance is needed in rescheduling imaging. Confirmed patient has contact number for [facility] department. Patient currently scheduled for MD visit on 04/14. No additional questions or concerns at this time.

## 2022-04-04 NOTE — TELEPHONE ENCOUNTER
Patient called stating that he called to schedule his scans. He wanted the team to know he couldn't get a scan scheduled until the 15th. Patient stated that the team could give him a phone call.    # [phone]

## 2022-04-04 NOTE — TELEPHONE ENCOUNTER
Patient called and stated he had an appointment for his CT and bone scan today and he fell this weekend and will not make these appointments. He is calling central scheduling today to get them rescheduled and he just wanted to let the team know. Just an FYI.

## 2022-04-07 NOTE — PROGRESS NOTES
[facility] [employer]  Medical Oncology at [address] [place] [address]  [phone]    Hematology / Oncology Established Visit    Reason for Visit:   Jorge Cloud is a 78 y.o. male who is seen for follow up of neuroendocrine carcinoma. Initially referred by Dr. Ele Lloyd. 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 Flow cytometry negative for lymphoproliferative disorder. FoundationOne: MS Stable, TMB 0, MDM4 amplification, MYC amplification, TMPRSS2 TMPRSS2-ERD fusion, CEBPA 1311fs*10, ERBB4 amplification - equivocal, MCL1 amplification, FSY7S7X amplification, RAD21 amplification. See scanned report for full info. Abbreviated [facility] Pathology Consultation:  Final diagnosis: Right external iliac node: Metastatic prostate adenocarcinoma. Comment: Histologic sections of the right external iliac node show sheets of cells with minimal eosinophilic cytoplasm and variable cytologic atypia. Some areas show acinar formation. The cells have predominantly round nuclei with vesicular chromatin and prominent nucleoli. Some areas show significantly more atypia with more irregular nuclear borders, hyperchromatic nyclei, and increase nuclear to cytoplasmic ratio. Frequent mitotic figures are identified. A provided pane of IHC stains is reviewed and demonstrates that the malignant cells show strong, diffuse expression of pan-cytokeratin and focal expression of TTF. CK7, CK20, chromogranin, and synaptophysin are negative in the lesional cells. CD3 and CD20 are negative in the lesions cells and positive in the background lymphocytes.  An abbreviated FoundationOne report was included, which included a TMPRSS2-ERG fusion (among other nonspecific abnormalities), which si found in approximately 50% of prostate cancers. Additional IHC studies performed at [facility] showed that the malignant cells have immunoreactivity with antibodies for PSA and PSAP, consistent with a diagnosis of metastatic prostate adenocarcinoma. Per report, flow cytometry negative for lymphoma. Overall, the histomorphology, immunophenotype and genomic findings in this case are diagnostic of metastatic prostate adenocarcinoma. The acinar formation and cytology were suggestive of this diagnosis, and the diffuse PSA and PSAP positivity as well as the TMPRSS2-ERG fusion confirmed the diagnosis. This fusion is the most common genomic alteration in prostate carcinoma and can be detected in over half of the cases, yet is not seen with any frequency in other types of carcinoma. Prior Treatment:   1. Carbo-Etoposide-Atezolizumab x 2 cycles, 6/29/20-7/20/20. 2. Docetaxel x 6 cycles, 8/10/20-11/24/20. Current Treatment: Lupron every 12 weeks. History of Present Illness:   Celestine Graff is a 78 y.o. male who comes in for follow up of poorly differentiated prostate cancer. Pt noticed a nontender bulge in his LLQ in 5/2020. He thought this was a hernia and was evaluated by Dr. Batool Hastings. CT on 5/20/20 was notable for extensive lymphadenopathy in abd/pelvis. Pt underwent robot assisted excisional Right external iliac LN biopsy 5/29/20, which showed poorly differentiated large cell neuroendocrine carcinoma. However, pathology reviewed at [facility] and felt to be poorly differentiated prostate adenocarcinoma. He reports intentional weight loss with dietary changes and exercise, losing 40-lbs in past 8 months. No n/v/d, melena/hematochezia, cough, SOB. No fevers, chills, sweats. [employer] nonsmoker. Mother had breast cancer diagnosed age < 48. Twin brother had melanoma diagnosed aged late 46s. He does not think he has ever had a colonoscopy or PSA screening prior to current diagnosis.     Interval History:  Patient here for follow up of prostate cancer, labs, lupron and review of imaging. His mother passed away last week. Patient fell when leaving her , scraped his right leg and left arm. No head trauma. Has noted mildly increased fatigue, increased MCADAMS. No cough, n/v/d, constipation. No abdominal pain. PMHx: Prostate cancer, HTN  PSurgHx: appendectomy, vasectomy, subcutaneous cyst removal on forehead  SHx:  Never smoker, no EtOH currently after prior alcohol abuse. Unmarried. Has 2 children, 27 and 32. 1 lives in [place]. FHx:  Mother had breast cancer, father had prostate cancer, brother has melanoma, COPD. Review of Systems: A complete review of systems was obtained, negative except as described above. Physical Exam:     Visit Vitals  /85   Pulse 65   Temp 96.8 °F (36 °C)   Resp 16   Ht 5' 10\" (1.778 m)   Wt 212 lb 8.4 oz (96.4 kg)   SpO2 96%   BMI 30.49 kg/m²     ECOG PS: 0  General: Well developed, no acute distress  Eyes: Anicteric sclerae  Lymphatic: No cervical, supraclavicular adenopathy  Respiratory: CTAB, normal respiratory effort  CV: Normal rate, regular rhythm, no murmurs, no edema  GI: firm, nontender, nondistended, +BS  MS: Normal gait and station. Digits without clubbing or cyanosis. Skin:  Fingernails dark, thickened at distal half and more normal at proximal half. No ecchymoses, or petechiae. Normal temperature, turgor. Diffuse dry skin. Vertical scab on R shin. Neuro/Psych: Alert, oriented. Moves all 4 extremities. Appropriate affect, normal judgment/insight. Results:     Lab Results   Component Value Date/Time    WBC 6.7 2022 09:06 AM    HGB 11.2 (L) 2022 09:06 AM    HCT 34.5 (L) 2022 09:06 AM    PLATELET 165  09:06 AM    MCV 92.5 2022 09:06 AM    ABS.  NEUTROPHILS 4.5 2022 09:06 AM     Lab Results   Component Value Date/Time    Sodium 138 2022 09:06 AM    Potassium 3.5 2022 09:06 AM    Chloride 103 2022 09:06 AM    CO2 30 2022 09:06 AM    Glucose 79 2022 09:06 AM    BUN 26 (H) 2022 09:06 AM    Creatinine 1.38 (H) 2022 09:06 AM    GFR est AA >60 2022 09:06 AM    GFR est non-AA 51 (L) 2022 09:06 AM    Calcium 9.2 2022 09:06 AM    Creatinine (POC) 1.3 10/29/2020 11:59 AM     Lab Results   Component Value Date/Time    Bilirubin, total 0.5 2022 09:06 AM    ALT (SGPT) 52 2022 09:06 AM    Alk. phosphatase 83 2022 09:06 AM    Protein, total 7.8 2022 09:06 AM    Albumin 4.3 2022 09:06 AM    Globulin 3.5 2022 09:06 AM     No results found for: IRON, FE, TIBC, IBCT, PSAT, FERR    No results found for: B12LT, FOL, RBCF  Lab Results   Component Value Date/Time    TSH 1.72 2020 09:58 AM     No results found for: HAMAT, HAAB, HABT, HAAT, HBSAG, HBSB, HBSAT, HBABN, HBCM, HBCAB, HBCAT, XBCABS, HBEAB, HBEAG, XHEPCS, [id number], HBEGLT, [place], HBCLT, HBEBLT, [id number], [id number], HAVMLT, [id number], HBCMLT, [id number], HCGAT     20: Chromogranin 42    20: PSA 38.6  20: PSA 13.2   8/10/20: PSA 1.2  20: PSA 0.5  20: PSA 0.4  10/15/20: PSA 0.3  20: PSA 0.3  20: 0.2  21: PSA 0.1    3/22/21 PSA 0.066 (PSA ultrasensitive at [facility])   21 PSA 0.1, testosterone < 3  21 PSA 0.1  21 PSA 0.2  10/28/21: PSA 0.4  22: PSA 2.3  3/9/22: PSA 9.1     Imagin/20/20 CT abd/pelvis with IV contrast:  Impression:  1. No findings to suggest gross abdominal wall hernia or flank hernia. 2. Extensive adenopathy throughout the abdomen and pelvis as described. Findings are concerning for possible metastatic disease or lymphoma. Recommend follow up or comparison with prior studies. 3. Other findings as described. 20 PET:  FINDINGS:  HEAD/NECK: No apparent foci of abnormal hypermetabolism. Cerebral evaluation is  limited by normal intense activity.   CHEST: Hypermetabolic lymphadenopathy at the base of the left neck and in the  left supraclavicular region is noted. Hypermetabolic superior mediastinal,  prevascular, right paratracheal, subcarinal, and bilateral hilar lymph  lymphadenopathy, maximum SUV of the subcarinal lymph node is 5.7. Small but  hypermetabolic soft tissue nodule left anterior chest wall. ABDOMEN/PELVIS: Hypermetabolic retrocrural, left para-aortic, aortocaval,  bilateral common iliac, bilateral external iliac, and bilateral inguinal  lymphadenopathy, maximum SUV 5.8 of an aortocaval lymph node. Hypermetabolic  mesenteric lymph node left lower quadrant, maximum SUV 12.3. SKELETON: No foci of abnormal hypermetabolism in the axial and visualized  appendicular skeleton. IMPRESSION: Hypermetabolic lymphadenopathy involving the left neck and left  supraclavicular region, mediastinum, bilateral hilum, retroperitoneum,  mesentery, and pelvis as described above. Hypermetabolic soft tissue nodule left  chest.    Brain MRI 6/27/20: IMPRESSION:  There is no evidence of intracranial metastatic disease. Mild chronic microvascular ischemic change. No intracranial mass, hemorrhage or evidence of acute infarction. CT c/a/p 8/3/20: IMPRESSION:  Chest:  1. Findings consistent with partial treatment response, with interval decrease  in size of mediastinal and hilar lymph nodes, and interval decrease in size of  left chest wall nodule. Abdomen/pelvis:   1. Findings consistent with partial treatment response, with interval decrease  in size of retroperitoneal lymph nodes. 2. Unchanged circumferential bladder wall thickening, which may represent acute  or chronic cystitis. Correlate with urinalysis. RECIST   TARGET LESIONS:      Lesion (description)         Location (series/slice)                Size            1. Posterior mediastinal lymph node    series 2 image 26    2.3 x 2.0 cm, previously 3.3 x 2.2 cm  2. Left upper paraesophageal lymph node    series 2 image 9    5 mm, previously 15 mm.   3. Right external iliac lymphadenopathy    series 2 image 98    3.8 x 3.2 cm, previously 6.3 x 4.7 cm  4. Left external iliac lymphadenopathy    series 2 image 99    1.6 cm, previously 2.5 cm    9/1/20 Bone Scan:  FINDINGS: There is physiologic uptake of radiotracer in the skeleton and soft tissues. There is no evidence of fracture, osteomyelitis or bone tumor. There is no pattern of skeletal metastases. IMPRESSION: Normal Whole Body Nuclear Bone Scan.    10/29/20 CT c/a/p:  Lesion (description):     Location (series/slice):  Size   1. Posterior mediastinal lymph node   2/27            1.1 x 1.4 cm, previously 2.3 x 2.0 cm (2/26)  2. Left upper paraesophageal lymph node  2/9           2 mm, previously 5 mm (2/9)   3. Right external iliac lymphadenopathy 2/21                3.8 x 3.2 cm (2/98)  4. Left external iliac lymphadenopathy  2/99              11 mm, previously 16 mm (2/99)   IMPRESSION:  1. Findings consistent with treatment response, with interval decrease in size  of mediastinal, retroperitoneal, and pelvic lymph nodes and no CT evidence of  new metastatic disease within the thorax, abdomen, or pelvis. 2. Unchanged circumferential bladder wall thickening, which may represent acute  or chronic cystitis.      2/1/21 CT c/a/p:  RECIST   TARGET LESIONS:  Lesion (description)         Location (series/slice)                Size      1. Left upper esophageal lymph node    series 2 image 10    1 mm previously measuring 2 mm  2. Azygos esophageal lymph node    2, 30    7 x 7 mm previously measuring 11 x 14 mm  3. Right external iliac lymph node    2, 102    27 x 23 mm previously 27 x 31 mm  4. Left external iliac lymph node    2, 102    8 mm previously 11 mm  NONTARGET LESIONS:  None  IMPRESSION  1. Further decrease size of adenopathy as described. 2.  No evidence of new metastatic disease.     2/1/21 Bone scan:  Normal whole-body nuclear bone scan.    2/20/21 Brain MRI:  IMPRESSION  No acute intracranial findings no mass    5/11/21 CT c/a/p:  RECIST   TARGET LESIONS:  Lesion (description)         Location (series/slice)                Size      1. Upper left paraesophageal lymph node    series 2 image 13    2 mm unchanged  2. Azygoesophageal lymph node    series 2 image 33 measuring 7 x 5 mm     unchanged  3. Right external iliac lymph node    series 2 image 103    27 x 22 mm unchanged  4. Left external iliac lymph node    series 2 image 104    12 x 7 mm unchanged  NONTARGET LESIONS:  None  IMPRESSION  1. No evidence of new metastatic disease. Stable normal-sized lymph nodes in  the mediastinum and enlarged right external iliac lymph node. 2.  Other incidental findings as above    5/11/21 Bone scan:  FINDINGS: Physiologic uptake of radiotracer is seen in the skeleton and soft  tissues. There is no evidence for fracture or metastatic disease. Incidental  renal imaging shows no abnormality. IMPRESSION  No definite evidence of bony metastatic disease. 7/29/21 CT ch/abd/pelvis:  RECIST   TARGET LESIONS:  Lesion (description)         Location (series/slice)                Size      1.  azygo-esophageal lymph node, unchanged    series 2, image 29    9 mm  2. Right external iliac lymph node, decreased    series 2, image 100    16 mm  NONTARGET LESIONS:  IMPRESSION  1. No evidence of new metastatic disease. Stable to slight decrease in size of  several target lymph nodes as described above. 2.  Unchanged nonacute findings as above. 7/29/21 Bone scan:  IMPRESSION  Normal whole-body nuclear bone scan. 10/27/21 CT ch/abd/pelv:  RECIST:  Target:  Azygo-esophageal recess LN                (2,33) 9mm  Right external iliac lymph node               (2,104) 16 mm  Nontarget:  IMPRESSION  No interval change. 10/2721 bone scan:   FINDINGS: Physiologic uptake of radiotracer is seen in the skeleton and soft  tissues. There is no evidence for fracture or metastatic disease. Incidental  renal imaging shows no abnormality.    IMPRESSION  Normal whole-body nuclear bone scan. 1/17/22 CT ch/abd/pelv:  IMPRESSION  1. Right lower paratracheal lymph node previously measured is now normal in size. 2.  Right external iliac lymph node is enlarged, stable at 14 mm in short axis dimension. 3.  No osseous metastatic disease. 4.  2 new foci of groundglass in the right upper lungs as noted above. Attention on follow-up recommended. RECIST:  Right external iliac lymph node, 2:107, 14 mm, previously 16 mm.    1/17/22 Bone Scan:  IMPRESSION  No significant change. No definite evidence of bony metastatic disease. 4/15/22 CT ch/abd/pelv:  IMPRESSION  1. Findings concerning for progression of disease. 2.  Bilateral new pulmonary nodules measuring up to 7 mm. Previously seen right  upper lobe opacity has nearly resolved. 3.  1.9 x 2.6 cm segment 3 hepatic lesion, possibly new, not well characterized  on prior. 4.  Multiple confluent right retroperitoneal nodes and right psoas intramuscular  deposits. 5.  New subcutaneous left gluteal 1.7 cm soft tissue deposit. 6.  No evidence of osseous metastatic disease. 7.  Additional findings as above. RECIST   TARGET LESIONS:  Lesion (description)         Location (series/slice)                Size      1. 1.9 x 2.6 cm segment 3 hepatic lesion    2:58  not well seen previously,  possibly new   2. 1.5 x 1.6 cm left retroperitoneal node    2:72    new  3. 2.9 x 5.7 cm right psoas deposit    2:76    new  4. 2.0 x 2.0 right pericaval node    2:77    new  5. 1.5 x 2.3 cm right external iliac node    2:102    previously 1.4 x 1.5 cm  6. 1.7 x 1.7 cm left posterior gluteal subcutaneous nodule 2:90 new  NONTARGET LESIONS:  1. Multiple new pulmonary nodules measuring up to 7 mm. 4/15/22 Bone scan:  IMPRESSION  1.  There is a new focus of increased activity overlying right occipital bone  which could represent a metastatic lesion and CT is recommended for further  assessment.     There is also question of a tiny focus in a right 6 rib posterolaterally which  is nonspecific and no CT correlate is identified and follow-up studies would be  helpful to assess for interval change. 23X          Assessment & Plan:   Aguila Antony is a 78 y.o. 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 1. Metastatic adenocarcinoma of prostate, high grade:  Hormone resistant. Initial pathologist at [address] [place] [address] called this \"Large cell neuroendocrine carcinoma,\" and based on diffuse disease on PET, patient was started on treatment with Carbo-Etoposide-Atezolizumab (thinking this was large cell carcinoma of the lung.) However [facility] testing identified TMPRSS2-ERG fusion, which is primarily seen in prostate cancers. This along with elevated PSA of 38 prompted sending of pathology specimen to [facility] for review. Their opinion and IHC staining is consistent with high grade adenocarcinoma of prostate. After Carbo-Etop-Atezo, pt was treated with Docetaxel x 6 cycles. Treatment options now include: Carboplatin + Cabazitaxel. 4/15/22 CT shows disease progression with a new liver and lung mets; bone scan with possible new occipital and possible rib met. Supportive medications: EMLA cream.   -- Lupron (3 month depot)  #7 given 1/20/22, #8 due today 4/19/22. -- Start Zytiga given disease progression, 1000mg once daily plus Prednisone 5mg bid   -- Head CT to further eval occipital met  -- Next CT due due 7/2022. -- Return in 4 weeks labs, MD/NP visit. 2. Chemotherapy induced neuropathy:  Grade 1. Present in bilateral fingertips. He is dropping objects less often. 3. H/o Genital Herpes: On suppression with Acyclovir. 4. Renal insufficiency:   Likely has mild underlying kidney dysfunction. Continue good PO hydration. Advised follow up with PCP for management. 5. Depression/anxiety:   2/2 to #1. On Zoloft 50mg/d. Following with Palliative medicine. 6. Dysequilibrium / Ear effusions:    Improving.  Past Brain MRI negative for mets. Left tympanostomy tube placed by ENT for bilat effusions. 7. Hyponychium:   Improving. Likely 2/2 to fungal infection. Continue topical antifungal medication. Advised follow up with dermatology if not improving. 8. Health maintenance:   Discussed healthy food options, diet and exercise. Never had screening colonoscopy - reasonable to do now given prognosis from metastatic prostate cancer measured in years rather than months. Saw GI, but has not yet had colonoscopy. 9. Normocytic anemia:   Likely due to #1.   -- Add on iron profile, ferritin, b12 and folate today - call with results or review at next visit. Emotional well being: Pt is coping well with his/her disease and has excellent support. Significant other: Gloria Debbie - [phone]. I appreciate the opportunity to participate in Mr. Kimberly Batista care. I personally provided the entire service today. I personally spent a total of 40 minutes on patient care on date of this encounter, regarding the following:  Reviewing prior medical records which I summarized in the history above, discussing diagnoses with the patient as listed in the assessment, ordering blood tests and imaging as listed in the plan, arranging follow up as well as documentation of this encounter.        Signed By: Ivelisse Avelar MD     April 18, 2022

## 2022-04-14 ENCOUNTER — APPOINTMENT (OUTPATIENT)
Dept: INFUSION THERAPY | Age: 67
End: 2022-04-14

## 2022-04-15 ENCOUNTER — HOSPITAL ENCOUNTER (OUTPATIENT)
Dept: NUCLEAR MEDICINE | Age: 67
Discharge: HOME | End: 2022-04-15
Attending: NURSE PRACTITIONER
Payer: MEDICARE

## 2022-04-15 ENCOUNTER — HOSPITAL ENCOUNTER (OUTPATIENT)
Dept: CT IMAGING | Age: 67
Discharge: HOME | End: 2022-04-15
Attending: NURSE PRACTITIONER
Payer: MEDICARE

## 2022-04-15 DIAGNOSIS — C61 ADENOCARCINOMA OF PROSTATE, STAGE 4 (HCC): ICD-10-CM

## 2022-04-15 LAB — CREAT BLD-MCNC: 1.3 MG/DL (ref 0.6–1.3)

## 2022-04-15 PROCEDURE — 78306 BONE IMAGING WHOLE BODY: CPT

## 2022-04-15 PROCEDURE — 82565 ASSAY OF CREATININE: CPT

## 2022-04-15 PROCEDURE — 74177 CT ABD & PELVIS W/CONTRAST: CPT

## 2022-04-15 PROCEDURE — 74011000636 HC RX REV CODE- 636: Performed by: STUDENT IN AN ORGANIZED HEALTH CARE EDUCATION/TRAINING PROGRAM

## 2022-04-15 RX ADMIN — IOPAMIDOL 100 ML: 755 INJECTION, SOLUTION INTRAVENOUS at 08:41

## 2022-04-18 ENCOUNTER — HOSPITAL ENCOUNTER (OUTPATIENT)
Dept: INFUSION THERAPY | Age: 67
Discharge: HOME | End: 2022-04-18
Payer: MEDICARE

## 2022-04-18 ENCOUNTER — OFFICE VISIT (OUTPATIENT)
Dept: ONCOLOGY | Age: 67
End: 2022-04-18
Payer: MEDICARE

## 2022-04-18 VITALS
WEIGHT: 212.52 LBS | DIASTOLIC BLOOD PRESSURE: 85 MMHG | OXYGEN SATURATION: 96 % | BODY MASS INDEX: 30.43 KG/M2 | SYSTOLIC BLOOD PRESSURE: 136 MMHG | HEART RATE: 65 BPM | TEMPERATURE: 96.8 F | RESPIRATION RATE: 16 BRPM | HEIGHT: 70 IN

## 2022-04-18 VITALS
TEMPERATURE: 96.8 F | OXYGEN SATURATION: 96 % | HEIGHT: 70 IN | DIASTOLIC BLOOD PRESSURE: 85 MMHG | RESPIRATION RATE: 16 BRPM | HEART RATE: 65 BPM | WEIGHT: 212.6 LBS | SYSTOLIC BLOOD PRESSURE: 136 MMHG | BODY MASS INDEX: 30.43 KG/M2

## 2022-04-18 DIAGNOSIS — C61 ADENOCARCINOMA OF PROSTATE, STAGE 4 (HCC): Primary | ICD-10-CM

## 2022-04-18 DIAGNOSIS — C61 PROSTATE CANCER METASTATIC TO INTRAABDOMINAL LYMPH NODE (HCC): ICD-10-CM

## 2022-04-18 DIAGNOSIS — D64.9 NORMOCYTIC ANEMIA: ICD-10-CM

## 2022-04-18 DIAGNOSIS — C77.2 PROSTATE CANCER METASTATIC TO INTRAABDOMINAL LYMPH NODE (HCC): ICD-10-CM

## 2022-04-18 DIAGNOSIS — C79.82 METASTATIC ADENOCARCINOMA TO PROSTATE (HCC): Primary | ICD-10-CM

## 2022-04-18 DIAGNOSIS — Z79.818 ENCOUNTER FOR MONITORING ANDROGEN DEPRIVATION THERAPY: ICD-10-CM

## 2022-04-18 LAB
ALBUMIN SERPL-MCNC: 4.2 G/DL (ref 3.5–5)
ALBUMIN/GLOB SERPL: 1.2 {RATIO} (ref 1.1–2.2)
ALP SERPL-CCNC: 85 U/L (ref 45–117)
ALT SERPL-CCNC: 42 U/L (ref 12–78)
ANION GAP SERPL CALC-SCNC: 4 MMOL/L (ref 5–15)
AST SERPL-CCNC: 100 U/L (ref 15–37)
BASOPHILS # BLD: 0.1 K/UL (ref 0–0.1)
BASOPHILS NFR BLD: 2 % (ref 0–1)
BILIRUB SERPL-MCNC: 0.8 MG/DL (ref 0.2–1)
BUN SERPL-MCNC: 26 MG/DL (ref 6–20)
BUN/CREAT SERPL: 20 (ref 12–20)
CALCIUM SERPL-MCNC: 8.9 MG/DL (ref 8.5–10.1)
CHLORIDE SERPL-SCNC: 100 MMOL/L (ref 97–108)
CO2 SERPL-SCNC: 30 MMOL/L (ref 21–32)
CREAT SERPL-MCNC: 1.3 MG/DL (ref 0.7–1.3)
DIFFERENTIAL METHOD BLD: ABNORMAL
EOSINOPHIL # BLD: 0.2 K/UL (ref 0–0.4)
EOSINOPHIL NFR BLD: 4 % (ref 0–7)
ERYTHROCYTE [DISTWIDTH] IN BLOOD BY AUTOMATED COUNT: 15.7 % (ref 11.5–14.5)
FERRITIN SERPL-MCNC: 268 NG/ML (ref 26–388)
FOLATE SERPL-MCNC: 9.6 NG/ML (ref 5–21)
GLOBULIN SER CALC-MCNC: 3.4 G/DL (ref 2–4)
GLUCOSE SERPL-MCNC: 131 MG/DL (ref 65–100)
HCT VFR BLD AUTO: 30.5 % (ref 36.6–50.3)
HGB BLD-MCNC: 10 G/DL (ref 12.1–17)
IMM GRANULOCYTES # BLD AUTO: 0.1 K/UL (ref 0–0.04)
IMM GRANULOCYTES NFR BLD AUTO: 1 % (ref 0–0.5)
IRON SATN MFR SERPL: 20 % (ref 20–50)
IRON SERPL-MCNC: 82 UG/DL (ref 35–150)
LYMPHOCYTES # BLD: 1 K/UL (ref 0.8–3.5)
LYMPHOCYTES NFR BLD: 21 % (ref 12–49)
MCH RBC QN AUTO: 29.9 PG (ref 26–34)
MCHC RBC AUTO-ENTMCNC: 32.8 G/DL (ref 30–36.5)
MCV RBC AUTO: 91.3 FL (ref 80–99)
MONOCYTES # BLD: 0.4 K/UL (ref 0–1)
MONOCYTES NFR BLD: 7 % (ref 5–13)
NEUTS SEG # BLD: 3.2 K/UL (ref 1.8–8)
NEUTS SEG NFR BLD: 65 % (ref 32–75)
NRBC # BLD: 0 K/UL (ref 0–0.01)
NRBC BLD-RTO: 0 PER 100 WBC
PLATELET # BLD AUTO: 213 K/UL (ref 150–400)
PMV BLD AUTO: 10.8 FL (ref 8.9–12.9)
POTASSIUM SERPL-SCNC: 3.8 MMOL/L (ref 3.5–5.1)
PROT SERPL-MCNC: 7.6 G/DL (ref 6.4–8.2)
PSA SERPL-MCNC: 25.3 NG/ML (ref 0.01–4)
RBC # BLD AUTO: 3.34 M/UL (ref 4.1–5.7)
SODIUM SERPL-SCNC: 134 MMOL/L (ref 136–145)
TIBC SERPL-MCNC: 415 UG/DL (ref 250–450)
VIT B12 SERPL-MCNC: 312 PG/ML (ref 193–986)
WBC # BLD AUTO: 4.9 K/UL (ref 4.1–11.1)

## 2022-04-18 PROCEDURE — 84403 ASSAY OF TOTAL TESTOSTERONE: CPT

## 2022-04-18 PROCEDURE — G9717 DOC PT DX DEP/BP F/U NT REQ: HCPCS | Performed by: INTERNAL MEDICINE

## 2022-04-18 PROCEDURE — 80053 COMPREHEN METABOLIC PANEL: CPT

## 2022-04-18 PROCEDURE — 82607 VITAMIN B-12: CPT

## 2022-04-18 PROCEDURE — 82728 ASSAY OF FERRITIN: CPT

## 2022-04-18 PROCEDURE — G0463 HOSPITAL OUTPT CLINIC VISIT: HCPCS | Performed by: NURSE PRACTITIONER

## 2022-04-18 PROCEDURE — 74011250636 HC RX REV CODE- 250/636: Performed by: NURSE PRACTITIONER

## 2022-04-18 PROCEDURE — 1101F PT FALLS ASSESS-DOCD LE1/YR: CPT | Performed by: INTERNAL MEDICINE

## 2022-04-18 PROCEDURE — G8536 NO DOC ELDER MAL SCRN: HCPCS | Performed by: INTERNAL MEDICINE

## 2022-04-18 PROCEDURE — 36415 COLL VENOUS BLD VENIPUNCTURE: CPT

## 2022-04-18 PROCEDURE — 83540 ASSAY OF IRON: CPT

## 2022-04-18 PROCEDURE — 85025 COMPLETE CBC W/AUTO DIFF WBC: CPT

## 2022-04-18 PROCEDURE — 3017F COLORECTAL CA SCREEN DOC REV: CPT | Performed by: INTERNAL MEDICINE

## 2022-04-18 PROCEDURE — G8427 DOCREV CUR MEDS BY ELIG CLIN: HCPCS | Performed by: INTERNAL MEDICINE

## 2022-04-18 PROCEDURE — 84153 ASSAY OF PSA TOTAL: CPT

## 2022-04-18 PROCEDURE — 99215 OFFICE O/P EST HI 40 MIN: CPT | Performed by: INTERNAL MEDICINE

## 2022-04-18 PROCEDURE — 96402 CHEMO HORMON ANTINEOPL SQ/IM: CPT

## 2022-04-18 PROCEDURE — 77030016057 HC NDL HUBR APOL -B

## 2022-04-18 PROCEDURE — G8417 CALC BMI ABV UP PARAM F/U: HCPCS | Performed by: INTERNAL MEDICINE

## 2022-04-18 PROCEDURE — 82746 ASSAY OF FOLIC ACID SERUM: CPT

## 2022-04-18 RX ORDER — HEPARIN 100 UNIT/ML
500 SYRINGE INTRAVENOUS AS NEEDED
Status: CANCELLED | OUTPATIENT
Start: 2022-05-16

## 2022-04-18 RX ORDER — SODIUM CHLORIDE 9 MG/ML
10 INJECTION INTRAMUSCULAR; INTRAVENOUS; SUBCUTANEOUS AS NEEDED
Status: CANCELLED | OUTPATIENT
Start: 2022-05-16

## 2022-04-18 RX ORDER — ABIRATERONE 500 MG/1
1000 TABLET ORAL DAILY
Qty: 60 TABLET | Refills: 3 | Status: SHIPPED | OUTPATIENT
Start: 2022-04-18

## 2022-04-18 RX ORDER — SODIUM CHLORIDE 0.9 % (FLUSH) 0.9 %
5-10 SYRINGE (ML) INJECTION AS NEEDED
Status: CANCELLED | OUTPATIENT
Start: 2022-05-16

## 2022-04-18 RX ORDER — PREDNISONE 5 MG/1
5 TABLET ORAL 2 TIMES DAILY
Qty: 60 TABLET | Refills: 3 | Status: SHIPPED | OUTPATIENT
Start: 2022-04-18 | End: 2022-09-12

## 2022-04-18 RX ADMIN — LEUPROLIDE ACETATE 22.5 MG: KIT at 12:25

## 2022-04-18 NOTE — PROGRESS NOTES
[facility] Progress Note    Date: 2022    Name: Kayla Centeno    MRN: [id number]         : 1955    Mr. Debra Mcgarry Arrived ambulatory and in no distress for C8D1 Lupron Injection. Assessment was completed, no acute issues at this time, no new complaints voiced. Patient reports fall 4/10 with visit to ED in [place], [place]. RN reported to Dr. Carlotta Dandy office. [facility] [place] and de accessed per protocol. Patient proceeded to Dr. Carlotta Dandy office for appointment. Mr. Rima Morrison vitals were reviewed. Visit Vitals  /85   Pulse 65   Temp 96.8 °F (36 °C)   Resp 16   Ht 5' 10\" (1.778 m)   Wt 96.4 kg (212 lb 9.6 oz)   SpO2 96%   BMI 30.50 kg/m²         Medications:  Medications Administered     leuprolide depot (LUPRON 3 MONTH) injection sykt 22.5 mg     Admin Date  2022 Action  Given Dose  22.5 mg Route  IntraMUS[facility]ular Administered By  [place], [place]                  Mr. Debra Mcgarry tolerated treatment well and was discharged from [place] [address] in stable condition. He is to return on May 16 at 1045 for his next appointment.     [facility]  2022

## 2022-04-18 NOTE — PROGRESS NOTES
Chief Complaint   Patient presents with    Follow-up     Kvng Velez is a pleasant 78year old male who presents as a follow up.  He reports leg and arm pain

## 2022-04-18 NOTE — PATIENT INSTRUCTIONS
We are starting you on a new medication for prostate cancer:    Zytiga (Abiraterone) 1000mg once daily  - this medication will get shipped to your from a specialty pharmacy. Make sure you check your voicemail daily to check for any missed calls. Prednisone 5mg twice a day - I sent this to [facility]. You can start this once you start Zytiga.

## 2022-04-19 LAB
TESTOST FREE SERPL-MCNC: <0.2 PG/ML (ref 6.6–18.1)
TESTOST SERPL-MCNC: <3 NG/DL (ref 264–916)

## 2022-04-22 ENCOUNTER — TELEPHONE (OUTPATIENT)
Dept: ONCOLOGY | Age: 67
End: 2022-04-22

## 2022-04-22 NOTE — TELEPHONE ENCOUNTER
4/19/22- Received email from [place] with [facility] stating pt has a high copay of $2751.96 for Zytiga and that pt would need to apply for assistance with Linda Suggs. Called pt and informed of the above information and pt stated he could come to the office to sign application. Met with pt in the office to obtain signature for Linda Moles application. Faxed completed application to Linda Moles and fax confirmation received.     4/22/22- Abrahan Loya to check status of patients application and per representative pt was temporarily approved for assistance effective 4/20/22-6/19/22 and that pt would need to sign the Linda Moles attestation form and turn that in to Rainbow Moles to be officially approved for the remainder of the year. Called pt and left a detailed voicemail with the above information requesting a return call to discuss further. Patient called back and stated he verbalized understanding of the voicemail. Informed pt the [facility] pharmacy should be contacting pt today or Monday. Informed pt of the Medicare Part D attestation that will need to be signed and pt stated he could possibly stop by the office on 4/26. No further questions or concerns.

## 2022-04-28 ENCOUNTER — TELEPHONE (OUTPATIENT)
Dept: ONCOLOGY | Age: 67
End: 2022-04-28

## 2022-04-28 DIAGNOSIS — E87.6 HYPOKALEMIA: ICD-10-CM

## 2022-04-28 RX ORDER — POTASSIUM CHLORIDE 1500 MG/1
TABLET, FILM COATED, EXTENDED RELEASE ORAL
Qty: 90 TABLET | Refills: 0 | Status: SHIPPED | OUTPATIENT
Start: 2022-04-28 | End: 2022-07-25

## 2022-04-28 NOTE — TELEPHONE ENCOUNTER
[address] at [place]  [phone]    04/28/22 4:46 PM - Called and spoke with the patient. Patient's ID verified x 2. Advised patient of NP's recommendations. The patient voiced understanding and gratitude for the call. No further questions or concerns.

## 2022-04-28 NOTE — TELEPHONE ENCOUNTER
Patient called and left  stating that he has received his zytiga today and would like a call back to know when he should start this medication along with the prednisone.  Please advise    Cb# [phone]

## 2022-04-28 NOTE — TELEPHONE ENCOUNTER
4/27/22- Patient stopped by office to sign Medicare part D attestation form for Colton Render and  scanned to this user.

## 2022-04-28 NOTE — TELEPHONE ENCOUNTER
4/28/22- Faxed medicare part D Attestation form to [address] [place] [address]. Fax confirmation received.

## 2022-04-29 ENCOUNTER — TELEPHONE (OUTPATIENT)
Dept: ONCOLOGY | Age: 67
End: 2022-04-29

## 2022-04-29 NOTE — TELEPHONE ENCOUNTER
Called and spoke to Mr. dEgard Henry regarding his new prescription for Zytiga 1000mg once daily with prednisone 5mg BID. All questions answered to patient's apparent satisfaction, with emphasis on how to take each medication. Mr. Edgard Henry inquired about his potassium chloride prescription, which was sent in to his [facility] yesterday (confirmed time and location with patient). Advised him to contact [facility] for . Dave Jaramillo PHARMD, BCPS    For Pharmacy Admin Tracking Only     CPA in place:  Yes   Recommendation Provided To: Patient/Caregiver: 2 via Telephone     Intervention Accepted By: Patient/Caregiver: 2   Time Spent (min): 20

## 2022-05-02 ENCOUNTER — TELEPHONE (OUTPATIENT)
Dept: ONCOLOGY | Age: 67
End: 2022-05-02

## 2022-05-02 NOTE — TELEPHONE ENCOUNTER
1021  Returned pts call and left a VM for pt to r/t call to the office at his convenience to discuss medication questions.

## 2022-05-02 NOTE — TELEPHONE ENCOUNTER
Patient called and stated that he would like a call back to discuss his medication (prednisone) and how many he should be taking.  Please advise     # [phone]

## 2022-05-03 NOTE — TELEPHONE ENCOUNTER
[address] at [facility] [address]  [phone]    05/03/22 11:05 AM - Attempted to call the patient. There was no answer. Left a voicemail for the patient to call back at their earliest convenience along with the phone number to our office. [address] at [facility] 88  [phone]    05/03/22 11:32 AM - Called and spoke with the patient. Patient's ID verified x 2. Patient inquiring about his prednisone and Zytiga. Advised patient he is to take 5 mg of Prednisone two times per day. Advised his Mary Jo Must is 1000 mg once daily. Advised since the tablet is 500 mg, he will need to take two to total 1000 mg. Advised the prednisone is to be taken on an empty stomach or 1 hour before or 2 hours after food. The patient voiced understanding and gratitude for the call. No further questions or concerns.

## 2022-05-03 NOTE — TELEPHONE ENCOUNTER
Patient called and stated he was calling regarding a question he has about his prednisone.  Please advise       CB# [phone]

## 2022-05-11 NOTE — PROGRESS NOTES
[facility]  Medical Oncology at [address] [facility]  [phone]    Hematology / Oncology Established Visit    Reason for Visit:   Elif Durand is a 78 y.o. male who is seen for follow up of neuroendocrine carcinoma. Initially referred by Dr. Aaron Hou. 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 Flow cytometry negative for lymphoproliferative disorder. FoundationOne: MS Stable, TMB 0, MDM4 amplification, MYC amplification, TMPRSS2 TMPRSS2-ERD fusion, CEBPA 1311fs*10, ERBB4 amplification - equivocal, MCL1 amplification, PND4N3X amplification, RAD21 amplification. See scanned report for full info. Abbreviated [facility] Pathology Consultation:  Final diagnosis: Right external iliac node: Metastatic prostate adenocarcinoma. Comment: Histologic sections of the right external iliac node show sheets of cells with minimal eosinophilic cytoplasm and variable cytologic atypia. Some areas show acinar formation. The cells have predominantly round nuclei with vesicular chromatin and prominent nucleoli. Some areas show significantly more atypia with more irregular nuclear borders, hyperchromatic nyclei, and increase nuclear to cytoplasmic ratio. Frequent mitotic figures are identified. A provided pane of IHC stains is reviewed and demonstrates that the malignant cells show strong, diffuse expression of pan-cytokeratin and focal expression of TTF. CK7, CK20, chromogranin, and synaptophysin are negative in the lesional cells. CD3 and CD20 are negative in the lesions cells and positive in the background lymphocytes.  An abbreviated FoundationOne report was included, which included a TMPRSS2-ERG fusion (among other nonspecific abnormalities), which si found in approximately 50% of prostate cancers. Additional IHC studies performed at [facility] showed that the malignant cells have immunoreactivity with antibodies for PSA and PSAP, consistent with a diagnosis of metastatic prostate adenocarcinoma. Per report, flow cytometry negative for lymphoma. Overall, the histomorphology, immunophenotype and genomic findings in this case are diagnostic of metastatic prostate adenocarcinoma. The acinar formation and cytology were suggestive of this diagnosis, and the diffuse PSA and PSAP positivity as well as the TMPRSS2-ERG fusion confirmed the diagnosis. This fusion is the most common genomic alteration in prostate carcinoma and can be detected in over half of the cases, yet is not seen with any frequency in other types of carcinoma. Prior Treatment:   1. Carbo-Etoposide-Atezolizumab x 2 cycles, 6/29/20-7/20/20. 2. Docetaxel x 6 cycles, 8/10/20-11/24/20. Current Treatment: Zytiga 1000mg/d and prednisone 5mg BID, started 4/29/22 - current. Lupron every 12 weeks, started 8/2020-current      History of Present Illness:   Gadiel Schrader is a 78 y.o. male who comes in for follow up of poorly differentiated prostate cancer. Pt noticed a nontender bulge in his LLQ in 5/2020. He thought this was a hernia and was evaluated by Dr. Luli Allison. CT on 5/20/20 was notable for extensive lymphadenopathy in abd/pelvis. Pt underwent robot assisted excisional Right external iliac LN biopsy 5/29/20, which showed poorly differentiated large cell neuroendocrine carcinoma. However, pathology reviewed at [facility] and felt to be poorly differentiated prostate adenocarcinoma. He reports intentional weight loss with dietary changes and exercise, losing 40-lbs in past 8 months. No n/v/d, melena/hematochezia, cough, SOB. No fevers, chills, sweats. [employer] nonsmoker. Mother had breast cancer diagnosed age < 48. Twin brother had melanoma diagnosed aged late 46s.   He does not think he has ever had a colonoscopy or PSA screening prior to current diagnosis. Interval History:  Patient here for follow up of prostate cancer. He recently started Zytiga and prednisone. Reports energy level has improved. Thinks he has more clarity and feels more comfortable. Reports he is more active, walking and trying to make healthier food options. Had 7 lb intentional weight-loss. Denies SOB, CP, edema, n/v, diarrhea or constipation. Denies headaches. Has a painless nodule on left forearm. Reports nodule has been present since fall in April. PMHx: Prostate cancer, HTN  PSurgHx: appendectomy, vasectomy, subcutaneous cyst removal on forehead  SHx:  Never smoker, no EtOH currently after prior alcohol abuse. Unmarried. Has 2 children, 27 and 32. 1 lives in [place]. FHx:  Mother had breast cancer, father had prostate cancer, brother has melanoma, COPD. Review of Systems: A complete review of systems was obtained, negative except as described above. Physical Exam:     Visit Vitals  BP (!) 144/88 (BP 1 Location: Right arm, BP Patient Position: Sitting, BP Cuff Size: Large adult) Comment: . Pulse 64   Temp 98 °F (36.7 °C)   Resp 20   Ht 5' 10\" (1.778 m)   Wt 205 lb (93 kg)   SpO2 98%   BMI 29.41 kg/m²     ECOG PS: 0  General: Well developed, no acute distress  Eyes: Anicteric sclerae  Lymphatic: No cervical, supraclavicular adenopathy  Respiratory: CTAB, normal respiratory effort  CV: Normal rate, regular rhythm, no murmurs, no edema  GI: firm, nontender, nondistended, +BS  MS: Normal gait and station. Digits without clubbing or cyanosis. Skin:  Fingernails dark, thickened at distal half and more normal at proximal half. No ecchymoses, or petechiae. Normal temperature, turgor. Diffuse dry skin. Vertical scab on R shin. Large nodule present on left forearm with surrounding pink scar tissue. Neuro/Psych: Alert, oriented. Moves all 4 extremities. Appropriate affect, normal judgment/insight.     Results:     Lab Results   Component Value Date/Time    WBC 6.1 05/16/2022 11:07 AM    HGB 10.7 (L) 05/16/2022 11:07 AM    HCT 32.5 (L) 05/16/2022 11:07 AM    PLATELET 634 35/74/5668 11:07 AM    MCV 91.8 05/16/2022 11:07 AM    ABS. NEUTROPHILS 4.5 05/16/2022 11:07 AM     Lab Results   Component Value Date/Time    Sodium 133 (L) 05/16/2022 11:07 AM    Potassium 3.9 05/16/2022 11:07 AM    Chloride 99 05/16/2022 11:07 AM    CO2 29 05/16/2022 11:07 AM    Glucose 109 (H) 05/16/2022 11:07 AM    BUN 26 (H) 05/16/2022 11:07 AM    Creatinine 1.31 (H) 05/16/2022 11:07 AM    GFR est AA >60 05/16/2022 11:07 AM    GFR est non-AA 55 (L) 05/16/2022 11:07 AM    Calcium 9.4 05/16/2022 11:07 AM    Creatinine (POC) 1.30 04/15/2022 08:49 AM     Lab Results   Component Value Date/Time    Bilirubin, total 0.8 04/18/2022 10:47 AM    ALT (SGPT) 42 04/18/2022 10:47 AM    Alk.  phosphatase 85 04/18/2022 10:47 AM    Protein, total 7.6 04/18/2022 10:47 AM    Albumin 4.2 04/18/2022 10:47 AM    Globulin 3.4 04/18/2022 10:47 AM     Lab Results   Component Value Date/Time    Iron 82 04/18/2022 10:47 AM    TIBC 415 04/18/2022 10:47 AM    Iron % saturation 20 04/18/2022 10:47 AM    Ferritin 268 04/18/2022 10:47 AM       Lab Results   Component Value Date/Time    Vitamin B12 312 04/18/2022 10:47 AM    Folate 9.6 04/18/2022 10:47 AM     Lab Results   Component Value Date/Time    TSH 1.72 06/29/2020 09:58 AM     No results found for: HAMAT, HAAB, HABT, HAAT, HBSAG, HBSB, HBSAT, HBABN, HBCM, HBCAB, HBCAT, XBCABS, HBEAB, HBEAG, XHEPCS, [id number], HBEGLT, [place], HBCLT, [address] [facility], [id number], [id number], [address] [facility], [id number], HBCMLT, [id number], HCGAT     6/11/20: Chromogranin 42    6/11/20: PSA 38.6  7/20/20: PSA 13.2   8/10/20: PSA 1.2  8/31/20: PSA 0.5  9/24/20: PSA 0.4  10/15/20: PSA 0.3  11/5/20: PSA 0.3  11/24/20: 0.2  2/18/21: PSA 0.1    3/22/21 PSA 0.066 (PSA ultrasensitive at [facility])   4/1/21 PSA 0.1, testosterone < 3  5/13/21 PSA 0.1  8/5/21 PSA 0.2  10/28/21: PSA 0.4  22: PSA 2.3  3/9/22: PSA 9.1   22 PSA 25.3  22 started Zytiga + prednisone   22 PSA 7.2    Imagin/20/20 CT abd/pelvis with IV contrast:  Impression:  1. No findings to suggest gross abdominal wall hernia or flank hernia. 2. Extensive adenopathy throughout the abdomen and pelvis as described. Findings are concerning for possible metastatic disease or lymphoma. Recommend follow up or comparison with prior studies. 3. Other findings as described. 20 PET:  FINDINGS:  HEAD/NECK: No apparent foci of abnormal hypermetabolism. Cerebral evaluation is  limited by normal intense activity. CHEST: Hypermetabolic lymphadenopathy at the base of the left neck and in the  left supraclavicular region is noted. Hypermetabolic superior mediastinal,  prevascular, right paratracheal, subcarinal, and bilateral hilar lymph  lymphadenopathy, maximum SUV of the subcarinal lymph node is 5.7. Small but  hypermetabolic soft tissue nodule left anterior chest wall. ABDOMEN/PELVIS: Hypermetabolic retrocrural, left para-aortic, aortocaval,  bilateral common iliac, bilateral external iliac, and bilateral inguinal  lymphadenopathy, maximum SUV 5.8 of an aortocaval lymph node. Hypermetabolic  mesenteric lymph node left lower quadrant, maximum SUV 12.3. SKELETON: No foci of abnormal hypermetabolism in the axial and visualized  appendicular skeleton. IMPRESSION: Hypermetabolic lymphadenopathy involving the left neck and left  supraclavicular region, mediastinum, bilateral hilum, retroperitoneum,  mesentery, and pelvis as described above. Hypermetabolic soft tissue nodule left  chest.    Brain MRI 20: IMPRESSION:  There is no evidence of intracranial metastatic disease. Mild chronic microvascular ischemic change. No intracranial mass, hemorrhage or evidence of acute infarction. CT c/a/p 8/3/20: IMPRESSION:  Chest:  1.  Findings consistent with partial treatment response, with interval decrease  in size of mediastinal and hilar lymph nodes, and interval decrease in size of  left chest wall nodule. Abdomen/pelvis:   1. Findings consistent with partial treatment response, with interval decrease  in size of retroperitoneal lymph nodes. 2. Unchanged circumferential bladder wall thickening, which may represent acute  or chronic cystitis. Correlate with urinalysis. RECIST   TARGET LESIONS:      Lesion (description)         Location (series/slice)                Size            1. Posterior mediastinal lymph node    series 2 image 26    2.3 x 2.0 cm, previously 3.3 x 2.2 cm  2. Left upper paraesophageal lymph node    series 2 image 9    5 mm, previously 15 mm. 3. Right external iliac lymphadenopathy    series 2 image 98    3.8 x 3.2 cm, previously 6.3 x 4.7 cm  4. Left external iliac lymphadenopathy    series 2 image 99    1.6 cm, previously 2.5 cm    9/1/20 Bone Scan:  FINDINGS: There is physiologic uptake of radiotracer in the skeleton and soft tissues. There is no evidence of fracture, osteomyelitis or bone tumor. There is no pattern of skeletal metastases. IMPRESSION: Normal Whole Body Nuclear Bone Scan.    10/29/20 CT c/a/p:  Lesion (description):     Location (series/slice):  Size   1. Posterior mediastinal lymph node   2/27            1.1 x 1.4 cm, previously 2.3 x 2.0 cm (2/26)  2. Left upper paraesophageal lymph node  2/9           2 mm, previously 5 mm (2/9)   3. Right external iliac lymphadenopathy 2/21                3.8 x 3.2 cm (2/98)  4. Left external iliac lymphadenopathy  2/99              11 mm, previously 16 mm (2/99)   IMPRESSION:  1. Findings consistent with treatment response, with interval decrease in size  of mediastinal, retroperitoneal, and pelvic lymph nodes and no CT evidence of  new metastatic disease within the thorax, abdomen, or pelvis.   2. Unchanged circumferential bladder wall thickening, which may represent acute  or chronic cystitis.      2/1/21 CT c/a/p:  RECIST TARGET LESIONS:  Lesion (description)         Location (series/slice)                Size      1. Left upper esophageal lymph node    series 2 image 10    1 mm previously measuring 2 mm  2. Azygos esophageal lymph node    2, 30    7 x 7 mm previously measuring 11 x 14 mm  3. Right external iliac lymph node    2, 102    27 x 23 mm previously 27 x 31 mm  4. Left external iliac lymph node    2, 102    8 mm previously 11 mm  NONTARGET LESIONS:  None  IMPRESSION  1. Further decrease size of adenopathy as described. 2.  No evidence of new metastatic disease. 2/1/21 Bone scan:  Normal whole-body nuclear bone scan.    2/20/21 Brain MRI:  IMPRESSION  No acute intracranial findings no mass    5/11/21 CT c/a/p:  RECIST   TARGET LESIONS:  Lesion (description)         Location (series/slice)                Size      1. Upper left paraesophageal lymph node    series 2 image 13    2 mm unchanged  2. Azygoesophageal lymph node    series 2 image 33 measuring 7 x 5 mm     unchanged  3. Right external iliac lymph node    series 2 image 103    27 x 22 mm unchanged  4. Left external iliac lymph node    series 2 image 104    12 x 7 mm unchanged  NONTARGET LESIONS:  None  IMPRESSION  1. No evidence of new metastatic disease. Stable normal-sized lymph nodes in  the mediastinum and enlarged right external iliac lymph node. 2.  Other incidental findings as above    5/11/21 Bone scan:  FINDINGS: Physiologic uptake of radiotracer is seen in the skeleton and soft  tissues. There is no evidence for fracture or metastatic disease. Incidental  renal imaging shows no abnormality. IMPRESSION  No definite evidence of bony metastatic disease. 7/29/21 CT ch/abd/pelvis:  RECIST   TARGET LESIONS:  Lesion (description)         Location (series/slice)                Size      1.  azygo-esophageal lymph node, unchanged    series 2, image 29    9 mm  2.  Right external iliac lymph node, decreased    series 2, image 100    16 mm  NONTARGET LESIONS:  IMPRESSION  1. No evidence of new metastatic disease. Stable to slight decrease in size of  several target lymph nodes as described above. 2.  Unchanged nonacute findings as above. 7/29/21 Bone scan:  IMPRESSION  Normal whole-body nuclear bone scan. 10/27/21 CT ch/abd/pelv:  RECIST:  Target:  Azygo-esophageal recess LN                (2,33) 9mm  Right external iliac lymph node               (2,104) 16 mm  Nontarget:  IMPRESSION  No interval change. 10/2721 bone scan:   FINDINGS: Physiologic uptake of radiotracer is seen in the skeleton and soft  tissues. There is no evidence for fracture or metastatic disease. Incidental  renal imaging shows no abnormality. IMPRESSION  Normal whole-body nuclear bone scan. 1/17/22 CT ch/abd/pelv:  IMPRESSION  1. Right lower paratracheal lymph node previously measured is now normal in size. 2.  Right external iliac lymph node is enlarged, stable at 14 mm in short axis dimension. 3.  No osseous metastatic disease. 4.  2 new foci of groundglass in the right upper lungs as noted above. Attention on follow-up recommended. RECIST:  Right external iliac lymph node, 2:107, 14 mm, previously 16 mm.    1/17/22 Bone Scan:  IMPRESSION  No significant change. No definite evidence of bony metastatic disease. 4/15/22 CT ch/abd/pelv:  IMPRESSION  1. Findings concerning for progression of disease. 2.  Bilateral new pulmonary nodules measuring up to 7 mm. Previously seen right  upper lobe opacity has nearly resolved. 3.  1.9 x 2.6 cm segment 3 hepatic lesion, possibly new, not well characterized  on prior. 4.  Multiple confluent right retroperitoneal nodes and right psoas intramuscular  deposits. 5.  New subcutaneous left gluteal 1.7 cm soft tissue deposit. 6.  No evidence of osseous metastatic disease. 7.  Additional findings as above. RECIST   TARGET LESIONS:  Lesion (description)         Location (series/slice)                Size      1.  1.9 x 2.6 cm segment 3 hepatic lesion    2:58  not well seen previously,  possibly new   2. 1.5 x 1.6 cm left retroperitoneal node    2:72    new  3. 2.9 x 5.7 cm right psoas deposit    2:76    new  4. 2.0 x 2.0 right pericaval node    2:77    new  5. 1.5 x 2.3 cm right external iliac node    2:102    previously 1.4 x 1.5 cm  6. 1.7 x 1.7 cm left posterior gluteal subcutaneous nodule 2:90 new  NONTARGET LESIONS:  1. Multiple new pulmonary nodules measuring up to 7 mm. 4/15/22 Bone scan:  IMPRESSION  1. There is a new focus of increased activity overlying right occipital bone  which could represent a metastatic lesion and CT is recommended for further  assessment.     There is also question of a tiny focus in a right 6 rib posterolaterally which  is nonspecific and no CT correlate is identified and follow-up studies would be  helpful to assess for interval change. 23X          Assessment & Plan:   Anika Parikh is a 78 y.o. 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 1. Metastatic adenocarcinoma of prostate, high grade:  Hormone resistant. Initial pathologist at [address] [place] [address] called this \"Large cell neuroendocrine carcinoma,\" and based on diffuse disease on PET, patient was started on treatment with Carbo-Etoposide-Atezolizumab (thinking this was large cell carcinoma of the lung.) However FoundationOne testing identified TMPRSS2-ERG fusion, which is primarily seen in prostate cancers. This along with elevated PSA of 38 prompted sending of pathology specimen to [facility] for review. Their opinion and IHC staining is consistent with high grade adenocarcinoma of prostate. After Carbo-Etop-Atezo, pt was treated with Docetaxel x 6 cycles. Treatment options now include: Carboplatin + Cabazitaxel. 4/15/22 CT shows disease progression with a new liver and lung mets; bone scan with possible new occipital and possible rib met.    Supportive medications: EMLA cream.   -- Lupron (3 month depot)  #8 given 4/19/22, #9 due 7/11/22. -- Continue Zytiga 1000mg once daily plus Prednisone 5mg bid, started 4/29/22   -- Next CT and bone scan due due 7/2022. Will order head CT to further eval occipital met. -- Return in 4 weeks labs, MD/NP visit. 2. Chemotherapy induced neuropathy:  Grade 1. Present in bilateral fingertips. He is dropping objects less often. 3. CKD:   Likely has mild underlying kidney dysfunction. Continue good PO hydration. Advised follow up with PCP for management. 4. Depression/anxiety / h/o Genital Herpes:   2/2 to #1. On Zoloft 50mg/d. Following with Palliative medicine. On suppression with Acyclovir. 5. Dysequilibrium / Ear effusions / Hyponychium:    Improving. Past Brain MRI negative for mets. Left tympanostomy tube placed by ENT for bilat effusions. Hyponychium improving - prn antifungal topical.     6. Health maintenance:   Discussed healthy food options, diet and exercise. Never had screening colonoscopy - reasonable to do now given prognosis from metastatic prostate cancer measured in years rather than months. Saw GI, but has not yet had colonoscopy. 7. Normocytic anemia:   Improving. Likely due to #1. Iron profile, ferritin, b12 and folate normal.      8. Osteopenia:   Seen on recent CT. Advised starting on calcium/D3 supplementation. Discussed starting on Xgeva every 3 months. Advised dental evaluation prior to starting.   -- Will tentatively plan to start Xgeva (q6mo) in 4 weeks, will delay if dental eval not completed. 9. HTN:  Moderately controlled on Amlodipine 5mg/d. Consider increasing to amlodipine 20IK if systolic remains >344. 10. Left arm nodule:  Painless and may be fat necrosis related to prior trauma. Per patient nodule present after fall that occurred in April 2022. -- Advised ultrasound of left arm. Emotional well being: Pt is coping well with his/her disease and has excellent support. Significant other: Richard Whittaker - [phone].      I appreciate the opportunity to participate in Mr. Jacqueline Mendez care. I personally saw and evaluated the patient and performed the key components of medical decision making. The history, physical exam, and documentation were performed by Han Perales NP. I reviewed and verified the above documentation and modified it as needed. Continue on Zytiga + Prednisone which pt is tolerating well. Good response thus far with reduction in PSA.     Signed By: Tristen Andres MD     May 16, 2022

## 2022-05-16 ENCOUNTER — HOSPITAL ENCOUNTER (OUTPATIENT)
Dept: INFUSION THERAPY | Age: 67
Discharge: HOME | End: 2022-05-16
Payer: MEDICARE

## 2022-05-16 ENCOUNTER — OFFICE VISIT (OUTPATIENT)
Dept: ONCOLOGY | Age: 67
End: 2022-05-16
Payer: MEDICARE

## 2022-05-16 VITALS
BODY MASS INDEX: 29.35 KG/M2 | SYSTOLIC BLOOD PRESSURE: 144 MMHG | HEIGHT: 70 IN | RESPIRATION RATE: 20 BRPM | DIASTOLIC BLOOD PRESSURE: 88 MMHG | TEMPERATURE: 98 F | WEIGHT: 205 LBS | OXYGEN SATURATION: 98 % | HEART RATE: 64 BPM

## 2022-05-16 VITALS
RESPIRATION RATE: 20 BRPM | HEIGHT: 70 IN | SYSTOLIC BLOOD PRESSURE: 145 MMHG | WEIGHT: 205.8 LBS | TEMPERATURE: 98 F | BODY MASS INDEX: 29.46 KG/M2 | HEART RATE: 64 BPM | DIASTOLIC BLOOD PRESSURE: 96 MMHG

## 2022-05-16 DIAGNOSIS — R22.32 ARM MASS, LEFT: ICD-10-CM

## 2022-05-16 DIAGNOSIS — N18.31 STAGE 3A CHRONIC KIDNEY DISEASE (HCC): ICD-10-CM

## 2022-05-16 DIAGNOSIS — C79.82 METASTATIC ADENOCARCINOMA TO PROSTATE (HCC): Primary | ICD-10-CM

## 2022-05-16 DIAGNOSIS — Z79.899 ENCOUNTER FOR MEDICATION MANAGEMENT: ICD-10-CM

## 2022-05-16 DIAGNOSIS — I10 PRIMARY HYPERTENSION: ICD-10-CM

## 2022-05-16 DIAGNOSIS — C61 ADENOCARCINOMA OF PROSTATE, STAGE 4 (HCC): Primary | ICD-10-CM

## 2022-05-16 DIAGNOSIS — M85.89 OSTEOPENIA OF MULTIPLE SITES: ICD-10-CM

## 2022-05-16 PROBLEM — N18.30 CHRONIC RENAL DISEASE, STAGE III (HCC): Status: ACTIVE | Noted: 2022-05-16

## 2022-05-16 LAB
ALBUMIN SERPL-MCNC: 4.2 G/DL (ref 3.5–5)
ALBUMIN/GLOB SERPL: 1.2 {RATIO} (ref 1.1–2.2)
ALP SERPL-CCNC: 90 U/L (ref 45–117)
ALT SERPL-CCNC: 46 U/L (ref 12–78)
ANION GAP SERPL CALC-SCNC: 5 MMOL/L (ref 5–15)
AST SERPL-CCNC: 67 U/L (ref 15–37)
BASOPHILS # BLD: 0 K/UL (ref 0–0.1)
BASOPHILS NFR BLD: 1 % (ref 0–1)
BILIRUB SERPL-MCNC: 0.9 MG/DL (ref 0.2–1)
BUN SERPL-MCNC: 26 MG/DL (ref 6–20)
BUN/CREAT SERPL: 20 (ref 12–20)
CALCIUM SERPL-MCNC: 9.4 MG/DL (ref 8.5–10.1)
CHLORIDE SERPL-SCNC: 99 MMOL/L (ref 97–108)
CO2 SERPL-SCNC: 29 MMOL/L (ref 21–32)
CREAT SERPL-MCNC: 1.31 MG/DL (ref 0.7–1.3)
DIFFERENTIAL METHOD BLD: ABNORMAL
EOSINOPHIL # BLD: 0.1 K/UL (ref 0–0.4)
EOSINOPHIL NFR BLD: 1 % (ref 0–7)
ERYTHROCYTE [DISTWIDTH] IN BLOOD BY AUTOMATED COUNT: 15.1 % (ref 11.5–14.5)
GLOBULIN SER CALC-MCNC: 3.5 G/DL (ref 2–4)
GLUCOSE SERPL-MCNC: 109 MG/DL (ref 65–100)
HCT VFR BLD AUTO: 32.5 % (ref 36.6–50.3)
HGB BLD-MCNC: 10.7 G/DL (ref 12.1–17)
IMM GRANULOCYTES # BLD AUTO: 0.1 K/UL (ref 0–0.04)
IMM GRANULOCYTES NFR BLD AUTO: 1 % (ref 0–0.5)
LYMPHOCYTES # BLD: 1.1 K/UL (ref 0.8–3.5)
LYMPHOCYTES NFR BLD: 17 % (ref 12–49)
MCH RBC QN AUTO: 30.2 PG (ref 26–34)
MCHC RBC AUTO-ENTMCNC: 32.9 G/DL (ref 30–36.5)
MCV RBC AUTO: 91.8 FL (ref 80–99)
MONOCYTES # BLD: 0.4 K/UL (ref 0–1)
MONOCYTES NFR BLD: 6 % (ref 5–13)
NEUTS SEG # BLD: 4.5 K/UL (ref 1.8–8)
NEUTS SEG NFR BLD: 74 % (ref 32–75)
NRBC # BLD: 0 K/UL (ref 0–0.01)
NRBC BLD-RTO: 0 PER 100 WBC
PLATELET # BLD AUTO: 162 K/UL (ref 150–400)
PMV BLD AUTO: 10.9 FL (ref 8.9–12.9)
POTASSIUM SERPL-SCNC: 3.9 MMOL/L (ref 3.5–5.1)
PROT SERPL-MCNC: 7.7 G/DL (ref 6.4–8.2)
PSA SERPL-MCNC: 7.2 NG/ML (ref 0.01–4)
RBC # BLD AUTO: 3.54 M/UL (ref 4.1–5.7)
SODIUM SERPL-SCNC: 133 MMOL/L (ref 136–145)
WBC # BLD AUTO: 6.1 K/UL (ref 4.1–11.1)

## 2022-05-16 PROCEDURE — G0463 HOSPITAL OUTPT CLINIC VISIT: HCPCS | Performed by: NURSE PRACTITIONER

## 2022-05-16 PROCEDURE — 84153 ASSAY OF PSA TOTAL: CPT

## 2022-05-16 PROCEDURE — G8536 NO DOC ELDER MAL SCRN: HCPCS | Performed by: NURSE PRACTITIONER

## 2022-05-16 PROCEDURE — 74011250636 HC RX REV CODE- 250/636: Performed by: NURSE PRACTITIONER

## 2022-05-16 PROCEDURE — 36591 DRAW BLOOD OFF VENOUS DEVICE: CPT

## 2022-05-16 PROCEDURE — G8417 CALC BMI ABV UP PARAM F/U: HCPCS | Performed by: NURSE PRACTITIONER

## 2022-05-16 PROCEDURE — 74011000250 HC RX REV CODE- 250: Performed by: NURSE PRACTITIONER

## 2022-05-16 PROCEDURE — 1101F PT FALLS ASSESS-DOCD LE1/YR: CPT | Performed by: NURSE PRACTITIONER

## 2022-05-16 PROCEDURE — G9717 DOC PT DX DEP/BP F/U NT REQ: HCPCS | Performed by: NURSE PRACTITIONER

## 2022-05-16 PROCEDURE — 84403 ASSAY OF TOTAL TESTOSTERONE: CPT

## 2022-05-16 PROCEDURE — G8754 DIAS BP LESS 90: HCPCS | Performed by: NURSE PRACTITIONER

## 2022-05-16 PROCEDURE — 99215 OFFICE O/P EST HI 40 MIN: CPT | Performed by: NURSE PRACTITIONER

## 2022-05-16 PROCEDURE — 85025 COMPLETE CBC W/AUTO DIFF WBC: CPT

## 2022-05-16 PROCEDURE — 3017F COLORECTAL CA SCREEN DOC REV: CPT | Performed by: NURSE PRACTITIONER

## 2022-05-16 PROCEDURE — G8753 SYS BP > OR = 140: HCPCS | Performed by: NURSE PRACTITIONER

## 2022-05-16 PROCEDURE — G8427 DOCREV CUR MEDS BY ELIG CLIN: HCPCS | Performed by: NURSE PRACTITIONER

## 2022-05-16 PROCEDURE — 77030016057 HC NDL HUBR APOL -B

## 2022-05-16 PROCEDURE — 80053 COMPREHEN METABOLIC PANEL: CPT

## 2022-05-16 RX ORDER — SODIUM CHLORIDE 9 MG/ML
10 INJECTION INTRAMUSCULAR; INTRAVENOUS; SUBCUTANEOUS AS NEEDED
Status: ACTIVE | OUTPATIENT
Start: 2022-05-16 | End: 2022-05-16

## 2022-05-16 RX ORDER — SODIUM CHLORIDE 0.9 % (FLUSH) 0.9 %
5-10 SYRINGE (ML) INJECTION AS NEEDED
Status: DISPENSED | OUTPATIENT
Start: 2022-05-16 | End: 2022-05-16

## 2022-05-16 RX ORDER — HEPARIN 100 UNIT/ML
500 SYRINGE INTRAVENOUS AS NEEDED
Status: ACTIVE | OUTPATIENT
Start: 2022-05-16 | End: 2022-05-16

## 2022-05-16 RX ADMIN — Medication 10 ML: at 10:55

## 2022-05-16 RX ADMIN — Medication 10 ML: at 11:00

## 2022-05-16 RX ADMIN — HEPARIN 500 UNITS: 100 SYRINGE at 11:00

## 2022-05-16 NOTE — PATIENT INSTRUCTIONS
1. Please start taking a calcium and vitamin D3 supplement, such as viactiv chews. 2. At the next visit, I am going to start you on a bone strengthening medication called Xgeva. You will receive it every 3 months. 3. I ordered an ultrasound of your left arm to evaluate the nodule.      4. Please get a dental evaluation

## 2022-05-16 NOTE — PROGRESS NOTES
Juan Suffolk is a 78 y.o. male follow up for neuroendocrine carcinoma. 1. Have you been to the ER, urgent care clinic since your last visit? Hospitalized since your last visit?no     2. Have you seen or consulted any other health care providers outside of the [address] [place] [address] since your last visit? Include any pap smears or colon screening.  No    Vitals 5/16/2022   Blood Pressure    Pulse 64   Temp 98   Resp 20   Height 5' 10\"   Weight 205 lb 12.8 oz   SpO2    BSA 2.15 m2   BMI 29.53 kg/m2

## 2022-05-16 NOTE — PROGRESS NOTES
[facility] Short Visit Progress Note    [id number] Patient admitted to [facility] for PF and labs ambulatory in stable condition. No new concerns voiced. Covid Screening      1. Do you have any symptoms of COVID-19? SOB, coughing, fever, or generally not feeling well ? NO  2. Have you been exposed to COVID-19 recently? NO  3. Have you had any recent contact with family/friend that has a pending COVID test? NO    Vital Signs:  Visit Vitals  BP (!) 145/96 (BP 1 Location: Right upper arm, BP Patient Position: Sitting)   Pulse 64   Temp 98 °F (36.7 °C)   Resp 20   Ht 5' 10\" (1.778 m)   Wt 93.4 kg (205 lb 12.8 oz)   BMI 29.53 kg/m²         Left chest PAC with positive blood return. Lab Results:  Recent Results (from the past 24 hour(s))   CBC WITH AUTOMATED DIFF    Collection Time: 05/16/22 11:07 AM   Result Value Ref Range    WBC 6.1 4.1 - 11.1 K/uL    RBC 3.54 (L) 4.10 - 5.70 M/uL    HGB 10.7 (L) 12.1 - 17.0 g/dL    HCT 32.5 (L) 36.6 - 50.3 %    MCV 91.8 80.0 - 99.0 FL    MCH 30.2 26.0 - 34.0 PG    MCHC 32.9 30.0 - 36.5 g/dL    RDW 15.1 (H) 11.5 - 14.5 %    PLATELET 753 551 - 854 K/uL    MPV 10.9 8.9 - 12.9 FL    NRBC 0.0 0  WBC    ABSOLUTE NRBC 0.00 0.00 - 0.01 K/uL    NEUTROPHILS 74 32 - 75 %    LYMPHOCYTES 17 12 - 49 %    MONOCYTES 6 5 - 13 %    EOSINOPHILS 1 0 - 7 %    BASOPHILS 1 0 - 1 %    IMMATURE GRANULOCYTES 1 (H) 0.0 - 0.5 %    ABS. NEUTROPHILS 4.5 1.8 - 8.0 K/UL    ABS. LYMPHOCYTES 1.1 0.8 - 3.5 K/UL    ABS. MONOCYTES 0.4 0.0 - 1.0 K/UL    ABS. EOSINOPHILS 0.1 0.0 - 0.4 K/UL    ABS. BASOPHILS 0.0 0.0 - 0.1 K/UL    ABS. IMM.  GRANS. 0.1 (H) 0.00 - 0.04 K/UL    DF AUTOMATED     METABOLIC PANEL, COMPREHENSIVE    Collection Time: 05/16/22 11:07 AM   Result Value Ref Range    Sodium 133 (L) 136 - 145 mmol/L    Potassium 3.9 3.5 - 5.1 mmol/L    Chloride 99 97 - 108 mmol/L    CO2 29 21 - 32 mmol/L    Anion gap 5 5 - 15 mmol/L    Glucose 109 (H) 65 - 100 mg/dL    BUN 26 (H) 6 - 20 MG/DL Creatinine 1.31 (H) 0.70 - 1.30 MG/DL    BUN/Creatinine ratio 20 12 - 20      GFR est AA >60 >60 ml/min/1.73m2    GFR est non-AA 55 (L) >60 ml/min/1.73m2    Calcium 9.4 8.5 - 10.1 MG/DL    Bilirubin, total 0.9 0.2 - 1.0 MG/DL    ALT (SGPT) 46 12 - 78 U/L    AST (SGOT) 67 (H) 15 - 37 U/L    Alk. phosphatase 90 45 - 117 U/L    Protein, total 7.7 6.4 - 8.2 g/dL    Albumin 4.2 3.5 - 5.0 g/dL    Globulin 3.5 2.0 - 4.0 g/dL    A-G Ratio 1.2 1.1 - 2.2             Medications:  Medications Administered     0.9% sodium chloride injection 10 mL     Admin Date  05/16/2022 Action  Given Dose  10 mL Route  IntraVENous Administered By  Sima Sibley RN           Admin Date  05/16/2022 Action  Given Dose  10 mL Route  IntraVENous Administered By  Sima Sibley RN          heparin (porcine) pf 500 Units     Admin Date  05/16/2022 Action  Given Dose  500 Units Route  IntraVENous Administered By  Sima Sibley RN                Patient tolerated treatment well. Patient discharged from [place] [address] ambulatory in no distress at 1100. Patient aware of next appointment.     Future Appointments   Date Time Provider Ayala Kidd   6/13/2022 10:00 AM [facility]   6/13/2022 10:45 AM Kia Leon NP [facility]   7/11/2022 11:00 AM SS INF7 CH2 [facility]H [facility]   9/6/2022 10:00 AM SS INF4 CH4 [facility]H [facility]   11/28/2022 10:00 AM SS INF4 CH4 <1H [facility] [address] [place] [address]

## 2022-05-18 ENCOUNTER — TELEPHONE (OUTPATIENT)
Dept: ONCOLOGY | Age: 67
End: 2022-05-18

## 2022-05-18 LAB
TESTOST FREE SERPL-MCNC: <0.2 PG/ML (ref 6.6–18.1)
TESTOST SERPL-MCNC: <3 NG/DL (ref 264–916)

## 2022-05-18 NOTE — TELEPHONE ENCOUNTER
[id number] Tal Kim at [facility]  [phone]    05/18/22 4:54 PM - Received call from Dalia Murray with [address]. He states that they need clarification on an order placed for this patient. He is coming in tomorrow at 9 AM to have the duplex done. Dalia Murray states the order says the reason for exam is to evaluate a nodule on the arm. He states they need clarification because this specific order is typically used to diagnose a blood clot or DVT. He states the team will need to call back tomorrow morning before the patient arrives (mostly likely around 8:30). The call back number is [phone]. Advised this nurse will alert the team.  No further questions or concerns.

## 2022-05-19 ENCOUNTER — HOSPITAL ENCOUNTER (OUTPATIENT)
Dept: ULTRASOUND IMAGING | Age: 67
Discharge: HOME | End: 2022-05-19
Attending: NURSE PRACTITIONER
Payer: MEDICARE

## 2022-05-19 ENCOUNTER — HOSPITAL ENCOUNTER (OUTPATIENT)
Dept: VASCULAR SURGERY | Age: 67
Discharge: HOME | End: 2022-05-19
Attending: NURSE PRACTITIONER
Payer: MEDICARE

## 2022-05-19 DIAGNOSIS — R22.32 ARM MASS, LEFT: ICD-10-CM

## 2022-05-19 DIAGNOSIS — R22.32 ARM MASS, LEFT: Primary | ICD-10-CM

## 2022-05-19 PROCEDURE — 76882 US LMTD JT/FCL EVL NVASC XTR: CPT

## 2022-05-19 NOTE — PROGRESS NOTES
Formerly [facility] [employer]  Medical Oncology at [address] [place] [address]  [phone]    Hematology / Oncology Established Visit    Reason for Visit:   Inés Arellano is a 78 y.o. male who is seen for follow up of neuroendocrine carcinoma. Initially referred by Dr. John Antonio. 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 Flow cytometry negative for lymphoproliferative disorder. FoundationOne: MS Stable, TMB 0, MDM4 amplification, MYC amplification, TMPRSS2 TMPRSS2-ERD fusion, CEBPA 1311fs*10, ERBB4 amplification - equivocal, MCL1 amplification, MFA5R2J amplification, RAD21 amplification. See scanned report for full info. Abbreviated [facility] Pathology Consultation:  Final diagnosis: Right external iliac node: Metastatic prostate adenocarcinoma. Comment: Histologic sections of the right external iliac node show sheets of cells with minimal eosinophilic cytoplasm and variable cytologic atypia. Some areas show acinar formation. The cells have predominantly round nuclei with vesicular chromatin and prominent nucleoli. Some areas show significantly more atypia with more irregular nuclear borders, hyperchromatic nyclei, and increase nuclear to cytoplasmic ratio. Frequent mitotic figures are identified. A provided pane of IHC stains is reviewed and demonstrates that the malignant cells show strong, diffuse expression of pan-cytokeratin and focal expression of TTF. CK7, CK20, chromogranin, and synaptophysin are negative in the lesional cells. CD3 and CD20 are negative in the lesions cells and positive in the background lymphocytes.  An abbreviated FoundationOne report was included, which included a TMPRSS2-ERG fusion (among other nonspecific abnormalities), which si found in approximately 50% of prostate cancers. Additional IHC studies performed at [facility] showed that the malignant cells have immunoreactivity with antibodies for PSA and PSAP, consistent with a diagnosis of metastatic prostate adenocarcinoma. Per report, flow cytometry negative for lymphoma. Overall, the histomorphology, immunophenotype and genomic findings in this case are diagnostic of metastatic prostate adenocarcinoma. The acinar formation and cytology were suggestive of this diagnosis, and the diffuse PSA and PSAP positivity as well as the TMPRSS2-ERG fusion confirmed the diagnosis. This fusion is the most common genomic alteration in prostate carcinoma and can be detected in over half of the cases, yet is not seen with any frequency in other types of carcinoma. Prior Treatment:   1. Carbo-Etoposide-Atezolizumab x 2 cycles, 6/29/20-7/20/20. 2. Docetaxel x 6 cycles, 8/10/20-11/24/20. Current Treatment: Zytiga 1000mg/d and prednisone 5mg BID, started 4/29/22 - current. Lupron every 12 weeks, started 8/2020-current      History of Present Illness:   Jorge Cloud is a 78 y.o. male who comes in for follow up of poorly differentiated prostate cancer. Pt noticed a nontender bulge in his LLQ in 5/2020. He thought this was a hernia and was evaluated by Dr. Sinan Gould. CT on 5/20/20 was notable for extensive lymphadenopathy in abd/pelvis. Pt underwent robot assisted excisional Right external iliac LN biopsy 5/29/20, which showed poorly differentiated large cell neuroendocrine carcinoma. However, pathology reviewed at [facility] and felt to be poorly differentiated prostate adenocarcinoma. He reports intentional weight loss with dietary changes and exercise, losing 40-lbs in past 8 months. No n/v/d, melena/hematochezia, cough, SOB. No fevers, chills, sweats. [employer] nonsmoker. Mother had breast cancer diagnosed age < 48. Twin brother had melanoma diagnosed aged late 46s.   He does not think he has ever had a colonoscopy or PSA screening prior to current diagnosis. Interval History:  Patient here for follow up of prostate cancer. Continues on Zytiga and prednisone. Completed US of left arm to further characterize mass. Working on getting dental eval scheduled. Reports improved energy level. Denies fevers, chills, infections, diarrhea, constipation, SOB or CP. PMHx: Prostate cancer, HTN  PSurgHx: appendectomy, vasectomy, subcutaneous cyst removal on forehead  SHx:  Never smoker, no EtOH currently after prior alcohol abuse. Unmarried. Has 2 children, 27 and 32. 1 lives in [place]. FHx:  Mother had breast cancer, father had prostate cancer, brother has melanoma, COPD. Review of Systems: A complete review of systems was obtained, negative except as described above. Physical Exam:     Visit Vitals  /78   Pulse 67   Temp 97.2 °F (36.2 °C)   Ht 5' 10\" (1.778 m)   Wt 205 lb 8 oz (93.2 kg)   SpO2 97%   BMI 29.49 kg/m²     ECOG PS: 0  General: Well developed, no acute distress  Eyes: Anicteric sclerae  Lymphatic: No cervical, supraclavicular adenopathy  Respiratory: CTAB, normal respiratory effort  CV: Normal rate, regular rhythm, no murmurs, no edema, port upper chest with needle in place. GI: firm, nontender, nondistended, +BS  MS: Normal gait and station. Digits without clubbing or cyanosis. Skin:  Fingernails dark, thickened at distal half and more normal at proximal half. No ecchymoses, or petechiae. Normal temperature, turgor. Diffuse dry skin. Vertical scab on R shin. Large nodule present on left forearm with surrounding pink scar tissue. Neuro/Psych: Alert, oriented. Moves all 4 extremities. Appropriate affect, normal judgment/insight. Results:     Lab Results   Component Value Date/Time    WBC 6.5 06/13/2022 10:04 AM    HGB 10.5 (L) 06/13/2022 10:04 AM    HCT 31.6 (L) 06/13/2022 10:04 AM    PLATELET 819 34/86/3858 10:04 AM    MCV 91.3 06/13/2022 10:04 AM    ABS.  NEUTROPHILS 5.2 2022 10:04 AM     Lab Results   Component Value Date/Time    Sodium 134 (L) 2022 10:04 AM    Potassium 3.4 (L) 2022 10:04 AM    Chloride 99 2022 10:04 AM    CO2 28 2022 10:04 AM    Glucose 153 (H) 2022 10:04 AM    BUN 23 (H) 2022 10:04 AM    Creatinine 1.37 (H) 2022 10:04 AM    GFR est AA >60 2022 10:04 AM    GFR est non-AA 52 (L) 2022 10:04 AM    Calcium 9.5 2022 10:04 AM    Creatinine (POC) 1.30 04/15/2022 08:49 AM     Lab Results   Component Value Date/Time    Bilirubin, total 0.9 2022 10:04 AM    ALT (SGPT) 32 2022 10:04 AM    Alk. phosphatase 91 2022 10:04 AM    Protein, total 7.8 2022 10:04 AM    Albumin 4.2 2022 10:04 AM    Globulin 3.6 2022 10:04 AM     Lab Results   Component Value Date/Time    Iron 82 2022 10:47 AM    TIBC 415 2022 10:47 AM    Iron % saturation 20 2022 10:47 AM    Ferritin 268 2022 10:47 AM       Lab Results   Component Value Date/Time    Vitamin B12 312 2022 10:47 AM    Folate 9.6 2022 10:47 AM     Lab Results   Component Value Date/Time    TSH 1.72 2020 09:58 AM     No results found for: HAMAT, HAAB, HABT, HAAT, HBSAG, HBSB, HBSAT, HBABN, HBCM, HBCAB, HBCAT, XBCABS, HBEAB, HBEAG, XHEPCS, [id number], HBEGLT, [place], HBCLT, HBEBLT, [id number], [id number], [facility], [id number], HBCMLT, [id number], HCGAT     20: Chromogranin 42    20: PSA 38.6  20: PSA 13.2   8/10/20: PSA 1.2  20: PSA 0.5  20: PSA 0.4  10/15/20: PSA 0.3  20: PSA 0.3  20: 0.2  21: PSA 0.1    3/22/21 PSA 0.066 (PSA ultrasensitive at [facility])   21 PSA 0.1, testosterone < 3  21 PSA 0.1  21 PSA 0.2  10/28/21: PSA 0.4  22: PSA 2.3  3/9/22: PSA 9.1   22 PSA 25.3  22 started Zytiga + prednisone   22 PSA 7.2    Imagin/20/20 CT abd/pelvis with IV contrast:  Impression:  1.  No findings to suggest gross abdominal wall hernia or flank hernia. 2. Extensive adenopathy throughout the abdomen and pelvis as described. Findings are concerning for possible metastatic disease or lymphoma. Recommend follow up or comparison with prior studies. 3. Other findings as described. 6/18/20 PET:  FINDINGS:  HEAD/NECK: No apparent foci of abnormal hypermetabolism. Cerebral evaluation is  limited by normal intense activity. CHEST: Hypermetabolic lymphadenopathy at the base of the left neck and in the  left supraclavicular region is noted. Hypermetabolic superior mediastinal,  prevascular, right paratracheal, subcarinal, and bilateral hilar lymph  lymphadenopathy, maximum SUV of the subcarinal lymph node is 5.7. Small but  hypermetabolic soft tissue nodule left anterior chest wall. ABDOMEN/PELVIS: Hypermetabolic retrocrural, left para-aortic, aortocaval,  bilateral common iliac, bilateral external iliac, and bilateral inguinal  lymphadenopathy, maximum SUV 5.8 of an aortocaval lymph node. Hypermetabolic  mesenteric lymph node left lower quadrant, maximum SUV 12.3. SKELETON: No foci of abnormal hypermetabolism in the axial and visualized  appendicular skeleton. IMPRESSION: Hypermetabolic lymphadenopathy involving the left neck and left  supraclavicular region, mediastinum, bilateral hilum, retroperitoneum,  mesentery, and pelvis as described above. Hypermetabolic soft tissue nodule left  chest.    Brain MRI 6/27/20: IMPRESSION:  There is no evidence of intracranial metastatic disease. Mild chronic microvascular ischemic change. No intracranial mass, hemorrhage or evidence of acute infarction. CT c/a/p 8/3/20: IMPRESSION:  Chest:  1. Findings consistent with partial treatment response, with interval decrease  in size of mediastinal and hilar lymph nodes, and interval decrease in size of  left chest wall nodule. Abdomen/pelvis:   1.  Findings consistent with partial treatment response, with interval decrease  in size of retroperitoneal lymph nodes.  2. Unchanged circumferential bladder wall thickening, which may represent acute  or chronic cystitis. Correlate with urinalysis. RECIST   TARGET LESIONS:      Lesion (description)         Location (series/slice)                Size            1. Posterior mediastinal lymph node    series 2 image 26    2.3 x 2.0 cm, previously 3.3 x 2.2 cm  2. Left upper paraesophageal lymph node    series 2 image 9    5 mm, previously 15 mm. 3. Right external iliac lymphadenopathy    series 2 image 98    3.8 x 3.2 cm, previously 6.3 x 4.7 cm  4. Left external iliac lymphadenopathy    series 2 image 99    1.6 cm, previously 2.5 cm    9/1/20 Bone Scan:  FINDINGS: There is physiologic uptake of radiotracer in the skeleton and soft tissues. There is no evidence of fracture, osteomyelitis or bone tumor. There is no pattern of skeletal metastases. IMPRESSION: Normal Whole Body Nuclear Bone Scan.    10/29/20 CT c/a/p:  Lesion (description):     Location (series/slice):  Size   1. Posterior mediastinal lymph node   2/27            1.1 x 1.4 cm, previously 2.3 x 2.0 cm (2/26)  2. Left upper paraesophageal lymph node  2/9           2 mm, previously 5 mm (2/9)   3. Right external iliac lymphadenopathy 2/21                3.8 x 3.2 cm (2/98)  4. Left external iliac lymphadenopathy  2/99              11 mm, previously 16 mm (2/99)   IMPRESSION:  1. Findings consistent with treatment response, with interval decrease in size  of mediastinal, retroperitoneal, and pelvic lymph nodes and no CT evidence of  new metastatic disease within the thorax, abdomen, or pelvis. 2. Unchanged circumferential bladder wall thickening, which may represent acute  or chronic cystitis.      2/1/21 CT c/a/p:  RECIST   TARGET LESIONS:  Lesion (description)         Location (series/slice)                Size      1. Left upper esophageal lymph node    series 2 image 10    1 mm previously measuring 2 mm  2.  Azygos esophageal lymph node    2, 30    7 x 7 mm previously measuring 11 x 14 mm  3. Right external iliac lymph node    2, 102    27 x 23 mm previously 27 x 31 mm  4. Left external iliac lymph node    2, 102    8 mm previously 11 mm  NONTARGET LESIONS:  None  IMPRESSION  1. Further decrease size of adenopathy as described. 2.  No evidence of new metastatic disease. 2/1/21 Bone scan:  Normal whole-body nuclear bone scan.    2/20/21 Brain MRI:  IMPRESSION  No acute intracranial findings no mass    5/11/21 CT c/a/p:  RECIST   TARGET LESIONS:  Lesion (description)         Location (series/slice)                Size      1. Upper left paraesophageal lymph node    series 2 image 13    2 mm unchanged  2. Azygoesophageal lymph node    series 2 image 33 measuring 7 x 5 mm     unchanged  3. Right external iliac lymph node    series 2 image 103    27 x 22 mm unchanged  4. Left external iliac lymph node    series 2 image 104    12 x 7 mm unchanged  NONTARGET LESIONS:  None  IMPRESSION  1. No evidence of new metastatic disease. Stable normal-sized lymph nodes in  the mediastinum and enlarged right external iliac lymph node. 2.  Other incidental findings as above    5/11/21 Bone scan:  FINDINGS: Physiologic uptake of radiotracer is seen in the skeleton and soft  tissues. There is no evidence for fracture or metastatic disease. Incidental  renal imaging shows no abnormality. IMPRESSION  No definite evidence of bony metastatic disease. 7/29/21 CT ch/abd/pelvis:  RECIST   TARGET LESIONS:  Lesion (description)         Location (series/slice)                Size      1.  azygo-esophageal lymph node, unchanged    series 2, image 29    9 mm  2. Right external iliac lymph node, decreased    series 2, image 100    16 mm  NONTARGET LESIONS:  IMPRESSION  1. No evidence of new metastatic disease. Stable to slight decrease in size of  several target lymph nodes as described above. 2.  Unchanged nonacute findings as above.     7/29/21 Bone scan:  IMPRESSION  Normal whole-body nuclear bone scan. 10/27/21 CT ch/abd/pelv:  RECIST:  Target:  Azygo-esophageal recess LN                (2,33) 9mm  Right external iliac lymph node               (2,104) 16 mm  Nontarget:  IMPRESSION  No interval change. 10/2721 bone scan:   FINDINGS: Physiologic uptake of radiotracer is seen in the skeleton and soft  tissues. There is no evidence for fracture or metastatic disease. Incidental  renal imaging shows no abnormality. IMPRESSION  Normal whole-body nuclear bone scan. 1/17/22 CT ch/abd/pelv:  IMPRESSION  1. Right lower paratracheal lymph node previously measured is now normal in size. 2.  Right external iliac lymph node is enlarged, stable at 14 mm in short axis dimension. 3.  No osseous metastatic disease. 4.  2 new foci of groundglass in the right upper lungs as noted above. Attention on follow-up recommended. RECIST:  Right external iliac lymph node, 2:107, 14 mm, previously 16 mm.    1/17/22 Bone Scan:  IMPRESSION  No significant change. No definite evidence of bony metastatic disease. 4/15/22 CT ch/abd/pelv:  IMPRESSION  1. Findings concerning for progression of disease. 2.  Bilateral new pulmonary nodules measuring up to 7 mm. Previously seen right  upper lobe opacity has nearly resolved. 3.  1.9 x 2.6 cm segment 3 hepatic lesion, possibly new, not well characterized  on prior. 4.  Multiple confluent right retroperitoneal nodes and right psoas intramuscular  deposits. 5.  New subcutaneous left gluteal 1.7 cm soft tissue deposit. 6.  No evidence of osseous metastatic disease. 7.  Additional findings as above. RECIST   TARGET LESIONS:  Lesion (description)         Location (series/slice)                Size      1. 1.9 x 2.6 cm segment 3 hepatic lesion    2:58  not well seen previously,  possibly new   2. 1.5 x 1.6 cm left retroperitoneal node    2:72    new  3. 2.9 x 5.7 cm right psoas deposit    2:76    new  4. 2.0 x 2.0 right pericaval node    2:77    new  5.  1.5 x 2.3 cm right external iliac node    2:102    previously 1.4 x 1.5 cm  6. 1.7 x 1.7 cm left posterior gluteal subcutaneous nodule 2:90 new  NONTARGET LESIONS:  1. Multiple new pulmonary nodules measuring up to 7 mm. 4/15/22 Bone scan:  IMPRESSION  1. There is a new focus of increased activity overlying right occipital bone  which could represent a metastatic lesion and CT is recommended for further  assessment.     There is also question of a tiny focus in a right 6 rib posterolaterally which  is nonspecific and no CT correlate is identified and follow-up studies would be  helpful to assess for interval change. 23X    5/19/22 US left forearm:  FINDINGS: In the left forearm, there is a well-circumscribed echogenic 1.3 x 0.7x 1.6 cm avascular subcutaneous mass. IMPRESSION: Subcutaneous lipoma in the left forearm.        Assessment & Plan:   Jacobo Valdez is a 78 y.o. 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 1. Metastatic adenocarcinoma of prostate, high grade:  Hormone resistant. Initial pathologist at [facility] called this \"Large cell neuroendocrine carcinoma,\" and based on diffuse disease on PET, patient was started on treatment with Carbo-Etoposide-Atezolizumab (thinking this was large cell carcinoma of the lung.) However FoundationOne testing identified TMPRSS2-ERG fusion, which is primarily seen in prostate cancers. This along with elevated PSA of 38 prompted sending of pathology specimen to [facility] for review. Their opinion and IHC staining is consistent with high grade adenocarcinoma of prostate. After Carbo-Etop-Atezo, pt was treated with Docetaxel x 6 cycles. Treatment options now include: Carboplatin + Cabazitaxel. 4/15/22 CT shows disease progression with a new liver and lung mets; bone scan with possible new occipital and possible rib met. Supportive medications: EMLA cream.   -- Lupron (3 month depot)  #8 given 4/19/22, #9 due 7/11/22.    -- Continue Zytiga 1000mg once daily plus Prednisone 5mg bid, started 4/29/22  -- Next CT and bone scan ordered for 3 weeks around 7/4/2022. Will order head CT to further eval occipital met. -- Return in 4 weeks labs, MD/NP visit. 2. Chemotherapy induced neuropathy:  Grade 1. Present in bilateral fingertips. He is dropping objects less often. 3. CKD:   Crt worse today. Likely has mild underlying kidney dysfunction. Continue good PO hydration. -- Advised follow up with PCP for management. 4. Depression/anxiety / h/o Genital Herpes:   2/2 to #1. On Zoloft 50mg/d. Following with Palliative medicine. On suppression with Acyclovir. 5. Dysequilibrium / Ear effusions / Hyponychium:    Improving. Past Brain MRI negative for mets. Left tympanostomy tube placed by ENT for bilat effusions. Hyponychium improving - prn antifungal topical.     6. Health maintenance:   Discussed healthy food options, diet and exercise. Never had screening colonoscopy - reasonable to do now given prognosis from metastatic prostate cancer measured in years rather than months. Saw GI, but has not yet had colonoscopy. 7. Normocytic anemia:   Stable. Likely due to #1. Iron profile, ferritin, b12 and folate normal.      8. Osteopenia:   Seen on recent CT. Advised starting on calcium/D3 supplementation. Discussed starting on Xgeva every 3 months. Advised dental evaluation prior to starting.   -- Jenny Sung (q6mo), delay #1 today. Needs to complete dental evaluation. 9. HTN:  Moderately controlled on Amlodipine 5mg/d. Consider increasing to amlodipine 99CY if systolic remains >646. 10. Left arm nodule:  Painless and lipoma confirmed with US related to prior trauma. 11. Hyperglycemia / hypokalemia:  Likely due to prednisone. -- Referral to Lianet Milton for management hyperglycemia. -- KCL 20 meq in [facility] today. Advised foods high in potassium. Emotional well being: Pt is coping well with his/her disease and has excellent support.   Significant other: Toño Eisenberg - [phone]. I personally saw and evaluated the patient and performed the key components of medical decision making. The history, physical exam, and documentation were performed by Claire Vickers NP. I reviewed and verified the above documentation and modified it as needed. He is tolerating systemic therapy with manageable toxicity, so we will proceed with treatment. Repeat CT and bone scan before next visit.       Signed By: Malik Gallego MD     June 13, 2022

## 2022-05-19 NOTE — TELEPHONE ENCOUNTER
[address] at [facility]  [phone]        05/19/22 7:23 AM Attempted to place return call via contact number provided. No answer, left message requesting call back. Provided office phone number as well. 8:15 AM Spoke with Ene in ultrasound department. Advised of reason for exam per Dr. Lilly Liu. Ene provided clarification on what order to place to evaluate nodule on arm. Will update Maria T Espino NP, so new order can be placed. No further questions or concerns at this time.

## 2022-05-19 NOTE — PROGRESS NOTES
Called patient and notified him of ultrasound results per Bon Larson NP. Patient voiced understanding. No further questions or concerns at this time.

## 2022-05-20 DIAGNOSIS — I10 ESSENTIAL HYPERTENSION: ICD-10-CM

## 2022-05-20 DIAGNOSIS — C79.82 METASTATIC ADENOCARCINOMA TO PROSTATE (HCC): ICD-10-CM

## 2022-05-20 DIAGNOSIS — C77.9 REGIONAL LYMPH NODE METASTASIS PRESENT (HCC): ICD-10-CM

## 2022-05-20 DIAGNOSIS — R59.0 MEDIASTINAL LYMPHADENOPATHY: ICD-10-CM

## 2022-05-20 DIAGNOSIS — C7A.8 LARGE CELL NEUROENDOCRINE CARCINOMA (HCC): ICD-10-CM

## 2022-05-20 DIAGNOSIS — C61 ADENOCARCINOMA OF PROSTATE, STAGE 4 (HCC): Primary | ICD-10-CM

## 2022-05-20 RX ORDER — ALBUTEROL SULFATE 0.83 MG/ML
2.5 SOLUTION RESPIRATORY (INHALATION) AS NEEDED
Status: CANCELLED
Start: 2022-07-11

## 2022-05-20 RX ORDER — HYDROCORTISONE SODIUM SUCCINATE 100 MG/2ML
100 INJECTION, POWDER, FOR SOLUTION INTRAMUSCULAR; INTRAVENOUS AS NEEDED
Status: CANCELLED | OUTPATIENT
Start: 2022-07-11

## 2022-05-20 RX ORDER — EPINEPHRINE 1 MG/ML
0.3 INJECTION, SOLUTION, CONCENTRATE INTRAVENOUS AS NEEDED
Status: CANCELLED | OUTPATIENT
Start: 2022-07-11

## 2022-05-20 RX ORDER — ONDANSETRON 2 MG/ML
8 INJECTION INTRAMUSCULAR; INTRAVENOUS AS NEEDED
Status: CANCELLED | OUTPATIENT
Start: 2022-07-11

## 2022-05-20 RX ORDER — ACETAMINOPHEN 325 MG/1
650 TABLET ORAL AS NEEDED
Status: CANCELLED
Start: 2022-07-11

## 2022-05-20 RX ORDER — DIPHENHYDRAMINE HYDROCHLORIDE 50 MG/ML
50 INJECTION, SOLUTION INTRAMUSCULAR; INTRAVENOUS AS NEEDED
Status: CANCELLED
Start: 2022-07-11

## 2022-05-20 RX ORDER — DIPHENHYDRAMINE HYDROCHLORIDE 50 MG/ML
25 INJECTION, SOLUTION INTRAMUSCULAR; INTRAVENOUS AS NEEDED
Status: CANCELLED
Start: 2022-07-11

## 2022-05-20 RX ORDER — AMLODIPINE BESYLATE 5 MG/1
TABLET ORAL
Qty: 90 TABLET | Refills: 1 | Status: SHIPPED | OUTPATIENT
Start: 2022-05-20 | End: 2022-07-11 | Stop reason: DRUGHIGH

## 2022-05-24 ENCOUNTER — TELEPHONE (OUTPATIENT)
Dept: ONCOLOGY | Age: 67
End: 2022-05-24

## 2022-05-24 NOTE — TELEPHONE ENCOUNTER
[address] at [facility]  [phone]        05/24/22 9:56 AM Received incoming call from Abdi Hall with [employer]. Patient coming in this afternoon at 2 PM for routine cleaning and exam. They are requesting verbal clearance to proceed. Staff also requesting clearance in writing, to be faxed to [phone]. Advised this nurse would forward above to Dr. Kenzie Hoffman and Romana Moore and call Abdi Hall back. She voiced understanding.  # [id number]     11:36 AM Melissa Rodriguez and advised of NP response. Also drafted letter. Will fax once provider reviews and signs. No further questions or concerns at this time.

## 2022-05-26 NOTE — TELEPHONE ENCOUNTER
5/26/22- Called [facility] to check status of patients application for full approval. Per representative the Medicare Part D attestation form was received and patient is fully approved until 12/31/22 and an approval will be faxed to the office. No further financial assistance required at this time.

## 2022-06-13 ENCOUNTER — HOSPITAL ENCOUNTER (OUTPATIENT)
Dept: INFUSION THERAPY | Age: 67
Discharge: HOME | End: 2022-06-13
Payer: MEDICARE

## 2022-06-13 ENCOUNTER — OFFICE VISIT (OUTPATIENT)
Dept: ONCOLOGY | Age: 67
End: 2022-06-13
Payer: MEDICARE

## 2022-06-13 VITALS
RESPIRATION RATE: 18 BRPM | HEIGHT: 70 IN | TEMPERATURE: 97.2 F | BODY MASS INDEX: 29.42 KG/M2 | OXYGEN SATURATION: 97 % | WEIGHT: 205.5 LBS | DIASTOLIC BLOOD PRESSURE: 78 MMHG | SYSTOLIC BLOOD PRESSURE: 130 MMHG | HEART RATE: 67 BPM

## 2022-06-13 VITALS
OXYGEN SATURATION: 97 % | DIASTOLIC BLOOD PRESSURE: 78 MMHG | WEIGHT: 205.5 LBS | TEMPERATURE: 97.2 F | HEIGHT: 70 IN | HEART RATE: 67 BPM | SYSTOLIC BLOOD PRESSURE: 130 MMHG | BODY MASS INDEX: 29.42 KG/M2

## 2022-06-13 DIAGNOSIS — C61 ADENOCARCINOMA OF PROSTATE, STAGE 4 (HCC): ICD-10-CM

## 2022-06-13 DIAGNOSIS — E87.6 HYPOKALEMIA: ICD-10-CM

## 2022-06-13 DIAGNOSIS — Z79.899 ENCOUNTER FOR MEDICATION MANAGEMENT: ICD-10-CM

## 2022-06-13 DIAGNOSIS — R22.0 OCCIPITAL MASS: ICD-10-CM

## 2022-06-13 DIAGNOSIS — R73.9 HYPERGLYCEMIA: ICD-10-CM

## 2022-06-13 DIAGNOSIS — C7A.8 LARGE CELL NEUROENDOCRINE CARCINOMA (HCC): ICD-10-CM

## 2022-06-13 DIAGNOSIS — C77.9 REGIONAL LYMPH NODE METASTASIS PRESENT (HCC): ICD-10-CM

## 2022-06-13 DIAGNOSIS — C61 ADENOCARCINOMA OF PROSTATE, STAGE 4 (HCC): Primary | ICD-10-CM

## 2022-06-13 DIAGNOSIS — C79.82 METASTATIC ADENOCARCINOMA TO PROSTATE (HCC): ICD-10-CM

## 2022-06-13 DIAGNOSIS — R59.0 MEDIASTINAL LYMPHADENOPATHY: ICD-10-CM

## 2022-06-13 LAB
ALBUMIN SERPL-MCNC: 4.2 G/DL (ref 3.5–5)
ALBUMIN/GLOB SERPL: 1.2 {RATIO} (ref 1.1–2.2)
ALP SERPL-CCNC: 91 U/L (ref 45–117)
ALT SERPL-CCNC: 32 U/L (ref 12–78)
ANION GAP SERPL CALC-SCNC: 7 MMOL/L (ref 5–15)
AST SERPL-CCNC: 63 U/L (ref 15–37)
BASOPHILS # BLD: 0.1 K/UL (ref 0–0.1)
BASOPHILS NFR BLD: 1 % (ref 0–1)
BILIRUB SERPL-MCNC: 0.9 MG/DL (ref 0.2–1)
BUN SERPL-MCNC: 23 MG/DL (ref 6–20)
BUN/CREAT SERPL: 17 (ref 12–20)
CALCIUM SERPL-MCNC: 9.5 MG/DL (ref 8.5–10.1)
CHLORIDE SERPL-SCNC: 99 MMOL/L (ref 97–108)
CO2 SERPL-SCNC: 28 MMOL/L (ref 21–32)
CREAT SERPL-MCNC: 1.37 MG/DL (ref 0.7–1.3)
DIFFERENTIAL METHOD BLD: ABNORMAL
EOSINOPHIL # BLD: 0.1 K/UL (ref 0–0.4)
EOSINOPHIL NFR BLD: 1 % (ref 0–7)
ERYTHROCYTE [DISTWIDTH] IN BLOOD BY AUTOMATED COUNT: 15.3 % (ref 11.5–14.5)
GLOBULIN SER CALC-MCNC: 3.6 G/DL (ref 2–4)
GLUCOSE SERPL-MCNC: 153 MG/DL (ref 65–100)
HCT VFR BLD AUTO: 31.6 % (ref 36.6–50.3)
HGB BLD-MCNC: 10.5 G/DL (ref 12.1–17)
IMM GRANULOCYTES # BLD AUTO: 0 K/UL (ref 0–0.04)
IMM GRANULOCYTES NFR BLD AUTO: 1 % (ref 0–0.5)
LYMPHOCYTES # BLD: 0.8 K/UL (ref 0.8–3.5)
LYMPHOCYTES NFR BLD: 13 % (ref 12–49)
MAGNESIUM SERPL-MCNC: 2.3 MG/DL (ref 1.6–2.4)
MCH RBC QN AUTO: 30.3 PG (ref 26–34)
MCHC RBC AUTO-ENTMCNC: 33.2 G/DL (ref 30–36.5)
MCV RBC AUTO: 91.3 FL (ref 80–99)
MONOCYTES # BLD: 0.4 K/UL (ref 0–1)
MONOCYTES NFR BLD: 6 % (ref 5–13)
NEUTS SEG # BLD: 5.2 K/UL (ref 1.8–8)
NEUTS SEG NFR BLD: 78 % (ref 32–75)
NRBC # BLD: 0 K/UL (ref 0–0.01)
NRBC BLD-RTO: 0 PER 100 WBC
PHOSPHATE SERPL-MCNC: 2.4 MG/DL (ref 2.6–4.7)
PLATELET # BLD AUTO: 156 K/UL (ref 150–400)
PMV BLD AUTO: 11.2 FL (ref 8.9–12.9)
POTASSIUM SERPL-SCNC: 3.4 MMOL/L (ref 3.5–5.1)
PROT SERPL-MCNC: 7.8 G/DL (ref 6.4–8.2)
PSA SERPL-MCNC: 4.2 NG/ML (ref 0.01–4)
RBC # BLD AUTO: 3.46 M/UL (ref 4.1–5.7)
SODIUM SERPL-SCNC: 134 MMOL/L (ref 136–145)
WBC # BLD AUTO: 6.5 K/UL (ref 4.1–11.1)

## 2022-06-13 PROCEDURE — 77030016057 HC NDL HUBR APOL -B

## 2022-06-13 PROCEDURE — 36591 DRAW BLOOD OFF VENOUS DEVICE: CPT

## 2022-06-13 PROCEDURE — G8427 DOCREV CUR MEDS BY ELIG CLIN: HCPCS | Performed by: INTERNAL MEDICINE

## 2022-06-13 PROCEDURE — 74011000250 HC RX REV CODE- 250: Performed by: INTERNAL MEDICINE

## 2022-06-13 PROCEDURE — 84402 ASSAY OF FREE TESTOSTERONE: CPT

## 2022-06-13 PROCEDURE — 80053 COMPREHEN METABOLIC PANEL: CPT

## 2022-06-13 PROCEDURE — 84153 ASSAY OF PSA TOTAL: CPT

## 2022-06-13 PROCEDURE — 85025 COMPLETE CBC W/AUTO DIFF WBC: CPT

## 2022-06-13 PROCEDURE — 84100 ASSAY OF PHOSPHORUS: CPT

## 2022-06-13 PROCEDURE — 1123F ACP DISCUSS/DSCN MKR DOCD: CPT | Performed by: INTERNAL MEDICINE

## 2022-06-13 PROCEDURE — G9717 DOC PT DX DEP/BP F/U NT REQ: HCPCS | Performed by: INTERNAL MEDICINE

## 2022-06-13 PROCEDURE — G8417 CALC BMI ABV UP PARAM F/U: HCPCS | Performed by: INTERNAL MEDICINE

## 2022-06-13 PROCEDURE — 83735 ASSAY OF MAGNESIUM: CPT

## 2022-06-13 PROCEDURE — 99215 OFFICE O/P EST HI 40 MIN: CPT | Performed by: INTERNAL MEDICINE

## 2022-06-13 PROCEDURE — 3017F COLORECTAL CA SCREEN DOC REV: CPT | Performed by: INTERNAL MEDICINE

## 2022-06-13 PROCEDURE — G0463 HOSPITAL OUTPT CLINIC VISIT: HCPCS | Performed by: NURSE PRACTITIONER

## 2022-06-13 PROCEDURE — 74011250636 HC RX REV CODE- 250/636: Performed by: INTERNAL MEDICINE

## 2022-06-13 PROCEDURE — 74011250637 HC RX REV CODE- 250/637: Performed by: NURSE PRACTITIONER

## 2022-06-13 PROCEDURE — 1101F PT FALLS ASSESS-DOCD LE1/YR: CPT | Performed by: INTERNAL MEDICINE

## 2022-06-13 PROCEDURE — G8752 SYS BP LESS 140: HCPCS | Performed by: INTERNAL MEDICINE

## 2022-06-13 PROCEDURE — G8754 DIAS BP LESS 90: HCPCS | Performed by: INTERNAL MEDICINE

## 2022-06-13 PROCEDURE — G8536 NO DOC ELDER MAL SCRN: HCPCS | Performed by: INTERNAL MEDICINE

## 2022-06-13 RX ORDER — SODIUM CHLORIDE 9 MG/ML
10 INJECTION INTRAMUSCULAR; INTRAVENOUS; SUBCUTANEOUS AS NEEDED
Status: DISCONTINUED | OUTPATIENT
Start: 2022-06-13 | End: 2022-06-14 | Stop reason: HOSPADM

## 2022-06-13 RX ORDER — HEPARIN 100 UNIT/ML
500 SYRINGE INTRAVENOUS AS NEEDED
Status: DISCONTINUED | OUTPATIENT
Start: 2022-06-13 | End: 2022-06-14 | Stop reason: HOSPADM

## 2022-06-13 RX ORDER — POTASSIUM CHLORIDE 750 MG/1
20 TABLET, FILM COATED, EXTENDED RELEASE ORAL
Status: COMPLETED | OUTPATIENT
Start: 2022-06-13 | End: 2022-06-13

## 2022-06-13 RX ADMIN — SODIUM CHLORIDE, PRESERVATIVE FREE 10 ML: 5 INJECTION INTRAVENOUS at 11:58

## 2022-06-13 RX ADMIN — Medication 500 UNITS: at 11:57

## 2022-06-13 RX ADMIN — POTASSIUM CHLORIDE 20 MEQ: 750 TABLET, EXTENDED RELEASE ORAL at 11:56

## 2022-06-13 NOTE — PROGRESS NOTES
Hailey Aden is a 78 y.o. male here for follow up of prostate cancer. Patient with no complaints of pain at this time.

## 2022-06-13 NOTE — PROGRESS NOTES
[facility] Progress Note    Date: 2022    Name: Sylvester Oliver    MRN: [id number]         : 1955    Mr. Deven Robb Arrived ambulatory and in no distress for PF + Labs + PO Potassium. Assessment & port access completed by KALANI Ogden RN. Patient proceed to appointment with Dr. Tania Delacruz. Orders received to hold xgeva today until dental eval completed. Mr. Armando Parada vitals were reviewed. Patient Vitals for the past 12 hrs:   Temp Pulse Resp BP SpO2   22 1001 97.2 °F (36.2 °C) 67 18 130/78 97 %       Lab results were obtained and reviewed. Recent Results (from the past 12 hour(s))   CBC WITH AUTOMATED DIFF    Collection Time: 22 10:04 AM   Result Value Ref Range    WBC 6.5 4.1 - 11.1 K/uL    RBC 3.46 (L) 4.10 - 5.70 M/uL    HGB 10.5 (L) 12.1 - 17.0 g/dL    HCT 31.6 (L) 36.6 - 50.3 %    MCV 91.3 80.0 - 99.0 FL    MCH 30.3 26.0 - 34.0 PG    MCHC 33.2 30.0 - 36.5 g/dL    RDW 15.3 (H) 11.5 - 14.5 %    PLATELET 954 906 - 088 K/uL    MPV 11.2 8.9 - 12.9 FL    NRBC 0.0 0  WBC    ABSOLUTE NRBC 0.00 0.00 - 0.01 K/uL    NEUTROPHILS 78 (H) 32 - 75 %    LYMPHOCYTES 13 12 - 49 %    MONOCYTES 6 5 - 13 %    EOSINOPHILS 1 0 - 7 %    BASOPHILS 1 0 - 1 %    IMMATURE GRANULOCYTES 1 (H) 0.0 - 0.5 %    ABS. NEUTROPHILS 5.2 1.8 - 8.0 K/UL    ABS. LYMPHOCYTES 0.8 0.8 - 3.5 K/UL    ABS. MONOCYTES 0.4 0.0 - 1.0 K/UL    ABS. EOSINOPHILS 0.1 0.0 - 0.4 K/UL    ABS. BASOPHILS 0.1 0.0 - 0.1 K/UL    ABS. IMM.  GRANS. 0.0 0.00 - 0.04 K/UL    DF AUTOMATED     METABOLIC PANEL, COMPREHENSIVE    Collection Time: 22 10:04 AM   Result Value Ref Range    Sodium 134 (L) 136 - 145 mmol/L    Potassium 3.4 (L) 3.5 - 5.1 mmol/L    Chloride 99 97 - 108 mmol/L    CO2 28 21 - 32 mmol/L    Anion gap 7 5 - 15 mmol/L    Glucose 153 (H) 65 - 100 mg/dL    BUN 23 (H) 6 - 20 MG/DL    Creatinine 1.37 (H) 0.70 - 1.30 MG/DL    BUN/Creatinine ratio 17 12 - 20      GFR est AA >60 >60 ml/min/1.73m2    GFR est non-AA 52 (L) >60 ml/min/1.73m2    Calcium 9.5 8.5 - 10.1 MG/DL    Bilirubin, total 0.9 0.2 - 1.0 MG/DL    ALT (SGPT) 32 12 - 78 U/L    AST (SGOT) 63 (H) 15 - 37 U/L    Alk. phosphatase 91 45 - 117 U/L    Protein, total 7.8 6.4 - 8.2 g/dL    Albumin 4.2 3.5 - 5.0 g/dL    Globulin 3.6 2.0 - 4.0 g/dL    A-G Ratio 1.2 1.1 - 2.2     MAGNESIUM    Collection Time: 06/13/22 10:04 AM   Result Value Ref Range    Magnesium 2.3 1.6 - 2.4 mg/dL   PHOSPHORUS    Collection Time: 06/13/22 10:04 AM   Result Value Ref Range    Phosphorus 2.4 (L) 2.6 - 4.7 MG/DL       Medications:    Medications Administered     0.9% sodium chloride injection 10 mL     Admin Date  06/13/2022 Action  Given Dose  10 mL Route  IntraVENous Administered By  Lori Sandoval RN          heparin (porcine) pf 500 Units     Admin Date  06/13/2022 Action  Given Dose  500 Units Route  IntraVENous Administered By  Lori Sandoval RN          potassium chloride SR (KLOR-CON 10) tablet 20 mEq     Admin Date  06/13/2022 Action  Given Dose  20 mEq Route  Oral Administered By  Lori Sandoval RN                  Mr. Cindy Ring tolerated treatment well and was discharged from [place] [address] in stable condition. Port de-accessed, flushed & heparinized per protocol. He is to return on 7/11/22 for his next appointment.     Agnieszka Youngblood RN  June 13, 2022

## 2022-06-14 LAB — TESTOST FREE SERPL-MCNC: <0.2 PG/ML (ref 6.6–18.1)

## 2022-06-16 ENCOUNTER — TELEPHONE (OUTPATIENT)
Dept: ONCOLOGY | Age: 67
End: 2022-06-16

## 2022-06-16 NOTE — TELEPHONE ENCOUNTER
Patient left a voicemail stating that he has found a dentist. Dr. Lizbet Douglass ([phone]). He is also going to schedule his scans for the week of July 4th. Please advise.

## 2022-06-16 NOTE — TELEPHONE ENCOUNTER
06/16/22 2:37 PM Received incoming call from Dr. Bayron Cook (dentist). He discussed pt has extensive periodontal disease and a lesion on his lower right jaw. Discussed I would be ordering Xgeva for the patient. Dr. Bayron Cook feels comfortable performing dental extractions, if needed, while patient is on Xgeva, due to decreased risk of osteonecrosis (vs. Zometa). Will hold Xgeva if future dental extraction or surgery is required.

## 2022-06-20 ENCOUNTER — APPOINTMENT (OUTPATIENT)
Dept: INFUSION THERAPY | Age: 67
End: 2022-06-20

## 2022-06-22 NOTE — PROGRESS NOTES
[employer]  Medical Oncology at [facility]  [phone]    Hematology / Oncology Established Visit    Reason for Visit:   Leonor Dunne is a 78 y.o. male who is seen for follow up of neuroendocrine carcinoma. Initially referred by Dr. Dallas Roy. 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 Flow cytometry negative for lymphoproliferative disorder. FoundationOne: MS Stable, TMB 0, MDM4 amplification, MYC amplification, TMPRSS2 TMPRSS2-ERD fusion, CEBPA 1311fs*10, ERBB4 amplification - equivocal, MCL1 amplification, [id number] amplification, RAD21 amplification. See scanned report for full info. Abbreviated [facility] Consultation:  Final diagnosis: Right external iliac node: Metastatic prostate adenocarcinoma. Comment: Histologic sections of the right external iliac node show sheets of cells with minimal eosinophilic cytoplasm and variable cytologic atypia. Some areas show acinar formation. The cells have predominantly round nuclei with vesicular chromatin and prominent nucleoli. Some areas show significantly more atypia with more irregular nuclear borders, hyperchromatic nyclei, and increase nuclear to cytoplasmic ratio. Frequent mitotic figures are identified. A provided pane of IHC stains is reviewed and demonstrates that the malignant cells show strong, diffuse expression of pan-cytokeratin and focal expression of TTF. CK7, CK20, chromogranin, and synaptophysin are negative in the lesional cells. CD3 and CD20 are negative in the lesions cells and positive in the background lymphocytes.  An abbreviated FoundationOne report was included, which included a TMPRSS2-ERG fusion (among other nonspecific abnormalities), which si found in approximately 50% of prostate cancers. Additional IHC studies performed at [facility] showed that the malignant cells have immunoreactivity with antibodies for PSA and PSAP, consistent with a diagnosis of metastatic prostate adenocarcinoma. Per report, flow cytometry negative for lymphoma. Overall, the histomorphology, immunophenotype and genomic findings in this case are diagnostic of metastatic prostate adenocarcinoma. The acinar formation and cytology were suggestive of this diagnosis, and the diffuse PSA and PSAP positivity as well as the TMPRSS2-ERG fusion confirmed the diagnosis. This fusion is the most common genomic alteration in prostate carcinoma and can be detected in over half of the cases, yet is not seen with any frequency in other types of carcinoma. Prior Treatment:   1. Carbo-Etoposide-Atezolizumab x 2 cycles, 6/29/20-7/20/20. 2. Docetaxel x 6 cycles, 8/10/20-11/24/20. Current Treatment: Zytiga 1000mg/d and prednisone 5mg BID, started 4/29/22 - current. Lupron every 12 weeks, started 8/2020-current      History of Present Illness:   Rj Wagoner is a 78 y.o. male who comes in for follow up of poorly differentiated prostate cancer. Pt noticed a nontender bulge in his LLQ in 5/2020. He thought this was a hernia and was evaluated by Dr. Jason Cruz. CT on 5/20/20 was notable for extensive lymphadenopathy in abd/pelvis. Pt underwent robot assisted excisional Right external iliac LN biopsy 5/29/20, which showed poorly differentiated large cell neuroendocrine carcinoma. However, pathology reviewed at [facility] and felt to be poorly differentiated prostate adenocarcinoma. He reports intentional weight loss with dietary changes and exercise, losing 40-lbs in past 8 months. No n/v/d, melena/hematochezia, cough, SOB. No fevers, chills, sweats. [employer] nonsmoker. Mother had breast cancer diagnosed age < 48. Twin brother had melanoma diagnosed aged late 46s.   He does not think he has ever had a colonoscopy or PSA screening prior to current diagnosis. Interval History:  Patient here for follow up of prostate cancer. Continues on Zytiga and prednisone. Followed up with GI and plans to have screening colonoscopy in August with [facility]. He followed up with dentist - he is unclear if there is a plan for dental extractions. Reports intermittent tingling in penis - asks if this is a side effect. No dysuria, hematuria, urinary frequency. Reports increased weakness, not riding bike. Reports improved energy level, balance. Good appetite - weight stable. No fevers, chills, SOB or CP. Of note, received incoming call from Dr. Abbi Hannah (dentist). He discussed pt has extensive periodontal disease and a lesion on his lower right jaw. Discussed I would be ordering Xgeva for the patient. He feels comfortable performing dental extractions, if needed, while patient is on Xgeva, due to decreased risk of osteonecrosis (vs. Zometa). Will hold Xgeva if future dental extraction or surgery is required. PMHx: Prostate cancer, HTN  PSurgHx: appendectomy, vasectomy, subcutaneous cyst removal on forehead  SHx:  Never smoker, no EtOH currently after prior alcohol abuse. Unmarried. Has 2 children, 27 and 32. 1 lives in [place]. FHx:  Mother had breast cancer, father had prostate cancer, brother has melanoma, COPD. Review of Systems: A complete review of systems was obtained, negative except as described above. Physical Exam:     Visit Vitals  BP (!) 154/92   Pulse 60   Temp 97.7 °F (36.5 °C)   Resp 16   Ht 5' 10\" (1.778 m)   Wt 200 lb (90.7 kg)   SpO2 95%   BMI 28.70 kg/m²     ECOG PS: 0  General: Well developed, no acute distress  Eyes: Anicteric sclerae  Lymphatic: No cervical, supraclavicular adenopathy  Respiratory: CTAB, normal respiratory effort  CV: Normal rate, regular rhythm, no murmurs, no edema, port upper chest.    GI: firm, nontender, nondistended, +BS  MS: Normal gait and station.  Digits without clubbing or cyanosis. Skin:  Fingernails dark, thickened at distal half and more normal at proximal half. No ecchymoses, or petechiae. Normal temperature, turgor. Diffuse dry skin. Vertical scab on R shin. Large nodule present on left forearm with surrounding pink scar tissue. Neuro/Psych: Alert, oriented. Moves all 4 extremities. Appropriate affect, normal judgment/insight. Results:     Lab Results   Component Value Date/Time    WBC 5.7 07/11/2022 11:05 AM    HGB 11.1 (L) 07/11/2022 11:05 AM    HCT 32.8 (L) 07/11/2022 11:05 AM    PLATELET 209 27/43/2095 11:05 AM    MCV 88.6 07/11/2022 11:05 AM    ABS. NEUTROPHILS 4.2 07/11/2022 11:05 AM     Lab Results   Component Value Date/Time    Sodium 133 (L) 07/11/2022 11:05 AM    Potassium 3.4 (L) 07/11/2022 11:05 AM    Chloride 94 (L) 07/11/2022 11:05 AM    CO2 28 07/11/2022 11:05 AM    Glucose 167 (H) 07/11/2022 11:05 AM    BUN 14 07/11/2022 11:05 AM    Creatinine 1.30 07/11/2022 11:05 AM    GFR est AA >60 07/11/2022 11:05 AM    GFR est non-AA 55 (L) 07/11/2022 11:05 AM    Calcium 9.3 07/11/2022 11:05 AM    Creatinine (POC) 1.30 04/15/2022 08:49 AM     Lab Results   Component Value Date/Time    Bilirubin, total 0.8 07/11/2022 11:05 AM    ALT (SGPT) 25 07/11/2022 11:05 AM    Alk.  phosphatase 82 07/11/2022 11:05 AM    Protein, total 7.7 07/11/2022 11:05 AM    Albumin 4.1 07/11/2022 11:05 AM    Globulin 3.6 07/11/2022 11:05 AM     Lab Results   Component Value Date/Time    Iron 82 04/18/2022 10:47 AM    TIBC 415 04/18/2022 10:47 AM    Iron % saturation 20 04/18/2022 10:47 AM    Ferritin 268 04/18/2022 10:47 AM       Lab Results   Component Value Date/Time    Vitamin B12 312 04/18/2022 10:47 AM    Folate 9.6 04/18/2022 10:47 AM     Lab Results   Component Value Date/Time    TSH 1.72 06/29/2020 09:58 AM     No results found for: HAMAT, HAAB, HABT, HAAT, HBSAG, HBSB, HBSAT, HBABN, HBCM, HBCAB, HBCAT, XBCABS, HBEAB, HBEAG, XHEPCS, [id number], HBEGLT, HBCMLT, HBCLT, HBEBLT, [id number], [id number], [address] [place] [address], [id number], HBCMLT, [id number], HCGAT     20: Chromogranin 42    20: PSA 38.6  20: PSA 13.2   8/10/20: PSA 1.2  20: PSA 0.5  20: PSA 0.4  10/15/20: PSA 0.3  20: PSA 0.3  20: 0.2  21: PSA 0.1    3/22/21 PSA 0.066 (PSA ultrasensitive at [facility])   21 PSA 0.1, testosterone < 3  21 PSA 0.1  21 PSA 0.2  10/28/21: PSA 0.4  22: PSA 2.3  3/9/22: PSA 9.1   22 PSA 25.3  22 started Zytiga + prednisone   22 PSA 7.2    Imagin/20/20 CT abd/pelvis with IV contrast:  Impression:  1. No findings to suggest gross abdominal wall hernia or flank hernia. 2. Extensive adenopathy throughout the abdomen and pelvis as described. Findings are concerning for possible metastatic disease or lymphoma. Recommend follow up or comparison with prior studies. 3. Other findings as described. 20 PET:  FINDINGS:  HEAD/NECK: No apparent foci of abnormal hypermetabolism. Cerebral evaluation is  limited by normal intense activity. CHEST: Hypermetabolic lymphadenopathy at the base of the left neck and in the  left supraclavicular region is noted. Hypermetabolic superior mediastinal,  prevascular, right paratracheal, subcarinal, and bilateral hilar lymph  lymphadenopathy, maximum SUV of the subcarinal lymph node is 5.7. Small but  hypermetabolic soft tissue nodule left anterior chest wall. ABDOMEN/PELVIS: Hypermetabolic retrocrural, left para-aortic, aortocaval,  bilateral common iliac, bilateral external iliac, and bilateral inguinal  lymphadenopathy, maximum SUV 5.8 of an aortocaval lymph node. Hypermetabolic  mesenteric lymph node left lower quadrant, maximum SUV 12.3. SKELETON: No foci of abnormal hypermetabolism in the axial and visualized  appendicular skeleton.   IMPRESSION: Hypermetabolic lymphadenopathy involving the left neck and left  supraclavicular region, mediastinum, bilateral hilum, retroperitoneum,  mesentery, and pelvis as described above. Hypermetabolic soft tissue nodule left  chest.    Brain MRI 6/27/20: IMPRESSION:  There is no evidence of intracranial metastatic disease. Mild chronic microvascular ischemic change. No intracranial mass, hemorrhage or evidence of acute infarction. CT c/a/p 8/3/20: IMPRESSION:  Chest:  1. Findings consistent with partial treatment response, with interval decrease  in size of mediastinal and hilar lymph nodes, and interval decrease in size of  left chest wall nodule. Abdomen/pelvis:   1. Findings consistent with partial treatment response, with interval decrease  in size of retroperitoneal lymph nodes. 2. Unchanged circumferential bladder wall thickening, which may represent acute  or chronic cystitis. Correlate with urinalysis. RECIST   TARGET LESIONS:      Lesion (description)         Location (series/slice)                Size            1. Posterior mediastinal lymph node    series 2 image 26    2.3 x 2.0 cm, previously 3.3 x 2.2 cm  2. Left upper paraesophageal lymph node    series 2 image 9    5 mm, previously 15 mm. 3. Right external iliac lymphadenopathy    series 2 image 98    3.8 x 3.2 cm, previously 6.3 x 4.7 cm  4. Left external iliac lymphadenopathy    series 2 image 99    1.6 cm, previously 2.5 cm    9/1/20 Bone Scan:  FINDINGS: There is physiologic uptake of radiotracer in the skeleton and soft tissues. There is no evidence of fracture, osteomyelitis or bone tumor. There is no pattern of skeletal metastases. IMPRESSION: Normal Whole Body Nuclear Bone Scan.    10/29/20 CT c/a/p:  Lesion (description):     Location (series/slice):  Size   1. Posterior mediastinal lymph node   2/27            1.1 x 1.4 cm, previously 2.3 x 2.0 cm (2/26)  2. Left upper paraesophageal lymph node  2/9           2 mm, previously 5 mm (2/9)   3. Right external iliac lymphadenopathy 2/21                3.8 x 3.2 cm (2/98)  4.  Left external iliac lymphadenopathy  2/99              11 mm, previously 16 mm (2/99)   IMPRESSION:  1. Findings consistent with treatment response, with interval decrease in size  of mediastinal, retroperitoneal, and pelvic lymph nodes and no CT evidence of  new metastatic disease within the thorax, abdomen, or pelvis. 2. Unchanged circumferential bladder wall thickening, which may represent acute  or chronic cystitis.      2/1/21 CT c/a/p:  RECIST   TARGET LESIONS:  Lesion (description)         Location (series/slice)                Size      1. Left upper esophageal lymph node    series 2 image 10    1 mm previously measuring 2 mm  2. Azygos esophageal lymph node    2, 30    7 x 7 mm previously measuring 11 x 14 mm  3. Right external iliac lymph node    2, 102    27 x 23 mm previously 27 x 31 mm  4. Left external iliac lymph node    2, 102    8 mm previously 11 mm  NONTARGET LESIONS:  None  IMPRESSION  1. Further decrease size of adenopathy as described. 2.  No evidence of new metastatic disease. 2/1/21 Bone scan:  Normal whole-body nuclear bone scan.    2/20/21 Brain MRI:  IMPRESSION  No acute intracranial findings no mass    5/11/21 CT c/a/p:  RECIST   TARGET LESIONS:  Lesion (description)         Location (series/slice)                Size      1. Upper left paraesophageal lymph node    series 2 image 13    2 mm unchanged  2. Azygoesophageal lymph node    series 2 image 33 measuring 7 x 5 mm     unchanged  3. Right external iliac lymph node    series 2 image 103    27 x 22 mm unchanged  4. Left external iliac lymph node    series 2 image 104    12 x 7 mm unchanged  NONTARGET LESIONS:  None  IMPRESSION  1. No evidence of new metastatic disease. Stable normal-sized lymph nodes in  the mediastinum and enlarged right external iliac lymph node. 2.  Other incidental findings as above    5/11/21 Bone scan:  FINDINGS: Physiologic uptake of radiotracer is seen in the skeleton and soft  tissues. There is no evidence for fracture or metastatic disease.   Incidental  renal imaging shows no abnormality. IMPRESSION  No definite evidence of bony metastatic disease. 7/29/21 CT ch/abd/pelvis:  RECIST   TARGET LESIONS:  Lesion (description)         Location (series/slice)                Size      1.  azygo-esophageal lymph node, unchanged    series 2, image 29    9 mm  2. Right external iliac lymph node, decreased    series 2, image 100    16 mm  NONTARGET LESIONS:  IMPRESSION  1. No evidence of new metastatic disease. Stable to slight decrease in size of  several target lymph nodes as described above. 2.  Unchanged nonacute findings as above. 7/29/21 Bone scan:  IMPRESSION  Normal whole-body nuclear bone scan. 10/27/21 CT ch/abd/pelv:  RECIST:  Target:  Azygo-esophageal recess LN                (2,33) 9mm  Right external iliac lymph node               (2,104) 16 mm  Nontarget:  IMPRESSION  No interval change. 10/2721 bone scan:   FINDINGS: Physiologic uptake of radiotracer is seen in the skeleton and soft  tissues. There is no evidence for fracture or metastatic disease. Incidental  renal imaging shows no abnormality. IMPRESSION  Normal whole-body nuclear bone scan. 1/17/22 CT ch/abd/pelv:  IMPRESSION  1. Right lower paratracheal lymph node previously measured is now normal in size. 2.  Right external iliac lymph node is enlarged, stable at 14 mm in short axis dimension. 3.  No osseous metastatic disease. 4.  2 new foci of groundglass in the right upper lungs as noted above. Attention on follow-up recommended. RECIST:  Right external iliac lymph node, 2:107, 14 mm, previously 16 mm.    1/17/22 Bone Scan:  IMPRESSION  No significant change. No definite evidence of bony metastatic disease. 4/15/22 CT ch/abd/pelv:  IMPRESSION  1. Findings concerning for progression of disease. 2.  Bilateral new pulmonary nodules measuring up to 7 mm. Previously seen right  upper lobe opacity has nearly resolved.   3.  1.9 x 2.6 cm segment 3 hepatic lesion, possibly new, not well characterized  on prior. 4.  Multiple confluent right retroperitoneal nodes and right psoas intramuscular  deposits. 5.  New subcutaneous left gluteal 1.7 cm soft tissue deposit. 6.  No evidence of osseous metastatic disease. 7.  Additional findings as above. RECIST   TARGET LESIONS:  Lesion (description)         Location (series/slice)                Size      1. 1.9 x 2.6 cm segment 3 hepatic lesion    2:58  not well seen previously,  possibly new   2. 1.5 x 1.6 cm left retroperitoneal node    2:72    new  3. 2.9 x 5.7 cm right psoas deposit    2:76    new  4. 2.0 x 2.0 right pericaval node    2:77    new  5. 1.5 x 2.3 cm right external iliac node    2:102    previously 1.4 x 1.5 cm  6. 1.7 x 1.7 cm left posterior gluteal subcutaneous nodule 2:90 new  NONTARGET LESIONS:  1. Multiple new pulmonary nodules measuring up to 7 mm. 4/15/22 Bone scan:  IMPRESSION  1. There is a new focus of increased activity overlying right occipital bone  which could represent a metastatic lesion and CT is recommended for further  assessment. There is also question of a tiny focus in a right 6 rib posterolaterally which  is nonspecific and no CT correlate is identified and follow-up studies would be  helpful to assess for interval change. 23X    5/19/22 US left forearm:  FINDINGS: In the left forearm, there is a well-circumscribed echogenic 1.3 x 0.7x 1.6 cm avascular subcutaneous mass. IMPRESSION: Subcutaneous lipoma in the left forearm. 7/7/22 Bone scan:   IMPRESSION  1. The focus of increased bony activity right occipital bone appears slightly  larger. 2. Small focus of activity right sixth rib posterolaterally is stable. CT  demonstrates small focus of sclerosis in this region. Findings are suspicious  for bony metastatic disease. No new foci of increased bony activity identified. 7/7/22 CT head:  IMPRESSION  1. No evidence of acute intracranial process.   2. No lesion to correspond to the previously seen abnormality in the right  posterior skull on bone scan.     7/7/22 CT ch/abd/pelv:  IMPRESSION  Response to treatment characterized by decrease in nodularity in the left  gluteal region, decrease in size of retroperitoneal lymph nodes, and decreased  enlargement of the right psoas muscle. No new lung nodules or mediastinal lymphadenopathy. RECIST:  Target lesions:  Left retroperitoneal lymph node, 2:74, 14 x 12 mm, previously 16 x 15 mm. Right psoas enlargement, 2:76, 53 x 23 mm, previously 57 x 29 mm. Right pericaval lymph node, 2:77, 17 x 15 mm, previously 20 x 20 mm. Right external iliac lymph node, 2:90, 26 x 20 mm, previously 35 x 18 mm. Nontarget lesions:  Lung nodules have resolved. Right posterior lateral sixth rib sclerotic lesion is new.       Assessment & Plan:   Rosa Artis is a 78 y.o. 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 1. Metastatic adenocarcinoma of prostate, high grade:  Hormone resistant. Initial pathologist at [address] [place] [address] called this \"Large cell neuroendocrine carcinoma,\" and based on diffuse disease on PET, patient was started on treatment with Carbo-Etoposide-Atezolizumab (thinking this was large cell carcinoma of the lung.) However FoundationOne testing identified TMPRSS2-ERG fusion, which is primarily seen in prostate cancers. This along with elevated PSA of 38 prompted sending of pathology specimen to [facility] for review. Their opinion and IHC staining is consistent with high grade adenocarcinoma of prostate. After Carbo-Etop-Atezo, pt was treated with Docetaxel x 6 cycles. Treatment options now include: Carboplatin + Cabazitaxel. 4/15/22 CT shows disease progression with a new liver and lung mets; bone scan with possible new occipital and possible rib met.  Initiated Zytiga + prednisone on 4/29/22.   7/2022 CT shows response to treatment with decrease in size of lymph nodes, gluteal mass, stable rib lesion and possible increase in occipital bone lesion. Supportive medications: EMLA cream.   -- Lupron (3 month depot)  #8 given 4/19/22, #9 due today 7/11/22. -- Continue Zytiga 1000mg once daily plus Prednisone 5mg bid, started 4/29/22  -- Next CT and bone scan due late 9/2022. -- Return in 4 weeks labs, MD/NP visit. 2. Chemotherapy induced neuropathy:  Grade 1. Present in bilateral fingertips. He is dropping objects less often. 3. CKD:   Likely has mild underlying kidney dysfunction. Continue good PO hydration. -- Advised follow up with PCP for management. 4. Depression/anxiety / h/o Genital Herpes:   2/2 to #1. On Zoloft 50mg/d. Following with Palliative medicine. On suppression with Acyclovir. 5. Dysequilibrium / Ear effusions / Hyponychium:    Improving. Past Brain MRI negative for mets. Left tympanostomy tube placed by ENT for bilat effusions. Hyponychium improving - prn antifungal topical.     6. Health maintenance:   Discussed healthy food options, diet and exercise. Never had screening colonoscopy - reasonable to do now given prognosis from metastatic prostate cancer measured in years rather than months. -- Plans to have screening colonoscopy in August 2022 wit [facility]. 7. Normocytic anemia:   Likely due to #1. Iron profile, ferritin, b12 and folate normal. Monitor with monthly labs. 8. Osteopenia / bone mets:   Occipital and rib met seen on recent imaging. On calcium/D3 supplementation. Evaluated by Dr. Kenneth Patel (dentist) and will eventually require extractions, treatment of severe periodontal disease, bridge may fail soon, but he advised ok to proceed with Xgeva. Discussed higher incidence of ONJ in patients on Denosumab with patient and he voices understanding. Wants to proceed with Xgeva. -- Xgeva every 4 weeks. Proceed with #1 today. #2 due 8/8/22.     9. HTN:  Moderately controlled on Amlodipine 5mg/d.   -- Advised increasing amlodipine to 89ZU given systolic remains >599. Rx sent.      11. Hyperglycemia / hypokalemia:  Likely due to prednisone. Roxane Crockett prn visits. Encouraged foods high in potassium. Emotional well being: Pt is coping well with his/her disease and has excellent support. Significant other: Storm Herrera - [phone]. I personally saw and evaluated the patient and performed the key components of medical decision making. The history, physical exam, and documentation were performed by Hector Lancaster NP. I reviewed and verified the above documentation and modified it as needed. Pt has had good response on imaging and PSA testing thus far after starting Zytiga + Prednisone. He will continue on this. Reminded pt on correct Prednisone dosing. Will switch Xgeva to q4wk given bone metastases.  Counseled on risk of ONJ and discussed with dentist.    Signed By: Lisa Polk MD     July 11, 2022

## 2022-06-30 ENCOUNTER — TELEPHONE (OUTPATIENT)
Dept: INTERNAL MEDICINE CLINIC | Age: 67
End: 2022-06-30

## 2022-06-30 NOTE — TELEPHONE ENCOUNTER
Called patient to check in and see how things were going with his blood sugars. I met with him in fall of 2020 regarding his blood sugars. Left message for patient to return call. Trey Reyna, AdelaD, [facility], [address] [place] [address] in place:  Yes   Recommendation Provided To: Patient/Caregiver: 0 via Telephone   Intervention Accepted By: Patient/Caregiver: 0   Time Spent (min): 5

## 2022-07-07 ENCOUNTER — HOSPITAL ENCOUNTER (OUTPATIENT)
Dept: NUCLEAR MEDICINE | Age: 67
Discharge: HOME | End: 2022-07-07
Attending: NURSE PRACTITIONER
Payer: MEDICARE

## 2022-07-07 ENCOUNTER — HOSPITAL ENCOUNTER (OUTPATIENT)
Dept: CT IMAGING | Age: 67
Discharge: HOME | End: 2022-07-07
Attending: NURSE PRACTITIONER
Payer: MEDICARE

## 2022-07-07 DIAGNOSIS — C61 ADENOCARCINOMA OF PROSTATE, STAGE 4 (HCC): ICD-10-CM

## 2022-07-07 DIAGNOSIS — R22.0 OCCIPITAL MASS: ICD-10-CM

## 2022-07-07 PROCEDURE — 74177 CT ABD & PELVIS W/CONTRAST: CPT

## 2022-07-07 PROCEDURE — 70450 CT HEAD/BRAIN W/O DYE: CPT

## 2022-07-07 PROCEDURE — 78306 BONE IMAGING WHOLE BODY: CPT

## 2022-07-07 PROCEDURE — 74011000636 HC RX REV CODE- 636: Performed by: NURSE PRACTITIONER

## 2022-07-07 RX ADMIN — IOPAMIDOL 100 ML: 755 INJECTION, SOLUTION INTRAVENOUS at 12:59

## 2022-07-11 ENCOUNTER — HOSPITAL ENCOUNTER (OUTPATIENT)
Dept: INFUSION THERAPY | Age: 67
Discharge: HOME | End: 2022-07-11
Payer: MEDICARE

## 2022-07-11 ENCOUNTER — OFFICE VISIT (OUTPATIENT)
Dept: ONCOLOGY | Age: 67
End: 2022-07-11
Payer: MEDICARE

## 2022-07-11 VITALS
TEMPERATURE: 97.7 F | DIASTOLIC BLOOD PRESSURE: 92 MMHG | SYSTOLIC BLOOD PRESSURE: 154 MMHG | HEART RATE: 60 BPM | BODY MASS INDEX: 28.63 KG/M2 | OXYGEN SATURATION: 95 % | HEIGHT: 70 IN | WEIGHT: 200 LBS | RESPIRATION RATE: 16 BRPM

## 2022-07-11 VITALS
SYSTOLIC BLOOD PRESSURE: 154 MMHG | HEART RATE: 60 BPM | WEIGHT: 200 LBS | DIASTOLIC BLOOD PRESSURE: 92 MMHG | TEMPERATURE: 97.7 F | HEIGHT: 70 IN | RESPIRATION RATE: 16 BRPM | OXYGEN SATURATION: 95 % | BODY MASS INDEX: 28.63 KG/M2

## 2022-07-11 DIAGNOSIS — E83.39 LOW PHOSPHATE LEVELS: Primary | ICD-10-CM

## 2022-07-11 DIAGNOSIS — C61 ADENOCARCINOMA OF PROSTATE, STAGE 4 (HCC): ICD-10-CM

## 2022-07-11 DIAGNOSIS — E87.6 HYPOKALEMIA: ICD-10-CM

## 2022-07-11 DIAGNOSIS — C77.9 REGIONAL LYMPH NODE METASTASIS PRESENT (HCC): ICD-10-CM

## 2022-07-11 DIAGNOSIS — C7A.8 LARGE CELL NEUROENDOCRINE CARCINOMA (HCC): ICD-10-CM

## 2022-07-11 DIAGNOSIS — Z79.899 ENCOUNTER FOR MEDICATION MANAGEMENT: ICD-10-CM

## 2022-07-11 DIAGNOSIS — R59.0 MEDIASTINAL LYMPHADENOPATHY: ICD-10-CM

## 2022-07-11 DIAGNOSIS — C79.51 BONE METASTASES (HCC): ICD-10-CM

## 2022-07-11 DIAGNOSIS — C61 ADENOCARCINOMA OF PROSTATE, STAGE 4 (HCC): Primary | ICD-10-CM

## 2022-07-11 DIAGNOSIS — C79.82 METASTATIC ADENOCARCINOMA TO PROSTATE (HCC): Primary | ICD-10-CM

## 2022-07-11 DIAGNOSIS — I10 PRIMARY HYPERTENSION: ICD-10-CM

## 2022-07-11 DIAGNOSIS — M85.80 OSTEOPENIA, UNSPECIFIED LOCATION: ICD-10-CM

## 2022-07-11 LAB
ALBUMIN SERPL-MCNC: 4.1 G/DL (ref 3.5–5)
ALBUMIN/GLOB SERPL: 1.1 {RATIO} (ref 1.1–2.2)
ALP SERPL-CCNC: 82 U/L (ref 45–117)
ALT SERPL-CCNC: 25 U/L (ref 12–78)
ANION GAP SERPL CALC-SCNC: 11 MMOL/L (ref 5–15)
AST SERPL-CCNC: 53 U/L (ref 15–37)
BASOPHILS # BLD: 0.1 K/UL (ref 0–0.1)
BASOPHILS NFR BLD: 1 % (ref 0–1)
BILIRUB SERPL-MCNC: 0.8 MG/DL (ref 0.2–1)
BUN SERPL-MCNC: 14 MG/DL (ref 6–20)
BUN/CREAT SERPL: 11 (ref 12–20)
CALCIUM SERPL-MCNC: 9.3 MG/DL (ref 8.5–10.1)
CHLORIDE SERPL-SCNC: 94 MMOL/L (ref 97–108)
CO2 SERPL-SCNC: 28 MMOL/L (ref 21–32)
CREAT SERPL-MCNC: 1.3 MG/DL (ref 0.7–1.3)
DIFFERENTIAL METHOD BLD: ABNORMAL
EOSINOPHIL # BLD: 0.1 K/UL (ref 0–0.4)
EOSINOPHIL NFR BLD: 2 % (ref 0–7)
ERYTHROCYTE [DISTWIDTH] IN BLOOD BY AUTOMATED COUNT: 14.7 % (ref 11.5–14.5)
GLOBULIN SER CALC-MCNC: 3.6 G/DL (ref 2–4)
GLUCOSE SERPL-MCNC: 167 MG/DL (ref 65–100)
HCT VFR BLD AUTO: 32.8 % (ref 36.6–50.3)
HGB BLD-MCNC: 11.1 G/DL (ref 12.1–17)
IMM GRANULOCYTES # BLD AUTO: 0.1 K/UL (ref 0–0.04)
IMM GRANULOCYTES NFR BLD AUTO: 1 % (ref 0–0.5)
LYMPHOCYTES # BLD: 0.8 K/UL (ref 0.8–3.5)
LYMPHOCYTES NFR BLD: 14 % (ref 12–49)
MAGNESIUM SERPL-MCNC: 2.1 MG/DL (ref 1.6–2.4)
MCH RBC QN AUTO: 30 PG (ref 26–34)
MCHC RBC AUTO-ENTMCNC: 33.8 G/DL (ref 30–36.5)
MCV RBC AUTO: 88.6 FL (ref 80–99)
MONOCYTES # BLD: 0.4 K/UL (ref 0–1)
MONOCYTES NFR BLD: 7 % (ref 5–13)
NEUTS SEG # BLD: 4.2 K/UL (ref 1.8–8)
NEUTS SEG NFR BLD: 75 % (ref 32–75)
NRBC # BLD: 0 K/UL (ref 0–0.01)
NRBC BLD-RTO: 0 PER 100 WBC
PHOSPHATE SERPL-MCNC: 2.3 MG/DL (ref 2.6–4.7)
PLATELET # BLD AUTO: 225 K/UL (ref 150–400)
PMV BLD AUTO: 11 FL (ref 8.9–12.9)
POTASSIUM SERPL-SCNC: 3.4 MMOL/L (ref 3.5–5.1)
PROT SERPL-MCNC: 7.7 G/DL (ref 6.4–8.2)
PSA SERPL-MCNC: 2.9 NG/ML (ref 0.01–4)
RBC # BLD AUTO: 3.7 M/UL (ref 4.1–5.7)
RBC MORPH BLD: ABNORMAL
SODIUM SERPL-SCNC: 133 MMOL/L (ref 136–145)
WBC # BLD AUTO: 5.7 K/UL (ref 4.1–11.1)
WBC MORPH BLD: ABNORMAL

## 2022-07-11 PROCEDURE — 99215 OFFICE O/P EST HI 40 MIN: CPT | Performed by: INTERNAL MEDICINE

## 2022-07-11 PROCEDURE — 80053 COMPREHEN METABOLIC PANEL: CPT

## 2022-07-11 PROCEDURE — G8755 DIAS BP > OR = 90: HCPCS | Performed by: INTERNAL MEDICINE

## 2022-07-11 PROCEDURE — G8417 CALC BMI ABV UP PARAM F/U: HCPCS | Performed by: INTERNAL MEDICINE

## 2022-07-11 PROCEDURE — G0463 HOSPITAL OUTPT CLINIC VISIT: HCPCS | Performed by: INTERNAL MEDICINE

## 2022-07-11 PROCEDURE — G8427 DOCREV CUR MEDS BY ELIG CLIN: HCPCS | Performed by: INTERNAL MEDICINE

## 2022-07-11 PROCEDURE — 77030016057 HC NDL HUBR APOL -B

## 2022-07-11 PROCEDURE — 1123F ACP DISCUSS/DSCN MKR DOCD: CPT | Performed by: INTERNAL MEDICINE

## 2022-07-11 PROCEDURE — G8536 NO DOC ELDER MAL SCRN: HCPCS | Performed by: INTERNAL MEDICINE

## 2022-07-11 PROCEDURE — G9717 DOC PT DX DEP/BP F/U NT REQ: HCPCS | Performed by: INTERNAL MEDICINE

## 2022-07-11 PROCEDURE — 84403 ASSAY OF TOTAL TESTOSTERONE: CPT

## 2022-07-11 PROCEDURE — 83735 ASSAY OF MAGNESIUM: CPT

## 2022-07-11 PROCEDURE — 74011250636 HC RX REV CODE- 250/636: Performed by: NURSE PRACTITIONER

## 2022-07-11 PROCEDURE — G8753 SYS BP > OR = 140: HCPCS | Performed by: INTERNAL MEDICINE

## 2022-07-11 PROCEDURE — 85025 COMPLETE CBC W/AUTO DIFF WBC: CPT

## 2022-07-11 PROCEDURE — 84100 ASSAY OF PHOSPHORUS: CPT

## 2022-07-11 PROCEDURE — 36415 COLL VENOUS BLD VENIPUNCTURE: CPT

## 2022-07-11 PROCEDURE — 84153 ASSAY OF PSA TOTAL: CPT

## 2022-07-11 PROCEDURE — 96372 THER/PROPH/DIAG INJ SC/IM: CPT

## 2022-07-11 PROCEDURE — 1101F PT FALLS ASSESS-DOCD LE1/YR: CPT | Performed by: INTERNAL MEDICINE

## 2022-07-11 PROCEDURE — 3017F COLORECTAL CA SCREEN DOC REV: CPT | Performed by: INTERNAL MEDICINE

## 2022-07-11 PROCEDURE — 96402 CHEMO HORMON ANTINEOPL SQ/IM: CPT

## 2022-07-11 RX ORDER — POTASSIUM CHLORIDE 750 MG/1
20 TABLET, FILM COATED, EXTENDED RELEASE ORAL
Status: DISCONTINUED | OUTPATIENT
Start: 2022-07-11 | End: 2022-07-12 | Stop reason: HOSPADM

## 2022-07-11 RX ORDER — DIPHENHYDRAMINE HYDROCHLORIDE 50 MG/ML
25 INJECTION, SOLUTION INTRAMUSCULAR; INTRAVENOUS AS NEEDED
Status: CANCELLED
Start: 2022-09-05

## 2022-07-11 RX ORDER — DIPHENHYDRAMINE HYDROCHLORIDE 50 MG/ML
50 INJECTION, SOLUTION INTRAMUSCULAR; INTRAVENOUS AS NEEDED
Status: CANCELLED
Start: 2022-09-05

## 2022-07-11 RX ORDER — HYDROCORTISONE SODIUM SUCCINATE 100 MG/2ML
100 INJECTION, POWDER, FOR SOLUTION INTRAMUSCULAR; INTRAVENOUS AS NEEDED
Status: CANCELLED | OUTPATIENT
Start: 2022-09-05

## 2022-07-11 RX ORDER — ONDANSETRON 2 MG/ML
8 INJECTION INTRAMUSCULAR; INTRAVENOUS AS NEEDED
Status: CANCELLED | OUTPATIENT
Start: 2022-09-05

## 2022-07-11 RX ORDER — ALBUTEROL SULFATE 0.83 MG/ML
2.5 SOLUTION RESPIRATORY (INHALATION) AS NEEDED
Status: CANCELLED
Start: 2022-09-05

## 2022-07-11 RX ORDER — EPINEPHRINE 1 MG/ML
0.3 INJECTION, SOLUTION, CONCENTRATE INTRAVENOUS AS NEEDED
Status: CANCELLED | OUTPATIENT
Start: 2022-09-05

## 2022-07-11 RX ORDER — ACETAMINOPHEN 325 MG/1
650 TABLET ORAL AS NEEDED
Status: CANCELLED
Start: 2022-09-05

## 2022-07-11 RX ORDER — AMLODIPINE BESYLATE 10 MG/1
10 TABLET ORAL DAILY
Qty: 30 TABLET | Refills: 0 | Status: SHIPPED | OUTPATIENT
Start: 2022-07-11 | End: 2022-08-14

## 2022-07-11 RX ADMIN — LEUPROLIDE ACETATE 22.5 MG: KIT at 12:32

## 2022-07-11 RX ADMIN — DENOSUMAB 120 MG: 120 INJECTION SUBCUTANEOUS at 12:36

## 2022-07-11 NOTE — PATIENT INSTRUCTIONS
1. Your blood pressure remains elevated. Please increase your amlodipine to 10mg daily. You can take 2 x 5mg tabs daily until you run out, then  new prescription, which will be for 10mg tabs. 2. Please make sure you are taking prednisone 5mg daily. 3. Let me know if you have dental extractions planned in the future.  I will have to hold your Xgeva (medication given to strengthen bones)

## 2022-07-11 NOTE — PROGRESS NOTES
Yao Mcmillan is a 75 y.o. male follow up for neuroendocrine carcinoma.          Vitals 7/11/2022   Blood Pressure 154/92   Pulse 60   Temp 97.7   Resp 16   Height 5' 10\"   Weight 200 lb   SpO2 95   BSA 2.12 m2   BMI 28.7 kg/m2   BP comment

## 2022-07-11 NOTE — PROGRESS NOTES
[facility] Progress Note    Date: 2022    Name: Melly Loza    MRN: [id number]         : 1955    Mr. Jeff Iglesias arrived ambulatory and in no distress for C9D1 of Lupron Regimen and Xgeva. Assessment was completed, no acute issues at this time, no new complaints voiced. Right chest wall port accessed without difficulty, labs drawn & sent for processing. Chemotherapy Flowsheet 2022   Cycle C9D1    Date 2022   Drug / Regimen Lupron   Dosage -   Time Up -   Time Down -   Pre Hydration -   Post Hydration -   Pre Meds -   Notes LGM/Xgeva       Patient proceed to appointment with Dr. Clark Yip. Mr. Sourav Godfrey vitals were reviewed. Visit Vitals  BP (!) 154/92 (BP 1 Location: Right upper arm, BP Patient Position: Sitting)   Pulse 60   Temp 97.7 °F (36.5 °C)   Resp 16   Ht 5' 10\" (1.778 m)   Wt 90.7 kg (200 lb)   SpO2 95%   BMI 28.70 kg/m²       Lab results were obtained and reviewed. Recent Results (from the past 12 hour(s))   MAGNESIUM    Collection Time: 22 11:05 AM   Result Value Ref Range    Magnesium 2.1 1.6 - 2.4 mg/dL   PHOSPHORUS    Collection Time: 22 11:05 AM   Result Value Ref Range    Phosphorus 2.3 (L) 2.6 - 4.7 MG/DL   CBC WITH AUTOMATED DIFF    Collection Time: 22 11:05 AM   Result Value Ref Range    WBC 5.7 4.1 - 11.1 K/uL    RBC 3.70 (L) 4.10 - 5.70 M/uL    HGB 11.1 (L) 12.1 - 17.0 g/dL    HCT 32.8 (L) 36.6 - 50.3 %    MCV 88.6 80.0 - 99.0 FL    MCH 30.0 26.0 - 34.0 PG    MCHC 33.8 30.0 - 36.5 g/dL    RDW 14.7 (H) 11.5 - 14.5 %    PLATELET 888 911 - 512 K/uL    MPV 11.0 8.9 - 12.9 FL    NRBC 0.0 0  WBC    ABSOLUTE NRBC 0.00 0.00 - 0.01 K/uL    NEUTROPHILS 75 32 - 75 %    LYMPHOCYTES 14 12 - 49 %    MONOCYTES 7 5 - 13 %    EOSINOPHILS 2 0 - 7 %    BASOPHILS 1 0 - 1 %    IMMATURE GRANULOCYTES 1 (H) 0.0 - 0.5 %    ABS. NEUTROPHILS 4.2 1.8 - 8.0 K/UL    ABS. LYMPHOCYTES 0.8 0.8 - 3.5 K/UL    ABS. MONOCYTES 0.4 0.0 - 1.0 K/UL    ABS.  EOSINOPHILS 0.1 0.0 - 0.4 K/UL ABS. BASOPHILS 0.1 0.0 - 0.1 K/UL    ABS. IMM. GRANS. 0.1 (H) 0.00 - 0.04 K/UL    DF SMEAR SCANNED      RBC COMMENTS NORMOCYTIC, NORMOCHROMIC      WBC COMMENTS REACTIVE LYMPHS     METABOLIC PANEL, COMPREHENSIVE    Collection Time: 07/11/22 11:05 AM   Result Value Ref Range    Sodium 133 (L) 136 - 145 mmol/L    Potassium 3.4 (L) 3.5 - 5.1 mmol/L    Chloride 94 (L) 97 - 108 mmol/L    CO2 28 21 - 32 mmol/L    Anion gap 11 5 - 15 mmol/L    Glucose 167 (H) 65 - 100 mg/dL    BUN 14 6 - 20 MG/DL    Creatinine 1.30 0.70 - 1.30 MG/DL    BUN/Creatinine ratio 11 (L) 12 - 20      GFR est AA >60 >60 ml/min/1.73m2    GFR est non-AA 55 (L) >60 ml/min/1.73m2    Calcium 9.3 8.5 - 10.1 MG/DL    Bilirubin, total 0.8 0.2 - 1.0 MG/DL    ALT (SGPT) 25 12 - 78 U/L    AST (SGOT) 53 (H) 15 - 37 U/L    Alk. phosphatase 82 45 - 117 U/L    Protein, total 7.7 6.4 - 8.2 g/dL    Albumin 4.1 3.5 - 5.0 g/dL    Globulin 3.6 2.0 - 4.0 g/dL    A-G Ratio 1.1 1.1 - 2.2         Medications:  Medications Administered     denosumab (XGEVA) injection 120 mg     Admin Date  07/11/2022 Action  Given Dose  120 mg Route  SubCUTAneous Administered By  Logan Gutierrez RN          leuprolide depot (LUPRON 3 MONTH) injection sykt 22.5 mg     Admin Date  07/11/2022 Action  Given Dose  22.5 mg Route  IntraMUSCular Administered By  Logan Gutierrez RN                      Mr. Ander Gomez tolerated treatment well and was discharged from [place] [address] in stable condition at [phone]. Port de-accessed, flushed & heparinized per protocol. He is to return on September 6 at 1000 for his next appointment.     Faby George RN  July 11, 2022

## 2022-07-13 LAB
TESTOST FREE SERPL-MCNC: <0.2 PG/ML (ref 6.6–18.1)
TESTOST SERPL-MCNC: <3 NG/DL (ref 264–916)

## 2022-07-25 DIAGNOSIS — E87.6 HYPOKALEMIA: ICD-10-CM

## 2022-07-25 RX ORDER — POTASSIUM CHLORIDE 1500 MG/1
TABLET, FILM COATED, EXTENDED RELEASE ORAL
Qty: 90 TABLET | Refills: 0 | Status: SHIPPED | OUTPATIENT
Start: 2022-07-25 | End: 2022-10-20

## 2022-08-03 NOTE — PROGRESS NOTES
[employer]  Medical Oncology at [facility]  [phone]    Hematology / Oncology Established Visit    Reason for Visit:   Chan Hurtado is a 75 y.o. male who is seen for follow up of neuroendocrine carcinoma. Initially referred by Dr. Ivone Gonzalez. 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 Flow cytometry negative for lymphoproliferative disorder. FoundationOne: MS Stable, TMB 0, MDM4 amplification, MYC amplification, TMPRSS2 TMPRSS2-ERD fusion, CEBPA 1311fs*10, ERBB4 amplification - equivocal, MCL1 amplification, XUK7D1V amplification, RAD21 amplification. See scanned report for full info. Abbreviated [facility] Pathology Consultation:  Final diagnosis: Right external iliac node: Metastatic prostate adenocarcinoma. Comment: Histologic sections of the right external iliac node show sheets of cells with minimal eosinophilic cytoplasm and variable cytologic atypia. Some areas show acinar formation. The cells have predominantly round nuclei with vesicular chromatin and prominent nucleoli. Some areas show significantly more atypia with more irregular nuclear borders, hyperchromatic nyclei, and increase nuclear to cytoplasmic ratio. Frequent mitotic figures are identified. A provided pane of IHC stains is reviewed and demonstrates that the malignant cells show strong, diffuse expression of pan-cytokeratin and focal expression of TTF. CK7, CK20, chromogranin, and synaptophysin are negative in the lesional cells. CD3 and CD20 are negative in the lesions cells and positive in the background lymphocytes.  An abbreviated FoundationOne report was included, which included a TMPRSS2-ERG fusion (among other nonspecific abnormalities), which si found in approximately 50% of prostate cancers. Additional IHC studies performed at [facility] showed that the malignant cells have immunoreactivity with antibodies for PSA and PSAP, consistent with a diagnosis of metastatic prostate adenocarcinoma. Per report, flow cytometry negative for lymphoma. Overall, the histomorphology, immunophenotype and genomic findings in this case are diagnostic of metastatic prostate adenocarcinoma. The acinar formation and cytology were suggestive of this diagnosis, and the diffuse PSA and PSAP positivity as well as the TMPRSS2-ERG fusion confirmed the diagnosis. This fusion is the most common genomic alteration in prostate carcinoma and can be detected in over half of the cases, yet is not seen with any frequency in other types of carcinoma. Prior Treatment:   1. Carbo-Etoposide-Atezolizumab x 2 cycles, 6/29/20-7/20/20. 2. Docetaxel x 6 cycles, 8/10/20-11/24/20. Current Treatment: Zytiga 1000mg/d and prednisone 5mg BID, started 4/29/22 - current. Lupron every 12 weeks, started 8/2020-current      History of Present Illness:   Ibeth Ryan is a 75 y.o. male who comes in for follow up of poorly differentiated prostate cancer. Pt noticed a nontender bulge in his LLQ in 5/2020. He thought this was a hernia and was evaluated by Dr. Schuyler Angeles. CT on 5/20/20 was notable for extensive lymphadenopathy in abd/pelvis. Pt underwent robot assisted excisional Right external iliac LN biopsy 5/29/20, which showed poorly differentiated large cell neuroendocrine carcinoma. However, pathology reviewed at [facility] and felt to be poorly differentiated prostate adenocarcinoma. He reports intentional weight loss with dietary changes and exercise, losing 40-lbs in past 8 months. No n/v/d, melena/hematochezia, cough, SOB. No fevers, chills, sweats. [employer] nonsmoker. Mother had breast cancer diagnosed age < 48. Twin brother had melanoma diagnosed aged late 46s.   He does not think he has ever had a colonoscopy or PSA screening prior to current diagnosis. Interval History:  Patient here for follow up of prostate cancer. Continues on Zytiga and prednisone. States he is taking zytiga as prescribed. Reports feeling well. No new concerns. Mild diarrhea. Denies pain today. Denies fatigue, hot flashes, night sweats. Mild itching, rash. States he wants to improve his core strength. PMHx: Prostate cancer, HTN  PSurgHx: appendectomy, vasectomy, subcutaneous cyst removal on forehead  SHx:  Never smoker, no EtOH currently after prior alcohol abuse. Unmarried. Has 2 children, 27 and 32. 1 lives in [place]. FHx:  Mother had breast cancer, father had prostate cancer, brother has melanoma, COPD. Review of Systems: A complete review of systems was obtained, negative except as described above. Physical Exam:     Visit Vitals  BP (!) 150/92 Comment: twice   Pulse 72   Temp 97.5 °F (36.4 °C)   Resp 18   Ht 5' 10\" (1.778 m)   Wt 202 lb (91.6 kg)   SpO2 94%   BMI 28.98 kg/m²       ECOG PS: 0  General: Well developed, no acute distress  Eyes: Anicteric sclerae  Lymphatic: No cervical, supraclavicular adenopathy  Respiratory: CTAB, normal respiratory effort  CV: Normal rate, regular rhythm, no murmurs, no edema, port upper chest.    GI: firm, nontender, nondistended, +BS  MS: Normal gait and station. Digits without clubbing or cyanosis. Skin:  Fingernails dark, thickened at distal half and more normal at proximal half. No ecchymoses, or petechiae. Normal temperature, turgor. Diffuse dry skin. Vertical scab on R shin. Large nodule present on left forearm with surrounding pink scar tissue. Neuro/Psych: Alert, oriented. Moves all 4 extremities. Appropriate affect, normal judgment/insight.     Results:     Lab Results   Component Value Date/Time    WBC 6.4 08/08/2022 10:23 AM    HGB 11.0 (L) 08/08/2022 10:23 AM    HCT 32.4 (L) 08/08/2022 10:23 AM    PLATELET 894 60/37/3827 10:23 AM    MCV 88.8 08/08/2022 10:23 AM ABS. NEUTROPHILS 4.1 2022 10:23 AM     Lab Results   Component Value Date/Time    Sodium 133 (L) 2022 10:23 AM    Potassium 3.7 2022 10:23 AM    Chloride 96 (L) 2022 10:23 AM    CO2 28 2022 10:23 AM    Glucose 119 (H) 2022 10:23 AM    BUN 25 (H) 2022 10:23 AM    Creatinine 1.29 2022 10:23 AM    GFR est AA >60 2022 10:23 AM    GFR est non-AA 55 (L) 2022 10:23 AM    Calcium 9.3 2022 10:23 AM    Creatinine (POC) 1.30 04/15/2022 08:49 AM     Lab Results   Component Value Date/Time    Bilirubin, total 0.8 2022 10:23 AM    ALT (SGPT) 27 2022 10:23 AM    Alk. phosphatase 85 2022 10:23 AM    Protein, total 8.0 2022 10:23 AM    Albumin 4.3 2022 10:23 AM    Globulin 3.7 2022 10:23 AM     Lab Results   Component Value Date/Time    Iron 82 2022 10:47 AM    TIBC 415 2022 10:47 AM    Iron % saturation 20 2022 10:47 AM    Ferritin 268 2022 10:47 AM       Lab Results   Component Value Date/Time    Vitamin B12 312 2022 10:47 AM    Folate 9.6 2022 10:47 AM     Lab Results   Component Value Date/Time    TSH 1.72 2020 09:58 AM     No results found for: HAMAT, HAAB, HABT, HAAT, HBSAG, HBSB, HBSAT, HBABN, HBCM, HBCAB, HBCAT, XBCABS, HBEAB, HBEAG, XHEPCS, [id number], [facility], HBCLT, HBEBLT, [id number], [id number], [address] [place] [address], [facility], HBCMLT, [id number], HCGAT     20: Chromogranin 42    20: PSA 38.6  20: PSA 13.2   8/10/20: PSA 1.2  20: PSA 0.5  20: PSA 0.4  10/15/20: PSA 0.3  20: PSA 0.3  20: 0.2  21: PSA 0.1    3/22/21 PSA 0.066 (PSA ultrasensitive at [address] [facility] urology)   21 PSA 0.1, testosterone < 3  21 PSA 0.1  21 PSA 0.2  10/28/21: PSA 0.4  22: PSA 2.3  3/9/22: PSA 9.1   22 PSA 25.3  22 started Zytiga + prednisone   22 PSA 7.2  22 PSA 4.2  22 PSA 2.9    Imagin/20/20 CT abd/pelvis with IV contrast:  1.  No findings to suggest gross abdominal wall hernia or flank hernia. 2. Extensive adenopathy throughout the abdomen and pelvis as described. Findings are concerning for possible metastatic disease or lymphoma. Recommend follow up or comparison with prior studies. 6/18/20 PET:  FINDINGS:  HEAD/NECK: No apparent foci of abnormal hypermetabolism. Cerebral evaluation is  limited by normal intense activity. CHEST: Hypermetabolic lymphadenopathy at the base of the left neck and in the  left supraclavicular region is noted. Hypermetabolic superior mediastinal,  prevascular, right paratracheal, subcarinal, and bilateral hilar lymph  lymphadenopathy, maximum SUV of the subcarinal lymph node is 5.7. Small but  hypermetabolic soft tissue nodule left anterior chest wall. ABDOMEN/PELVIS: Hypermetabolic retrocrural, left para-aortic, aortocaval,  bilateral common iliac, bilateral external iliac, and bilateral inguinal  lymphadenopathy, maximum SUV 5.8 of an aortocaval lymph node. Hypermetabolic  mesenteric lymph node left lower quadrant, maximum SUV 12.3. SKELETON: No foci of abnormal hypermetabolism in the axial and visualized  appendicular skeleton. IMPRESSION: Hypermetabolic lymphadenopathy involving the left neck and left  supraclavicular region, mediastinum, bilateral hilum, retroperitoneum,  mesentery, and pelvis as described above. Hypermetabolic soft tissue nodule left  chest.    Brain MRI 6/27/20: There is no evidence of intracranial metastatic disease. Mild chronic microvascular ischemic change. No intracranial mass, hemorrhage or evidence of acute infarction. CT c/a/p 8/3/20:  Chest:  1. Findings consistent with partial treatment response, with interval decrease in size of mediastinal and hilar lymph nodes, and interval decrease in size of left chest wall nodule. Abdomen/pelvis:   1. Findings consistent with partial treatment response, with interval decrease in size of retroperitoneal lymph nodes.   2. Unchanged circumferential bladder wall thickening, which may represent acute or chronic cystitis. Correlate with urinalysis. RECIST   TARGET LESIONS:      Lesion (description)         Location (series/slice)                Size            1. Posterior mediastinal lymph node    series 2 image 26    2.3 x 2.0 cm, previously 3.3 x 2.2 cm  2. Left upper paraesophageal lymph node    series 2 image 9    5 mm, previously 15 mm. 3. Right external iliac lymphadenopathy    series 2 image 98    3.8 x 3.2 cm, previously 6.3 x 4.7 cm  4. Left external iliac lymphadenopathy    series 2 image 99    1.6 cm, previously 2.5 cm    9/1/20 Bone Scan:  Normal Whole Body Nuclear Bone Scan.    10/29/20 CT c/a/p:  Lesion (description):     Location (series/slice):  Size   1. Posterior mediastinal lymph node   2/27            1.1 x 1.4 cm, previously 2.3 x 2.0 cm (2/26)  2. Left upper paraesophageal lymph node  2/9           2 mm, previously 5 mm (2/9)   3. Right external iliac lymphadenopathy 2/21                3.8 x 3.2 cm (2/98)  4. Left external iliac lymphadenopathy  2/99              11 mm, previously 16 mm (2/99)   IMPRESSION:  1. Findings consistent with treatment response, with interval decrease in size of mediastinal, retroperitoneal, and pelvic lymph nodes and no CT evidence of new metastatic disease within the thorax, abdomen, or pelvis. 2. Unchanged circumferential bladder wall thickening, which may represent acute or chronic cystitis. 2/1/21 CT c/a/p:  RECIST   TARGET LESIONS:  Lesion (description)         Location (series/slice)                Size      1. Left upper esophageal lymph node    series 2 image 10    1 mm previously measuring 2 mm  2. Azygos esophageal lymph node    2, 30    7 x 7 mm previously measuring 11 x 14 mm  3. Right external iliac lymph node    2, 102    27 x 23 mm previously 27 x 31 mm  4. Left external iliac lymph node    2, 102    8 mm previously 11 mm  NONTARGET LESIONS:  None  IMPRESSION  1.   Further decrease size of adenopathy as described. 2.  No evidence of new metastatic disease. 2/1/21 Bone scan:  Normal whole-body nuclear bone scan.    2/20/21 Brain MRI:  No acute intracranial findings no mass    5/11/21 CT c/a/p:  RECIST   TARGET LESIONS:  Lesion (description)         Location (series/slice)                Size      1. Upper left paraesophageal lymph node    series 2 image 13    2 mm unchanged  2. Azygoesophageal lymph node    series 2 image 33 measuring 7 x 5 mm     unchanged  3. Right external iliac lymph node    series 2 image 103    27 x 22 mm unchanged  4. Left external iliac lymph node    series 2 image 104    12 x 7 mm unchanged  NONTARGET LESIONS:  None  IMPRESSION  1. No evidence of new metastatic disease. Stable normal-sized lymph nodes in  the mediastinum and enlarged right external iliac lymph node. 2.  Other incidental findings as above    5/11/21 Bone scan:  No definite evidence of bony metastatic disease. 7/29/21 CT ch/abd/pelvis:  RECIST   TARGET LESIONS:  Lesion (description)         Location (series/slice)                Size      1.  azygo-esophageal lymph node, unchanged    series 2, image 29    9 mm  2. Right external iliac lymph node, decreased    series 2, image 100    16 mm  NONTARGET LESIONS:  IMPRESSION  1. No evidence of new metastatic disease. Stable to slight decrease in size of  several target lymph nodes as described above. 2.  Unchanged nonacute findings as above. 7/29/21 Bone scan:  Normal whole-body nuclear bone scan. 10/27/21 CT ch/abd/pelv:  RECIST:  Target:  Azygo-esophageal recess LN                (2,33) 9mm  Right external iliac lymph node               (2,104) 16 mm  Nontarget:  IMPRESSION  No interval change. 10/2721 bone scan:   Normal whole-body nuclear bone scan. 1/17/22 CT ch/abd/pelv:  1. Right lower paratracheal lymph node previously measured is now normal in size.   2.  Right external iliac lymph node is enlarged, stable at 14 mm in short axis dimension. 3.  No osseous metastatic disease. 4.  2 new foci of groundglass in the right upper lungs as noted above. Attention on follow-up recommended. RECIST:  Right external iliac lymph node, 2:107, 14 mm, previously 16 mm.    1/17/22 Bone Scan:  No significant change. No definite evidence of bony metastatic disease. 4/15/22 CT ch/abd/pelv:  RECIST   TARGET LESIONS:  Lesion (description)         Location (series/slice)                Size      1. 1.9 x 2.6 cm segment 3 hepatic lesion    2:58  not well seen previously,  possibly new   2. 1.5 x 1.6 cm left retroperitoneal node    2:72    new  3. 2.9 x 5.7 cm right psoas deposit    2:76    new  4. 2.0 x 2.0 right pericaval node    2:77    new  5. 1.5 x 2.3 cm right external iliac node    2:102    previously 1.4 x 1.5 cm  6. 1.7 x 1.7 cm left posterior gluteal subcutaneous nodule 2:90 new  NONTARGET LESIONS:  1. Multiple new pulmonary nodules measuring up to 7 mm. 4/15/22 Bone scan:  1. There is a new focus of increased activity overlying right occipital bone which could represent a metastatic lesion and CT is recommended for further assessment. There is also question of a tiny focus in a right 6 rib posterolaterally which is nonspecific and no CT correlate is identified and follow-up studies would be helpful to assess for interval change. 23X    5/19/22 US left forearm:  Subcutaneous lipoma in the left forearm. 7/7/22 Bone scan:   1. The focus of increased bony activity right occipital bone appears slightly larger. 2. Small focus of activity right sixth rib posterolaterally is stable. CT  demonstrates small focus of sclerosis in this region. Findings are suspicious for bony metastatic disease. No new foci of increased bony activity identified. 7/7/22 CT head:  1. No evidence of acute intracranial process. 2. No lesion to correspond to the previously seen abnormality in the right posterior skull on bone scan.      7/7/22 CT ch/abd/pelv:  Response to treatment characterized by decrease in nodularity in the left gluteal region, decrease in size of retroperitoneal lymph nodes, and decreased enlargement of the right psoas muscle. No new lung nodules or mediastinal lymphadenopathy. RECIST:  Target lesions:  Left retroperitoneal lymph node, 2:74, 14 x 12 mm, previously 16 x 15 mm. Right psoas enlargement, 2:76, 53 x 23 mm, previously 57 x 29 mm. Right pericaval lymph node, 2:77, 17 x 15 mm, previously 20 x 20 mm. Right external iliac lymph node, 2:90, 26 x 20 mm, previously 35 x 18 mm. Nontarget lesions:  Lung nodules have resolved. Right posterior lateral sixth rib sclerotic lesion is new. Assessment & Plan:   Nola Tee is a 75 y.o. 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 1. Metastatic adenocarcinoma of prostate, high grade:  Hormone resistant. Initial pathologist at [facility] called this \"Large cell neuroendocrine carcinoma,\" and based on diffuse disease on PET, patient was started on treatment with Carbo-Etoposide-Atezolizumab (thinking this was large cell carcinoma of the lung.) However FoundationOne testing identified TMPRSS2-ERG fusion, which is primarily seen in prostate cancers. This along with elevated PSA of 38 prompted sending of pathology specimen to [facility] for review. Their opinion and IHC staining is consistent with high grade adenocarcinoma of prostate. After Carbo-Etop-Atezo, pt was treated with Docetaxel x 6 cycles. Treatment options now include: Carboplatin + Cabazitaxel. 4/15/22 CT shows disease progression with a new liver and lung mets; bone scan with possible new occipital and possible rib met. Initiated Zytiga + prednisone on 4/29/22.   7/2022 CT shows response to treatment with decrease in size of lymph nodes, gluteal mass, stable rib lesion and possible increase in occipital bone lesion.   Supportive medications: EMLA cream.   -- Lupron (3 month depot)  #9 given 7/11/22, #10 due 10/3/22  -- Continue Zytiga 1000mg once daily plus Prednisone 5mg bid, started 4/29/22  -- Next CT and bone scan due late 9/2022. -- Return in 4 weeks labs, MD/NP visit. 2. Chemotherapy induced neuropathy:  Grade 1. Present in bilateral fingertips. He is dropping objects less often. 3. CKD:   Likely has mild underlying kidney dysfunction. Continue good PO hydration. -- Advised follow up with PCP for management. 4. Depression/anxiety / h/o Genital Herpes:   2/2 to #1. On Zoloft 50mg/d. Following with Palliative medicine. On suppression with Acyclovir. 5. Dysequilibrium / Ear effusions / Hyponychium:    Improving. Past Brain MRI negative for mets. Left tympanostomy tube placed by ENT for bilat effusions. Hyponychium improving - prn antifungal topical.     6. Health maintenance:   Discussed healthy food options, diet and exercise. Never had screening colonoscopy - reasonable to do now given prognosis from metastatic prostate cancer measured in years rather than months. -- Plans to have screening colonoscopy 8/13/202 with [facility]. 7. Normocytic anemia:   Likely due to #1. Iron profile, ferritin, b12 and folate normal. Monitor with monthly labs. 8. Osteopenia / bone mets:   Occipital and rib met seen on recent imaging. On calcium/D3 supplementation. Evaluated by Dr. Moris Ford (dentist) and will eventually require extractions, treatment of severe periodontal disease, bridge may fail soon, but he advised ok to proceed with Xgeva. Discussed higher incidence of ONJ in patients on Denosumab with patient and he voices understanding. Wants to proceed with Xgeva. -- Xgeva every 4 weeks. #2 due today    9. HTN:  Moderately controlled on Amlodipine 5mg/d, increased to 10mg/day 7/2022. 11. Hyperglycemia / hypokalemia:  Likely due to prednisone. [address] prn visits. Encouraged foods high in potassium.        Emotional well being: Pt is coping well with his/her disease and has excellent support. Significant other: Javier Deal - [phone]. I personally saw and evaluated the patient and performed the key components of medical decision making. The history, physical exam, and documentation were performed by Louie Barragan NP. I reviewed and verified the above documentation and modified it as needed. Continue Zytiga + Prednisone + ADT which pt is tolerating well.      Signed By: Daysi Thakkar MD     August 8, 2022

## 2022-08-08 ENCOUNTER — HOSPITAL ENCOUNTER (OUTPATIENT)
Dept: INFUSION THERAPY | Age: 67
Discharge: HOME | End: 2022-08-08
Attending: NURSE PRACTITIONER
Payer: MEDICARE

## 2022-08-08 ENCOUNTER — OFFICE VISIT (OUTPATIENT)
Dept: ONCOLOGY | Age: 67
End: 2022-08-08
Payer: MEDICARE

## 2022-08-08 VITALS
HEART RATE: 72 BPM | WEIGHT: 202 LBS | BODY MASS INDEX: 28.92 KG/M2 | TEMPERATURE: 97.5 F | SYSTOLIC BLOOD PRESSURE: 150 MMHG | RESPIRATION RATE: 18 BRPM | HEIGHT: 70 IN | OXYGEN SATURATION: 94 % | DIASTOLIC BLOOD PRESSURE: 92 MMHG

## 2022-08-08 VITALS
OXYGEN SATURATION: 94 % | HEART RATE: 72 BPM | RESPIRATION RATE: 18 BRPM | BODY MASS INDEX: 29.08 KG/M2 | WEIGHT: 202.7 LBS | TEMPERATURE: 97.5 F | DIASTOLIC BLOOD PRESSURE: 94 MMHG | SYSTOLIC BLOOD PRESSURE: 150 MMHG

## 2022-08-08 DIAGNOSIS — C61 ADENOCARCINOMA OF PROSTATE, STAGE 4 (HCC): Primary | ICD-10-CM

## 2022-08-08 DIAGNOSIS — R73.9 HYPERGLYCEMIA: ICD-10-CM

## 2022-08-08 DIAGNOSIS — R59.0 MEDIASTINAL LYMPHADENOPATHY: ICD-10-CM

## 2022-08-08 DIAGNOSIS — C7A.8 LARGE CELL NEUROENDOCRINE CARCINOMA (HCC): ICD-10-CM

## 2022-08-08 DIAGNOSIS — C79.51 BONE METASTASES (HCC): ICD-10-CM

## 2022-08-08 DIAGNOSIS — Z79.899 ENCOUNTER FOR MEDICATION MANAGEMENT: ICD-10-CM

## 2022-08-08 DIAGNOSIS — C77.9 REGIONAL LYMPH NODE METASTASIS PRESENT (HCC): ICD-10-CM

## 2022-08-08 DIAGNOSIS — I10 PRIMARY HYPERTENSION: ICD-10-CM

## 2022-08-08 DIAGNOSIS — D64.9 NORMOCYTIC ANEMIA: ICD-10-CM

## 2022-08-08 DIAGNOSIS — C79.82 METASTATIC ADENOCARCINOMA TO PROSTATE (HCC): ICD-10-CM

## 2022-08-08 LAB
ALBUMIN SERPL-MCNC: 4.3 G/DL (ref 3.5–5)
ALBUMIN/GLOB SERPL: 1.2 {RATIO} (ref 1.1–2.2)
ALP SERPL-CCNC: 85 U/L (ref 45–117)
ALT SERPL-CCNC: 27 U/L (ref 12–78)
ANION GAP SERPL CALC-SCNC: 9 MMOL/L (ref 5–15)
AST SERPL-CCNC: 62 U/L (ref 15–37)
BASOPHILS # BLD: 0.1 K/UL (ref 0–0.1)
BASOPHILS NFR BLD: 1 % (ref 0–1)
BILIRUB SERPL-MCNC: 0.8 MG/DL (ref 0.2–1)
BUN SERPL-MCNC: 25 MG/DL (ref 6–20)
BUN/CREAT SERPL: 19 (ref 12–20)
CALCIUM SERPL-MCNC: 9.3 MG/DL (ref 8.5–10.1)
CHLORIDE SERPL-SCNC: 96 MMOL/L (ref 97–108)
CO2 SERPL-SCNC: 28 MMOL/L (ref 21–32)
CREAT SERPL-MCNC: 1.29 MG/DL (ref 0.7–1.3)
DIFFERENTIAL METHOD BLD: ABNORMAL
EOSINOPHIL # BLD: 0.1 K/UL (ref 0–0.4)
EOSINOPHIL NFR BLD: 1 % (ref 0–7)
ERYTHROCYTE [DISTWIDTH] IN BLOOD BY AUTOMATED COUNT: 14.9 % (ref 11.5–14.5)
GLOBULIN SER CALC-MCNC: 3.7 G/DL (ref 2–4)
GLUCOSE SERPL-MCNC: 119 MG/DL (ref 65–100)
HCT VFR BLD AUTO: 32.4 % (ref 36.6–50.3)
HGB BLD-MCNC: 11 G/DL (ref 12.1–17)
IMM GRANULOCYTES # BLD AUTO: 0.1 K/UL (ref 0–0.04)
IMM GRANULOCYTES NFR BLD AUTO: 2 % (ref 0–0.5)
LYMPHOCYTES # BLD: 1.5 K/UL (ref 0.8–3.5)
LYMPHOCYTES NFR BLD: 23 % (ref 12–49)
MAGNESIUM SERPL-MCNC: 2.2 MG/DL (ref 1.6–2.4)
MCH RBC QN AUTO: 30.1 PG (ref 26–34)
MCHC RBC AUTO-ENTMCNC: 34 G/DL (ref 30–36.5)
MCV RBC AUTO: 88.8 FL (ref 80–99)
MONOCYTES # BLD: 0.5 K/UL (ref 0–1)
MONOCYTES NFR BLD: 8 % (ref 5–13)
NEUTS SEG # BLD: 4.1 K/UL (ref 1.8–8)
NEUTS SEG NFR BLD: 65 % (ref 32–75)
NRBC # BLD: 0 K/UL (ref 0–0.01)
NRBC BLD-RTO: 0 PER 100 WBC
PHOSPHATE SERPL-MCNC: 3.2 MG/DL (ref 2.6–4.7)
PLATELET # BLD AUTO: 179 K/UL (ref 150–400)
PMV BLD AUTO: 11.2 FL (ref 8.9–12.9)
POTASSIUM SERPL-SCNC: 3.7 MMOL/L (ref 3.5–5.1)
PROT SERPL-MCNC: 8 G/DL (ref 6.4–8.2)
PSA SERPL-MCNC: 2.8 NG/ML (ref 0.01–4)
RBC # BLD AUTO: 3.65 M/UL (ref 4.1–5.7)
SODIUM SERPL-SCNC: 133 MMOL/L (ref 136–145)
WBC # BLD AUTO: 6.4 K/UL (ref 4.1–11.1)

## 2022-08-08 PROCEDURE — 3017F COLORECTAL CA SCREEN DOC REV: CPT | Performed by: INTERNAL MEDICINE

## 2022-08-08 PROCEDURE — 83735 ASSAY OF MAGNESIUM: CPT

## 2022-08-08 PROCEDURE — 85025 COMPLETE CBC W/AUTO DIFF WBC: CPT

## 2022-08-08 PROCEDURE — 84100 ASSAY OF PHOSPHORUS: CPT

## 2022-08-08 PROCEDURE — G9717 DOC PT DX DEP/BP F/U NT REQ: HCPCS | Performed by: INTERNAL MEDICINE

## 2022-08-08 PROCEDURE — G8427 DOCREV CUR MEDS BY ELIG CLIN: HCPCS | Performed by: INTERNAL MEDICINE

## 2022-08-08 PROCEDURE — G8417 CALC BMI ABV UP PARAM F/U: HCPCS | Performed by: INTERNAL MEDICINE

## 2022-08-08 PROCEDURE — 80053 COMPREHEN METABOLIC PANEL: CPT

## 2022-08-08 PROCEDURE — G8753 SYS BP > OR = 140: HCPCS | Performed by: INTERNAL MEDICINE

## 2022-08-08 PROCEDURE — 36415 COLL VENOUS BLD VENIPUNCTURE: CPT

## 2022-08-08 PROCEDURE — 74011250636 HC RX REV CODE- 250/636: Performed by: INTERNAL MEDICINE

## 2022-08-08 PROCEDURE — G8536 NO DOC ELDER MAL SCRN: HCPCS | Performed by: INTERNAL MEDICINE

## 2022-08-08 PROCEDURE — 84403 ASSAY OF TOTAL TESTOSTERONE: CPT

## 2022-08-08 PROCEDURE — 84153 ASSAY OF PSA TOTAL: CPT

## 2022-08-08 PROCEDURE — 96372 THER/PROPH/DIAG INJ SC/IM: CPT

## 2022-08-08 PROCEDURE — G8755 DIAS BP > OR = 90: HCPCS | Performed by: INTERNAL MEDICINE

## 2022-08-08 PROCEDURE — 99215 OFFICE O/P EST HI 40 MIN: CPT | Performed by: INTERNAL MEDICINE

## 2022-08-08 PROCEDURE — G0463 HOSPITAL OUTPT CLINIC VISIT: HCPCS | Performed by: INTERNAL MEDICINE

## 2022-08-08 PROCEDURE — 1123F ACP DISCUSS/DSCN MKR DOCD: CPT | Performed by: INTERNAL MEDICINE

## 2022-08-08 PROCEDURE — 1101F PT FALLS ASSESS-DOCD LE1/YR: CPT | Performed by: INTERNAL MEDICINE

## 2022-08-08 RX ORDER — HYDROCORTISONE SODIUM SUCCINATE 100 MG/2ML
100 INJECTION, POWDER, FOR SOLUTION INTRAMUSCULAR; INTRAVENOUS AS NEEDED
Status: ACTIVE | OUTPATIENT
Start: 2022-08-08 | End: 2022-08-08

## 2022-08-08 RX ORDER — ALBUTEROL SULFATE 90 UG/1
2 AEROSOL, METERED RESPIRATORY (INHALATION) AS NEEDED
Status: ACTIVE | OUTPATIENT
Start: 2022-08-08 | End: 2022-08-08

## 2022-08-08 RX ORDER — DIPHENHYDRAMINE HYDROCHLORIDE 50 MG/ML
25 INJECTION, SOLUTION INTRAMUSCULAR; INTRAVENOUS AS NEEDED
Status: CANCELLED
Start: 2022-09-05

## 2022-08-08 RX ORDER — DIPHENHYDRAMINE HYDROCHLORIDE 50 MG/ML
50 INJECTION, SOLUTION INTRAMUSCULAR; INTRAVENOUS AS NEEDED
Status: CANCELLED
Start: 2022-09-05

## 2022-08-08 RX ORDER — DIPHENHYDRAMINE HYDROCHLORIDE 50 MG/ML
50 INJECTION, SOLUTION INTRAMUSCULAR; INTRAVENOUS AS NEEDED
Status: ACTIVE | OUTPATIENT
Start: 2022-08-08 | End: 2022-08-08

## 2022-08-08 RX ORDER — DIPHENHYDRAMINE HYDROCHLORIDE 50 MG/ML
25 INJECTION, SOLUTION INTRAMUSCULAR; INTRAVENOUS AS NEEDED
Status: ACTIVE | OUTPATIENT
Start: 2022-08-08 | End: 2022-08-08

## 2022-08-08 RX ORDER — ACETAMINOPHEN 325 MG/1
650 TABLET ORAL AS NEEDED
Status: ACTIVE | OUTPATIENT
Start: 2022-08-08 | End: 2022-08-08

## 2022-08-08 RX ORDER — EPINEPHRINE 1 MG/ML
0.3 INJECTION, SOLUTION, CONCENTRATE INTRAVENOUS AS NEEDED
Status: ACTIVE | OUTPATIENT
Start: 2022-08-08 | End: 2022-08-08

## 2022-08-08 RX ORDER — ONDANSETRON 2 MG/ML
8 INJECTION INTRAMUSCULAR; INTRAVENOUS AS NEEDED
Status: CANCELLED | OUTPATIENT
Start: 2022-09-05

## 2022-08-08 RX ORDER — HYDROCORTISONE SODIUM SUCCINATE 100 MG/2ML
100 INJECTION, POWDER, FOR SOLUTION INTRAMUSCULAR; INTRAVENOUS AS NEEDED
Status: CANCELLED | OUTPATIENT
Start: 2022-09-05

## 2022-08-08 RX ORDER — ALBUTEROL SULFATE 0.83 MG/ML
2.5 SOLUTION RESPIRATORY (INHALATION) AS NEEDED
Status: CANCELLED
Start: 2022-09-05

## 2022-08-08 RX ORDER — ACETAMINOPHEN 325 MG/1
650 TABLET ORAL AS NEEDED
Status: CANCELLED
Start: 2022-09-05

## 2022-08-08 RX ORDER — ONDANSETRON 2 MG/ML
8 INJECTION INTRAMUSCULAR; INTRAVENOUS AS NEEDED
Status: ACTIVE | OUTPATIENT
Start: 2022-08-08 | End: 2022-08-08

## 2022-08-08 RX ORDER — EPINEPHRINE 1 MG/ML
0.3 INJECTION, SOLUTION, CONCENTRATE INTRAVENOUS AS NEEDED
Status: CANCELLED | OUTPATIENT
Start: 2022-09-05

## 2022-08-08 RX ADMIN — DENOSUMAB 120 MG: 120 INJECTION SUBCUTANEOUS at 12:01

## 2022-08-08 NOTE — PROGRESS NOTES
1. Have you been to the ER, urgent care clinic since your last visit? Hospitalized since your last visit? No    2. Have you seen or consulted any other health care providers outside of the [address] [place] [address] since your last visit? Include any pap smears or colon screening. No        Chief Complaint   Patient presents with    Follow-up     Follow up for labs and prostate cancer.

## 2022-08-08 NOTE — PROGRESS NOTES
[facility] Short Visit Progress Note    Patient admitted to [facility] for xgeva ambulatory in stable condition. Assessment completed. No new concerns voiced. To MD office visit with MD Alvarado. Covid Screening      1. Do you have any symptoms of COVID-19? SOB, coughing, fever, or generally not feeling well ? no  2. Have you been exposed to COVID-19 recently? no  3. Have you had any recent contact with family/friend that has a pending COVID test? no    Vital Signs:  Visit Vitals  BP (!) 150/94   Pulse 72   Temp 97.5 °F (36.4 °C)   Resp 18   Wt 91.9 kg (202 lb 11.2 oz)   SpO2 94%   BMI 29.08 kg/m²         R arm with positive blood return drawn by Fransisco Velasquez. Lab Results:  Recent Results (from the past 12 hour(s))   PHOSPHORUS    Collection Time: 08/08/22 10:23 AM   Result Value Ref Range    Phosphorus 3.2 2.6 - 4.7 MG/DL   MAGNESIUM    Collection Time: 08/08/22 10:23 AM   Result Value Ref Range    Magnesium 2.2 1.6 - 2.4 mg/dL   CBC WITH AUTOMATED DIFF    Collection Time: 08/08/22 10:23 AM   Result Value Ref Range    WBC 6.4 4.1 - 11.1 K/uL    RBC 3.65 (L) 4.10 - 5.70 M/uL    HGB 11.0 (L) 12.1 - 17.0 g/dL    HCT 32.4 (L) 36.6 - 50.3 %    MCV 88.8 80.0 - 99.0 FL    MCH 30.1 26.0 - 34.0 PG    MCHC 34.0 30.0 - 36.5 g/dL    RDW 14.9 (H) 11.5 - 14.5 %    PLATELET 375 433 - 824 K/uL    MPV 11.2 8.9 - 12.9 FL    NRBC 0.0 0  WBC    ABSOLUTE NRBC 0.00 0.00 - 0.01 K/uL    NEUTROPHILS 65 32 - 75 %    LYMPHOCYTES 23 12 - 49 %    MONOCYTES 8 5 - 13 %    EOSINOPHILS 1 0 - 7 %    BASOPHILS 1 0 - 1 %    IMMATURE GRANULOCYTES 2 (H) 0.0 - 0.5 %    ABS. NEUTROPHILS 4.1 1.8 - 8.0 K/UL    ABS. LYMPHOCYTES 1.5 0.8 - 3.5 K/UL    ABS. MONOCYTES 0.5 0.0 - 1.0 K/UL    ABS. EOSINOPHILS 0.1 0.0 - 0.4 K/UL    ABS. BASOPHILS 0.1 0.0 - 0.1 K/UL    ABS. IMM.  GRANS. 0.1 (H) 0.00 - 0.04 K/UL    DF AUTOMATED     METABOLIC PANEL, COMPREHENSIVE    Collection Time: 08/08/22 10:23 AM   Result Value Ref Range    Sodium 133 (L) 136 - 145 mmol/L    Potassium 3.7 3.5 - 5.1 mmol/L    Chloride 96 (L) 97 - 108 mmol/L    CO2 28 21 - 32 mmol/L    Anion gap 9 5 - 15 mmol/L    Glucose 119 (H) 65 - 100 mg/dL    BUN 25 (H) 6 - 20 MG/DL    Creatinine 1.29 0.70 - 1.30 MG/DL    BUN/Creatinine ratio 19 12 - 20      GFR est AA >60 >60 ml/min/1.73m2    GFR est non-AA 55 (L) >60 ml/min/1.73m2    Calcium 9.3 8.5 - 10.1 MG/DL    Bilirubin, total 0.8 0.2 - 1.0 MG/DL    ALT (SGPT) 27 12 - 78 U/L    AST (SGOT) 62 (H) 15 - 37 U/L    Alk. phosphatase 85 45 - 117 U/L    Protein, total 8.0 6.4 - 8.2 g/dL    Albumin 4.3 3.5 - 5.0 g/dL    Globulin 3.7 2.0 - 4.0 g/dL    A-G Ratio 1.2 1.1 - 2.2              Medications:  Medications Administered       denosumab (XGEVA) injection 120 mg       Admin Date  08/08/2022 Action  Given Dose  120 mg Route  SubCUTAneous Administered By  Concha Christie                      Patient tolerated treatment well. Patient discharged from [place] [address] ambulatory in no distress Patient stated the MD office will be calling him with his next appointment.     Betty Arevalo, RN    Future Appointments   Date Time Provider Ayala Marti   9/6/2022 10:30 AM SS INF4 CH4 <1H [facility]   9/6/2022 10:45 AM Onesimo Thornton MD [facility] AMB   9/19/2022 10:00 AM SS INF7 CH3 <1H [facility] [address] [place] [address]

## 2022-08-14 LAB
TESTOST FREE SERPL-MCNC: <0.2 PG/ML (ref 6.6–18.1)
TESTOST SERPL-MCNC: <3 NG/DL (ref 264–916)

## 2022-09-01 NOTE — PROGRESS NOTES
[address]  Medical Oncology at [address] [place] [address]  [phone]    Hematology / Oncology Established Visit    Reason for Visit:   Mary Alcantara is a 75 y.o. male who is seen for follow up of neuroendocrine carcinoma. Initially referred by Dr. Ramandeep Delgado. 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 Flow cytometry negative for lymphoproliferative disorder. FoundationOne: MS Stable, TMB 0, MDM4 amplification, MYC amplification, TMPRSS2 TMPRSS2-ERD fusion, CEBPA 1311fs*10, ERBB4 amplification - equivocal, MCL1 amplification, NDK0N9D amplification, RAD21 amplification. See scanned report for full info. Abbreviated [facility] Pathology Consultation:  Final diagnosis: Right external iliac node: Metastatic prostate adenocarcinoma. Comment: Histologic sections of the right external iliac node show sheets of cells with minimal eosinophilic cytoplasm and variable cytologic atypia. Some areas show acinar formation. The cells have predominantly round nuclei with vesicular chromatin and prominent nucleoli. Some areas show significantly more atypia with more irregular nuclear borders, hyperchromatic nyclei, and increase nuclear to cytoplasmic ratio. Frequent mitotic figures are identified. A provided pane of IHC stains is reviewed and demonstrates that the malignant cells show strong, diffuse expression of pan-cytokeratin and focal expression of TTF. CK7, CK20, chromogranin, and synaptophysin are negative in the lesional cells. CD3 and CD20 are negative in the lesions cells and positive in the background lymphocytes.  An abbreviated FoundationOne report was included, which included a TMPRSS2-ERG fusion (among other nonspecific abnormalities), which si found in approximately 50% of prostate cancers. Additional IHC studies performed at [facility] showed that the malignant cells have immunoreactivity with antibodies for PSA and PSAP, consistent with a diagnosis of metastatic prostate adenocarcinoma. Per report, flow cytometry negative for lymphoma. Overall, the histomorphology, immunophenotype and genomic findings in this case are diagnostic of metastatic prostate adenocarcinoma. The acinar formation and cytology were suggestive of this diagnosis, and the diffuse PSA and PSAP positivity as well as the TMPRSS2-ERG fusion confirmed the diagnosis. This fusion is the most common genomic alteration in prostate carcinoma and can be detected in over half of the cases, yet is not seen with any frequency in other types of carcinoma. Prior Treatment:   1. Carbo-Etoposide-Atezolizumab x 2 cycles, 6/29/20-7/20/20. 2. Docetaxel x 6 cycles, 8/10/20-11/24/20. Current Treatment: Zytiga 1000mg/d and prednisone 5mg BID, started 4/29/22 - current. Lupron every 12 weeks, started 8/2020-current      History of Present Illness:   Nora Kraus is a 75 y.o. male who comes in for follow up of poorly differentiated prostate cancer. Pt noticed a nontender bulge in his LLQ in 5/2020. He thought this was a hernia and was evaluated by Dr. Marco Antonio Thomas. CT on 5/20/20 was notable for extensive lymphadenopathy in abd/pelvis. Pt underwent robot assisted excisional Right external iliac LN biopsy 5/29/20, which showed poorly differentiated large cell neuroendocrine carcinoma. However, pathology reviewed at [facility] and felt to be poorly differentiated prostate adenocarcinoma. He reports intentional weight loss with dietary changes and exercise, losing 40-lbs in past 8 months. No n/v/d, melena/hematochezia, cough, SOB. No fevers, chills, sweats. [employer] nonsmoker. Mother had breast cancer diagnosed age < 48. Twin brother had melanoma diagnosed aged late 46s.   He does not think he has ever had a colonoscopy or PSA screening prior to current diagnosis. Interval History:  Patient here for follow up of prostate cancer. Continues on Zytiga and prednisone. States he is taking zytiga as prescribed. Denies SOB or cough. Reports mild exertional SOB but not worse from baseline. Reports some swelling in ankles. Denies pain, fevers, chills. Reports he had a colonoscopy a few weeks ago and was told he has one benign polyp. Reports he is trying to walk for exercise. Good appetite and stable weight. He asks about riding a bike. PMHx: Prostate cancer, HTN  PSurgHx: appendectomy, vasectomy, subcutaneous cyst removal on forehead  SHx:  Never smoker, no EtOH currently after prior alcohol abuse. Unmarried. Has 2 children, 27 and 32. 1 lives in [place]. FHx:  Mother had breast cancer, father had prostate cancer, brother has melanoma, COPD. Review of Systems: A complete review of systems was obtained, negative except as described above. Physical Exam:     Visit Vitals  BP (!) 144/83   Pulse 64   Temp 97.4 °F (36.3 °C)   Resp 18   Ht 5' 10\" (1.778 m)   Wt 206 lb 6.4 oz (93.6 kg)   SpO2 91%   BMI 29.62 kg/m²       ECOG PS: 0  General: Well developed, no acute distress  Eyes: Anicteric sclerae  Lymphatic: No cervical, supraclavicular adenopathy  Respiratory: course lung sounds throughout, normal respiratory effort  CV: Normal rate, regular rhythm, no murmurs, no edema, port upper chest, 1 + edema bilateral LE L > R.   GI: firm, nontender, nondistended, +BS  MS: Normal gait and station. Digits without clubbing or cyanosis. Skin:  Fingernails dark, thickened at distal half and more normal at proximal half. No ecchymoses, or petechiae. Normal temperature, turgor. Diffuse dry skin. Large nodule present on left forearm with surrounding pink scar tissue. Scattered ecchymosis on arms. Neuro/Psych: Alert, oriented. Moves all 4 extremities. Appropriate affect, normal judgment/insight.     Results:     Lab Results Component Value Date/Time    WBC 7.0 09/06/2022 10:47 AM    HGB 10.7 (L) 09/06/2022 10:47 AM    HCT 32.2 (L) 09/06/2022 10:47 AM    PLATELET 739 11/09/4902 10:47 AM    MCV 90.7 09/06/2022 10:47 AM    ABS. NEUTROPHILS 5.2 09/06/2022 10:47 AM     Lab Results   Component Value Date/Time    Sodium 136 09/06/2022 10:47 AM    Potassium 3.6 09/06/2022 10:47 AM    Chloride 100 09/06/2022 10:47 AM    CO2 30 09/06/2022 10:47 AM    Glucose 117 (H) 09/06/2022 10:47 AM    BUN 25 (H) 09/06/2022 10:47 AM    Creatinine 1.22 09/06/2022 10:47 AM    GFR est AA >60 09/06/2022 10:47 AM    GFR est non-AA 59 (L) 09/06/2022 10:47 AM    Calcium 9.2 09/06/2022 10:47 AM    Creatinine (POC) 1.30 04/15/2022 08:49 AM     Lab Results   Component Value Date/Time    Bilirubin, total 1.0 09/06/2022 10:47 AM    ALT (SGPT) 32 09/06/2022 10:47 AM    Alk.  phosphatase 59 09/06/2022 10:47 AM    Protein, total 7.6 09/06/2022 10:47 AM    Albumin 4.2 09/06/2022 10:47 AM    Globulin 3.4 09/06/2022 10:47 AM     Lab Results   Component Value Date/Time    Iron 82 04/18/2022 10:47 AM    TIBC 415 04/18/2022 10:47 AM    Iron % saturation 20 04/18/2022 10:47 AM    Ferritin 268 04/18/2022 10:47 AM       Lab Results   Component Value Date/Time    Vitamin B12 312 04/18/2022 10:47 AM    Folate 9.6 04/18/2022 10:47 AM     Lab Results   Component Value Date/Time    TSH 1.72 06/29/2020 09:58 AM     Lab Results   Component Value Date/Time    Prostate Specific Ag 2.8 08/08/2022 10:23 AM    Prostate Specific Ag 2.9 07/11/2022 11:05 AM    Prostate Specific Ag 4.2 (H) 06/13/2022 10:04 AM       No results found for: HAMAT, HAAB, HABT, HAAT, HBSAG, HBSB, HBSAT, HBABN, HBCM, HBCAB, HBCAT, XBCABS, HBEAB, [facility], [facility], [id number], [facility], [facility], HBCLT, [address] [facility], [id number], [id number], [address] [facility], [id number], HBCMLT, [id number], HCGAT     6/11/20: Chromogranin 42    6/11/20: PSA 38.6  7/20/20: PSA 13.2   8/10/20: PSA 1.2  8/31/20: PSA 0.5  9/24/20: PSA 0.4  10/15/20: PSA 0.3  11/5/20: PSA 0.3  20: 0.2  21: PSA 0.1    3/22/21 PSA 0.066 (PSA ultrasensitive at [address] [facility])   21 PSA 0.1, testosterone < 3  21 PSA 0.1  21 PSA 0.2  10/28/21: PSA 0.4  22: PSA 2.3  3/9/22: PSA 9.1   22 PSA 25.3  22 started Zytiga + prednisone   22 PSA 7.2  22 PSA 4.2  22 PSA 2.9  22 PSA 2.8  22: pending    Imagin/20/20 CT abd/pelvis with IV contrast:  1. No findings to suggest gross abdominal wall hernia or flank hernia. 2. Extensive adenopathy throughout the abdomen and pelvis as described. Findings are concerning for possible metastatic disease or lymphoma. Recommend follow up or comparison with prior studies. 20 PET:  FINDINGS:  HEAD/NECK: No apparent foci of abnormal hypermetabolism. Cerebral evaluation is  limited by normal intense activity. CHEST: Hypermetabolic lymphadenopathy at the base of the left neck and in the  left supraclavicular region is noted. Hypermetabolic superior mediastinal,  prevascular, right paratracheal, subcarinal, and bilateral hilar lymph  lymphadenopathy, maximum SUV of the subcarinal lymph node is 5.7. Small but  hypermetabolic soft tissue nodule left anterior chest wall. ABDOMEN/PELVIS: Hypermetabolic retrocrural, left para-aortic, aortocaval,  bilateral common iliac, bilateral external iliac, and bilateral inguinal  lymphadenopathy, maximum SUV 5.8 of an aortocaval lymph node. Hypermetabolic  mesenteric lymph node left lower quadrant, maximum SUV 12.3. SKELETON: No foci of abnormal hypermetabolism in the axial and visualized  appendicular skeleton. IMPRESSION: Hypermetabolic lymphadenopathy involving the left neck and left  supraclavicular region, mediastinum, bilateral hilum, retroperitoneum,  mesentery, and pelvis as described above. Hypermetabolic soft tissue nodule left  chest.    Brain MRI 20: There is no evidence of intracranial metastatic disease. Mild chronic microvascular ischemic change.   No intracranial mass, hemorrhage or evidence of acute infarction. CT c/a/p 8/3/20:  Chest:  1. Findings consistent with partial treatment response, with interval decrease in size of mediastinal and hilar lymph nodes, and interval decrease in size of left chest wall nodule. Abdomen/pelvis:   1. Findings consistent with partial treatment response, with interval decrease in size of retroperitoneal lymph nodes. 2. Unchanged circumferential bladder wall thickening, which may represent acute or chronic cystitis. Correlate with urinalysis. RECIST   TARGET LESIONS:      Lesion (description)         Location (series/slice)                Size            1. Posterior mediastinal lymph node    series 2 image 26    2.3 x 2.0 cm, previously 3.3 x 2.2 cm  2. Left upper paraesophageal lymph node    series 2 image 9    5 mm, previously 15 mm. 3. Right external iliac lymphadenopathy    series 2 image 98    3.8 x 3.2 cm, previously 6.3 x 4.7 cm  4. Left external iliac lymphadenopathy    series 2 image 99    1.6 cm, previously 2.5 cm    9/1/20 Bone Scan:  Normal Whole Body Nuclear Bone Scan.    10/29/20 CT c/a/p:  Lesion (description):     Location (series/slice):  Size   1. Posterior mediastinal lymph node   2/27            1.1 x 1.4 cm, previously 2.3 x 2.0 cm (2/26)  2. Left upper paraesophageal lymph node  2/9           2 mm, previously 5 mm (2/9)   3. Right external iliac lymphadenopathy 2/21                3.8 x 3.2 cm (2/98)  4. Left external iliac lymphadenopathy  2/99              11 mm, previously 16 mm (2/99)   IMPRESSION:  1. Findings consistent with treatment response, with interval decrease in size of mediastinal, retroperitoneal, and pelvic lymph nodes and no CT evidence of new metastatic disease within the thorax, abdomen, or pelvis. 2. Unchanged circumferential bladder wall thickening, which may represent acute or chronic cystitis.       2/1/21 CT c/a/p:  RECIST   TARGET LESIONS:  Lesion (description) Location (series/slice)                Size      1. Left upper esophageal lymph node    series 2 image 10    1 mm previously measuring 2 mm  2. Azygos esophageal lymph node    2, 30    7 x 7 mm previously measuring 11 x 14 mm  3. Right external iliac lymph node    2, 102    27 x 23 mm previously 27 x 31 mm  4. Left external iliac lymph node    2, 102    8 mm previously 11 mm  NONTARGET LESIONS:  None  IMPRESSION  1. Further decrease size of adenopathy as described. 2.  No evidence of new metastatic disease. 2/1/21 Bone scan:  Normal whole-body nuclear bone scan.    2/20/21 Brain MRI:  No acute intracranial findings no mass    5/11/21 CT c/a/p:  RECIST   TARGET LESIONS:  Lesion (description)         Location (series/slice)                Size      1. Upper left paraesophageal lymph node    series 2 image 13    2 mm unchanged  2. Azygoesophageal lymph node    series 2 image 33 measuring 7 x 5 mm     unchanged  3. Right external iliac lymph node    series 2 image 103    27 x 22 mm unchanged  4. Left external iliac lymph node    series 2 image 104    12 x 7 mm unchanged  NONTARGET LESIONS:  None  IMPRESSION  1. No evidence of new metastatic disease. Stable normal-sized lymph nodes in  the mediastinum and enlarged right external iliac lymph node. 2.  Other incidental findings as above    5/11/21 Bone scan:  No definite evidence of bony metastatic disease. 7/29/21 CT ch/abd/pelvis:  RECIST   TARGET LESIONS:  Lesion (description)         Location (series/slice)                Size      1.  azygo-esophageal lymph node, unchanged    series 2, image 29    9 mm  2. Right external iliac lymph node, decreased    series 2, image 100    16 mm  NONTARGET LESIONS:  IMPRESSION  1. No evidence of new metastatic disease. Stable to slight decrease in size of  several target lymph nodes as described above. 2.  Unchanged nonacute findings as above. 7/29/21 Bone scan:  Normal whole-body nuclear bone scan.     10/27/21 CT ch/abd/pelv:  RECIST:  Target:  Azygo-esophageal recess LN                (2,33) 9mm  Right external iliac lymph node               (2,104) 16 mm  Nontarget:  IMPRESSION  No interval change. 10/2721 bone scan:   Normal whole-body nuclear bone scan. 1/17/22 CT ch/abd/pelv:  1. Right lower paratracheal lymph node previously measured is now normal in size. 2.  Right external iliac lymph node is enlarged, stable at 14 mm in short axis dimension. 3.  No osseous metastatic disease. 4.  2 new foci of groundglass in the right upper lungs as noted above. Attention on follow-up recommended. RECIST:  Right external iliac lymph node, 2:107, 14 mm, previously 16 mm.    1/17/22 Bone Scan:  No significant change. No definite evidence of bony metastatic disease. 4/15/22 CT ch/abd/pelv:  RECIST   TARGET LESIONS:  Lesion (description)         Location (series/slice)                Size      1. 1.9 x 2.6 cm segment 3 hepatic lesion    2:58  not well seen previously,  possibly new   2. 1.5 x 1.6 cm left retroperitoneal node    2:72    new  3. 2.9 x 5.7 cm right psoas deposit    2:76    new  4. 2.0 x 2.0 right pericaval node    2:77    new  5. 1.5 x 2.3 cm right external iliac node    2:102    previously 1.4 x 1.5 cm  6. 1.7 x 1.7 cm left posterior gluteal subcutaneous nodule 2:90 new  NONTARGET LESIONS:  1. Multiple new pulmonary nodules measuring up to 7 mm. 4/15/22 Bone scan:  1. There is a new focus of increased activity overlying right occipital bone which could represent a metastatic lesion and CT is recommended for further assessment. There is also question of a tiny focus in a right 6 rib posterolaterally which is nonspecific and no CT correlate is identified and follow-up studies would be helpful to assess for interval change. 23X    5/19/22 US left forearm:  Subcutaneous lipoma in the left forearm. 7/7/22 Bone scan:   1.  The focus of increased bony activity right occipital bone appears slightly larger. 2. Small focus of activity right sixth rib posterolaterally is stable. CT  demonstrates small focus of sclerosis in this region. Findings are suspicious for bony metastatic disease. No new foci of increased bony activity identified. 7/7/22 CT head:  1. No evidence of acute intracranial process. 2. No lesion to correspond to the previously seen abnormality in the right posterior skull on bone scan. 7/7/22 CT ch/abd/pelv:  Response to treatment characterized by decrease in nodularity in the left gluteal region, decrease in size of retroperitoneal lymph nodes, and decreased enlargement of the right psoas muscle. No new lung nodules or mediastinal lymphadenopathy. RECIST:  Target lesions:  Left retroperitoneal lymph node, 2:74, 14 x 12 mm, previously 16 x 15 mm. Right psoas enlargement, 2:76, 53 x 23 mm, previously 57 x 29 mm. Right pericaval lymph node, 2:77, 17 x 15 mm, previously 20 x 20 mm. Right external iliac lymph node, 2:90, 26 x 20 mm, previously 35 x 18 mm. Nontarget lesions:  Lung nodules have resolved. Right posterior lateral sixth rib sclerotic lesion is new. Assessment & Plan:   Brett Schofield is a 75 y.o. 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 1. Metastatic adenocarcinoma of prostate, high grade:  Hormone resistant. Initial pathologist at [facility] called this \"Large cell neuroendocrine carcinoma,\" and based on diffuse disease on PET, patient was started on treatment with Carbo-Etoposide-Atezolizumab (thinking this was large cell carcinoma of the lung.) However FoundationOne testing identified TMPRSS2-ERG fusion, which is primarily seen in prostate cancers. This along with elevated PSA of 38 prompted sending of pathology specimen to [facility] for review. Their opinion and IHC staining is consistent with high grade adenocarcinoma of prostate. After Carbo-Etop-Atezo, pt was treated with Docetaxel x 6 cycles.  Treatment options now include: Carboplatin + Cabazitaxel. 4/15/22 CT shows disease progression with a new liver and lung mets; bone scan with possible new occipital and possible rib met. Initiated Zytiga + prednisone on 4/29/22.   7/2022 CT shows response to treatment with decrease in size of lymph nodes, gluteal mass, stable rib lesion and possible increase in occipital bone lesion. Supportive medications: EMLA cream.   -- Lupron (3 month depot)  #9 given 7/11/22, #10 due 10/3/22.  -- Continue Zytiga 1000mg once daily plus Prednisone 5mg bid, started 4/29/22  -- Next CT and bone scan due next week. Call with results. -- Return in 4 weeks labs, MD/NP visit. 2. Chemotherapy induced neuropathy:  Grade 1. Present in bilateral fingertips. He is dropping objects less often. 3. CKD:   Likely has mild underlying kidney dysfunction. Continue good PO hydration. -- Advised follow up with PCP for management. 4. Depression/anxiety / h/o Genital Herpes:   2/2 to #1. On Zoloft 50mg/d. Following with Palliative medicine. On suppression with Acyclovir. -- Refilled zoloft today. 5. Dysequilibrium / Ear effusions / Hyponychium:    Improving. Past Brain MRI negative for mets. Left tympanostomy tube placed by ENT for bilat effusions. Hyponychium improving - prn antifungal topical.     6. Health maintenance:   Discussed healthy food options, diet and exercise. Never had screening colonoscopy - reasonable to do now given prognosis from metastatic prostate cancer measured in years rather than months. Had screening colonoscopy 8/13/2022 with [place] with benign polyp per patient. -- Request colonoscopy report. 7. Normocytic anemia:   Likely due to #1. Iron profile, ferritin, b12 and folate normal. Monitor with monthly labs. 8. Osteopenia / bone mets:   Occipital and rib met seen on recent imaging. On calcium/D3 supplementation.  Evaluated by Dr. Jeremy Chao (dentist) and will eventually require extractions, treatment of severe periodontal disease, bridge may fail soon, but he advised ok to proceed with Xgeva. Discussed higher incidence of ONJ in patients on Denosumab with patient and he voices understanding. Wants to proceed with Xgeva. -- Xgeva every 4 weeks, due 9/6/22.     9. HTN:  Moderately controlled on Amlodipine 10mg/d. 11. Hyperglycemia / hypokalemia:  Likely due to prednisone. [address] prn visits. Encouraged foods high in potassium. 12. Hypoxia / abdominal distention:  No fevers, cough, worsening SOB or constipation. Unclear etiology but differentials include viral infection, ascites. -- Will check CT imaging to r/o cause. -- Advised starting mucinex 1-2 times daily. Emotional well being: Pt is coping well with his/her disease and has excellent support. Significant other: Surendra Bonilla - [phone]. I personally saw and evaluated the patient and performed the key components of medical decision making. The history, physical exam, and documentation were performed by Karla Howell NP. I reviewed and verified the above documentation and modified it as needed. Continue on Zytiga+Prednisone which pt is tolerating well. Proceed with Xgeva for bone mets. Due for repeat imaging to follow up on prostate cancer and bone mets.      Signed By: Kellie Wren MD     September 6, 2022

## 2022-09-05 RX ORDER — SERTRALINE HYDROCHLORIDE 50 MG/1
TABLET, FILM COATED ORAL
Qty: 90 TABLET | Refills: 0 | OUTPATIENT
Start: 2022-09-05

## 2022-09-06 ENCOUNTER — HOSPITAL ENCOUNTER (OUTPATIENT)
Dept: INFUSION THERAPY | Age: 67
Discharge: HOME | End: 2022-09-06
Attending: NURSE PRACTITIONER
Payer: MEDICARE

## 2022-09-06 ENCOUNTER — OFFICE VISIT (OUTPATIENT)
Dept: ONCOLOGY | Age: 67
End: 2022-09-06
Payer: MEDICARE

## 2022-09-06 ENCOUNTER — APPOINTMENT (OUTPATIENT)
Dept: INFUSION THERAPY | Age: 67
End: 2022-09-06
Attending: NURSE PRACTITIONER
Payer: MEDICARE

## 2022-09-06 VITALS
RESPIRATION RATE: 18 BRPM | HEIGHT: 70 IN | BODY MASS INDEX: 29.55 KG/M2 | OXYGEN SATURATION: 91 % | DIASTOLIC BLOOD PRESSURE: 83 MMHG | HEART RATE: 64 BPM | WEIGHT: 206.4 LBS | SYSTOLIC BLOOD PRESSURE: 144 MMHG | TEMPERATURE: 97.4 F

## 2022-09-06 VITALS
RESPIRATION RATE: 17 BRPM | DIASTOLIC BLOOD PRESSURE: 83 MMHG | SYSTOLIC BLOOD PRESSURE: 144 MMHG | TEMPERATURE: 97.4 F | HEART RATE: 65 BPM

## 2022-09-06 DIAGNOSIS — C61 ADENOCARCINOMA OF PROSTATE, STAGE 4 (HCC): Primary | ICD-10-CM

## 2022-09-06 DIAGNOSIS — F32.89 OTHER DEPRESSION: ICD-10-CM

## 2022-09-06 DIAGNOSIS — C61 ADENOCARCINOMA OF PROSTATE, STAGE 4 (HCC): ICD-10-CM

## 2022-09-06 DIAGNOSIS — R09.02 HYPOXIA: ICD-10-CM

## 2022-09-06 DIAGNOSIS — Z79.899 ENCOUNTER FOR MEDICATION MANAGEMENT: ICD-10-CM

## 2022-09-06 DIAGNOSIS — R14.0 ABDOMINAL DISTENTION: ICD-10-CM

## 2022-09-06 DIAGNOSIS — C7A.8 LARGE CELL NEUROENDOCRINE CARCINOMA (HCC): ICD-10-CM

## 2022-09-06 DIAGNOSIS — C79.82 METASTATIC ADENOCARCINOMA TO PROSTATE (HCC): ICD-10-CM

## 2022-09-06 DIAGNOSIS — C79.51 BONE METASTASES (HCC): ICD-10-CM

## 2022-09-06 DIAGNOSIS — C77.9 REGIONAL LYMPH NODE METASTASIS PRESENT (HCC): ICD-10-CM

## 2022-09-06 DIAGNOSIS — R59.0 MEDIASTINAL LYMPHADENOPATHY: Primary | ICD-10-CM

## 2022-09-06 LAB
ALBUMIN SERPL-MCNC: 4.2 G/DL (ref 3.5–5)
ALBUMIN/GLOB SERPL: 1.2 {RATIO} (ref 1.1–2.2)
ALP SERPL-CCNC: 59 U/L (ref 45–117)
ALT SERPL-CCNC: 32 U/L (ref 12–78)
ANION GAP SERPL CALC-SCNC: 6 MMOL/L (ref 5–15)
AST SERPL-CCNC: 64 U/L (ref 15–37)
BASOPHILS # BLD: 0.1 K/UL (ref 0–0.1)
BASOPHILS NFR BLD: 1 % (ref 0–1)
BILIRUB SERPL-MCNC: 1 MG/DL (ref 0.2–1)
BUN SERPL-MCNC: 25 MG/DL (ref 6–20)
BUN/CREAT SERPL: 20 (ref 12–20)
CALCIUM SERPL-MCNC: 9.2 MG/DL (ref 8.5–10.1)
CHLORIDE SERPL-SCNC: 100 MMOL/L (ref 97–108)
CO2 SERPL-SCNC: 30 MMOL/L (ref 21–32)
CREAT SERPL-MCNC: 1.22 MG/DL (ref 0.7–1.3)
DIFFERENTIAL METHOD BLD: ABNORMAL
EOSINOPHIL # BLD: 0.1 K/UL (ref 0–0.4)
EOSINOPHIL NFR BLD: 2 % (ref 0–7)
ERYTHROCYTE [DISTWIDTH] IN BLOOD BY AUTOMATED COUNT: 15.4 % (ref 11.5–14.5)
GLOBULIN SER CALC-MCNC: 3.4 G/DL (ref 2–4)
GLUCOSE SERPL-MCNC: 117 MG/DL (ref 65–100)
HCT VFR BLD AUTO: 32.2 % (ref 36.6–50.3)
HGB BLD-MCNC: 10.7 G/DL (ref 12.1–17)
IMM GRANULOCYTES # BLD AUTO: 0.1 K/UL (ref 0–0.04)
IMM GRANULOCYTES NFR BLD AUTO: 1 % (ref 0–0.5)
LYMPHOCYTES # BLD: 1.1 K/UL (ref 0.8–3.5)
LYMPHOCYTES NFR BLD: 16 % (ref 12–49)
MAGNESIUM SERPL-MCNC: 2.1 MG/DL (ref 1.6–2.4)
MCH RBC QN AUTO: 30.1 PG (ref 26–34)
MCHC RBC AUTO-ENTMCNC: 33.2 G/DL (ref 30–36.5)
MCV RBC AUTO: 90.7 FL (ref 80–99)
MONOCYTES # BLD: 0.4 K/UL (ref 0–1)
MONOCYTES NFR BLD: 6 % (ref 5–13)
NEUTS SEG # BLD: 5.2 K/UL (ref 1.8–8)
NEUTS SEG NFR BLD: 74 % (ref 32–75)
NRBC # BLD: 0 K/UL (ref 0–0.01)
NRBC BLD-RTO: 0 PER 100 WBC
PHOSPHATE SERPL-MCNC: 3 MG/DL (ref 2.6–4.7)
PLATELET # BLD AUTO: 195 K/UL (ref 150–400)
PMV BLD AUTO: 10.9 FL (ref 8.9–12.9)
POTASSIUM SERPL-SCNC: 3.6 MMOL/L (ref 3.5–5.1)
PROT SERPL-MCNC: 7.6 G/DL (ref 6.4–8.2)
PSA SERPL-MCNC: 2.1 NG/ML (ref 0.01–4)
RBC # BLD AUTO: 3.55 M/UL (ref 4.1–5.7)
SODIUM SERPL-SCNC: 136 MMOL/L (ref 136–145)
WBC # BLD AUTO: 7 K/UL (ref 4.1–11.1)

## 2022-09-06 PROCEDURE — 83735 ASSAY OF MAGNESIUM: CPT

## 2022-09-06 PROCEDURE — 1101F PT FALLS ASSESS-DOCD LE1/YR: CPT | Performed by: INTERNAL MEDICINE

## 2022-09-06 PROCEDURE — 84100 ASSAY OF PHOSPHORUS: CPT

## 2022-09-06 PROCEDURE — 85025 COMPLETE CBC W/AUTO DIFF WBC: CPT

## 2022-09-06 PROCEDURE — 3017F COLORECTAL CA SCREEN DOC REV: CPT | Performed by: INTERNAL MEDICINE

## 2022-09-06 PROCEDURE — G8536 NO DOC ELDER MAL SCRN: HCPCS | Performed by: INTERNAL MEDICINE

## 2022-09-06 PROCEDURE — 36415 COLL VENOUS BLD VENIPUNCTURE: CPT

## 2022-09-06 PROCEDURE — G8417 CALC BMI ABV UP PARAM F/U: HCPCS | Performed by: INTERNAL MEDICINE

## 2022-09-06 PROCEDURE — G8427 DOCREV CUR MEDS BY ELIG CLIN: HCPCS | Performed by: INTERNAL MEDICINE

## 2022-09-06 PROCEDURE — 96372 THER/PROPH/DIAG INJ SC/IM: CPT

## 2022-09-06 PROCEDURE — G8754 DIAS BP LESS 90: HCPCS | Performed by: INTERNAL MEDICINE

## 2022-09-06 PROCEDURE — 84403 ASSAY OF TOTAL TESTOSTERONE: CPT

## 2022-09-06 PROCEDURE — 80053 COMPREHEN METABOLIC PANEL: CPT

## 2022-09-06 PROCEDURE — 99215 OFFICE O/P EST HI 40 MIN: CPT | Performed by: INTERNAL MEDICINE

## 2022-09-06 PROCEDURE — 74011250636 HC RX REV CODE- 250/636: Performed by: INTERNAL MEDICINE

## 2022-09-06 PROCEDURE — G9717 DOC PT DX DEP/BP F/U NT REQ: HCPCS | Performed by: INTERNAL MEDICINE

## 2022-09-06 PROCEDURE — G0463 HOSPITAL OUTPT CLINIC VISIT: HCPCS | Performed by: INTERNAL MEDICINE

## 2022-09-06 PROCEDURE — 1123F ACP DISCUSS/DSCN MKR DOCD: CPT | Performed by: INTERNAL MEDICINE

## 2022-09-06 PROCEDURE — G8753 SYS BP > OR = 140: HCPCS | Performed by: INTERNAL MEDICINE

## 2022-09-06 PROCEDURE — 84153 ASSAY OF PSA TOTAL: CPT

## 2022-09-06 RX ORDER — ALBUTEROL SULFATE 90 UG/1
2 AEROSOL, METERED RESPIRATORY (INHALATION) AS NEEDED
Status: ACTIVE | OUTPATIENT
Start: 2022-09-06 | End: 2022-09-06

## 2022-09-06 RX ORDER — DIPHENHYDRAMINE HYDROCHLORIDE 50 MG/ML
25 INJECTION, SOLUTION INTRAMUSCULAR; INTRAVENOUS AS NEEDED
Status: CANCELLED
Start: 2022-10-04

## 2022-09-06 RX ORDER — ACETAMINOPHEN 325 MG/1
650 TABLET ORAL AS NEEDED
Status: CANCELLED
Start: 2022-10-04

## 2022-09-06 RX ORDER — DIPHENHYDRAMINE HYDROCHLORIDE 50 MG/ML
50 INJECTION, SOLUTION INTRAMUSCULAR; INTRAVENOUS AS NEEDED
Status: CANCELLED
Start: 2022-10-04

## 2022-09-06 RX ORDER — ONDANSETRON 2 MG/ML
8 INJECTION INTRAMUSCULAR; INTRAVENOUS AS NEEDED
Status: CANCELLED | OUTPATIENT
Start: 2022-10-04

## 2022-09-06 RX ORDER — ONDANSETRON 2 MG/ML
8 INJECTION INTRAMUSCULAR; INTRAVENOUS AS NEEDED
Status: ACTIVE | OUTPATIENT
Start: 2022-09-06 | End: 2022-09-06

## 2022-09-06 RX ORDER — ACETAMINOPHEN 325 MG/1
650 TABLET ORAL AS NEEDED
Status: ACTIVE | OUTPATIENT
Start: 2022-09-06 | End: 2022-09-06

## 2022-09-06 RX ORDER — DIPHENHYDRAMINE HYDROCHLORIDE 50 MG/ML
50 INJECTION, SOLUTION INTRAMUSCULAR; INTRAVENOUS AS NEEDED
Status: ACTIVE | OUTPATIENT
Start: 2022-09-06 | End: 2022-09-06

## 2022-09-06 RX ORDER — HYDROCORTISONE SODIUM SUCCINATE 100 MG/2ML
100 INJECTION, POWDER, FOR SOLUTION INTRAMUSCULAR; INTRAVENOUS AS NEEDED
Status: ACTIVE | OUTPATIENT
Start: 2022-09-06 | End: 2022-09-06

## 2022-09-06 RX ORDER — EPINEPHRINE 1 MG/ML
0.3 INJECTION, SOLUTION, CONCENTRATE INTRAVENOUS AS NEEDED
Status: ACTIVE | OUTPATIENT
Start: 2022-09-06 | End: 2022-09-06

## 2022-09-06 RX ORDER — EPINEPHRINE 1 MG/ML
0.3 INJECTION, SOLUTION, CONCENTRATE INTRAVENOUS AS NEEDED
Status: CANCELLED | OUTPATIENT
Start: 2022-10-04

## 2022-09-06 RX ORDER — DIPHENHYDRAMINE HYDROCHLORIDE 50 MG/ML
25 INJECTION, SOLUTION INTRAMUSCULAR; INTRAVENOUS AS NEEDED
Status: ACTIVE | OUTPATIENT
Start: 2022-09-06 | End: 2022-09-06

## 2022-09-06 RX ORDER — HYDROCORTISONE SODIUM SUCCINATE 100 MG/2ML
100 INJECTION, POWDER, FOR SOLUTION INTRAMUSCULAR; INTRAVENOUS AS NEEDED
Status: CANCELLED | OUTPATIENT
Start: 2022-10-04

## 2022-09-06 RX ORDER — ALBUTEROL SULFATE 0.83 MG/ML
2.5 SOLUTION RESPIRATORY (INHALATION) AS NEEDED
Status: CANCELLED
Start: 2022-10-04

## 2022-09-06 RX ORDER — SERTRALINE HYDROCHLORIDE 50 MG/1
50 TABLET, FILM COATED ORAL DAILY
Qty: 90 TABLET | Refills: 1 | Status: SHIPPED | OUTPATIENT
Start: 2022-09-06

## 2022-09-06 RX ADMIN — DENOSUMAB 120 MG: 120 INJECTION SUBCUTANEOUS at 12:33

## 2022-09-06 NOTE — PROGRESS NOTES
1. Have you been to the ER, urgent care clinic since your last visit? Hospitalized since your last visit? No    2. Have you seen or consulted any other health care providers outside of the [address] [place] [address] since your last visit? Include any pap smears or colon screening. Yes Patient had skin cancer screening completed, which came back negative. Patient also had a colonoscopy done at [facility], which came back fine with 1 polyp. Visit Vitals  BP (!) 144/83   Pulse 64   Temp 97.4 °F (36.3 °C)   Resp 18   Ht 5' 10\" (1.778 m)   Wt 206 lb 6.4 oz (93.6 kg)   SpO2 (!) 89%   BMI 29.62 kg/m²    Patient reports exertional shortness of breath. Chief Complaint   Patient presents with    Follow-up     Patient presents as a follow-up for prostate cancer. He reports pain/tingling in both hands, 2/10.

## 2022-09-06 NOTE — PROGRESS NOTES
[facility] Progress Note    Date: 2022    Name: Rosa Artis    MRN: [id number]         : 1955    Mr. Josefina Mcdowell Arrived ambulatory and in no distress for Xgeva Regimen. Assessment was completed, no acute issues at this time, no new complaints voiced. Left chest wall port accessed without difficulty, labs drawn & sent for processing. Patient proceed to appointment with . Mr. Ed Foreman vitals were reviewed. Visit Vitals  BP (!) 144/83   Pulse 65   Temp 97.4 °F (36.3 °C)   Resp 17       Lab results were obtained and reviewed. Recent Results (from the past 12 hour(s))   CBC WITH AUTOMATED DIFF    Collection Time: 22 10:47 AM   Result Value Ref Range    WBC 7.0 4.1 - 11.1 K/uL    RBC 3.55 (L) 4.10 - 5.70 M/uL    HGB 10.7 (L) 12.1 - 17.0 g/dL    HCT 32.2 (L) 36.6 - 50.3 %    MCV 90.7 80.0 - 99.0 FL    MCH 30.1 26.0 - 34.0 PG    MCHC 33.2 30.0 - 36.5 g/dL    RDW 15.4 (H) 11.5 - 14.5 %    PLATELET 281 728 - 045 K/uL    MPV 10.9 8.9 - 12.9 FL    NRBC 0.0 0  WBC    ABSOLUTE NRBC 0.00 0.00 - 0.01 K/uL    NEUTROPHILS 74 32 - 75 %    LYMPHOCYTES 16 12 - 49 %    MONOCYTES 6 5 - 13 %    EOSINOPHILS 2 0 - 7 %    BASOPHILS 1 0 - 1 %    IMMATURE GRANULOCYTES 1 (H) 0.0 - 0.5 %    ABS. NEUTROPHILS 5.2 1.8 - 8.0 K/UL    ABS. LYMPHOCYTES 1.1 0.8 - 3.5 K/UL    ABS. MONOCYTES 0.4 0.0 - 1.0 K/UL    ABS. EOSINOPHILS 0.1 0.0 - 0.4 K/UL    ABS. BASOPHILS 0.1 0.0 - 0.1 K/UL    ABS. IMM.  GRANS. 0.1 (H) 0.00 - 0.04 K/UL    DF AUTOMATED     METABOLIC PANEL, COMPREHENSIVE    Collection Time: 22 10:47 AM   Result Value Ref Range    Sodium 136 136 - 145 mmol/L    Potassium 3.6 3.5 - 5.1 mmol/L    Chloride 100 97 - 108 mmol/L    CO2 30 21 - 32 mmol/L    Anion gap 6 5 - 15 mmol/L    Glucose 117 (H) 65 - 100 mg/dL    BUN 25 (H) 6 - 20 MG/DL    Creatinine 1.22 0.70 - 1.30 MG/DL    BUN/Creatinine ratio 20 12 - 20      GFR est AA >60 >60 ml/min/1.73m2    GFR est non-AA 59 (L) >60 ml/min/1.73m2    Calcium 9.2 8.5 - 10.1 MG/DL    Bilirubin, total 1.0 0.2 - 1.0 MG/DL    ALT (SGPT) 32 12 - 78 U/L    AST (SGOT) 64 (H) 15 - 37 U/L    Alk. phosphatase 59 45 - 117 U/L    Protein, total 7.6 6.4 - 8.2 g/dL    Albumin 4.2 3.5 - 5.0 g/dL    Globulin 3.4 2.0 - 4.0 g/dL    A-G Ratio 1.2 1.1 - 2.2     MAGNESIUM    Collection Time: 09/06/22 10:47 AM   Result Value Ref Range    Magnesium 2.1 1.6 - 2.4 mg/dL   PHOSPHORUS    Collection Time: 09/06/22 10:47 AM   Result Value Ref Range    Phosphorus 3.0 2.6 - 4.7 MG/DL         Medications:  Medications Administered       denosumab (XGEVA) injection 120 mg       Admin Date  09/06/2022 Action  Given Dose  120 mg Route  SubCUTAneous Administered By  Vasu Hooper RN                      Mr. Hailey Wright tolerated treatment well and was discharged from [place] [address] in stable condition. Port de-accessed, flushed & heparinized per protocol. He is to return on September 19, 22 for his next appointment.     Moose Olsen RN  September 6, 2022

## 2022-09-09 LAB
TESTOST FREE SERPL-MCNC: <0.2 PG/ML (ref 6.6–18.1)
TESTOST SERPL-MCNC: <3 NG/DL (ref 264–916)

## 2022-09-12 RX ORDER — PREDNISONE 5 MG/1
TABLET ORAL
Qty: 60 TABLET | Refills: 3 | Status: SHIPPED | OUTPATIENT
Start: 2022-09-12

## 2022-09-14 DIAGNOSIS — C7A.8 LARGE CELL NEUROENDOCRINE CARCINOMA (HCC): ICD-10-CM

## 2022-09-14 DIAGNOSIS — C77.9 REGIONAL LYMPH NODE METASTASIS PRESENT (HCC): ICD-10-CM

## 2022-09-14 DIAGNOSIS — C79.82 METASTATIC ADENOCARCINOMA TO PROSTATE (HCC): ICD-10-CM

## 2022-09-14 DIAGNOSIS — C61 ADENOCARCINOMA OF PROSTATE, STAGE 4 (HCC): Primary | ICD-10-CM

## 2022-09-14 DIAGNOSIS — R59.0 MEDIASTINAL LYMPHADENOPATHY: ICD-10-CM

## 2022-09-19 ENCOUNTER — HOSPITAL ENCOUNTER (OUTPATIENT)
Dept: INFUSION THERAPY | Age: 67
End: 2022-09-19
Attending: NURSE PRACTITIONER

## 2022-09-27 ENCOUNTER — HOSPITAL ENCOUNTER (OUTPATIENT)
Dept: CT IMAGING | Age: 67
Discharge: HOME | End: 2022-09-27
Attending: NURSE PRACTITIONER
Payer: MEDICARE

## 2022-09-27 ENCOUNTER — HOSPITAL ENCOUNTER (OUTPATIENT)
Dept: NUCLEAR MEDICINE | Age: 67
Discharge: HOME | End: 2022-09-27
Attending: NURSE PRACTITIONER
Payer: MEDICARE

## 2022-09-27 DIAGNOSIS — C61 ADENOCARCINOMA OF PROSTATE, STAGE 4 (HCC): ICD-10-CM

## 2022-09-27 PROCEDURE — 74177 CT ABD & PELVIS W/CONTRAST: CPT

## 2022-09-27 PROCEDURE — 74011000636 HC RX REV CODE- 636: Performed by: NURSE PRACTITIONER

## 2022-09-27 PROCEDURE — 78306 BONE IMAGING WHOLE BODY: CPT

## 2022-09-27 RX ADMIN — IOPAMIDOL 100 ML: 755 INJECTION, SOLUTION INTRAVENOUS at 09:30

## 2022-09-28 NOTE — PROGRESS NOTES
[address]  Medical Oncology at [address] [place] [address]  [phone]    Hematology / Oncology Established Visit    Reason for Visit:   Armin Lord is a 75 y.o. male who is seen for follow up of neuroendocrine carcinoma. Initially referred by Dr. Taylor Cao. 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 Flow cytometry negative for lymphoproliferative disorder. FoundationOne: MS Stable, TMB 0, MDM4 amplification, MYC amplification, TMPRSS2 TMPRSS2-ERD fusion, CEBPA 1311fs*10, ERBB4 amplification - equivocal, MCL1 amplification, OZN8R5D amplification, RAD21 amplification. See scanned report for full info. Abbreviated [facility] Pathology Consultation:  Final diagnosis: Right external iliac node: Metastatic prostate adenocarcinoma. Comment: Histologic sections of the right external iliac node show sheets of cells with minimal eosinophilic cytoplasm and variable cytologic atypia. Some areas show acinar formation. The cells have predominantly round nuclei with vesicular chromatin and prominent nucleoli. Some areas show significantly more atypia with more irregular nuclear borders, hyperchromatic nyclei, and increase nuclear to cytoplasmic ratio. Frequent mitotic figures are identified. A provided pane of IHC stains is reviewed and demonstrates that the malignant cells show strong, diffuse expression of pan-cytokeratin and focal expression of TTF. CK7, CK20, chromogranin, and synaptophysin are negative in the lesional cells. CD3 and CD20 are negative in the lesions cells and positive in the background lymphocytes.  An abbreviated FoundationOne report was included, which included a TMPRSS2-ERG fusion (among other nonspecific abnormalities), which si found in approximately 50% of prostate cancers. Additional IHC studies performed at [facility] showed that the malignant cells have immunoreactivity with antibodies for PSA and PSAP, consistent with a diagnosis of metastatic prostate adenocarcinoma. Per report, flow cytometry negative for lymphoma. Overall, the histomorphology, immunophenotype and genomic findings in this case are diagnostic of metastatic prostate adenocarcinoma. The acinar formation and cytology were suggestive of this diagnosis, and the diffuse PSA and PSAP positivity as well as the TMPRSS2-ERG fusion confirmed the diagnosis. This fusion is the most common genomic alteration in prostate carcinoma and can be detected in over half of the cases, yet is not seen with any frequency in other types of carcinoma. Prior Treatment:   1. Carbo-Etoposide-Atezolizumab x 2 cycles, 6/29/20-7/20/20. 2. Docetaxel x 6 cycles, 8/10/20-11/24/20. Current Treatment: Zytiga 1000mg/d and prednisone 5mg BID, started 4/29/22 - current. Lupron every 12 weeks, started 8/2020-current      History of Present Illness:   Nneka Hwang is a 75 y.o. male who comes in for follow up of poorly differentiated prostate cancer. Pt noticed a nontender bulge in his LLQ in 5/2020. He thought this was a hernia and was evaluated by Dr. Saba Hay. CT on 5/20/20 was notable for extensive lymphadenopathy in abd/pelvis. Pt underwent robot assisted excisional Right external iliac LN biopsy 5/29/20, which showed poorly differentiated large cell neuroendocrine carcinoma. However, pathology reviewed at [facility] and felt to be poorly differentiated prostate adenocarcinoma. He reports intentional weight loss with dietary changes and exercise, losing 40-lbs in past 8 months. No n/v/d, melena/hematochezia, cough, SOB. No fevers, chills, sweats. [employer] nonsmoker. Mother had breast cancer diagnosed age < 48. Twin brother had melanoma diagnosed aged late 46s.   He does not think he has ever had a colonoscopy or PSA screening prior to current diagnosis. Interval History:  Patient here for follow up of prostate cancer. Continues on Zytiga and prednisone. He completed CT and bone scan. Had a colonoscopy and Dermatology visit. Is accompanied by his ex-wife, Heber Amanda, today. She raises her concern with patient's ability to keep up with his household chores. Pt states he feels he does not have enough time to keep up with maintaining his home. Wife requests additional resources. PMHx: Prostate cancer, HTN  PSurgHx: appendectomy, vasectomy, subcutaneous cyst removal on forehead  SHx:  Never smoker, no EtOH currently after prior alcohol abuse. Unmarried. Has 2 children, 27 and 32. 1 lives in [place]. FHx:  Mother had breast cancer, father had prostate cancer, brother has melanoma, COPD. Review of Systems: A complete review of systems was obtained, negative except as described above. Physical Exam:     Visit Vitals  /85   Pulse 68   Temp 97.8 °F (36.6 °C)   Resp 18   Ht 5' 10\" (1.778 m)   Wt 202 lb 3.2 oz (91.7 kg)   SpO2 95%   BMI 29.01 kg/m²         ECOG PS: 0  General: Well developed, no acute distress  Eyes: Anicteric sclerae  Lymphatic: No cervical, supraclavicular adenopathy  Respiratory: course lung sounds throughout, normal respiratory effort  CV: Normal rate, regular rhythm, no murmurs, no edema, port upper chest, 1 + edema bilateral LE L > R.   GI: firm, nontender, nondistended, +BS  MS: Normal gait and station. Digits without clubbing or cyanosis. Skin:  Fingernails dark, thickened at distal half and more normal at proximal half. No ecchymoses, or petechiae. Normal temperature, turgor. Diffuse dry skin. Large nodule present on left forearm with surrounding pink scar tissue. Scattered ecchymosis on arms. Neuro/Psych: Alert, oriented. Moves all 4 extremities. Appropriate affect, normal judgment/insight.     Results:     Lab Results   Component Value Date/Time    WBC 6.3 10/03/2022 10:06 AM    HGB 10.8 (L) 10/03/2022 10:06 AM    HCT 32.2 (L) 10/03/2022 10:06 AM    PLATELET 058 62/97/7922 10:06 AM    MCV 91.2 10/03/2022 10:06 AM    ABS. NEUTROPHILS 4.8 10/03/2022 10:06 AM     Lab Results   Component Value Date/Time    Sodium 136 10/03/2022 10:06 AM    Potassium 3.7 10/03/2022 10:06 AM    Chloride 101 10/03/2022 10:06 AM    CO2 29 10/03/2022 10:06 AM    Glucose 130 (H) 10/03/2022 10:06 AM    BUN 28 (H) 10/03/2022 10:06 AM    Creatinine 1.21 10/03/2022 10:06 AM    GFR est AA >60 10/03/2022 10:06 AM    GFR est non-AA 60 (L) 10/03/2022 10:06 AM    Calcium 9.7 10/03/2022 10:06 AM    Creatinine (POC) 1.30 04/15/2022 08:49 AM     Lab Results   Component Value Date/Time    Bilirubin, total 0.7 10/03/2022 10:06 AM    ALT (SGPT) 30 10/03/2022 10:06 AM    Alk.  phosphatase 93 10/03/2022 10:06 AM    Protein, total 7.4 10/03/2022 10:06 AM    Albumin 4.3 10/03/2022 10:06 AM    Globulin 3.1 10/03/2022 10:06 AM     Lab Results   Component Value Date/Time    Iron 82 04/18/2022 10:47 AM    TIBC 415 04/18/2022 10:47 AM    Iron % saturation 20 04/18/2022 10:47 AM    Ferritin 268 04/18/2022 10:47 AM       Lab Results   Component Value Date/Time    Vitamin B12 312 04/18/2022 10:47 AM    Folate 9.6 04/18/2022 10:47 AM     Lab Results   Component Value Date/Time    TSH 1.72 06/29/2020 09:58 AM     Lab Results   Component Value Date/Time    Prostate Specific Ag 2.1 09/06/2022 10:47 AM    Prostate Specific Ag 2.8 08/08/2022 10:23 AM    Prostate Specific Ag 2.9 07/11/2022 11:05 AM       No results found for: HAMAT, HAAB, HABT, HAAT, HBSAG, HBSB, HBSAT, HBABN, HBCM, HBCAB, HBCAT, XBCABS, HBEAB, [address] [facility], [facility], [id number], [facility], [facility], [facility], [id number], [id number], HAVMLT, [id number], HBCMLT, [id number], HCGAT     6/11/20: Chromogranin 42    6/11/20: PSA 38.6  7/20/20: PSA 13.2   8/10/20: PSA 1.2  8/31/20: PSA 0.5  9/24/20: PSA 0.4  10/15/20: PSA 0.3  11/5/20: PSA 0.3  11/24/20: 0.2  2/18/21: PSA 0.1    3/22/21 PSA 0.066 (PSA ultrasensitive at [facility])   21 PSA 0.1, testosterone < 3  21 PSA 0.1  21 PSA 0.2  10/28/21: PSA 0.4  22: PSA 2.3  3/9/22: PSA 9.1   22 PSA 25.3  22 started Zytiga + prednisone   22 PSA 7.2  22 PSA 4.2  22 PSA 2.9  22 PSA 2.8  22: PSA 2.1     Imagin/20/20 CT abd/pelvis with IV contrast:  1. No findings to suggest gross abdominal wall hernia or flank hernia. 2. Extensive adenopathy throughout the abdomen and pelvis as described. Findings are concerning for possible metastatic disease or lymphoma. Recommend follow up or comparison with prior studies. 20 PET:  FINDINGS:  HEAD/NECK: No apparent foci of abnormal hypermetabolism. Cerebral evaluation is  limited by normal intense activity. CHEST: Hypermetabolic lymphadenopathy at the base of the left neck and in the  left supraclavicular region is noted. Hypermetabolic superior mediastinal,  prevascular, right paratracheal, subcarinal, and bilateral hilar lymph  lymphadenopathy, maximum SUV of the subcarinal lymph node is 5.7. Small but  hypermetabolic soft tissue nodule left anterior chest wall. ABDOMEN/PELVIS: Hypermetabolic retrocrural, left para-aortic, aortocaval,  bilateral common iliac, bilateral external iliac, and bilateral inguinal  lymphadenopathy, maximum SUV 5.8 of an aortocaval lymph node. Hypermetabolic  mesenteric lymph node left lower quadrant, maximum SUV 12.3. SKELETON: No foci of abnormal hypermetabolism in the axial and visualized  appendicular skeleton. IMPRESSION: Hypermetabolic lymphadenopathy involving the left neck and left  supraclavicular region, mediastinum, bilateral hilum, retroperitoneum,  mesentery, and pelvis as described above. Hypermetabolic soft tissue nodule left  chest.    Brain MRI 20: There is no evidence of intracranial metastatic disease. Mild chronic microvascular ischemic change.   No intracranial mass, hemorrhage or evidence of acute infarction.    ----------------------------------------------------  7/7/22 Bone scan:   1. The focus of increased bony activity right occipital bone appears slightly larger. 2. Small focus of activity right sixth rib posterolaterally is stable. CT  demonstrates small focus of sclerosis in this region. Findings are suspicious for bony metastatic disease. No new foci of increased bony activity identified. 7/7/22 CT head:  1. No evidence of acute intracranial process. 2. No lesion to correspond to the previously seen abnormality in the right posterior skull on bone scan. 7/7/22 CT ch/abd/pelv:  Response to treatment characterized by decrease in nodularity in the left gluteal region, decrease in size of retroperitoneal lymph nodes, and decreased enlargement of the right psoas muscle. No new lung nodules or mediastinal lymphadenopathy. RECIST:  Target lesions:  Left retroperitoneal lymph node, 2:74, 14 x 12 mm, previously 16 x 15 mm. Right psoas enlargement, 2:76, 53 x 23 mm, previously 57 x 29 mm. Right pericaval lymph node, 2:77, 17 x 15 mm, previously 20 x 20 mm. Right external iliac lymph node, 2:90, 26 x 20 mm, previously 35 x 18 mm. Nontarget lesions:  Lung nodules have resolved. Right posterior lateral sixth rib sclerotic lesion is new. 9/27/22 CT ch/abd/pelv:  RECIST   TARGET LESIONS:  Lesion (description)         Location (series/slice)                Size      1. Left retroperitoneal lymph node    series 2 image 69    14 x 12 mm decrease in size  2. Right psoas infiltrative lesion    series 2 image 74    5.3 x 2.0 cm unchanged  3. Right pericaval lymph node    series 2 image 76    12 x 10 mm decrease in size  4. Right external iliac lymph node. Series 2 image 87    25 x 21 mm without significant change  NONTARGET LESIONS:  1. Lung nodules remain resolved. 2. Sclerotic lesions in the osseous structures unchanged. IMPRESSION  1.   Retroperitoneal lymph nodes are slightly decreased in size.  2.  Infiltrative right psoas lesion is not significant change. 3.  Right external iliac lymph node is not significant changed. 4.  Stable small sclerotic lesions. 9/27/22 Bone scan:  IMPRESSION  1. Focus of activity right occipital bone is stable. The tiny focus in the right sixth rib posterolaterally is less apparent. The tiny focus distal left femur which in retrospect is unchanged. There is a tiny focus in the sacrum which was not present previously but could  be degenerative related to L5-S1 degenerative disc changes. Assessment & Plan:   Vadim Patel is a 75 y.o. 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 1. Metastatic adenocarcinoma of prostate, high grade:  Hormone resistant. Initial pathologist at [facility] called this \"Large cell neuroendocrine carcinoma,\" and based on diffuse disease on PET, patient was started on treatment with Carbo-Etoposide-Atezolizumab (thinking this was large cell carcinoma of the lung.) However [facility] testing identified TMPRSS2-ERG fusion, which is primarily seen in prostate cancers. This along with elevated PSA of 38 prompted sending of pathology specimen to [facility] for review. Their opinion and IHC staining is consistent with high grade adenocarcinoma of prostate. After Carbo-Etop-Atezo, pt was treated with Docetaxel x 6 cycles. Treatment options now include: Carboplatin + Cabazitaxel. 4/15/22 CT shows disease progression with a new liver and lung mets; bone scan with possible new occipital and possible rib met. Initiated Zytiga + prednisone on 4/29/22.   9/2022 CT and bone scan show response to treatment with decrease in size of retroperitoneal lymph nodes and stable sclerotic lesions. Supportive medications: EMLA cream.   -- Lupron (3 month depot)  #9 given 7/11/22, #10 due 10/3/22.  -- Continue Zytiga 1000mg once daily plus Prednisone 5mg bid, started 4/29/22 - current.    -- Next CT and bone scan due in 12 weeks (12/20/22)  -- Return in 4 weeks labs, MD/NP visit. 2. Chemotherapy induced neuropathy:  Grade 1. Present in bilateral fingertips. He is dropping objects less often. 3. CKD / HTN:  Continue good PO hydration. On Amlodipine. Following with PCP. 4. Depression/anxiety / h/o Genital Herpes:   2/2 to #1. On Zoloft 50mg/d. Following with Palliative medicine. On suppression with Acyclovir. 5. Dysequilibrium / Ear effusions / Hyponychium:    Improving. Past Brain MRI negative for mets. Left tympanostomy tube placed by ENT for bilat effusions. Hyponychium improving - prn antifungal topical.     6. Health maintenance:   Discussed healthy food options, diet and exercise. Never had screening colonoscopy - reasonable to do now given prognosis from metastatic prostate cancer measured in years rather than months. Had screening colonoscopy 8/13/2022 with [facility] with benign polyp per patient. 7. Normocytic anemia:   Likely due to #1. Iron profile, ferritin, b12 and folate normal. Monitor with monthly labs. 8. Osteopenia / bone mets:   On calcium/D3 supplementation. Evaluated by Dr. Elayne Ramey (dentist) and will eventually require extractions, treatment of severe periodontal disease, bridge may fail soon, but he advised ok to proceed with Xgeva. Discussed higher incidence of ONJ in patients on Denosumab with patient and he voices understanding. Wants to proceed with Xgeva. -- Jessica Nelson every 4 weeks, due 10/3/22.     9. Falls: May be due to neuropathy. Advised home health PT/OT eval and treat. Also asking SW to reach out to patient regarding any available resources pertaining to his household chores. Emotional well being: Pt is coping well with his/her disease and has excellent support. Significant other: Gamal Norman - [phone].        Signed By: Daniel Barahona MD

## 2022-10-03 ENCOUNTER — HOSPITAL ENCOUNTER (OUTPATIENT)
Dept: INFUSION THERAPY | Age: 67
Discharge: HOME | End: 2022-10-03
Payer: MEDICARE

## 2022-10-03 ENCOUNTER — OFFICE VISIT (OUTPATIENT)
Dept: ONCOLOGY | Age: 67
End: 2022-10-03
Payer: MEDICARE

## 2022-10-03 VITALS
RESPIRATION RATE: 18 BRPM | DIASTOLIC BLOOD PRESSURE: 85 MMHG | TEMPERATURE: 97.8 F | OXYGEN SATURATION: 95 % | HEART RATE: 68 BPM | WEIGHT: 202.2 LBS | SYSTOLIC BLOOD PRESSURE: 138 MMHG | BODY MASS INDEX: 28.95 KG/M2 | HEIGHT: 70 IN

## 2022-10-03 VITALS
DIASTOLIC BLOOD PRESSURE: 85 MMHG | OXYGEN SATURATION: 95 % | SYSTOLIC BLOOD PRESSURE: 138 MMHG | HEIGHT: 70 IN | TEMPERATURE: 97.8 F | WEIGHT: 202.2 LBS | HEART RATE: 68 BPM | BODY MASS INDEX: 28.95 KG/M2 | RESPIRATION RATE: 18 BRPM

## 2022-10-03 DIAGNOSIS — C79.82 METASTATIC ADENOCARCINOMA TO PROSTATE (HCC): Primary | ICD-10-CM

## 2022-10-03 DIAGNOSIS — F32.89 OTHER DEPRESSION: ICD-10-CM

## 2022-10-03 DIAGNOSIS — Z79.899 ENCOUNTER FOR MEDICATION MANAGEMENT: ICD-10-CM

## 2022-10-03 DIAGNOSIS — C79.51 BONE METASTASES (HCC): ICD-10-CM
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
## 2022-10-03 DIAGNOSIS — R59.0 MEDIASTINAL LYMPHADENOPATHY: ICD-10-CM

## 2022-10-03 DIAGNOSIS — C61 ADENOCARCINOMA OF PROSTATE, STAGE 4 (HCC): ICD-10-CM

## 2022-10-03 DIAGNOSIS — C7A.8 LARGE CELL NEUROENDOCRINE CARCINOMA (HCC): ICD-10-CM

## 2022-10-03 DIAGNOSIS — C61 ADENOCARCINOMA OF PROSTATE, STAGE 4 (HCC): Primary | ICD-10-CM

## 2022-10-03 DIAGNOSIS — D64.9 NORMOCYTIC ANEMIA: ICD-10-CM

## 2022-10-03 DIAGNOSIS — C77.9 REGIONAL LYMPH NODE METASTASIS PRESENT (HCC): ICD-10-CM

## 2022-10-03 LAB
ALBUMIN SERPL-MCNC: 4.3 G/DL (ref 3.5–5)
ALBUMIN/GLOB SERPL: 1.4 {RATIO} (ref 1.1–2.2)
ALP SERPL-CCNC: 93 U/L (ref 45–117)
ALT SERPL-CCNC: 30 U/L (ref 12–78)
ANION GAP SERPL CALC-SCNC: 6 MMOL/L (ref 5–15)
AST SERPL-CCNC: 55 U/L (ref 15–37)
BASOPHILS # BLD: 0.1 K/UL (ref 0–0.1)
BASOPHILS NFR BLD: 1 % (ref 0–1)
BILIRUB SERPL-MCNC: 0.7 MG/DL (ref 0.2–1)
BUN SERPL-MCNC: 28 MG/DL (ref 6–20)
BUN/CREAT SERPL: 23 (ref 12–20)
CALCIUM SERPL-MCNC: 9.7 MG/DL (ref 8.5–10.1)
CHLORIDE SERPL-SCNC: 101 MMOL/L (ref 97–108)
CO2 SERPL-SCNC: 29 MMOL/L (ref 21–32)
CREAT SERPL-MCNC: 1.21 MG/DL (ref 0.7–1.3)
DIFFERENTIAL METHOD BLD: ABNORMAL
EOSINOPHIL # BLD: 0.1 K/UL (ref 0–0.4)
EOSINOPHIL NFR BLD: 1 % (ref 0–7)
ERYTHROCYTE [DISTWIDTH] IN BLOOD BY AUTOMATED COUNT: 15 % (ref 11.5–14.5)
GLOBULIN SER CALC-MCNC: 3.1 G/DL (ref 2–4)
GLUCOSE SERPL-MCNC: 130 MG/DL (ref 65–100)
HCT VFR BLD AUTO: 32.2 % (ref 36.6–50.3)
HGB BLD-MCNC: 10.8 G/DL (ref 12.1–17)
IMM GRANULOCYTES # BLD AUTO: 0.1 K/UL (ref 0–0.04)
IMM GRANULOCYTES NFR BLD AUTO: 1 % (ref 0–0.5)
LYMPHOCYTES # BLD: 0.9 K/UL (ref 0.8–3.5)
LYMPHOCYTES NFR BLD: 14 % (ref 12–49)
MAGNESIUM SERPL-MCNC: 2.4 MG/DL (ref 1.6–2.4)
MCH RBC QN AUTO: 30.6 PG (ref 26–34)
MCHC RBC AUTO-ENTMCNC: 33.5 G/DL (ref 30–36.5)
MCV RBC AUTO: 91.2 FL (ref 80–99)
MONOCYTES # BLD: 0.5 K/UL (ref 0–1)
MONOCYTES NFR BLD: 7 % (ref 5–13)
NEUTS SEG # BLD: 4.8 K/UL (ref 1.8–8)
NEUTS SEG NFR BLD: 76 % (ref 32–75)
NRBC # BLD: 0 K/UL (ref 0–0.01)
NRBC BLD-RTO: 0 PER 100 WBC
PHOSPHATE SERPL-MCNC: 3.4 MG/DL (ref 2.6–4.7)
PLATELET # BLD AUTO: 188 K/UL (ref 150–400)
PMV BLD AUTO: 11.3 FL (ref 8.9–12.9)
POTASSIUM SERPL-SCNC: 3.7 MMOL/L (ref 3.5–5.1)
PROT SERPL-MCNC: 7.4 G/DL (ref 6.4–8.2)
PSA SERPL-MCNC: 2.6 NG/ML (ref 0.01–4)
RBC # BLD AUTO: 3.53 M/UL (ref 4.1–5.7)
SODIUM SERPL-SCNC: 136 MMOL/L (ref 136–145)
WBC # BLD AUTO: 6.3 K/UL (ref 4.1–11.1)

## 2022-10-03 PROCEDURE — 96402 CHEMO HORMON ANTINEOPL SQ/IM: CPT

## 2022-10-03 PROCEDURE — 80053 COMPREHEN METABOLIC PANEL: CPT

## 2022-10-03 PROCEDURE — 84153 ASSAY OF PSA TOTAL: CPT

## 2022-10-03 PROCEDURE — 1101F PT FALLS ASSESS-DOCD LE1/YR: CPT | Performed by: INTERNAL MEDICINE

## 2022-10-03 PROCEDURE — G8536 NO DOC ELDER MAL SCRN: HCPCS | Performed by: INTERNAL MEDICINE

## 2022-10-03 PROCEDURE — G8754 DIAS BP LESS 90: HCPCS | Performed by: INTERNAL MEDICINE

## 2022-10-03 PROCEDURE — 74011250636 HC RX REV CODE- 250/636: Performed by: INTERNAL MEDICINE

## 2022-10-03 PROCEDURE — 1123F ACP DISCUSS/DSCN MKR DOCD: CPT | Performed by: INTERNAL MEDICINE

## 2022-10-03 PROCEDURE — G8417 CALC BMI ABV UP PARAM F/U: HCPCS | Performed by: INTERNAL MEDICINE

## 2022-10-03 PROCEDURE — 77030016057 HC NDL HUBR APOL -B

## 2022-10-03 PROCEDURE — 99215 OFFICE O/P EST HI 40 MIN: CPT | Performed by: INTERNAL MEDICINE

## 2022-10-03 PROCEDURE — 96372 THER/PROPH/DIAG INJ SC/IM: CPT

## 2022-10-03 PROCEDURE — G8427 DOCREV CUR MEDS BY ELIG CLIN: HCPCS | Performed by: INTERNAL MEDICINE

## 2022-10-03 PROCEDURE — 3017F COLORECTAL CA SCREEN DOC REV: CPT | Performed by: INTERNAL MEDICINE

## 2022-10-03 PROCEDURE — 36415 COLL VENOUS BLD VENIPUNCTURE: CPT

## 2022-10-03 PROCEDURE — G8752 SYS BP LESS 140: HCPCS | Performed by: INTERNAL MEDICINE

## 2022-10-03 PROCEDURE — 74011250636 HC RX REV CODE- 250/636: Performed by: NURSE PRACTITIONER

## 2022-10-03 PROCEDURE — G0463 HOSPITAL OUTPT CLINIC VISIT: HCPCS | Performed by: INTERNAL MEDICINE

## 2022-10-03 PROCEDURE — 85025 COMPLETE CBC W/AUTO DIFF WBC: CPT

## 2022-10-03 PROCEDURE — 84403 ASSAY OF TOTAL TESTOSTERONE: CPT

## 2022-10-03 PROCEDURE — 83735 ASSAY OF MAGNESIUM: CPT

## 2022-10-03 PROCEDURE — 84100 ASSAY OF PHOSPHORUS: CPT

## 2022-10-03 PROCEDURE — G9717 DOC PT DX DEP/BP F/U NT REQ: HCPCS | Performed by: INTERNAL MEDICINE

## 2022-10-03 RX ORDER — HYDROCORTISONE SODIUM SUCCINATE 100 MG/2ML
100 INJECTION, POWDER, FOR SOLUTION INTRAMUSCULAR; INTRAVENOUS AS NEEDED
Status: CANCELLED | OUTPATIENT
Start: 2022-10-31

## 2022-10-03 RX ORDER — EPINEPHRINE 1 MG/ML
0.3 INJECTION, SOLUTION, CONCENTRATE INTRAVENOUS AS NEEDED
Status: CANCELLED | OUTPATIENT
Start: 2022-10-31

## 2022-10-03 RX ORDER — ALBUTEROL SULFATE 0.83 MG/ML
2.5 SOLUTION RESPIRATORY (INHALATION) AS NEEDED
Status: DISPENSED | OUTPATIENT
Start: 2022-10-03 | End: 2022-10-03

## 2022-10-03 RX ORDER — ACETAMINOPHEN 325 MG/1
650 TABLET ORAL AS NEEDED
Status: CANCELLED
Start: 2022-10-31

## 2022-10-03 RX ORDER — DIPHENHYDRAMINE HYDROCHLORIDE 50 MG/ML
50 INJECTION, SOLUTION INTRAMUSCULAR; INTRAVENOUS AS NEEDED
Status: ACTIVE | OUTPATIENT
Start: 2022-10-03 | End: 2022-10-03

## 2022-10-03 RX ORDER — ONDANSETRON 2 MG/ML
8 INJECTION INTRAMUSCULAR; INTRAVENOUS AS NEEDED
Status: CANCELLED | OUTPATIENT
Start: 2022-10-31

## 2022-10-03 RX ORDER — DIPHENHYDRAMINE HYDROCHLORIDE 50 MG/ML
25 INJECTION, SOLUTION INTRAMUSCULAR; INTRAVENOUS AS NEEDED
Status: CANCELLED
Start: 2022-10-31

## 2022-10-03 RX ORDER — EPINEPHRINE 1 MG/ML
0.3 INJECTION, SOLUTION, CONCENTRATE INTRAVENOUS AS NEEDED
Status: ACTIVE | OUTPATIENT
Start: 2022-10-03 | End: 2022-10-03

## 2022-10-03 RX ORDER — HYDROCORTISONE SODIUM SUCCINATE 100 MG/2ML
100 INJECTION, POWDER, FOR SOLUTION INTRAMUSCULAR; INTRAVENOUS AS NEEDED
Status: ACTIVE | OUTPATIENT
Start: 2022-10-03 | End: 2022-10-03

## 2022-10-03 RX ORDER — DIPHENHYDRAMINE HYDROCHLORIDE 50 MG/ML
50 INJECTION, SOLUTION INTRAMUSCULAR; INTRAVENOUS AS NEEDED
Status: CANCELLED
Start: 2022-10-31

## 2022-10-03 RX ORDER — ALBUTEROL SULFATE 0.83 MG/ML
2.5 SOLUTION RESPIRATORY (INHALATION) AS NEEDED
Status: CANCELLED
Start: 2022-10-31

## 2022-10-03 RX ADMIN — DENOSUMAB 120 MG: 120 INJECTION SUBCUTANEOUS at 11:56

## 2022-10-03 RX ADMIN — LEUPROLIDE ACETATE 22.5 MG: KIT at 11:32

## 2022-10-03 NOTE — PROGRESS NOTES
1. Have you been to the ER, urgent care clinic since your last visit? Hospitalized since your last visit? No    2. Have you seen or consulted any other health care providers outside of the [address] [place] [address] since your last visit? Include any pap smears or colon screening. Yes Dermatology, skin assessment, [facility] & colonoscopy done with [facility]        Visit Vitals  /85   Pulse 68   Temp 97.8 °F (36.6 °C)   Resp 18   Ht 5' 10\" (1.778 m)   Wt 202 lb 3.2 oz (91.7 kg)   SpO2 95%   BMI 29.01 kg/m²            Chief Complaint   Patient presents with    Follow-up     Patient reports neuropathy pain in fingertips, 2-3/10.       Prostate Cancer

## 2022-10-03 NOTE — PROGRESS NOTES
[facility] Progress Note    Date: October 3, 2022    Name: Mary Ann Fisher    MRN: [id number]         : 1955    Mr. Pascale Bennett arrived ambulatory and in no distress for Lupron and Xgeva Regimen. Assessment was completed, no acute issues at this time, no new complaints voiced. Left chest wall port accessed without difficulty, labs drawn & sent for processing. Chemotherapy Flowsheet 10/3/2022   Cycle C10D1   Date 10/3/2022   Drug / Regimen Lupron/Xgeva   Dosage -   Time Up -   Time Down -   Pre Hydration -   Post Hydration -   Pre Meds -   Notes LGM/Xgeva       Patient proceed to appointment with Dr. Silvia Cushing.  Xiomara Danielle vitals were reviewed. Visit Vitals  /85 (BP 1 Location: Right upper arm, BP Patient Position: At rest;Sitting)   Pulse 68   Temp 97.8 °F (36.6 °C)   Resp 18   Ht 5' 10\" (1.778 m)   Wt 91.7 kg (202 lb 3.2 oz)   SpO2 95%   BMI 29.01 kg/m²       Lab results were obtained and reviewed. Recent Results (from the past 12 hour(s))   MAGNESIUM    Collection Time: 10/03/22 10:06 AM   Result Value Ref Range    Magnesium 2.4 1.6 - 2.4 mg/dL   PHOSPHORUS    Collection Time: 10/03/22 10:06 AM   Result Value Ref Range    Phosphorus 3.4 2.6 - 4.7 MG/DL   CBC WITH AUTOMATED DIFF    Collection Time: 10/03/22 10:06 AM   Result Value Ref Range    WBC 6.3 4.1 - 11.1 K/uL    RBC 3.53 (L) 4.10 - 5.70 M/uL    HGB 10.8 (L) 12.1 - 17.0 g/dL    HCT 32.2 (L) 36.6 - 50.3 %    MCV 91.2 80.0 - 99.0 FL    MCH 30.6 26.0 - 34.0 PG    MCHC 33.5 30.0 - 36.5 g/dL    RDW 15.0 (H) 11.5 - 14.5 %    PLATELET 392 762 - 534 K/uL    MPV 11.3 8.9 - 12.9 FL    NRBC 0.0 0  WBC    ABSOLUTE NRBC 0.00 0.00 - 0.01 K/uL    NEUTROPHILS 76 (H) 32 - 75 %    LYMPHOCYTES 14 12 - 49 %    MONOCYTES 7 5 - 13 %    EOSINOPHILS 1 0 - 7 %    BASOPHILS 1 0 - 1 %    IMMATURE GRANULOCYTES 1 (H) 0.0 - 0.5 %    ABS. NEUTROPHILS 4.8 1.8 - 8.0 K/UL    ABS. LYMPHOCYTES 0.9 0.8 - 3.5 K/UL    ABS. MONOCYTES 0.5 0.0 - 1.0 K/UL    ABS.  EOSINOPHILS 0.1 0.0 - 0.4 K/UL    ABS. BASOPHILS 0.1 0.0 - 0.1 K/UL    ABS. IMM. GRANS. 0.1 (H) 0.00 - 0.04 K/UL    DF AUTOMATED     METABOLIC PANEL, COMPREHENSIVE    Collection Time: 10/03/22 10:06 AM   Result Value Ref Range    Sodium 136 136 - 145 mmol/L    Potassium 3.7 3.5 - 5.1 mmol/L    Chloride 101 97 - 108 mmol/L    CO2 29 21 - 32 mmol/L    Anion gap 6 5 - 15 mmol/L    Glucose 130 (H) 65 - 100 mg/dL    BUN 28 (H) 6 - 20 MG/DL    Creatinine 1.21 0.70 - 1.30 MG/DL    BUN/Creatinine ratio 23 (H) 12 - 20      GFR est AA >60 >60 ml/min/1.73m2    GFR est non-AA 60 (L) >60 ml/min/1.73m2    Calcium 9.7 8.5 - 10.1 MG/DL    Bilirubin, total 0.7 0.2 - 1.0 MG/DL    ALT (SGPT) 30 12 - 78 U/L    AST (SGOT) 55 (H) 15 - 37 U/L    Alk. phosphatase 93 45 - 117 U/L    Protein, total 7.4 6.4 - 8.2 g/dL    Albumin 4.3 3.5 - 5.0 g/dL    Globulin 3.1 2.0 - 4.0 g/dL    A-G Ratio 1.4 1.1 - 2.2         Medications:  Medications Administered       denosumab (XGEVA) injection 120 mg       Admin Date  10/03/2022 Action  Given Dose  120 mg Route  SubCUTAneous Administered By  Placido Dennis RN              leuprolide depot (LUPRON 3 MONTH) injection sykt 22.5 mg       Admin Date  10/03/2022 Action  Given Dose  22.5 mg Route  IntraMUSCular Administered By  Placido Dennis RN                Saline flush  Heparin flush        Declined AVS    Mr. Waynetta Hammans tolerated treatment well and was discharged from [place] [address] in stable condition at 1200. Port de-accessed, flushed & heparinized per protocol. He is to return on December 27 at 0930 for his next appointment.     Liban Ashford RN  October 3, 2022

## 2022-10-04 ENCOUNTER — TELEPHONE (OUTPATIENT)
Dept: ONCOLOGY | Age: 67
End: 2022-10-04

## 2022-10-04 ENCOUNTER — HOME HEALTH ADMISSION (OUTPATIENT)
Dept: HOME HEALTH SERVICES | Facility: HOME HEALTH | Age: 67
End: 2022-10-04
Payer: MEDICARE

## 2022-10-04 NOTE — TELEPHONE ENCOUNTER
[address] [facility]   Oncology Social Work  Encounter     Patient Name:  Laurel Barragan     Medical History: dx metastatic prostate cancer     Advance Directives:    Narrative: SW called pt for needs assessment. PT has been  twice, 2 sons from 4st wife, 1 son in [place] and one in [place]. PT has been  from his 2nd wife for 10 years. PT retired from being an  2 years ago. Pt has a 2 story house. PT states he is able to do all his ADLs and is still driving. PT states someone from [phone]? Pt said 0 first but that is not a valid phone number. SW also called pt son to understand what his son thinks pt might need. PT son is 27 yo ( [phone]). PT son asked if SW had spoken to his mother Mike Gabriel - [phone]. Obviously pt son called  his mother and ex wife called this SW.      Pt wife called this SW and was concerned about pt being off balance, house being [facility]- pt son has 2 daughters and ex-wife concern about son taking on any debt of father - and also what will happen if pt doesn't divorce his 2nd wife -  as pt says it's because of the cost of divorce. SW provided ex wife- (1st wife and mother of pt children) Cancer [facility] number [phone]. Per ex-wife pt has fallen a couple times and doesn't tell MD, has significant cognition decline- she is concerned about him taking meds correctly. SW informed her to ask for medication management with home health company. Barriers to Care:     Plan:    Referral/Handouts:    Insurance/Entitlements referral

## 2022-10-04 NOTE — TELEPHONE ENCOUNTER
----- Message from Kostas Cedeno MD sent at 10/4/2022 11:59 AM EDT -----  Thank you so much!    ----- Message -----  From: Mary Smith  Sent: 10/4/2022  10:08 AM EDT  To: Kostas Cedeno MD    Will call. PT can apply for Medicaid LTC. I will provide that number for him to call [phone]. Or yes, hire help.    ----- Message -----  From: Abisai Phillips MD  Sent: 10/3/2022   3:59 PM EDT  To: KSENIA Del Rio there,    This patient has metastatic prostate cancer and has developed fatigue and concentration issues while on treatment over the past 1-2 years. His ex-wife asked about additional resources to help patient in the home as he is getting overwhelmed with household chores. Things are piling up on him. I have already placed a home health referral for PT/OT, but I am unsure whether any resources are available to specifically help pt with keeping up his house (he lives alone.)    Could you reach out to patient to discuss? I realize you may have to advise them on hiring help, but I wanted to make sure.     Thank you,  José Miguel Love

## 2022-10-06 ENCOUNTER — HOME CARE VISIT (OUTPATIENT)
Dept: SCHEDULING | Facility: HOME HEALTH | Age: 67
End: 2022-10-06
Payer: MEDICARE

## 2022-10-06 VITALS
HEART RATE: 72 BPM | SYSTOLIC BLOOD PRESSURE: 130 MMHG | RESPIRATION RATE: 16 BRPM | DIASTOLIC BLOOD PRESSURE: 78 MMHG | OXYGEN SATURATION: 93 % | TEMPERATURE: 97.7 F

## 2022-10-06 VITALS
SYSTOLIC BLOOD PRESSURE: 130 MMHG | OXYGEN SATURATION: 93 % | HEART RATE: 72 BPM | TEMPERATURE: 97.7 F | DIASTOLIC BLOOD PRESSURE: 78 MMHG | RESPIRATION RATE: 18 BRPM

## 2022-10-06 PROCEDURE — G0152 HHCP-SERV OF OT,EA 15 MIN: HCPCS

## 2022-10-06 PROCEDURE — 3331090001 HH PPS REVENUE CREDIT

## 2022-10-06 PROCEDURE — 3331090002 HH PPS REVENUE DEBIT

## 2022-10-06 PROCEDURE — 400018 HH-NO PAY CLAIM PROCEDURE

## 2022-10-06 PROCEDURE — G0151 HHCP-SERV OF PT,EA 15 MIN: HCPCS

## 2022-10-06 PROCEDURE — 400013 HH SOC

## 2022-10-07 PROCEDURE — 3331090002 HH PPS REVENUE DEBIT

## 2022-10-07 PROCEDURE — 3331090001 HH PPS REVENUE CREDIT

## 2022-10-08 PROCEDURE — 3331090002 HH PPS REVENUE DEBIT

## 2022-10-08 PROCEDURE — 3331090001 HH PPS REVENUE CREDIT

## 2022-10-09 LAB
TESTOST FREE SERPL-MCNC: <0.2 PG/ML (ref 6.6–18.1)
TESTOST SERPL-MCNC: <3 NG/DL (ref 264–916)

## 2022-10-09 PROCEDURE — 3331090002 HH PPS REVENUE DEBIT

## 2022-10-09 PROCEDURE — 3331090001 HH PPS REVENUE CREDIT

## 2022-10-10 ENCOUNTER — HOME CARE VISIT (OUTPATIENT)
Dept: SCHEDULING | Facility: HOME HEALTH | Age: 67
End: 2022-10-10
Payer: MEDICARE

## 2022-10-10 PROCEDURE — 3331090001 HH PPS REVENUE CREDIT

## 2022-10-10 PROCEDURE — 3331090002 HH PPS REVENUE DEBIT

## 2022-10-10 PROCEDURE — G0151 HHCP-SERV OF PT,EA 15 MIN: HCPCS

## 2022-10-11 VITALS
RESPIRATION RATE: 18 BRPM | TEMPERATURE: 98 F | HEART RATE: 60 BPM | SYSTOLIC BLOOD PRESSURE: 134 MMHG | OXYGEN SATURATION: 97 % | DIASTOLIC BLOOD PRESSURE: 98 MMHG

## 2022-10-11 PROCEDURE — 3331090001 HH PPS REVENUE CREDIT

## 2022-10-11 PROCEDURE — 3331090002 HH PPS REVENUE DEBIT

## 2022-10-12 ENCOUNTER — HOME CARE VISIT (OUTPATIENT)
Dept: SCHEDULING | Facility: HOME HEALTH | Age: 67
End: 2022-10-12
Payer: MEDICARE

## 2022-10-12 VITALS
RESPIRATION RATE: 18 BRPM | HEART RATE: 79 BPM | OXYGEN SATURATION: 93 % | SYSTOLIC BLOOD PRESSURE: 120 MMHG | TEMPERATURE: 97.8 F | DIASTOLIC BLOOD PRESSURE: 78 MMHG

## 2022-10-12 PROCEDURE — G0152 HHCP-SERV OF OT,EA 15 MIN: HCPCS

## 2022-10-12 PROCEDURE — 3331090001 HH PPS REVENUE CREDIT

## 2022-10-12 PROCEDURE — 3331090002 HH PPS REVENUE DEBIT

## 2022-10-12 NOTE — PROGRESS NOTES
[address]  Medical Oncology at [facility]  [phone]    Hematology / Oncology Established Visit    Reason for Visit:   Tiffanie Burnham is a 75 y.o. male who is seen for follow up of neuroendocrine carcinoma. Initially referred by Dr. Pattie Diallo. 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 Flow cytometry negative for lymphoproliferative disorder. FoundationOne: MS Stable, TMB 0, MDM4 amplification, MYC amplification, TMPRSS2 TMPRSS2-ERD fusion, CEBPA 1311fs*10, ERBB4 amplification - equivocal, MCL1 amplification, ALE6U7Q amplification, RAD21 amplification. See scanned report for full info. Abbreviated [facility] Pathology Consultation:  Final diagnosis: Right external iliac node: Metastatic prostate adenocarcinoma. Comment: Histologic sections of the right external iliac node show sheets of cells with minimal eosinophilic cytoplasm and variable cytologic atypia. Some areas show acinar formation. The cells have predominantly round nuclei with vesicular chromatin and prominent nucleoli. Some areas show significantly more atypia with more irregular nuclear borders, hyperchromatic nyclei, and increase nuclear to cytoplasmic ratio. Frequent mitotic figures are identified. A provided pane of IHC stains is reviewed and demonstrates that the malignant cells show strong, diffuse expression of pan-cytokeratin and focal expression of TTF. CK7, CK20, chromogranin, and synaptophysin are negative in the lesional cells. CD3 and CD20 are negative in the lesions cells and positive in the background lymphocytes.  An abbreviated FoundationOne report was included, which included a TMPRSS2-ERG fusion (among other nonspecific abnormalities), which si found in approximately 50% of prostate cancers. Additional IHC studies performed at [facility] showed that the malignant cells have immunoreactivity with antibodies for PSA and PSAP, consistent with a diagnosis of metastatic prostate adenocarcinoma. Per report, flow cytometry negative for lymphoma. Overall, the histomorphology, immunophenotype and genomic findings in this case are diagnostic of metastatic prostate adenocarcinoma. The acinar formation and cytology were suggestive of this diagnosis, and the diffuse PSA and PSAP positivity as well as the TMPRSS2-ERG fusion confirmed the diagnosis. This fusion is the most common genomic alteration in prostate carcinoma and can be detected in over half of the cases, yet is not seen with any frequency in other types of carcinoma. Prior Treatment:   1. Carbo-Etoposide-Atezolizumab x 2 cycles, 6/29/20-7/20/20. 2. Docetaxel x 6 cycles, 8/10/20-11/24/20. Current Treatment: Zytiga 1000mg/d and prednisone 5mg BID, started 4/29/22 - current. Lupron every 12 weeks, started 8/2020-current      History of Present Illness:   Izzy Sanders is a 75 y.o. male who comes in for follow up of poorly differentiated prostate cancer. Pt noticed a nontender bulge in his LLQ in 5/2020. He thought this was a hernia and was evaluated by Dr. Eloise Lynn. CT on 5/20/20 was notable for extensive lymphadenopathy in abd/pelvis. Pt underwent robot assisted excisional Right external iliac LN biopsy 5/29/20, which showed poorly differentiated large cell neuroendocrine carcinoma. However, pathology reviewed at [facility] and felt to be poorly differentiated prostate adenocarcinoma. He reports intentional weight loss with dietary changes and exercise, losing 40-lbs in past 8 months. No n/v/d, melena/hematochezia, cough, SOB. No fevers, chills, sweats. Lifetiime nonsmoker. Mother had breast cancer diagnosed age < 48. Twin brother had melanoma diagnosed aged late 46s.   He does not think he has ever had a colonoscopy or PSA screening prior to current diagnosis. Interval History:  Patient here for follow up of prostate cancer. Continues on Zytiga and prednisone. Working with physical therapy twice weekly. He only has 2 more sessions. Reports he is eating and hydrating well, weight is stable. No recent fevers, chills, aches or pains. PMHx: Prostate cancer, HTN  PSurgHx: appendectomy, vasectomy, subcutaneous cyst removal on forehead  SHx:  Never smoker, no EtOH currently after prior alcohol abuse. Unmarried. Has 2 children, 27 and 32. 1 lives in [place]. FHx:  Mother had breast cancer, father had prostate cancer, brother has melanoma, COPD. Review of Systems: A complete review of systems was obtained, negative except as described above. Physical Exam:     Visit Vitals  /87 (BP 1 Location: Left upper arm, BP Patient Position: Sitting, BP Cuff Size: Large adult) Comment: . Pulse 74   Temp 98.1 °F (36.7 °C)   Resp 18   Ht 5' 10\" (1.778 m)   Wt 203 lb (92.1 kg)   SpO2 96%   BMI 29.13 kg/m²       ECOG PS: 0  General: Well developed, no acute distress  Eyes: Anicteric sclerae  Lymphatic: No cervical, supraclavicular adenopathy  Respiratory: course lung sounds throughout, normal respiratory effort  CV: Normal rate, regular rhythm, no murmurs, no edema, port upper chest, 1 + edema bilateral LE L > R.   GI: firm, nontender, nondistended, +BS  MS: Normal gait and station. Digits without clubbing or cyanosis. Skin:  Fingernails dark, thickened at distal half and more normal at proximal half. No ecchymoses, or petechiae. Normal temperature, turgor. Diffuse dry skin. Scattered ecchymosis on arms. 1 cm scaly lesion on left upper thigh   Neuro/Psych: Alert, oriented. Moves all 4 extremities. Appropriate affect, normal judgment/insight.     Results:     Lab Results   Component Value Date/Time    WBC 6.3 10/31/2022 10:27 AM    HGB 11.0 (L) 10/31/2022 10:27 AM    HCT 33.2 (L) 10/31/2022 10:27 AM    PLATELET 395 10/31/2022 10:27 AM    MCV 92.7 10/31/2022 10:27 AM    ABS. NEUTROPHILS 4.4 10/31/2022 10:27 AM     Lab Results   Component Value Date/Time    Sodium 134 (L) 10/31/2022 10:27 AM    Potassium 3.5 10/31/2022 10:27 AM    Chloride 97 10/31/2022 10:27 AM    CO2 30 10/31/2022 10:27 AM    Glucose 128 (H) 10/31/2022 10:27 AM    BUN 21 (H) 10/31/2022 10:27 AM    Creatinine 1.32 (H) 10/31/2022 10:27 AM    GFR est AA >60 10/03/2022 10:06 AM    GFR est non-AA 60 (L) 10/03/2022 10:06 AM    Calcium 9.2 10/31/2022 10:27 AM    Creatinine (POC) 1.30 04/15/2022 08:49 AM     Lab Results   Component Value Date/Time    Bilirubin, total 0.7 10/03/2022 10:06 AM    ALT (SGPT) 30 10/03/2022 10:06 AM    Alk.  phosphatase 93 10/03/2022 10:06 AM    Protein, total 7.4 10/03/2022 10:06 AM    Albumin 4.3 10/03/2022 10:06 AM    Globulin 3.1 10/03/2022 10:06 AM     Lab Results   Component Value Date/Time    Iron 82 04/18/2022 10:47 AM    TIBC 415 04/18/2022 10:47 AM    Iron % saturation 20 04/18/2022 10:47 AM    Ferritin 268 04/18/2022 10:47 AM       Lab Results   Component Value Date/Time    Vitamin B12 312 04/18/2022 10:47 AM    Folate 9.6 04/18/2022 10:47 AM     Lab Results   Component Value Date/Time    TSH 1.72 06/29/2020 09:58 AM     Lab Results   Component Value Date/Time    Prostate Specific Ag 2.6 10/03/2022 10:06 AM    Prostate Specific Ag 2.1 09/06/2022 10:47 AM    Prostate Specific Ag 2.8 08/08/2022 10:23 AM       No results found for: HAMAT, HAAB, HABT, HAAT, HBSAG, HBSB, HBSAT, HBABN, HBCM, HBCAB, HBCAT, XBCABS, HBEAB, [address], [facility], [id number], [address], [facility], HBCLT, [address], [id number], [id number], [address] [facility], [id number], HBCMLT, [id number], HCGAT     6/11/20: Chromogranin 42    6/11/20: PSA 38.6  7/20/20: PSA 13.2   8/10/20: PSA 1.2  8/31/20: PSA 0.5  9/24/20: PSA 0.4  10/15/20: PSA 0.3  11/5/20: PSA 0.3  11/24/20: 0.2  2/18/21: PSA 0.1    3/22/21 PSA 0.066 (PSA ultrasensitive at [facility])   4/1/21 PSA 0.1, testosterone < 3  5/13/21 PSA 0.1  21 PSA 0.2  10/28/21: PSA 0.4  22: PSA 2.3  3/9/22: PSA 9.1   22 PSA 25.3  22 started Zytiga + prednisone   22 PSA 7.2  22 PSA 4.2  22 PSA 2.9  22 PSA 2.8  22: PSA 2.1     Imagin/20/20 CT abd/pelvis with IV contrast:  1. No findings to suggest gross abdominal wall hernia or flank hernia. 2. Extensive adenopathy throughout the abdomen and pelvis as described. Findings are concerning for possible metastatic disease or lymphoma. Recommend follow up or comparison with prior studies. 20 PET:  FINDINGS:  HEAD/NECK: No apparent foci of abnormal hypermetabolism. Cerebral evaluation is  limited by normal intense activity. CHEST: Hypermetabolic lymphadenopathy at the base of the left neck and in the  left supraclavicular region is noted. Hypermetabolic superior mediastinal,  prevascular, right paratracheal, subcarinal, and bilateral hilar lymph  lymphadenopathy, maximum SUV of the subcarinal lymph node is 5.7. Small but  hypermetabolic soft tissue nodule left anterior chest wall. ABDOMEN/PELVIS: Hypermetabolic retrocrural, left para-aortic, aortocaval,  bilateral common iliac, bilateral external iliac, and bilateral inguinal  lymphadenopathy, maximum SUV 5.8 of an aortocaval lymph node. Hypermetabolic  mesenteric lymph node left lower quadrant, maximum SUV 12.3. SKELETON: No foci of abnormal hypermetabolism in the axial and visualized  appendicular skeleton. IMPRESSION: Hypermetabolic lymphadenopathy involving the left neck and left  supraclavicular region, mediastinum, bilateral hilum, retroperitoneum,  mesentery, and pelvis as described above. Hypermetabolic soft tissue nodule left  chest.    Brain MRI 20: There is no evidence of intracranial metastatic disease. Mild chronic microvascular ischemic change. No intracranial mass, hemorrhage or evidence of acute infarction.    ----------------------------------------------------  22 Bone scan:   1. The focus of increased bony activity right occipital bone appears slightly larger. 2. Small focus of activity right sixth rib posterolaterally is stable. CT  demonstrates small focus of sclerosis in this region. Findings are suspicious for bony metastatic disease. No new foci of increased bony activity identified. 7/7/22 CT head:  1. No evidence of acute intracranial process. 2. No lesion to correspond to the previously seen abnormality in the right posterior skull on bone scan. 7/7/22 CT ch/abd/pelv:  Response to treatment characterized by decrease in nodularity in the left gluteal region, decrease in size of retroperitoneal lymph nodes, and decreased enlargement of the right psoas muscle. No new lung nodules or mediastinal lymphadenopathy. RECIST:  Target lesions:  Left retroperitoneal lymph node, 2:74, 14 x 12 mm, previously 16 x 15 mm. Right psoas enlargement, 2:76, 53 x 23 mm, previously 57 x 29 mm. Right pericaval lymph node, 2:77, 17 x 15 mm, previously 20 x 20 mm. Right external iliac lymph node, 2:90, 26 x 20 mm, previously 35 x 18 mm. Nontarget lesions:  Lung nodules have resolved. Right posterior lateral sixth rib sclerotic lesion is new. 9/27/22 CT ch/abd/pelv:  RECIST   TARGET LESIONS:  Lesion (description)         Location (series/slice)                Size      1. Left retroperitoneal lymph node    series 2 image 69    14 x 12 mm decrease in size  2. Right psoas infiltrative lesion    series 2 image 74    5.3 x 2.0 cm unchanged  3. Right pericaval lymph node    series 2 image 76    12 x 10 mm decrease in size  4. Right external iliac lymph node. Series 2 image 87    25 x 21 mm without significant change  NONTARGET LESIONS:  1. Lung nodules remain resolved. 2. Sclerotic lesions in the osseous structures unchanged. IMPRESSION  1. Retroperitoneal lymph nodes are slightly decreased in size. 2.  Infiltrative right psoas lesion is not significant change.   3.  Right external iliac lymph node is not significant changed. 4.  Stable small sclerotic lesions. 9/27/22 Bone scan:  IMPRESSION  1. Focus of activity right occipital bone is stable. The tiny focus in the right sixth rib posterolaterally is less apparent. The tiny focus distal left femur which in retrospect is unchanged. There is a tiny focus in the sacrum which was not present previously but could  be degenerative related to L5-S1 degenerative disc changes. Assessment & Plan:   Vadim Patel is a 75 y.o. 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 1. Metastatic adenocarcinoma of prostate, high grade:  Hormone resistant. Initial pathologist at [address] [place] [address] called this \"Large cell neuroendocrine carcinoma,\" and based on diffuse disease on PET, patient was started on treatment with Carbo-Etoposide-Atezolizumab (thinking this was large cell carcinoma of the lung.) However [facility] testing identified TMPRSS2-ERG fusion, which is primarily seen in prostate cancers. This along with elevated PSA of 38 prompted sending of pathology specimen to [facility] for review. Their opinion and IHC staining is consistent with high grade adenocarcinoma of prostate. After Carbo-Etop-Atezo, pt was treated with Docetaxel x 6 cycles. Treatment options now include: Carboplatin + Cabazitaxel. 4/15/22 CT shows disease progression with a new liver and lung mets; bone scan with possible new occipital and possible rib met. Initiated Zytiga + prednisone on 4/29/22.   9/2022 CT and bone scan show response to treatment with decrease in size of retroperitoneal lymph nodes and stable sclerotic lesions. Supportive medications: EMLA cream.   -- Lupron (3 month depot)  #10 given 10/3/22, #11 due 12/26/22. -- Continue Zytiga 1000mg once daily plus Prednisone 5mg bid, started 4/29/22 - current. -- Next CT and bone scan due in 12 weeks (12/20/22)  -- Return in 4 weeks labs, MD/NP visit. 2. Chemotherapy induced neuropathy:   Grade 1. Present in bilateral fingertips. He is dropping objects less often. 3. CKD / HTN:  Continue good PO hydration. On Amlodipine. Following with PCP. 4. Depression/anxiety / h/o Genital Herpes:   2/2 to #1. On Zoloft 50mg/d. Following with Palliative medicine. On suppression with Acyclovir. 5. Dysequilibrium / Ear effusions / cognitive decline / falls:   Past Brain MRI negative for mets. Left tympanostomy tube placed by ENT for bilat effusions. Working with PT/OT twice weekly for balance issues. Mild increase in confusion. -- Referral for Neuropsych for cognitive testing. 6. Health maintenance:   Had screening colonoscopy 8/13/2022 with GSI with benign polyp per patient. -- Flu vaccine in [facility] today. 7. Normocytic anemia:   Likely due to #1. Iron profile, ferritin, b12 and folate normal. Monitor with monthly labs. 8. Osteopenia / bone mets:   On calcium/D3 supplementation. Evaluated by Dr. Javid Briceño (dentist) and will eventually require extractions, treatment of severe periodontal disease, bridge may fail soon, but he advised ok to proceed with Xgeva. Discussed higher incidence of ONJ in patients on Denosumab with patient and he voices understanding. Wants to proceed with Xgeva. -- Cristinaenzo Schrader every 4 weeks, due 10/31/22. 9. Skin lesion:  1 cm, scaly  and pruritic lesion present on left upper thigh. -- Referral to Dermatology. Emotional well being: Pt is coping well with his/her disease and has a support system, but he is struggling with keeping up with housework, MARGOT Negrete consulted and advised patient apply for [place] LTC and provided to call [phone]. SW provided ex wife- (1st wife and mother of pt children) Cancer [employer] number [phone] to assist pt with divorce of 2nd wife. I personally saw and evaluated the patient and performed the key components of medical decision making. The history, physical exam, and documentation were performed by Fatmata Padilla NP.   I reviewed and verified the above documentation and modified it as needed. Continue Zytiga + Prednisone which he is tolerating well with prostate cancer under control. Given subtle and progressive cognitive changes and slowing, will refer to NeuroPsych for cognitive testing.        Signed By: Lizz Guido MD

## 2022-10-13 ENCOUNTER — HOME CARE VISIT (OUTPATIENT)
Dept: SCHEDULING | Facility: HOME HEALTH | Age: 67
End: 2022-10-13
Payer: MEDICARE

## 2022-10-13 VITALS
SYSTOLIC BLOOD PRESSURE: 108 MMHG | TEMPERATURE: 98 F | DIASTOLIC BLOOD PRESSURE: 70 MMHG | OXYGEN SATURATION: 90 % | RESPIRATION RATE: 18 BRPM | HEART RATE: 78 BPM

## 2022-10-13 PROCEDURE — 3331090002 HH PPS REVENUE DEBIT

## 2022-10-13 PROCEDURE — G0151 HHCP-SERV OF PT,EA 15 MIN: HCPCS

## 2022-10-13 PROCEDURE — 3331090001 HH PPS REVENUE CREDIT

## 2022-10-14 DIAGNOSIS — E83.39 LOW PHOSPHATE LEVELS: ICD-10-CM

## 2022-10-14 PROCEDURE — 3331090001 HH PPS REVENUE CREDIT

## 2022-10-14 PROCEDURE — 3331090002 HH PPS REVENUE DEBIT

## 2022-10-14 RX ORDER — DIBASIC SODIUM PHOSPHATE, MONOBASIC POTASSIUM PHOSPHATE AND MONOBASIC SODIUM PHOSPHATE 852; 155; 130 MG/1; MG/1; MG/1
TABLET ORAL
Qty: 90 TABLET | Refills: 1 | Status: SHIPPED | OUTPATIENT
Start: 2022-10-14 | End: 2022-10-31 | Stop reason: SDUPTHER

## 2022-10-15 PROCEDURE — 3331090002 HH PPS REVENUE DEBIT

## 2022-10-15 PROCEDURE — 3331090001 HH PPS REVENUE CREDIT

## 2022-10-16 PROCEDURE — 3331090002 HH PPS REVENUE DEBIT

## 2022-10-16 PROCEDURE — 3331090001 HH PPS REVENUE CREDIT

## 2022-10-17 ENCOUNTER — HOME CARE VISIT (OUTPATIENT)
Dept: SCHEDULING | Facility: HOME HEALTH | Age: 67
End: 2022-10-17
Payer: MEDICARE

## 2022-10-17 VITALS
DIASTOLIC BLOOD PRESSURE: 70 MMHG | HEART RATE: 80 BPM | RESPIRATION RATE: 18 BRPM | OXYGEN SATURATION: 94 % | SYSTOLIC BLOOD PRESSURE: 102 MMHG | TEMPERATURE: 98 F

## 2022-10-17 PROCEDURE — 3331090002 HH PPS REVENUE DEBIT

## 2022-10-17 PROCEDURE — G0151 HHCP-SERV OF PT,EA 15 MIN: HCPCS

## 2022-10-17 PROCEDURE — 3331090001 HH PPS REVENUE CREDIT

## 2022-10-18 PROCEDURE — 3331090001 HH PPS REVENUE CREDIT

## 2022-10-18 PROCEDURE — 3331090002 HH PPS REVENUE DEBIT

## 2022-10-19 ENCOUNTER — HOME CARE VISIT (OUTPATIENT)
Dept: HOME HEALTH SERVICES | Facility: HOME HEALTH | Age: 67
End: 2022-10-19
Payer: MEDICARE

## 2022-10-19 PROCEDURE — 3331090002 HH PPS REVENUE DEBIT

## 2022-10-19 PROCEDURE — 3331090001 HH PPS REVENUE CREDIT

## 2022-10-19 PROCEDURE — G0152 HHCP-SERV OF OT,EA 15 MIN: HCPCS

## 2022-10-20 VITALS
TEMPERATURE: 97.2 F | DIASTOLIC BLOOD PRESSURE: 80 MMHG | SYSTOLIC BLOOD PRESSURE: 130 MMHG | HEART RATE: 62 BPM | OXYGEN SATURATION: 96 % | RESPIRATION RATE: 18 BRPM

## 2022-10-20 DIAGNOSIS — E87.6 HYPOKALEMIA: ICD-10-CM

## 2022-10-20 PROCEDURE — 3331090002 HH PPS REVENUE DEBIT

## 2022-10-20 PROCEDURE — 3331090001 HH PPS REVENUE CREDIT

## 2022-10-20 RX ORDER — POTASSIUM CHLORIDE 1500 MG/1
TABLET, FILM COATED, EXTENDED RELEASE ORAL
Qty: 90 TABLET | Refills: 0 | Status: SHIPPED | OUTPATIENT
Start: 2022-10-20

## 2022-10-21 ENCOUNTER — HOME CARE VISIT (OUTPATIENT)
Dept: HOME HEALTH SERVICES | Facility: HOME HEALTH | Age: 67
End: 2022-10-21
Payer: MEDICARE

## 2022-10-21 PROCEDURE — 3331090001 HH PPS REVENUE CREDIT

## 2022-10-21 PROCEDURE — 3331090002 HH PPS REVENUE DEBIT

## 2022-10-22 PROCEDURE — 3331090001 HH PPS REVENUE CREDIT

## 2022-10-22 PROCEDURE — 3331090002 HH PPS REVENUE DEBIT

## 2022-10-23 PROCEDURE — 3331090002 HH PPS REVENUE DEBIT

## 2022-10-23 PROCEDURE — 3331090001 HH PPS REVENUE CREDIT

## 2022-10-24 PROCEDURE — 3331090002 HH PPS REVENUE DEBIT

## 2022-10-24 PROCEDURE — 3331090001 HH PPS REVENUE CREDIT

## 2022-10-25 PROCEDURE — 3331090001 HH PPS REVENUE CREDIT

## 2022-10-25 PROCEDURE — 3331090002 HH PPS REVENUE DEBIT

## 2022-10-26 ENCOUNTER — HOME CARE VISIT (OUTPATIENT)
Dept: HOME HEALTH SERVICES | Facility: HOME HEALTH | Age: 67
End: 2022-10-26
Payer: MEDICARE

## 2022-10-26 PROCEDURE — 3331090002 HH PPS REVENUE DEBIT

## 2022-10-26 PROCEDURE — 3331090001 HH PPS REVENUE CREDIT

## 2022-10-27 PROCEDURE — 3331090002 HH PPS REVENUE DEBIT

## 2022-10-27 PROCEDURE — 3331090001 HH PPS REVENUE CREDIT

## 2022-10-28 ENCOUNTER — HOME CARE VISIT (OUTPATIENT)
Dept: SCHEDULING | Facility: HOME HEALTH | Age: 67
End: 2022-10-28
Payer: MEDICARE

## 2022-10-28 VITALS
SYSTOLIC BLOOD PRESSURE: 120 MMHG | OXYGEN SATURATION: 92 % | DIASTOLIC BLOOD PRESSURE: 82 MMHG | TEMPERATURE: 98.7 F | HEART RATE: 84 BPM | RESPIRATION RATE: 18 BRPM

## 2022-10-28 PROCEDURE — 3331090002 HH PPS REVENUE DEBIT

## 2022-10-28 PROCEDURE — 3331090001 HH PPS REVENUE CREDIT

## 2022-10-28 PROCEDURE — G0151 HHCP-SERV OF PT,EA 15 MIN: HCPCS

## 2022-10-29 PROCEDURE — 3331090002 HH PPS REVENUE DEBIT

## 2022-10-29 PROCEDURE — 3331090001 HH PPS REVENUE CREDIT

## 2022-10-30 PROCEDURE — 3331090001 HH PPS REVENUE CREDIT

## 2022-10-30 PROCEDURE — 3331090002 HH PPS REVENUE DEBIT

## 2022-10-31 ENCOUNTER — APPOINTMENT (OUTPATIENT)
Dept: INFUSION THERAPY | Age: 67
End: 2022-10-31
Attending: NURSE PRACTITIONER

## 2022-10-31 ENCOUNTER — HOSPITAL ENCOUNTER (OUTPATIENT)
Dept: INFUSION THERAPY | Age: 67
Discharge: HOME | End: 2022-10-31
Payer: MEDICARE

## 2022-10-31 ENCOUNTER — OFFICE VISIT (OUTPATIENT)
Dept: ONCOLOGY | Age: 67
End: 2022-10-31
Payer: MEDICARE

## 2022-10-31 VITALS
WEIGHT: 203 LBS | BODY MASS INDEX: 29.06 KG/M2 | TEMPERATURE: 98.1 F | OXYGEN SATURATION: 96 % | SYSTOLIC BLOOD PRESSURE: 135 MMHG | HEART RATE: 74 BPM | HEIGHT: 70 IN | DIASTOLIC BLOOD PRESSURE: 87 MMHG | RESPIRATION RATE: 18 BRPM

## 2022-10-31 VITALS
BODY MASS INDEX: 29.09 KG/M2 | RESPIRATION RATE: 18 BRPM | HEART RATE: 74 BPM | TEMPERATURE: 98.1 F | HEIGHT: 70 IN | WEIGHT: 203.2 LBS | SYSTOLIC BLOOD PRESSURE: 141 MMHG | OXYGEN SATURATION: 96 % | DIASTOLIC BLOOD PRESSURE: 91 MMHG

## 2022-10-31 DIAGNOSIS — R59.0 MEDIASTINAL LYMPHADENOPATHY: ICD-10-CM

## 2022-10-31 DIAGNOSIS — C77.9 REGIONAL LYMPH NODE METASTASIS PRESENT (HCC): ICD-10-CM

## 2022-10-31 DIAGNOSIS — C7A.8 LARGE CELL NEUROENDOCRINE CARCINOMA (HCC): ICD-10-CM

## 2022-10-31 DIAGNOSIS — E83.39 LOW PHOSPHATE LEVELS: ICD-10-CM

## 2022-10-31 DIAGNOSIS — I10 PRIMARY HYPERTENSION: ICD-10-CM

## 2022-10-31 DIAGNOSIS — C79.82 METASTATIC ADENOCARCINOMA TO PROSTATE (HCC): ICD-10-CM

## 2022-10-31 DIAGNOSIS — R26.89 BALANCE PROBLEM: ICD-10-CM

## 2022-10-31 DIAGNOSIS — C61 ADENOCARCINOMA OF PROSTATE, STAGE 4 (HCC): Primary | ICD-10-CM

## 2022-10-31 DIAGNOSIS — Z79.899 ENCOUNTER FOR MEDICATION MANAGEMENT: ICD-10-CM

## 2022-10-31 DIAGNOSIS — R41.89 ALTERATION IN COGNITION: ICD-10-CM

## 2022-10-31 DIAGNOSIS — C79.51 BONE METASTASES (HCC): ICD-10-CM

## 2022-10-31 DIAGNOSIS — L98.9 SKIN LESION: ICD-10-CM

## 2022-10-31 LAB
ALBUMIN SERPL-MCNC: 4.2 G/DL (ref 3.5–5)
ALBUMIN/GLOB SERPL: 1.3 {RATIO} (ref 1.1–2.2)
ALP SERPL-CCNC: 90 U/L (ref 45–117)
ALT SERPL-CCNC: 29 U/L (ref 12–78)
ANION GAP SERPL CALC-SCNC: 7 MMOL/L (ref 5–15)
AST SERPL-CCNC: 57 U/L (ref 15–37)
BASOPHILS # BLD: 0.1 K/UL (ref 0–0.1)
BASOPHILS NFR BLD: 1 % (ref 0–1)
BILIRUB SERPL-MCNC: 0.5 MG/DL (ref 0.2–1)
BUN SERPL-MCNC: 21 MG/DL (ref 6–20)
BUN/CREAT SERPL: 16 (ref 12–20)
CALCIUM SERPL-MCNC: 9.2 MG/DL (ref 8.5–10.1)
CHLORIDE SERPL-SCNC: 97 MMOL/L (ref 97–108)
CO2 SERPL-SCNC: 30 MMOL/L (ref 21–32)
CREAT SERPL-MCNC: 1.32 MG/DL (ref 0.7–1.3)
DIFFERENTIAL METHOD BLD: ABNORMAL
EOSINOPHIL # BLD: 0.2 K/UL (ref 0–0.4)
EOSINOPHIL NFR BLD: 3 % (ref 0–7)
ERYTHROCYTE [DISTWIDTH] IN BLOOD BY AUTOMATED COUNT: 15 % (ref 11.5–14.5)
GLOBULIN SER CALC-MCNC: 3.2 G/DL (ref 2–4)
GLUCOSE SERPL-MCNC: 128 MG/DL (ref 65–100)
HCT VFR BLD AUTO: 33.2 % (ref 36.6–50.3)
HGB BLD-MCNC: 11 G/DL (ref 12.1–17)
IMM GRANULOCYTES # BLD AUTO: 0 K/UL (ref 0–0.04)
IMM GRANULOCYTES NFR BLD AUTO: 1 % (ref 0–0.5)
LYMPHOCYTES # BLD: 1.2 K/UL (ref 0.8–3.5)
LYMPHOCYTES NFR BLD: 19 % (ref 12–49)
MAGNESIUM SERPL-MCNC: 2.3 MG/DL (ref 1.6–2.4)
MCH RBC QN AUTO: 30.7 PG (ref 26–34)
MCHC RBC AUTO-ENTMCNC: 33.1 G/DL (ref 30–36.5)
MCV RBC AUTO: 92.7 FL (ref 80–99)
MONOCYTES # BLD: 0.4 K/UL (ref 0–1)
MONOCYTES NFR BLD: 7 % (ref 5–13)
NEUTS SEG # BLD: 4.4 K/UL (ref 1.8–8)
NEUTS SEG NFR BLD: 69 % (ref 32–75)
NRBC # BLD: 0 K/UL (ref 0–0.01)
NRBC BLD-RTO: 0 PER 100 WBC
PHOSPHATE SERPL-MCNC: 2.3 MG/DL (ref 2.6–4.7)
PLATELET # BLD AUTO: 182 K/UL (ref 150–400)
PMV BLD AUTO: 10.8 FL (ref 8.9–12.9)
POTASSIUM SERPL-SCNC: 3.5 MMOL/L (ref 3.5–5.1)
PROT SERPL-MCNC: 7.4 G/DL (ref 6.4–8.2)
PSA SERPL-MCNC: 3.5 NG/ML (ref 0.01–4)
RBC # BLD AUTO: 3.58 M/UL (ref 4.1–5.7)
SODIUM SERPL-SCNC: 134 MMOL/L (ref 136–145)
WBC # BLD AUTO: 6.3 K/UL (ref 4.1–11.1)

## 2022-10-31 PROCEDURE — 90471 IMMUNIZATION ADMIN: CPT

## 2022-10-31 PROCEDURE — 1123F ACP DISCUSS/DSCN MKR DOCD: CPT | Performed by: INTERNAL MEDICINE

## 2022-10-31 PROCEDURE — 80053 COMPREHEN METABOLIC PANEL: CPT

## 2022-10-31 PROCEDURE — G8536 NO DOC ELDER MAL SCRN: HCPCS | Performed by: INTERNAL MEDICINE

## 2022-10-31 PROCEDURE — 74011000636 HC RX REV CODE- 636: Performed by: NURSE PRACTITIONER

## 2022-10-31 PROCEDURE — 84153 ASSAY OF PSA TOTAL: CPT

## 2022-10-31 PROCEDURE — 90686 IIV4 VACC NO PRSV 0.5 ML IM: CPT | Performed by: NURSE PRACTITIONER

## 2022-10-31 PROCEDURE — 1101F PT FALLS ASSESS-DOCD LE1/YR: CPT | Performed by: INTERNAL MEDICINE

## 2022-10-31 PROCEDURE — G8752 SYS BP LESS 140: HCPCS | Performed by: INTERNAL MEDICINE

## 2022-10-31 PROCEDURE — 74011250636 HC RX REV CODE- 250/636: Performed by: INTERNAL MEDICINE

## 2022-10-31 PROCEDURE — 96372 THER/PROPH/DIAG INJ SC/IM: CPT

## 2022-10-31 PROCEDURE — 3331090002 HH PPS REVENUE DEBIT

## 2022-10-31 PROCEDURE — 74011000250 HC RX REV CODE- 250: Performed by: INTERNAL MEDICINE

## 2022-10-31 PROCEDURE — 84100 ASSAY OF PHOSPHORUS: CPT

## 2022-10-31 PROCEDURE — 77030016057 HC NDL HUBR APOL -B

## 2022-10-31 PROCEDURE — G8427 DOCREV CUR MEDS BY ELIG CLIN: HCPCS | Performed by: INTERNAL MEDICINE

## 2022-10-31 PROCEDURE — 3017F COLORECTAL CA SCREEN DOC REV: CPT | Performed by: INTERNAL MEDICINE

## 2022-10-31 PROCEDURE — G9717 DOC PT DX DEP/BP F/U NT REQ: HCPCS | Performed by: INTERNAL MEDICINE

## 2022-10-31 PROCEDURE — 3074F SYST BP LT 130 MM HG: CPT | Performed by: INTERNAL MEDICINE

## 2022-10-31 PROCEDURE — 3078F DIAST BP <80 MM HG: CPT | Performed by: INTERNAL MEDICINE

## 2022-10-31 PROCEDURE — G8417 CALC BMI ABV UP PARAM F/U: HCPCS | Performed by: INTERNAL MEDICINE

## 2022-10-31 PROCEDURE — 83735 ASSAY OF MAGNESIUM: CPT

## 2022-10-31 PROCEDURE — 99215 OFFICE O/P EST HI 40 MIN: CPT | Performed by: INTERNAL MEDICINE

## 2022-10-31 PROCEDURE — 3331090001 HH PPS REVENUE CREDIT

## 2022-10-31 PROCEDURE — 85025 COMPLETE CBC W/AUTO DIFF WBC: CPT

## 2022-10-31 PROCEDURE — 84403 ASSAY OF TOTAL TESTOSTERONE: CPT

## 2022-10-31 PROCEDURE — G8754 DIAS BP LESS 90: HCPCS | Performed by: INTERNAL MEDICINE

## 2022-10-31 PROCEDURE — 36591 DRAW BLOOD OFF VENOUS DEVICE: CPT

## 2022-10-31 PROCEDURE — G0463 HOSPITAL OUTPT CLINIC VISIT: HCPCS | Performed by: INTERNAL MEDICINE

## 2022-10-31 RX ORDER — EPINEPHRINE 1 MG/ML
0.3 INJECTION, SOLUTION, CONCENTRATE INTRAVENOUS AS NEEDED
Status: ACTIVE | OUTPATIENT
Start: 2022-10-31 | End: 2022-10-31

## 2022-10-31 RX ORDER — HYDROCORTISONE SODIUM SUCCINATE 100 MG/2ML
100 INJECTION, POWDER, FOR SOLUTION INTRAMUSCULAR; INTRAVENOUS AS NEEDED
Status: ACTIVE | OUTPATIENT
Start: 2022-10-31 | End: 2022-10-31

## 2022-10-31 RX ORDER — EPINEPHRINE 1 MG/ML
0.3 INJECTION, SOLUTION, CONCENTRATE INTRAVENOUS AS NEEDED
Status: CANCELLED | OUTPATIENT
Start: 2022-11-28

## 2022-10-31 RX ORDER — ACETAMINOPHEN 325 MG/1
650 TABLET ORAL AS NEEDED
Status: ACTIVE | OUTPATIENT
Start: 2022-10-31 | End: 2022-10-31

## 2022-10-31 RX ORDER — HEPARIN 100 UNIT/ML
500 SYRINGE INTRAVENOUS AS NEEDED
Status: DISCONTINUED | OUTPATIENT
Start: 2022-10-31 | End: 2022-11-01 | Stop reason: HOSPADM

## 2022-10-31 RX ORDER — DIPHENHYDRAMINE HYDROCHLORIDE 50 MG/ML
50 INJECTION, SOLUTION INTRAMUSCULAR; INTRAVENOUS AS NEEDED
Status: ACTIVE | OUTPATIENT
Start: 2022-10-31 | End: 2022-10-31

## 2022-10-31 RX ORDER — ALBUTEROL SULFATE 0.83 MG/ML
2.5 SOLUTION RESPIRATORY (INHALATION) AS NEEDED
Status: CANCELLED
Start: 2022-11-28

## 2022-10-31 RX ORDER — ONDANSETRON 2 MG/ML
8 INJECTION INTRAMUSCULAR; INTRAVENOUS AS NEEDED
Status: CANCELLED | OUTPATIENT
Start: 2022-11-28

## 2022-10-31 RX ORDER — DIPHENHYDRAMINE HYDROCHLORIDE 50 MG/ML
25 INJECTION, SOLUTION INTRAMUSCULAR; INTRAVENOUS AS NEEDED
Status: ACTIVE | OUTPATIENT
Start: 2022-10-31 | End: 2022-10-31

## 2022-10-31 RX ORDER — ACETAMINOPHEN 325 MG/1
650 TABLET ORAL AS NEEDED
Status: CANCELLED
Start: 2022-11-28

## 2022-10-31 RX ORDER — DIPHENHYDRAMINE HYDROCHLORIDE 50 MG/ML
25 INJECTION, SOLUTION INTRAMUSCULAR; INTRAVENOUS AS NEEDED
Status: CANCELLED
Start: 2022-11-28

## 2022-10-31 RX ORDER — HYDROCORTISONE SODIUM SUCCINATE 100 MG/2ML
100 INJECTION, POWDER, FOR SOLUTION INTRAMUSCULAR; INTRAVENOUS AS NEEDED
Status: CANCELLED | OUTPATIENT
Start: 2022-11-28

## 2022-10-31 RX ORDER — DIPHENHYDRAMINE HYDROCHLORIDE 50 MG/ML
50 INJECTION, SOLUTION INTRAMUSCULAR; INTRAVENOUS AS NEEDED
Status: CANCELLED
Start: 2022-11-28

## 2022-10-31 RX ORDER — ONDANSETRON 2 MG/ML
8 INJECTION INTRAMUSCULAR; INTRAVENOUS AS NEEDED
Status: ACTIVE | OUTPATIENT
Start: 2022-10-31 | End: 2022-10-31

## 2022-10-31 RX ORDER — DIBASIC SODIUM PHOSPHATE, MONOBASIC POTASSIUM PHOSPHATE AND MONOBASIC SODIUM PHOSPHATE 852; 155; 130 MG/1; MG/1; MG/1
TABLET ORAL
Qty: 90 TABLET | Refills: 1 | Status: SHIPPED | OUTPATIENT
Start: 2022-10-31

## 2022-10-31 RX ORDER — AMLODIPINE BESYLATE 10 MG/1
10 TABLET ORAL DAILY
Qty: 90 TABLET | Refills: 4 | Status: SHIPPED | OUTPATIENT
Start: 2022-10-31

## 2022-10-31 RX ORDER — ALBUTEROL SULFATE 90 UG/1
1-2 AEROSOL, METERED RESPIRATORY (INHALATION) AS NEEDED
Status: ACTIVE | OUTPATIENT
Start: 2022-10-31 | End: 2022-10-31

## 2022-10-31 RX ORDER — SODIUM CHLORIDE 0.9 % (FLUSH) 0.9 %
5-10 SYRINGE (ML) INJECTION AS NEEDED
Status: DISCONTINUED | OUTPATIENT
Start: 2022-10-31 | End: 2022-11-01 | Stop reason: HOSPADM

## 2022-10-31 RX ADMIN — DENOSUMAB 120 MG: 120 INJECTION SUBCUTANEOUS at 12:10

## 2022-10-31 RX ADMIN — SODIUM CHLORIDE, PRESERVATIVE FREE 10 ML: 5 INJECTION INTRAVENOUS at 12:10

## 2022-10-31 RX ADMIN — INFLUENZA VIRUS VACCINE 0.5 ML: 15; 15; 15; 15 SUSPENSION INTRAMUSCULAR at 12:05

## 2022-10-31 RX ADMIN — Medication 500 UNITS: at 12:10

## 2022-10-31 NOTE — PROGRESS NOTES
Vadim Miles is a 75 y.o. male follow up for neuroendocrine carcinoma. 1. Have you been to the ER, urgent care clinic since your last visit? Hospitalized since your last visit?no     2. Have you seen or consulted any other health care providers outside of the [address] [place] [address] since your last visit? Include any pap smears or colon screening.  No     Vitals 10/31/2022   Blood Pressure    Pulse 74   Temp 98.1   Resp 18   Height 5' 10\"   Weight 203 lb 3.2 oz   SpO2 96   BSA 2.13 m2   BMI 29.16 kg/m2   BP comment

## 2022-10-31 NOTE — PROGRESS NOTES
[facility] Progress Note    Date: 2022    Name: Izzy Sanders    MRN: [id number]         : 1955    Mr. Kira Mendes Arrived ambulatory and in no distress for PF/Xgeva + Flu Vaccine. Assessment was completed, no acute issues at this time. L chest wall port accessed without difficulty, labs drawn & sent for processing. Patient denies recent/upcoming dental work. Patient proceed to appointment with Dr. Isidoro Huber. Mr. Shane Lujan vitals were reviewed. Visit Vitals  BP (!) 141/91   Pulse 74   Temp 98.1 °F (36.7 °C)   Resp 18   Ht 5' 10\" (1.778 m)   Wt 92.2 kg (203 lb 3.2 oz)   SpO2 96%   BMI 29.16 kg/m²       Lab results were obtained and reviewed. Recent Results (from the past 12 hour(s))   CBC WITH AUTOMATED DIFF    Collection Time: 10/31/22 10:27 AM   Result Value Ref Range    WBC 6.3 4.1 - 11.1 K/uL    RBC 3.58 (L) 4.10 - 5.70 M/uL    HGB 11.0 (L) 12.1 - 17.0 g/dL    HCT 33.2 (L) 36.6 - 50.3 %    MCV 92.7 80.0 - 99.0 FL    MCH 30.7 26.0 - 34.0 PG    MCHC 33.1 30.0 - 36.5 g/dL    RDW 15.0 (H) 11.5 - 14.5 %    PLATELET 991 217 - 756 K/uL    MPV 10.8 8.9 - 12.9 FL    NRBC 0.0 0  WBC    ABSOLUTE NRBC 0.00 0.00 - 0.01 K/uL    NEUTROPHILS 69 32 - 75 %    LYMPHOCYTES 19 12 - 49 %    MONOCYTES 7 5 - 13 %    EOSINOPHILS 3 0 - 7 %    BASOPHILS 1 0 - 1 %    IMMATURE GRANULOCYTES 1 (H) 0.0 - 0.5 %    ABS. NEUTROPHILS 4.4 1.8 - 8.0 K/UL    ABS. LYMPHOCYTES 1.2 0.8 - 3.5 K/UL    ABS. MONOCYTES 0.4 0.0 - 1.0 K/UL    ABS. EOSINOPHILS 0.2 0.0 - 0.4 K/UL    ABS. BASOPHILS 0.1 0.0 - 0.1 K/UL    ABS. IMM.  GRANS. 0.0 0.00 - 0.04 K/UL    DF AUTOMATED     METABOLIC PANEL, COMPREHENSIVE    Collection Time: 10/31/22 10:27 AM   Result Value Ref Range    Sodium 134 (L) 136 - 145 mmol/L    Potassium 3.5 3.5 - 5.1 mmol/L    Chloride 97 97 - 108 mmol/L    CO2 30 21 - 32 mmol/L    Anion gap 7 5 - 15 mmol/L    Glucose 128 (H) 65 - 100 mg/dL    BUN 21 (H) 6 - 20 MG/DL    Creatinine 1.32 (H) 0.70 - 1.30 MG/DL BUN/Creatinine ratio 16 12 - 20      eGFR 59 (L) >60 ml/min/1.73m2    Calcium 9.2 8.5 - 10.1 MG/DL    Bilirubin, total 0.5 0.2 - 1.0 MG/DL    ALT (SGPT) 29 12 - 78 U/L    AST (SGOT) 57 (H) 15 - 37 U/L    Alk. phosphatase 90 45 - 117 U/L    Protein, total 7.4 6.4 - 8.2 g/dL    Albumin 4.2 3.5 - 5.0 g/dL    Globulin 3.2 2.0 - 4.0 g/dL    A-G Ratio 1.3 1.1 - 2.2     MAGNESIUM    Collection Time: 10/31/22 10:27 AM   Result Value Ref Range    Magnesium 2.3 1.6 - 2.4 mg/dL   PHOSPHORUS    Collection Time: 10/31/22 10:27 AM   Result Value Ref Range    Phosphorus 2.3 (L) 2.6 - 4.7 MG/DL   PSA, DIAGNOSTIC (PROSTATE SPECIFIC AG)    Collection Time: 10/31/22 10:27 AM   Result Value Ref Range    Prostate Specific Ag 3.5 0.01 - 4.0 ng/mL       Medications:      Medications Administered       denosumab (XGEVA) injection 120 mg       Admin Date  10/31/2022 Action  Given Dose  120 mg Route  SubCUTAneous Administered By  Eric Donovan RN              heparin (porcine) pf 500 Units       Admin Date  10/31/2022 Action  Given Dose  500 Units Route  IntraVENous Administered By  Eric Donovan RN              influenza vaccine 3228-21 (6 mos+)(PF) (FLUARIX/FLULAVAL/FLUZONE QUAD) injection 0.5 mL       Admin Date  10/31/2022 Action  Given Dose  0.5 mL Route  IntraMUSCular Administered By  Eric Donovan RN              sodium chloride (NS) flush 5-10 mL       Admin Date  10/31/2022 Action  Given Dose  10 mL Route  IntraVENous Administered By  Eric Donovan RN                        Mr. Kira Mendes tolerated treatment well and was discharged from [place] [address] in stable condition. Port de-accessed, flushed & heparinized per protocol. He is to return on 12/27/22 for his next appointment.     Yesenia Davis RN  October 31, 2022

## 2022-11-01 PROCEDURE — 3331090001 HH PPS REVENUE CREDIT

## 2022-11-01 PROCEDURE — 3331090002 HH PPS REVENUE DEBIT

## 2022-11-02 ENCOUNTER — HOME CARE VISIT (OUTPATIENT)
Dept: SCHEDULING | Facility: HOME HEALTH | Age: 67
End: 2022-11-02
Payer: MEDICARE

## 2022-11-02 VITALS
RESPIRATION RATE: 18 BRPM | SYSTOLIC BLOOD PRESSURE: 110 MMHG | TEMPERATURE: 98 F | HEART RATE: 64 BPM | DIASTOLIC BLOOD PRESSURE: 70 MMHG | OXYGEN SATURATION: 97 %

## 2022-11-02 PROCEDURE — G0151 HHCP-SERV OF PT,EA 15 MIN: HCPCS

## 2022-11-02 PROCEDURE — 3331090002 HH PPS REVENUE DEBIT

## 2022-11-02 PROCEDURE — 3331090001 HH PPS REVENUE CREDIT

## 2022-11-03 PROCEDURE — 3331090001 HH PPS REVENUE CREDIT

## 2022-11-03 PROCEDURE — 3331090002 HH PPS REVENUE DEBIT

## 2022-11-04 ENCOUNTER — HOME CARE VISIT (OUTPATIENT)
Dept: SCHEDULING | Facility: HOME HEALTH | Age: 67
End: 2022-11-04
Payer: MEDICARE

## 2022-11-04 PROCEDURE — 3331090002 HH PPS REVENUE DEBIT

## 2022-11-04 PROCEDURE — 3331090001 HH PPS REVENUE CREDIT

## 2022-11-04 PROCEDURE — G0151 HHCP-SERV OF PT,EA 15 MIN: HCPCS

## 2022-11-05 VITALS
HEART RATE: 67 BPM | OXYGEN SATURATION: 92 % | RESPIRATION RATE: 18 BRPM | TEMPERATURE: 98 F | DIASTOLIC BLOOD PRESSURE: 62 MMHG | SYSTOLIC BLOOD PRESSURE: 104 MMHG

## 2022-11-09 LAB
TESTOST FREE SERPL-MCNC: <0.2 PG/ML (ref 6.6–18.1)
TESTOST SERPL-MCNC: <3 NG/DL (ref 264–916)

## 2022-11-11 ENCOUNTER — OFFICE VISIT (OUTPATIENT)
Dept: NEUROLOGY | Age: 67
End: 2022-11-11
Payer: MEDICARE

## 2022-11-11 DIAGNOSIS — C79.82 METASTATIC ADENOCARCINOMA TO PROSTATE (HCC): ICD-10-CM

## 2022-11-11 DIAGNOSIS — R41.840 ATTENTION DEFICIT: ICD-10-CM

## 2022-11-11 DIAGNOSIS — F32.A ANXIETY AND DEPRESSION: ICD-10-CM

## 2022-11-11 DIAGNOSIS — R53.83 CHEMOTHERAPY-INDUCED FATIGUE: ICD-10-CM

## 2022-11-11 DIAGNOSIS — G31.84 MILD COGNITIVE IMPAIRMENT: Primary | ICD-10-CM

## 2022-11-11 DIAGNOSIS — F41.9 ANXIETY AND DEPRESSION: ICD-10-CM

## 2022-11-11 DIAGNOSIS — R47.89 WORD FINDING DIFFICULTY: ICD-10-CM

## 2022-11-11 DIAGNOSIS — T45.1X5A CHEMOTHERAPY-INDUCED FATIGUE: ICD-10-CM

## 2022-11-11 DIAGNOSIS — R41.3 SHORT-TERM MEMORY LOSS: ICD-10-CM

## 2022-11-11 PROCEDURE — 90791 PSYCH DIAGNOSTIC EVALUATION: CPT | Performed by: CLINICAL NEUROPSYCHOLOGIST

## 2022-11-11 PROCEDURE — 1123F ACP DISCUSS/DSCN MKR DOCD: CPT | Performed by: CLINICAL NEUROPSYCHOLOGIST

## 2022-11-11 NOTE — PROGRESS NOTES
[address] [facility],[address]  [address]   [place] [address] [facility]   [phone] Office   [phone] Fax      Neuropsychology    Initial Diagnostic Interview Note      Referral:  Misty Castano MD, Dr. Hola Field is a 75 y.o. right handed (relationship status is complicated) male who was unaccompanied to the initial clinical interview on 11/11/22. Please refer to his medical records for details pertaining to his history. At the start of the appointment, I reviewed the patient's [facility] Epic Chart (including Media scanned in from previous providers) for the active Problem List, all pertinent Past Medical Hx, medications, recent radiologic and laboratory findings. In addition, I reviewed pt's documented Immunization Record and Encounter History. PMHx: Prostate cancer, HTN  PSurgHx: appendectomy, vasectomy, subcutaneous cyst removal on forehead  SHx:  Never smoker, no EtOH currently after prior alcohol abuse. Unmarried. Has 2 children, 27 and 32. 1 lives in [place]. FHx:  Mother had breast cancer, father had prostate cancer, brother has melanoma, COPD. Brain MRI 6/27/20: There is no evidence of intracranial metastatic disease. Mild chronic microvascular ischemic change. No intracranial mass, hemorrhage or evidence of acute infarction. 7/7/22 CT head:  1. No evidence of acute intracranial process. 2. No lesion to correspond to the previously seen abnormality in the right posterior skull on bone scan. Patient is followed by Dr. 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    Flow cytometry negative for lymphoproliferative disorder. FoundationOne: MS Stable, TMB 0, MDM4 amplification, MYC amplification, TMPRSS2 TMPRSS2-ERD fusion, CEBPA 1311fs*10, ERBB4 amplification - equivocal, MCL1 amplification, [id number] amplification, RAD21 amplification. See scanned report for full info. Abbreviated [facility] Pathology Consultation:  Final diagnosis: Right external iliac node: Metastatic prostate adenocarcinoma. Comment: Histologic sections of the right external iliac node show sheets of cells with minimal eosinophilic cytoplasm and variable cytologic atypia. Some areas show acinar formation. The cells have predominantly round nuclei with vesicular chromatin and prominent nucleoli. Some areas show significantly more atypia with more irregular nuclear borders, hyperchromatic nyclei, and increase nuclear to cytoplasmic ratio. Frequent mitotic figures are identified. A provided pane of IHC stains is reviewed and demonstrates that the malignant cells show strong, diffuse expression of pan-cytokeratin and focal expression of TTF. CK7, CK20, chromogranin, and synaptophysin are negative in the lesional cells. CD3 and CD20 are negative in the lesions cells and positive in the background lymphocytes. An abbreviated [facility] report was included, which included a TMPRSS2-ERG fusion (among other nonspecific abnormalities), which si found in approximately 50% of prostate cancers. Additional IHC studies performed at [facility] showed that the malignant cells have immunoreactivity with antibodies for PSA and PSAP, consistent with a diagnosis of metastatic prostate adenocarcinoma. Per report, flow cytometry negative for lymphoma. Overall, the histomorphology, immunophenotype and genomic findings in this case are diagnostic of metastatic prostate adenocarcinoma. The acinar formation and cytology were suggestive of this diagnosis, and the diffuse PSA and PSAP positivity as well as the TMPRSS2-ERG fusion confirmed the diagnosis.  This fusion is the most common genomic alteration in prostate carcinoma and can be detected in over half of the cases, yet is not seen with any frequency in other types of carcinoma. \"    College completed without history of previously diagnosed LD and/or receipt of special education services. Retired elementary school math and . He has noticed marked problems with sustained focus and attention and learning and memory. He has overt word finding problems here today. He has short term memory issues. He forgets conversations. Misplaces things. Starts tasks and does not complete. Goes into a room and forget. He does a lot of backtracking. He is driving without issue. He states he takes his own medications and does his own finances. Day-to-day chores are okay. He is working with PT/OT twice a week. MRI of the brain showed no mats, touchwood. He does struggle with keeping up with things around the house.  x 2 and  2nd. He has had left ear tube placement. Neuropsychological Mental Status Exam (NMSE):      Historian: Good  Praxis: No UE apraxia  R/L Orientation: Intact to self and to other  Dress: within normal limits   Weight: within normal limits   Appearance/Hygiene: within normal limits   Gait: Slow without assistance  Assistive Devices:Glassees  Mood: within normal limits   Affect: within normal limits   Comprehension: within normal limits   Thought Process: within normal limits   Expressive Language: mild articulation problems    Receptive Language: within normal limits   Motor:  No cognitive or motor perseveration  ETOH:  Tobacco:  Illicit:  SI/HI:  Psychosis:  Insight: Within normal limits  Judgment: Within normal limits  Other Psych:      History reviewed. No pertinent past medical history.     Past Surgical History:   Procedure Laterality Date    HX APPENDECTOMY  1964    HX OTHER SURGICAL      subcutaneous cyst of forehead    HX VASECTOMY         No Known Allergies    Family History   Problem Relation Age of Onset    Cancer Mother         breast    Cancer Father         prostate    Cancer Brother         melanoma    COPD Brother        Social History     Tobacco Use    Smoking status: Never    Smokeless tobacco: Never   Vaping Use    Vaping Use: Never used   Substance Use Topics    Alcohol use: Not Currently     Comment: In active recovery from alcohol use disorder    Drug use: Never       Current Outpatient Medications   Medication Sig Dispense Refill    phosphorus (Phospha 250 Neutral) 250 mg tablet TAKE 1 TABLET BY MOUTH DAILY. TAKE WITH A FULL GLASS OF WATER. TAKE WITH FOOD. 90 Tablet 1    amLODIPine (NORVASC) 10 mg tablet Take 1 Tablet by mouth daily. 90 Tablet 4    potassium chloride SR (K-TAB) 20 mEq tablet TAKE 1 TABLET BY MOUTH EVERY DAY 90 Tablet 0    calcium carbonate (CALTREX) 600 mg calcium (1,500 mg) tablet Take 600 mg by mouth daily. predniSONE (DELTASONE) 5 mg tablet TAKE 1 TABLET BY MOUTH TWO TIMES A DAY. 60 Tablet 3    sertraline (ZOLOFT) 50 mg tablet Take 1 Tablet by mouth daily. 90 Tablet 1    abiraterone 500 mg tab Take 1,000 mg by mouth daily. Indications: metastatic castration-resistant prostate cancer 60 Tablet 3    ciclopirox (PENLAC) 8 % solution Apply a thin film to affected fingernails at bedtime. Remove with alcohol every 7 days. 6.6 mL 1    leuprolide acetate (LUPRON DEPOT IM) 1 Dose by IntraMUSCular route every three (3) months. glucose blood VI test strips (blood glucose test) strip Use to check blood sugars four times daily 100 Strip 3    lancets misc Use to check blood sugars four times daily 100 Each 3    OneTouch Ultra2 Meter misc USE TO CHECK BLOOD SUGARS TWICE DAILY 1 Each 0    degarelix (FIRMAGON) 120 mg solr injection 120 mg by SubCUTAneous route once. ondansetron hcl (ZOFRAN) 8 mg tablet Take 1 Tab by mouth every eight (8) hours as needed for Nausea or Vomiting.  30 Tab 2    lidocaine-prilocaine (EMLA) topical cream Apply a dime size amount to port site 30-60 minutes before access on treatment days to numb port site. 30 g 1    prochlorperazine (Compazine) 10 mg tablet Take 0.5 Tabs by mouth every six (6) hours as needed for Nausea or Vomiting. 30 Tab 2    dextroamphetamine-amphetamine (ADDERALL) 10 mg tablet TAKE 1 TABLET TWICE A DAY FOR 30 DAYS      acyclovir (ZOVIRAX) 400 mg tablet Take 400 mg by mouth two (2) times a day. Plan:  Obtain authorization for testing from insurance company. Report to follow once testing, scoring, and interpretation completed. ? Organic based neurocognitive issues versus mood disorder or combination of same. ? Problems organic, functional, or both? This note will not be viewable in [address]

## 2022-11-16 ENCOUNTER — OFFICE VISIT (OUTPATIENT)
Dept: NEUROLOGY | Age: 67
End: 2022-11-16

## 2022-11-16 DIAGNOSIS — G31.84 MILD COGNITIVE IMPAIRMENT: Primary | ICD-10-CM

## 2022-11-17 ENCOUNTER — OFFICE VISIT (OUTPATIENT)
Dept: NEUROLOGY | Age: 67
End: 2022-11-17
Payer: MEDICARE

## 2022-11-17 DIAGNOSIS — R53.83 CHEMOTHERAPY-INDUCED FATIGUE: ICD-10-CM

## 2022-11-17 DIAGNOSIS — F02.80 LATE ONSET ALZHEIMER'S DEMENTIA WITHOUT BEHAVIORAL DISTURBANCE (HCC): Primary | ICD-10-CM

## 2022-11-17 DIAGNOSIS — C79.82 METASTATIC ADENOCARCINOMA TO PROSTATE (HCC): ICD-10-CM

## 2022-11-17 DIAGNOSIS — R41.89 COGNITIVE DECLINE: ICD-10-CM

## 2022-11-17 DIAGNOSIS — G30.1 LATE ONSET ALZHEIMER'S DEMENTIA WITHOUT BEHAVIORAL DISTURBANCE (HCC): Primary | ICD-10-CM

## 2022-11-17 DIAGNOSIS — T45.1X5A CHEMOTHERAPY-INDUCED FATIGUE: ICD-10-CM

## 2022-11-17 DIAGNOSIS — F10.91 ALCOHOL USE DISORDER IN REMISSION: ICD-10-CM

## 2022-11-17 DIAGNOSIS — F43.23 ADJUSTMENT DISORDER WITH MIXED ANXIETY AND DEPRESSED MOOD: ICD-10-CM

## 2022-11-17 PROCEDURE — 96132 NRPSYC TST EVAL PHYS/QHP 1ST: CPT | Performed by: CLINICAL NEUROPSYCHOLOGIST

## 2022-11-17 PROCEDURE — 96136 PSYCL/NRPSYC TST PHY/QHP 1ST: CPT | Performed by: CLINICAL NEUROPSYCHOLOGIST

## 2022-11-17 PROCEDURE — 96139 PSYCL/NRPSYC TST TECH EA: CPT | Performed by: CLINICAL NEUROPSYCHOLOGIST

## 2022-11-17 PROCEDURE — 96138 PSYCL/NRPSYC TECH 1ST: CPT | Performed by: CLINICAL NEUROPSYCHOLOGIST

## 2022-11-17 PROCEDURE — 96137 PSYCL/NRPSYC TST PHY/QHP EA: CPT | Performed by: CLINICAL NEUROPSYCHOLOGIST

## 2022-11-17 PROCEDURE — 96133 NRPSYC TST EVAL PHYS/QHP EA: CPT | Performed by: CLINICAL NEUROPSYCHOLOGIST

## 2022-11-21 NOTE — PROGRESS NOTES
[address] [facility],[address]  [address]   [place] [address] [facility] Demetris King    [phone] Office   [phone] Fax      Neuropsychological Evaluation Report      Referral:  Cori Moreno MD, Dr. Stewart Renee is a 75 y.o. right handed (relationship status is complicated) male who was unaccompanied to the initial clinical interview on 11/11/22. Please refer to his medical records for details pertaining to his history. At the start of the appointment, I reviewed the patient's [facility] Epic Chart (including Media scanned in from previous providers) for the active Problem List, all pertinent Past Medical Hx, medications, recent radiologic and laboratory findings. In addition, I reviewed pt's documented Immunization Record and Encounter History. PMHx: Prostate cancer, HTN  PSurgHx: appendectomy, vasectomy, subcutaneous cyst removal on forehead  SHx:  Never smoker, no EtOH currently after prior alcohol abuse. Unmarried. Has 2 children, [address] [facility] and 32. 1 lives in [place]. FHx:  Mother had breast cancer, father had prostate cancer, brother has melanoma, COPD. Brain MRI 6/27/20: There is no evidence of intracranial metastatic disease. Mild chronic microvascular ischemic change. No intracranial mass, hemorrhage or evidence of acute infarction. 7/7/22 CT head:  1. No evidence of acute intracranial process. 2. No lesion to correspond to the previously seen abnormality in the right posterior skull on bone scan. Patient is followed by Dr. 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    Flow cytometry negative for lymphoproliferative disorder. FoundationOne: MS Stable, TMB 0, MDM4 amplification, MYC amplification, TMPRSS2 TMPRSS2-ERD fusion, CEBPA 1311fs*10, ERBB4 amplification - equivocal, MCL1 amplification, JVS4L9D amplification, RAD21 amplification. See scanned report for full info. Abbreviated [facility] Pathology Consultation:  Final diagnosis: Right external iliac node: Metastatic prostate adenocarcinoma. Comment: Histologic sections of the right external iliac node show sheets of cells with minimal eosinophilic cytoplasm and variable cytologic atypia. Some areas show acinar formation. The cells have predominantly round nuclei with vesicular chromatin and prominent nucleoli. Some areas show significantly more atypia with more irregular nuclear borders, hyperchromatic nyclei, and increase nuclear to cytoplasmic ratio. Frequent mitotic figures are identified. A provided pane of IHC stains is reviewed and demonstrates that the malignant cells show strong, diffuse expression of pan-cytokeratin and focal expression of TTF. CK7, CK20, chromogranin, and synaptophysin are negative in the lesional cells. CD3 and CD20 are negative in the lesions cells and positive in the background lymphocytes. An abbreviated FoundationOne report was included, which included a TMPRSS2-ERG fusion (among other nonspecific abnormalities), which si found in approximately 50% of prostate cancers. Additional IHC studies performed at [facility] showed that the malignant cells have immunoreactivity with antibodies for PSA and PSAP, consistent with a diagnosis of metastatic prostate adenocarcinoma. Per report, flow cytometry negative for lymphoma. Overall, the histomorphology, immunophenotype and genomic findings in this case are diagnostic of metastatic prostate adenocarcinoma. The acinar formation and cytology were suggestive of this diagnosis, and the diffuse PSA and PSAP positivity as well as the TMPRSS2-ERG fusion confirmed the diagnosis.  This fusion is the most common genomic alteration in prostate carcinoma and can be detected in over half of the cases, yet is not seen with any frequency in other types of carcinoma. \"    College completed without history of previously diagnosed LD and/or receipt of special education services. Retired elementary school math and . He has noticed marked problems with sustained focus and attention and learning and memory. He has overt word finding problems here today. He has short term memory issues. He forgets conversations. Misplaces things. Starts tasks and does not complete. Goes into a room and forget. He does a lot of backtracking. He is driving without issue. He states he takes his own medications and does his own finances. Day-to-day chores are okay. He is working with PT/OT twice a week. MRI of the brain showed no mats, touchwood. He does struggle with keeping up with things around the house.  x 2 and  2nd. He has had left ear tube placement. Neuropsychological Mental Status Exam (NMSE):      Historian: Good  Praxis: No UE apraxia  R/L Orientation: Intact to self and to other  Dress: within normal limits   Weight: within normal limits   Appearance/Hygiene: within normal limits   Gait: Slow without assistance  Assistive Devices:Glassees  Mood: within normal limits   Affect: within normal limits   Comprehension: within normal limits   Thought Process: within normal limits   Expressive Language: mild articulation problems    Receptive Language: within normal limits   Motor:  No cognitive or motor perseveration  ETOH: In recovery  Tobacco: Denied  Illicit: Denied  SI/HI: Denied  Psychosis: Denied  Insight: Within normal limits  Judgment: Within normal limits  Other Psych:      History reviewed. No pertinent past medical history.     Past Surgical History:   Procedure Laterality Date    HX APPENDECTOMY  1964    HX OTHER SURGICAL      subcutaneous cyst of forehead    HX VASECTOMY         No Known Allergies    Family History   Problem Relation Age of Onset    Cancer Mother         breast    Cancer Father         prostate    Cancer Brother         melanoma    COPD Brother        Social History     Tobacco Use    Smoking status: Never    Smokeless tobacco: Never   Vaping Use    Vaping Use: Never used   Substance Use Topics    Alcohol use: Not Currently     Comment: In active recovery from alcohol use disorder    Drug use: Never       Current Outpatient Medications   Medication Sig Dispense Refill    phosphorus (Phospha 250 Neutral) 250 mg tablet TAKE 1 TABLET BY MOUTH DAILY. TAKE WITH A FULL GLASS OF WATER. TAKE WITH FOOD. 90 Tablet 1    amLODIPine (NORVASC) 10 mg tablet Take 1 Tablet by mouth daily. 90 Tablet 4    potassium chloride SR (K-TAB) 20 mEq tablet TAKE 1 TABLET BY MOUTH EVERY DAY 90 Tablet 0    calcium carbonate (CALTREX) 600 mg calcium (1,500 mg) tablet Take 600 mg by mouth daily. predniSONE (DELTASONE) 5 mg tablet TAKE 1 TABLET BY MOUTH TWO TIMES A DAY. 60 Tablet 3    sertraline (ZOLOFT) 50 mg tablet Take 1 Tablet by mouth daily. 90 Tablet 1    abiraterone 500 mg tab Take 1,000 mg by mouth daily. Indications: metastatic castration-resistant prostate cancer 60 Tablet 3    ciclopirox (PENLAC) 8 % solution Apply a thin film to affected fingernails at bedtime. Remove with alcohol every 7 days. 6.6 mL 1    leuprolide acetate (LUPRON DEPOT IM) 1 Dose by IntraMUSCular route every three (3) months. glucose blood VI test strips (blood glucose test) strip Use to check blood sugars four times daily 100 Strip 3    lancets misc Use to check blood sugars four times daily 100 Each 3    OneTouch Ultra2 Meter misc USE TO CHECK BLOOD SUGARS TWICE DAILY 1 Each 0    degarelix (FIRMAGON) 120 mg solr injection 120 mg by SubCUTAneous route once. ondansetron hcl (ZOFRAN) 8 mg tablet Take 1 Tab by mouth every eight (8) hours as needed for Nausea or Vomiting.  30 Tab 2    lidocaine-prilocaine (EMLA) topical cream Apply a dime size amount to port site 30-60 minutes before access on treatment days to numb port site. 30 g 1    prochlorperazine (Compazine) 10 mg tablet Take 0.5 Tabs by mouth every six (6) hours as needed for Nausea or Vomiting. 30 Tab 2    dextroamphetamine-amphetamine (ADDERALL) 10 mg tablet TAKE 1 TABLET TWICE A DAY FOR 30 DAYS      acyclovir (ZOVIRAX) 400 mg tablet Take 400 mg by mouth two (2) times a day. Plan:  Obtain authorization for testing from insurance company. Report to follow once testing, scoring, and interpretation completed. ? Organic based neurocognitive issues versus mood disorder or combination of same. ? Problems organic, functional, or both? This note will not be viewable in [address] Neuropsychological Test Results  Patient Testing 11/16/22 and 11/17/22 Report Completed 11/21/22  A Psychometrist Assisted w/ portions of this evaluation while under my direct  supervision    The following evaluation procedures/tests were administered:      Neuropsychologist Administered/Interpreted:  Neuropsychological Mental Status Exam, Revised Memory & Behavior Checklist,  Mini Mental Status Exam, Clock Drawing Test, Test Of Premorbid Functioning, Jovi-Melzack Pain Questionnaire, History Taking  & Clinical Interview With The Patient,, CARLOS, CPT-III, Review Of Available Records. Psychometrist Administered under Neuropsychologist Supervision & Neuropsychologist Interpreted:  Verbal Fluency Tests, Edd & Edd - Revised, Trailmaking Test Parts A & B, Wechsler Adult Intelligence Scale - IV, FlexGen Learning Test - 3, Grooved Pegboard, Beck Depression Inventory - II, Beck Anxiety Inventory. Test Findings:  Test Findings:  Note:  The patients raw data have been compared with currently available norms which include demographic corrections for age, gender, and/or education.   Sometimes, the patients scores are compared to demographically similar individuals as close to the patients age, education level, etc., as possible. \"Average\" is viewed as being +/- 1 standard deviation (SD) from the stated mean for a particular test score. \"Low average\" is viewed as being between 1 and 2 SD below the mean, and above average is viewed as being 1 and 2 SD above the mean. Scores falling in the borderline range (between 1-1/2 and 2 SD below the mean) are viewed with particular attention as to whether they are normal or abnormal neurocognitive test scores. Other methods of inference in analyzing the test data are also utilized, including the pattern and range of scores in the profile, bilateral motor functions, and the presence, if any, of pathognomonic signs. Behaviorally, the patient was friendly and cooperative and appeared motivated to perform well during this examination. He was tested over two days. ,  Within this context, the results of this evaluation are viewed as a valid reflection of the patients actual neurocognitive and emotional status. His MMSE score of 29/30 correct was normal.   In this regard, he was not oriented to county. Clock drawing was normal.        His structured word list fluency, as assessed by the FAS Test, was within the above average range with a T score of 56. Category fluency was within the mildly to moderately impaired range with a T score of 31. Confrontation naming ability, as assessed by the Edd & Edd - Revised, was within the average range at 54/60 correct (T = 51). This pattern of performance is indicative of a patient who is at increased risk for day-to-day problems with verbal fluency and confrontation naming was normal.       The patient was administered the [facility] Continuous Performance Test - III,a computer administered test of sustained attention, and review of the subscales within this instrument revealed severe concern for inattentiveness without impulsivity.   This pattern of performance is indicative of a patient who is at increased risk for day-to-day problems with sustained visual attention/concentration. The patient is not showing problems with working memory capacity (9th %ile) though processing speed (5th %ile) was borderline on the WAIS-IV. His Verbal Comprehension Index score of 102 was average. His Perceptual Reasoning Index score of  96 was average. Processing speed is lower than what would be expected based on his performance on a task estimating premorbid functioning levels. The patient was administered the [place] Verbal Learning Test  - 3 and generated an impaired range (and flat) learning curve over five repeated auditory word list learning trials. An interference trial was within the normal range. Free and cued, short and long delayed recall were all impaired. Recognition recall was impaired. Forced choice recall was low normal. No concern for malingering. This pattern of performance is indicative of a patient who is at increased risk for day-to-day problems with auditory learning and memory. Simple timed visual motor sequencing (Trailmaking Test Part A) was within the below average range with a T score of 40. His performance on a similar, but more complex task of timed visual motor sequencing (Trailmaking Test Part B) was within the average range with a T score of 45. Taken together, this pattern of performance is not indicative of a patient who is at increased risk for day-to-day problems with executive functioning.  strength was moderately to severely impaired bilaterally. Fine motor dexterity was also moderately to severely impaired bilaterally. This does not raise concern for a focal or lateralized brain dysfunction. The patient rated his current level of pain as \"0/5- No Pain\" on the Jovi-Melzack Pain Questionnaire. His Shen Depression Inventory- II score of 4 was within the normal range.   His Shen Anxiety Inventory score of 5 reflected minimal anxiety. The patient was administered the Personality Assessment Inventory and generated a valid profile for interpretation. Within this context, he is concerned about physical functioning and health matters and there is anxiety about his health. Mild depression is present. Alcohol use may be a source of difficulty for him. Notable day-to-day stress and turmoil is reported. He is highly motivated for treatment. Impressions & Recommendations: This patient generated an abnormal range Neuropsychological Evaluation with respect to neurocognitive functioning. In this regard, he is showing problems with verbal fluency, sustained visual attention, auditory learning, auditory memory, bilateral motor skills, and processing speed. His performances across all other neurocognitive domains assessed are normal.  From an emotional standpoint, there is mild depression and anxiety about health. Alcohol use in recovery. The pattern of scores here is not consistent with chemo related cognitive decline/fatigue/\"fog. \" This is not a focal or lateralized issue, either. In my opinion, this profile is consistent with an evolving organic process that is currently at a mild to moderate level of severity. This is likely Alzheimer's disease, though a combination of Alzheimer's and vascular issues is also possible. In addition to continued medical care, my recommendations include consideration for memory management medication. I also strongly advise consideration for an appropriate medication for his moderate to severe attention deficit issues if this is not medically contraindicated. The emotional distress problems appear functional in etiology for her, for which I recommend active engagement in counseling. The patient should be encouraged to remain as mentally, socially, and physically active as possible. Continue to abstain from alcohol. I will send for a Neurology consult.       The patient's generated cognitive difficulties are significant to the degree whereby it is my opinion that he should not live independently without appropriate supervision for those domains with a heavy memory emphasis. This includes medication management supervision and supervision of financial dealings. Marked problems with attention and reaction time raise concern regarding driving safety, and I suggest consideration for a formal evaluation of same. The patient has capacity at this time. This has been caught early on, and I hope he recognizes this as positive. The more emotional support he can receive, given the history, the better. I wish him well. Baseline now established. Follow up yearly, or prn. Clinical correlation is, of course, indicated. I will discuss these findings with the patient and family when they follow up with me in the near future. A follow up Neuropsychological Evaluation is indicated on a prn basis. 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 If there is any additional information of which I am currently unaware, I would be more than happy to review it upon having it made available to me. Thank you for the opportunity to see this interesting individual.     Sincerely,       Miya Acosta. Rosa Mcnally PsyD, EdS      Attachments:  IQ Test Results (In Media Section Of This EMR)    Cc: Jacobo Torres MD    Time Documentation:    52075*0 88788*0 (60 minutes)    96138 x 1  96139 x 5 Test Administration/Data Gathering By Technician: (3 hours). 25230 x 1 (first 30 minutes), 46182 x 5 (each additional 30 minutes)    96132 x 1  96133 x 1 Testing Evaluation Services by Neuropsychologist (1 hour, 50 minutes) 96132 x 1 (first hour), 96133 x 1 (50 minutes)    Definitions:      10056/01438:  Neurobehavioral Status Exam, Clinical interview. Clinical assessment of thinking, reasoning and judgment, by neuropsychologist, both face to face time with patient and time interpreting those test results and reporting, first and subsequent hours)    90622/07586: Neuropsychological Test Administration by Technician/Psychometrist, first 30 minutes and each additional 30 minutes. The above includes: Record review. Review of history provided by patient. Review of collaborative information. Testing by Clinician. Review of raw data. Scoring. Report writing of individual tests administered by Clinician. Integration of individual tests administered by psychometrist with NSE/testing by clinician, review of records/history/collaborative information, case Conceptualization, treatment planning, clinical decision making, report writing, coordination Of Care. Psychometry test codes as time spent by psychometrist administering and scoring neurocognitive/psychological tests under supervision of neuropsychologist.  Integral services including scoring of raw data, data interpretation, case conceptualization, report writing etcetera were initiated after the patient finished testing/raw data collected and was completed on the date the report was signed. Please note that this dictation was completed with [employer], the [employer] voice recognition software. Quite often unanticipated grammatical, syntax, homophones, and other interpretive errors are inadvertently transcribed by the computer software. Please disregard these errors. Please excuse any errors that have escaped final proofreading. Thank you.

## 2022-11-22 NOTE — PROGRESS NOTES
[facility]  Medical Oncology at [facility]  [phone]    Hematology / Oncology Established Visit    Reason for Visit:   Almaz Franz is a 75 y.o. male who is seen for follow up of neuroendocrine carcinoma. Initially referred by Dr. Ly Hardin. 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 Flow cytometry negative for lymphoproliferative disorder. FoundationOne: MS Stable, TMB 0, MDM4 amplification, MYC amplification, TMPRSS2 TMPRSS2-ERD fusion, CEBPA 1311fs*10, ERBB4 amplification - equivocal, MCL1 amplification, [id number] amplification, RAD21 amplification. See scanned report for full info. Abbreviated [facility] Pathology Consultation:  Final diagnosis: Right external iliac node: Metastatic prostate adenocarcinoma. Comment: Histologic sections of the right external iliac node show sheets of cells with minimal eosinophilic cytoplasm and variable cytologic atypia. Some areas show acinar formation. The cells have predominantly round nuclei with vesicular chromatin and prominent nucleoli. Some areas show significantly more atypia with more irregular nuclear borders, hyperchromatic nyclei, and increase nuclear to cytoplasmic ratio. Frequent mitotic figures are identified. A provided pane of IHC stains is reviewed and demonstrates that the malignant cells show strong, diffuse expression of pan-cytokeratin and focal expression of TTF. CK7, CK20, chromogranin, and synaptophysin are negative in the lesional cells. CD3 and CD20 are negative in the lesions cells and positive in the background lymphocytes.  An abbreviated FoundationOne report was included, which included a TMPRSS2-ERG fusion (among other nonspecific abnormalities), which si found in approximately 50% of prostate cancers. Additional IHC studies performed at [facility] showed that the malignant cells have immunoreactivity with antibodies for PSA and PSAP, consistent with a diagnosis of metastatic prostate adenocarcinoma. Per report, flow cytometry negative for lymphoma. Overall, the histomorphology, immunophenotype and genomic findings in this case are diagnostic of metastatic prostate adenocarcinoma. The acinar formation and cytology were suggestive of this diagnosis, and the diffuse PSA and PSAP positivity as well as the TMPRSS2-ERG fusion confirmed the diagnosis. This fusion is the most common genomic alteration in prostate carcinoma and can be detected in over half of the cases, yet is not seen with any frequency in other types of carcinoma. Prior Treatment:   1. Carbo-Etoposide-Atezolizumab x 2 cycles, 6/29/20-7/20/20. 2. Docetaxel x 6 cycles, 8/10/20-11/24/20. Current Treatment: Zytiga 1000mg/d and prednisone 5mg BID, started 4/29/22 - current. Lupron every 12 weeks, started 8/2020-current      History of Present Illness:   Gita Kirby is a 75 y.o. male who comes in for follow up of poorly differentiated prostate cancer. Pt noticed a nontender bulge in his LLQ in 5/2020. He thought this was a hernia and was evaluated by Dr. Nicole Soto. CT on 5/20/20 was notable for extensive lymphadenopathy in abd/pelvis. Pt underwent robot assisted excisional Right external iliac LN biopsy 5/29/20, which showed poorly differentiated large cell neuroendocrine carcinoma. However, pathology reviewed at [facility] and felt to be poorly differentiated prostate adenocarcinoma. He reports intentional weight loss with dietary changes and exercise, losing 40-lbs in past 8 months. No n/v/d, melena/hematochezia, cough, SOB. No fevers, chills, sweats. [employer] nonsmoker. Mother had breast cancer diagnosed age < 48. Twin brother had melanoma diagnosed aged late 46s.   He does not think he has ever had a colonoscopy or PSA screening prior to current diagnosis. Interval History:  Patient here for follow up of prostate cancer. Continues on Zytiga and prednisone. He was evaluated by Dr. Ursula Mulligan and diagnosed with mild to moderate dementia. Reports his balance has improved the past few weeks, navigating turns has improved. No recent falls. Reports good appetite. Present with ex-wife Charline Clark and brother, Clarke Burrows. Clarke Burrows lives in [place]. PMHx: Prostate cancer, HTN  PSurgHx: appendectomy, vasectomy, subcutaneous cyst removal on forehead  SHx:  Never smoker, no EtOH currently after prior alcohol abuse. Unmarried. Has 2 children, 27 and 32. 1 lives in [place]. FHx:  Mother had breast cancer, father had prostate cancer, brother has melanoma, COPD. Review of Systems: A complete review of systems was obtained, negative except as described above. Physical Exam:     Visit Vitals  /78   Pulse 61   Temp 97.6 °F (36.4 °C)   Ht 5' 10\" (1.778 m)   Wt 206 lb 1.6 oz (93.5 kg)   SpO2 95%   BMI 29.57 kg/m²         ECOG PS: 0  General: Well developed, no acute distress  Eyes: Anicteric sclerae  Lymphatic: No cervical, supraclavicular adenopathy  Respiratory: course lung sounds throughout, normal respiratory effort  CV: Normal rate, regular rhythm, no murmurs, no edema, port upper chest, 1 + edema bilateral LE L > R.   GI: firm, nontender, nondistended, +BS  MS: Normal gait and station. Digits without clubbing or cyanosis. Skin:  Fingernails dark, thickened at distal half and more normal at proximal half. No ecchymoses, or petechiae. Normal temperature, turgor. Diffuse dry skin. Scattered ecchymosis on arms. 1 cm scaly lesion on left upper thigh   Neuro/Psych: Alert, oriented. Moves all 4 extremities. Appropriate affect, normal judgment/insight.     Results:     Lab Results   Component Value Date/Time    WBC 6.5 11/28/2022 11:57 AM    HGB 10.7 (L) 11/28/2022 11:57 AM    HCT 31.8 (L) 11/28/2022 11:57 AM    PLATELET 415 11/28/2022 11:57 AM    MCV 92.4 11/28/2022 11:57 AM    ABS. NEUTROPHILS 4.6 11/28/2022 11:57 AM     Lab Results   Component Value Date/Time    Sodium 135 (L) 11/28/2022 11:57 AM    Potassium 3.3 (L) 11/28/2022 11:57 AM    Chloride 98 11/28/2022 11:57 AM    CO2 30 11/28/2022 11:57 AM    Glucose 133 (H) 11/28/2022 11:57 AM    BUN 28 (H) 11/28/2022 11:57 AM    Creatinine 1.26 11/28/2022 11:57 AM    GFR est AA >60 10/03/2022 10:06 AM    GFR est non-AA 60 (L) 10/03/2022 10:06 AM    Calcium 9.0 11/28/2022 11:57 AM    Creatinine (POC) 1.30 04/15/2022 08:49 AM     Lab Results   Component Value Date/Time    Bilirubin, total 0.5 10/31/2022 10:27 AM    ALT (SGPT) 29 10/31/2022 10:27 AM    Alk.  phosphatase 90 10/31/2022 10:27 AM    Protein, total 7.4 10/31/2022 10:27 AM    Albumin 4.2 10/31/2022 10:27 AM    Globulin 3.2 10/31/2022 10:27 AM     Lab Results   Component Value Date/Time    Iron 82 04/18/2022 10:47 AM    TIBC 415 04/18/2022 10:47 AM    Iron % saturation 20 04/18/2022 10:47 AM    Ferritin 268 04/18/2022 10:47 AM       Lab Results   Component Value Date/Time    Vitamin B12 312 04/18/2022 10:47 AM    Folate 9.6 04/18/2022 10:47 AM     Lab Results   Component Value Date/Time    TSH 1.72 06/29/2020 09:58 AM     Lab Results   Component Value Date/Time    Prostate Specific Ag 3.5 10/31/2022 10:27 AM    Prostate Specific Ag 2.6 10/03/2022 10:06 AM    Prostate Specific Ag 2.1 09/06/2022 10:47 AM       No results found for: HAMAT, HAAB, HABT, HAAT, HBSAG, HBSB, HBSAT, HBABN, HBCM, HBCAB, HBCAT, XBCABS, HBEAB, [address], [facility], [id number], [address], [facility], HBCLT, [address], [id number], [id number], [address] [facility], [id number], HBCMLT, [id number], HCGAT     6/11/20: Chromogranin 42    6/11/20: PSA 38.6  7/20/20: PSA 13.2   8/10/20: PSA 1.2  8/31/20: PSA 0.5  9/24/20: PSA 0.4  10/15/20: PSA 0.3  11/5/20: PSA 0.3  11/24/20: 0.2  2/18/21: PSA 0.1    3/22/21 PSA 0.066 (PSA ultrasensitive at [address] [facility] urology)   4/1/21 PSA 0.1, testosterone < 3  5/13/21 PSA 0.1  21 PSA 0.2  10/28/21: PSA 0.4  22: PSA 2.3  3/9/22: PSA 9.1   22 PSA 25.3  22 started Zytiga + prednisone   22 PSA 7.2  22 PSA 4.2  22 PSA 2.9  22 PSA 2.8  22: PSA 2.1   10/3/22: PSA 2.6   10/31/22: PSA 3.5     Imagin/20/20 CT abd/pelvis with IV contrast:  1. No findings to suggest gross abdominal wall hernia or flank hernia. 2. Extensive adenopathy throughout the abdomen and pelvis as described. Findings are concerning for possible metastatic disease or lymphoma. Recommend follow up or comparison with prior studies. 20 PET:  FINDINGS:  HEAD/NECK: No apparent foci of abnormal hypermetabolism. Cerebral evaluation is  limited by normal intense activity. CHEST: Hypermetabolic lymphadenopathy at the base of the left neck and in the  left supraclavicular region is noted. Hypermetabolic superior mediastinal,  prevascular, right paratracheal, subcarinal, and bilateral hilar lymph  lymphadenopathy, maximum SUV of the subcarinal lymph node is 5.7. Small but  hypermetabolic soft tissue nodule left anterior chest wall. ABDOMEN/PELVIS: Hypermetabolic retrocrural, left para-aortic, aortocaval,  bilateral common iliac, bilateral external iliac, and bilateral inguinal  lymphadenopathy, maximum SUV 5.8 of an aortocaval lymph node. Hypermetabolic  mesenteric lymph node left lower quadrant, maximum SUV 12.3. SKELETON: No foci of abnormal hypermetabolism in the axial and visualized  appendicular skeleton. IMPRESSION: Hypermetabolic lymphadenopathy involving the left neck and left  supraclavicular region, mediastinum, bilateral hilum, retroperitoneum,  mesentery, and pelvis as described above. Hypermetabolic soft tissue nodule left  chest.    Brain MRI 20: There is no evidence of intracranial metastatic disease. Mild chronic microvascular ischemic change.   No intracranial mass, hemorrhage or evidence of acute infarction.    ----------------------------------------------------  7/7/22 Bone scan:   1. The focus of increased bony activity right occipital bone appears slightly larger. 2. Small focus of activity right sixth rib posterolaterally is stable. CT  demonstrates small focus of sclerosis in this region. Findings are suspicious for bony metastatic disease. No new foci of increased bony activity identified. 7/7/22 CT head:  1. No evidence of acute intracranial process. 2. No lesion to correspond to the previously seen abnormality in the right posterior skull on bone scan. 7/7/22 CT ch/abd/pelv:  Response to treatment characterized by decrease in nodularity in the left gluteal region, decrease in size of retroperitoneal lymph nodes, and decreased enlargement of the right psoas muscle. No new lung nodules or mediastinal lymphadenopathy. RECIST:  Target lesions:  Left retroperitoneal lymph node, 2:74, 14 x 12 mm, previously 16 x 15 mm. Right psoas enlargement, 2:76, 53 x 23 mm, previously 57 x 29 mm. Right pericaval lymph node, 2:77, 17 x 15 mm, previously 20 x 20 mm. Right external iliac lymph node, 2:90, 26 x 20 mm, previously 35 x 18 mm. Nontarget lesions:  Lung nodules have resolved. Right posterior lateral sixth rib sclerotic lesion is new. 9/27/22 CT ch/abd/pelv:  RECIST   TARGET LESIONS:  Lesion (description)         Location (series/slice)                Size      1. Left retroperitoneal lymph node    series 2 image 69    14 x 12 mm decrease in size  2. Right psoas infiltrative lesion    series 2 image 74    5.3 x 2.0 cm unchanged  3. Right pericaval lymph node    series 2 image 76    12 x 10 mm decrease in size  4. Right external iliac lymph node. Series 2 image 87    25 x 21 mm without significant change  NONTARGET LESIONS:  1. Lung nodules remain resolved. 2. Sclerotic lesions in the osseous structures unchanged. IMPRESSION  1.   Retroperitoneal lymph nodes are slightly decreased in size.  2.  Infiltrative right psoas lesion is not significant change. 3.  Right external iliac lymph node is not significant changed. 4.  Stable small sclerotic lesions. 9/27/22 Bone scan:  IMPRESSION  1. Focus of activity right occipital bone is stable. The tiny focus in the right sixth rib posterolaterally is less apparent. The tiny focus distal left femur which in retrospect is unchanged. There is a tiny focus in the sacrum which was not present previously but could  be degenerative related to L5-S1 degenerative disc changes. Assessment & Plan:   Vadim Miles is a 75 y.o. 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 1. Metastatic adenocarcinoma of prostate, high grade:  Hormone resistant. Initial pathologist at [address] [place] [address] called this \"Large cell neuroendocrine carcinoma,\" and based on diffuse disease on PET, patient was started on treatment with Carbo-Etoposide-Atezolizumab (thinking this was large cell carcinoma of the lung.) However FoundationOne testing identified TMPRSS2-ERG fusion, which is primarily seen in prostate cancers. This along with elevated PSA of 38 prompted sending of pathology specimen to [facility] for review. Their opinion and IHC staining is consistent with high grade adenocarcinoma of prostate. After Carbo-Etop-Atezo, pt was treated with Docetaxel x 6 cycles. Treatment options now include: Carboplatin + Cabazitaxel. 4/15/22 CT shows disease progression with a new liver and lung mets; bone scan with possible new occipital and possible rib met. Initiated Zytiga + prednisone on 4/29/22.   9/2022 CT and bone scan show response to treatment with decrease in size of retroperitoneal lymph nodes and stable sclerotic lesions. Supportive medications: EMLA cream.   -- Lupron (3 month depot)  #10 given 10/3/22, #11 due 12/26/22. -- Continue Zytiga 1000mg once daily plus Prednisone 5mg bid, started 4/29/22 - current.    -- Next CT and bone scan due in 12 weeks (12/20/22)  -- Return in 4 weeks labs, MD/NP visit. Will follow up 12/27/22 one day late due to holiday. -- 40 meq KCL in opic today. 2. Chemotherapy induced neuropathy:   Grade 1. Present in bilateral fingertips. He is dropping objects less often. 3. CKD / HTN:  On Amlodipine. Following with PCP. 4. Depression/anxiety / h/o Genital Herpes:   2/2 to #1. On Zoloft 50mg/d. Following with Palliative medicine. On suppression with Acyclovir. 5. Dysequilibrium / Ear effusions / dementia / falls:   Past Brain MRI negative for mets. Left tympanostomy tube placed by ENT for bilat effusions. Balanced improved with PT. Diagnosed with mild to moderate dementia via neuropsych evaluation. Was advised consideration for an appropriate medication for his moderate to severe attention deficit issues, counseling for the emotional distress problems. Dr Lelia Urbina referred to Neurology. -- Follow up Dr. Lelia Urbina 2/2023  -- Consultation with SW today to discuss future living options if memory/dementia worsen. 6. Health maintenance:   Had screening colonoscopy 8/13/2022 with GSI with benign polyp per patient. Received 2022 seasonal influenza vaccine. 7. Normocytic anemia:   Likely due to #1. Iron profile, ferritin, b12 and folate normal. Monitor with monthly labs. 8. Osteopenia / bone mets:   On calcium/D3 supplementation. Evaluated by Dr. Rafal Moore (dentist) and will eventually require extractions, treatment of severe periodontal disease, bridge may fail soon, but he advised ok to proceed with Xgeva. Discussed higher incidence of ONJ in patients on Denosumab with patient and he voices understanding. Wants to proceed with Xgeva. -- Xgeva every 4 weeks. Will reschedule for next month to align with Lupron. 9. Skin lesion / cyst:  1 cm, scaly  and pruritic lesion present on left upper thigh. Painless cyst on right upper back. -- Dermatology appt scheduled 3/8/2023.      Emotional well being: Pt is coping well with his/her disease and has a support system, but he is struggling with keeping up with housework, MARGOT Christianson consulted and advised patient apply for [place] [facility] and provided to call [phone]. SW provided ex wife- (1st wife and mother of pt children) Cancer [employer] number [phone] to assist pt with divorce of 2nd wife. I personally saw and evaluated the patient and performed the key components of medical decision making. The history, physical exam, and documentation were performed by Brenda Holley NP. I reviewed and verified the above documentation and modified it as needed. Continue Zytiga + Prednisone which he is tolerating well. Due for Lupron and Xgeva next month.        Signed By: Ignacio Martinez MD

## 2022-11-28 ENCOUNTER — OFFICE VISIT (OUTPATIENT)
Dept: ONCOLOGY | Age: 67
End: 2022-11-28
Payer: MEDICARE

## 2022-11-28 ENCOUNTER — APPOINTMENT (OUTPATIENT)
Dept: INFUSION THERAPY | Age: 67
End: 2022-11-28
Attending: NURSE PRACTITIONER

## 2022-11-28 ENCOUNTER — DOCUMENTATION ONLY (OUTPATIENT)
Dept: ONCOLOGY | Age: 67
End: 2022-11-28

## 2022-11-28 ENCOUNTER — HOSPITAL ENCOUNTER (OUTPATIENT)
Dept: INFUSION THERAPY | Age: 67
Discharge: HOME | End: 2022-11-28
Payer: MEDICARE

## 2022-11-28 VITALS
TEMPERATURE: 97.6 F | OXYGEN SATURATION: 95 % | HEIGHT: 70 IN | BODY MASS INDEX: 29.51 KG/M2 | HEART RATE: 61 BPM | SYSTOLIC BLOOD PRESSURE: 136 MMHG | DIASTOLIC BLOOD PRESSURE: 78 MMHG | WEIGHT: 206.1 LBS

## 2022-11-28 VITALS
DIASTOLIC BLOOD PRESSURE: 78 MMHG | HEIGHT: 70 IN | BODY MASS INDEX: 29.51 KG/M2 | OXYGEN SATURATION: 95 % | RESPIRATION RATE: 18 BRPM | HEART RATE: 61 BPM | TEMPERATURE: 97.6 F | WEIGHT: 206.1 LBS | SYSTOLIC BLOOD PRESSURE: 136 MMHG

## 2022-11-28 DIAGNOSIS — Z79.899 ENCOUNTER FOR MEDICATION MANAGEMENT: ICD-10-CM

## 2022-11-28 DIAGNOSIS — R59.0 MEDIASTINAL LYMPHADENOPATHY: Primary | ICD-10-CM

## 2022-11-28 DIAGNOSIS — C79.82 METASTATIC ADENOCARCINOMA TO PROSTATE (HCC): ICD-10-CM

## 2022-11-28 DIAGNOSIS — E87.6 HYPOKALEMIA: ICD-10-CM

## 2022-11-28 DIAGNOSIS — C77.9 REGIONAL LYMPH NODE METASTASIS PRESENT (HCC): ICD-10-CM

## 2022-11-28 DIAGNOSIS — G30.0 MILD EARLY ONSET ALZHEIMER'S DEMENTIA WITHOUT BEHAVIORAL DISTURBANCE, PSYCHOTIC DISTURBANCE, MOOD DISTURBANCE, OR ANXIETY (HCC): ICD-10-CM

## 2022-11-28 DIAGNOSIS — F02.A0 MILD EARLY ONSET ALZHEIMER'S DEMENTIA WITHOUT BEHAVIORAL DISTURBANCE, PSYCHOTIC DISTURBANCE, MOOD DISTURBANCE, OR ANXIETY (HCC): ICD-10-CM

## 2022-11-28 DIAGNOSIS — C79.51 BONE METASTASES (HCC): ICD-10-CM

## 2022-11-28 DIAGNOSIS — C61 ADENOCARCINOMA OF PROSTATE, STAGE 4 (HCC): ICD-10-CM

## 2022-11-28 DIAGNOSIS — C61 ADENOCARCINOMA OF PROSTATE, STAGE 4 (HCC): Primary | ICD-10-CM

## 2022-11-28 DIAGNOSIS — C7A.8 LARGE CELL NEUROENDOCRINE CARCINOMA (HCC): ICD-10-CM

## 2022-11-28 LAB
ALBUMIN SERPL-MCNC: 4.1 G/DL (ref 3.5–5)
ALBUMIN/GLOB SERPL: 1.2 {RATIO} (ref 1.1–2.2)
ALP SERPL-CCNC: 71 U/L (ref 45–117)
ALT SERPL-CCNC: 32 U/L (ref 12–78)
ANION GAP SERPL CALC-SCNC: 7 MMOL/L (ref 5–15)
AST SERPL-CCNC: 65 U/L (ref 15–37)
BASOPHILS # BLD: 0.1 K/UL (ref 0–0.1)
BASOPHILS NFR BLD: 1 % (ref 0–1)
BILIRUB SERPL-MCNC: 0.8 MG/DL (ref 0.2–1)
BUN SERPL-MCNC: 28 MG/DL (ref 6–20)
BUN/CREAT SERPL: 22 (ref 12–20)
CALCIUM SERPL-MCNC: 9 MG/DL (ref 8.5–10.1)
CHLORIDE SERPL-SCNC: 98 MMOL/L (ref 97–108)
CO2 SERPL-SCNC: 30 MMOL/L (ref 21–32)
CREAT SERPL-MCNC: 1.26 MG/DL (ref 0.7–1.3)
DIFFERENTIAL METHOD BLD: ABNORMAL
EOSINOPHIL # BLD: 0.1 K/UL (ref 0–0.4)
EOSINOPHIL NFR BLD: 2 % (ref 0–7)
ERYTHROCYTE [DISTWIDTH] IN BLOOD BY AUTOMATED COUNT: 14.6 % (ref 11.5–14.5)
GLOBULIN SER CALC-MCNC: 3.3 G/DL (ref 2–4)
GLUCOSE SERPL-MCNC: 133 MG/DL (ref 65–100)
HCT VFR BLD AUTO: 31.8 % (ref 36.6–50.3)
HGB BLD-MCNC: 10.7 G/DL (ref 12.1–17)
IMM GRANULOCYTES # BLD AUTO: 0.1 K/UL (ref 0–0.04)
IMM GRANULOCYTES NFR BLD AUTO: 1 % (ref 0–0.5)
LYMPHOCYTES # BLD: 1.2 K/UL (ref 0.8–3.5)
LYMPHOCYTES NFR BLD: 19 % (ref 12–49)
MAGNESIUM SERPL-MCNC: 2.2 MG/DL (ref 1.6–2.4)
MCH RBC QN AUTO: 31.1 PG (ref 26–34)
MCHC RBC AUTO-ENTMCNC: 33.6 G/DL (ref 30–36.5)
MCV RBC AUTO: 92.4 FL (ref 80–99)
MONOCYTES # BLD: 0.4 K/UL (ref 0–1)
MONOCYTES NFR BLD: 6 % (ref 5–13)
NEUTS SEG # BLD: 4.6 K/UL (ref 1.8–8)
NEUTS SEG NFR BLD: 71 % (ref 32–75)
NRBC # BLD: 0 K/UL (ref 0–0.01)
NRBC BLD-RTO: 0 PER 100 WBC
PHOSPHATE SERPL-MCNC: 2.8 MG/DL (ref 2.6–4.7)
PLATELET # BLD AUTO: 180 K/UL (ref 150–400)
PMV BLD AUTO: 10.6 FL (ref 8.9–12.9)
POTASSIUM SERPL-SCNC: 3.3 MMOL/L (ref 3.5–5.1)
PROT SERPL-MCNC: 7.4 G/DL (ref 6.4–8.2)
PSA SERPL-MCNC: 5.9 NG/ML (ref 0.01–4)
RBC # BLD AUTO: 3.44 M/UL (ref 4.1–5.7)
SODIUM SERPL-SCNC: 135 MMOL/L (ref 136–145)
WBC # BLD AUTO: 6.5 K/UL (ref 4.1–11.1)

## 2022-11-28 PROCEDURE — G8536 NO DOC ELDER MAL SCRN: HCPCS | Performed by: INTERNAL MEDICINE

## 2022-11-28 PROCEDURE — 77030016057 HC NDL HUBR APOL -B

## 2022-11-28 PROCEDURE — 85025 COMPLETE CBC W/AUTO DIFF WBC: CPT

## 2022-11-28 PROCEDURE — 1101F PT FALLS ASSESS-DOCD LE1/YR: CPT | Performed by: INTERNAL MEDICINE

## 2022-11-28 PROCEDURE — G0463 HOSPITAL OUTPT CLINIC VISIT: HCPCS | Performed by: INTERNAL MEDICINE

## 2022-11-28 PROCEDURE — 3017F COLORECTAL CA SCREEN DOC REV: CPT | Performed by: INTERNAL MEDICINE

## 2022-11-28 PROCEDURE — G9717 DOC PT DX DEP/BP F/U NT REQ: HCPCS | Performed by: INTERNAL MEDICINE

## 2022-11-28 PROCEDURE — G8754 DIAS BP LESS 90: HCPCS | Performed by: INTERNAL MEDICINE

## 2022-11-28 PROCEDURE — 1123F ACP DISCUSS/DSCN MKR DOCD: CPT | Performed by: INTERNAL MEDICINE

## 2022-11-28 PROCEDURE — G8427 DOCREV CUR MEDS BY ELIG CLIN: HCPCS | Performed by: INTERNAL MEDICINE

## 2022-11-28 PROCEDURE — 80053 COMPREHEN METABOLIC PANEL: CPT

## 2022-11-28 PROCEDURE — G8752 SYS BP LESS 140: HCPCS | Performed by: INTERNAL MEDICINE

## 2022-11-28 PROCEDURE — 84100 ASSAY OF PHOSPHORUS: CPT

## 2022-11-28 PROCEDURE — G8417 CALC BMI ABV UP PARAM F/U: HCPCS | Performed by: INTERNAL MEDICINE

## 2022-11-28 PROCEDURE — 84403 ASSAY OF TOTAL TESTOSTERONE: CPT

## 2022-11-28 PROCEDURE — 99215 OFFICE O/P EST HI 40 MIN: CPT | Performed by: INTERNAL MEDICINE

## 2022-11-28 PROCEDURE — 83735 ASSAY OF MAGNESIUM: CPT

## 2022-11-28 PROCEDURE — 84153 ASSAY OF PSA TOTAL: CPT

## 2022-11-28 PROCEDURE — 36591 DRAW BLOOD OFF VENOUS DEVICE: CPT

## 2022-11-28 RX ORDER — ACETAMINOPHEN 325 MG/1
650 TABLET ORAL AS NEEDED
Start: 2022-12-26

## 2022-11-28 RX ORDER — EPINEPHRINE 1 MG/ML
0.3 INJECTION, SOLUTION, CONCENTRATE INTRAVENOUS AS NEEDED
Status: DISCONTINUED | OUTPATIENT
Start: 2022-11-28 | End: 2022-11-29 | Stop reason: HOSPADM

## 2022-11-28 RX ORDER — ALBUTEROL SULFATE 0.83 MG/ML
2.5 SOLUTION RESPIRATORY (INHALATION) AS NEEDED
Start: 2022-12-26

## 2022-11-28 RX ORDER — HYDROCORTISONE SODIUM SUCCINATE 100 MG/2ML
100 INJECTION, POWDER, FOR SOLUTION INTRAMUSCULAR; INTRAVENOUS AS NEEDED
Status: DISCONTINUED | OUTPATIENT
Start: 2022-11-28 | End: 2022-11-29 | Stop reason: HOSPADM

## 2022-11-28 RX ORDER — ALBUTEROL SULFATE 90 UG/1
2 AEROSOL, METERED RESPIRATORY (INHALATION) AS NEEDED
Status: DISCONTINUED | OUTPATIENT
Start: 2022-11-28 | End: 2022-11-29 | Stop reason: HOSPADM

## 2022-11-28 RX ORDER — DIPHENHYDRAMINE HYDROCHLORIDE 50 MG/ML
50 INJECTION, SOLUTION INTRAMUSCULAR; INTRAVENOUS AS NEEDED
Start: 2022-12-26

## 2022-11-28 RX ORDER — ONDANSETRON 2 MG/ML
8 INJECTION INTRAMUSCULAR; INTRAVENOUS AS NEEDED
OUTPATIENT
Start: 2022-12-26

## 2022-11-28 RX ORDER — DIPHENHYDRAMINE HYDROCHLORIDE 50 MG/ML
50 INJECTION, SOLUTION INTRAMUSCULAR; INTRAVENOUS AS NEEDED
Status: DISCONTINUED | OUTPATIENT
Start: 2022-11-28 | End: 2022-11-29 | Stop reason: HOSPADM

## 2022-11-28 RX ORDER — EPINEPHRINE 1 MG/ML
0.3 INJECTION, SOLUTION, CONCENTRATE INTRAVENOUS AS NEEDED
OUTPATIENT
Start: 2022-12-26

## 2022-11-28 RX ORDER — DIPHENHYDRAMINE HYDROCHLORIDE 50 MG/ML
25 INJECTION, SOLUTION INTRAMUSCULAR; INTRAVENOUS AS NEEDED
Start: 2022-12-26

## 2022-11-28 RX ORDER — HYDROCORTISONE SODIUM SUCCINATE 100 MG/2ML
100 INJECTION, POWDER, FOR SOLUTION INTRAMUSCULAR; INTRAVENOUS AS NEEDED
OUTPATIENT
Start: 2022-12-26

## 2022-11-28 NOTE — PROGRESS NOTES
Sheri Eason is a 75 y.o. male here for follow up of prostate cancer. Patient with no complaints of pain at this time.

## 2022-11-28 NOTE — PROGRESS NOTES
[facility] Progress Note    Date: 2022    Name: Ceci Sheppard    MRN: [id number]         : 1955    Mr. Christine Gill Arrived ambulatory and in no distress for PF/Labs. Port accessed without difficulty, labs drawn & sent for processing. Mr. Coles Day vitals were reviewed. Visit Vitals  /78   Pulse 61   Temp 97.6 °F (36.4 °C)   Resp 18   Ht 5' 10\" (1.778 m)   Wt 93.5 kg (206 lb 1.6 oz)   SpO2 95%   BMI 29.57 kg/m²       Lab results were obtained and reviewed. Recent Results (from the past 12 hour(s))   CBC WITH AUTOMATED DIFF    Collection Time: 22 11:57 AM   Result Value Ref Range    WBC 6.5 4.1 - 11.1 K/uL    RBC 3.44 (L) 4.10 - 5.70 M/uL    HGB 10.7 (L) 12.1 - 17.0 g/dL    HCT 31.8 (L) 36.6 - 50.3 %    MCV 92.4 80.0 - 99.0 FL    MCH 31.1 26.0 - 34.0 PG    MCHC 33.6 30.0 - 36.5 g/dL    RDW 14.6 (H) 11.5 - 14.5 %    PLATELET 335 885 - 045 K/uL    MPV 10.6 8.9 - 12.9 FL    NRBC 0.0 0  WBC    ABSOLUTE NRBC 0.00 0.00 - 0.01 K/uL    NEUTROPHILS 71 32 - 75 %    LYMPHOCYTES 19 12 - 49 %    MONOCYTES 6 5 - 13 %    EOSINOPHILS 2 0 - 7 %    BASOPHILS 1 0 - 1 %    IMMATURE GRANULOCYTES 1 (H) 0.0 - 0.5 %    ABS. NEUTROPHILS 4.6 1.8 - 8.0 K/UL    ABS. LYMPHOCYTES 1.2 0.8 - 3.5 K/UL    ABS. MONOCYTES 0.4 0.0 - 1.0 K/UL    ABS. EOSINOPHILS 0.1 0.0 - 0.4 K/UL    ABS. BASOPHILS 0.1 0.0 - 0.1 K/UL    ABS. IMM.  GRANS. 0.1 (H) 0.00 - 0.04 K/UL    DF AUTOMATED     METABOLIC PANEL, COMPREHENSIVE    Collection Time: 22 11:57 AM   Result Value Ref Range    Sodium 135 (L) 136 - 145 mmol/L    Potassium 3.3 (L) 3.5 - 5.1 mmol/L    Chloride 98 97 - 108 mmol/L    CO2 30 21 - 32 mmol/L    Anion gap 7 5 - 15 mmol/L    Glucose 133 (H) 65 - 100 mg/dL    BUN 28 (H) 6 - 20 MG/DL    Creatinine 1.26 0.70 - 1.30 MG/DL    BUN/Creatinine ratio 22 (H) 12 - 20      eGFR >60 >60 ml/min/1.73m2    Calcium 9.0 8.5 - 10.1 MG/DL    Bilirubin, total 0.8 0.2 - 1.0 MG/DL    ALT (SGPT) 32 12 - 78 U/L    AST (SGOT) 65 (H) 15 - 37 U/L    Alk. phosphatase 71 45 - 117 U/L    Protein, total 7.4 6.4 - 8.2 g/dL    Albumin 4.1 3.5 - 5.0 g/dL    Globulin 3.3 2.0 - 4.0 g/dL    A-G Ratio 1.2 1.1 - 2.2     MAGNESIUM    Collection Time: 11/28/22 11:57 AM   Result Value Ref Range    Magnesium 2.2 1.6 - 2.4 mg/dL   PHOSPHORUS    Collection Time: 11/28/22 11:57 AM   Result Value Ref Range    Phosphorus 2.8 2.6 - 4.7 MG/DL               Mr. Amando Mccarthy tolerated treatment well and was discharged from [place] [address] in stable condition. Port de-accessed, flushed & heparinized per protocol. Xgeva deferred today to align with appointment with lupron in December. He is to return on December 27 at 0930 for his next appointment.     [facility]  November 28, 2022

## 2022-11-28 NOTE — PROGRESS NOTES
[facility]   Oncology Social Work  Encounter     Patient Name:  Marc Bell. \"Azeem\"    Medical History: High grade prostate adenocarcinoma     Advance Directives: none on file; provided information and offer to assist pt complete AMD when desired    Narrative: Met with patient, his brother and ex-wife to assess supports and provide resources. Patient lives alone in his private residence. His ex-wife and adult son live locally and are his primary support. His brother lives in [place] and is here visiting. Explored the benefits of planing for increased care needs due to cognitive or physical impairments. Discussed AMD/mPOA and offered to assist pt complete those documents when ready.      Referral/Handouts:   Dependent Care referral  Legal referral    Thank you,   Whitney Clark LCSW

## 2022-12-01 LAB
TESTOST FREE SERPL-MCNC: <0.2 PG/ML (ref 6.6–18.1)
TESTOST SERPL-MCNC: <3 NG/DL (ref 264–916)

## 2022-12-19 ENCOUNTER — HOSPITAL ENCOUNTER (OUTPATIENT)
Dept: CT IMAGING | Age: 67
Discharge: HOME | End: 2022-12-19
Attending: NURSE PRACTITIONER
Payer: MEDICARE

## 2022-12-19 ENCOUNTER — HOSPITAL ENCOUNTER (OUTPATIENT)
Dept: NUCLEAR MEDICINE | Age: 67
Discharge: HOME | End: 2022-12-19
Attending: NURSE PRACTITIONER
Payer: MEDICARE

## 2022-12-19 DIAGNOSIS — C61 ADENOCARCINOMA OF PROSTATE, STAGE 4 (HCC): ICD-10-CM

## 2022-12-19 PROCEDURE — 74011000636 HC RX REV CODE- 636: Performed by: NURSE PRACTITIONER

## 2022-12-19 PROCEDURE — 74177 CT ABD & PELVIS W/CONTRAST: CPT

## 2022-12-19 PROCEDURE — 78306 BONE IMAGING WHOLE BODY: CPT

## 2022-12-19 RX ADMIN — IOPAMIDOL 100 ML: 755 INJECTION, SOLUTION INTRAVENOUS at 08:18

## 2022-12-20 RX ORDER — ABIRATERONE 500 MG/1
1000 TABLET ORAL DAILY
Qty: 60 TABLET | Refills: 3 | Status: SHIPPED | OUTPATIENT
Start: 2022-12-20

## 2022-12-20 NOTE — TELEPHONE ENCOUNTER
[employer] at [facility]  [phone]        12/20/22 11:05 AM Refill of zytiga pended to NP for review.

## 2022-12-21 NOTE — PROGRESS NOTES
[address]  Medical Oncology at [address] [place] [address]  [phone]    Hematology / Oncology Established Visit    Reason for Visit:   Yaima Dockery is a 75 y.o. male who is seen for follow up of neuroendocrine carcinoma. Initially referred by Dr. Jeanna Rowe. 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 Flow cytometry negative for lymphoproliferative disorder. FoundationOne: MS Stable, TMB 0, MDM4 amplification, MYC amplification, TMPRSS2 TMPRSS2-ERD fusion, CEBPA 1311fs*10, ERBB4 amplification - equivocal, MCL1 amplification, PZK5M6H amplification, RAD21 amplification. See scanned report for full info. Abbreviated [facility] Pathology Consultation:  Final diagnosis: Right external iliac node: Metastatic prostate adenocarcinoma. Comment: Histologic sections of the right external iliac node show sheets of cells with minimal eosinophilic cytoplasm and variable cytologic atypia. Some areas show acinar formation. The cells have predominantly round nuclei with vesicular chromatin and prominent nucleoli. Some areas show significantly more atypia with more irregular nuclear borders, hyperchromatic nyclei, and increase nuclear to cytoplasmic ratio. Frequent mitotic figures are identified. A provided pane of IHC stains is reviewed and demonstrates that the malignant cells show strong, diffuse expression of pan-cytokeratin and focal expression of TTF. CK7, CK20, chromogranin, and synaptophysin are negative in the lesional cells. CD3 and CD20 are negative in the lesions cells and positive in the background lymphocytes.  An abbreviated FoundationOne report was included, which included a TMPRSS2-ERG fusion (among other nonspecific abnormalities), which si found in approximately 50% of prostate cancers. Additional IHC studies performed at [facility] showed that the malignant cells have immunoreactivity with antibodies for PSA and PSAP, consistent with a diagnosis of metastatic prostate adenocarcinoma. Per report, flow cytometry negative for lymphoma. Overall, the histomorphology, immunophenotype and genomic findings in this case are diagnostic of metastatic prostate adenocarcinoma. The acinar formation and cytology were suggestive of this diagnosis, and the diffuse PSA and PSAP positivity as well as the TMPRSS2-ERG fusion confirmed the diagnosis. This fusion is the most common genomic alteration in prostate carcinoma and can be detected in over half of the cases, yet is not seen with any frequency in other types of carcinoma. Prior Treatment:   1. Carbo-Etoposide-Atezolizumab x 2 cycles, 6/29/20-7/20/20. 2. Docetaxel x 6 cycles, 8/10/20-11/24/20. Current Treatment: Zytiga 1000mg/d and prednisone 5mg BID, started 4/29/22 - current. Lupron every 12 weeks, started 8/2020-current      History of Present Illness:   Artem Garcia is a 75 y.o. male who comes in for follow up of poorly differentiated prostate cancer. Pt noticed a nontender bulge in his LLQ in 5/2020. He thought this was a hernia and was evaluated by Dr. Lucho Macias. CT on 5/20/20 was notable for extensive lymphadenopathy in abd/pelvis. Pt underwent robot assisted excisional Right external iliac LN biopsy 5/29/20, which showed poorly differentiated large cell neuroendocrine carcinoma. However, pathology reviewed at [facility] and felt to be poorly differentiated prostate adenocarcinoma. He reports intentional weight loss with dietary changes and exercise, losing 40-lbs in past 8 months. No n/v/d, melena/hematochezia, cough, SOB. No fevers, chills, sweats. [employer] nonsmoker. Mother had breast cancer diagnosed age < 48. Twin brother had melanoma diagnosed aged late 46s.   He does not think he has ever had a colonoscopy or PSA screening prior to current diagnosis. Interval History:  Patient here for follow up of prostate cancer. Continues on Zytiga and prednisone. He completed a CT and bone scan. Pt is only walking minimally during the day. No fevers, chills, sweats. No bone pain. Still adjusting to losing his parent. PMHx: Prostate cancer, HTN  PSurgHx: appendectomy, vasectomy, subcutaneous cyst removal on forehead  SHx:  Never smoker, no EtOH currently after prior alcohol abuse. Unmarried. Has 2 children, 27 and 32. 1 lives in [place]. FHx:  Mother had breast cancer, father had prostate cancer, brother has melanoma, COPD. Review of Systems: A complete review of systems was obtained, negative except as described above. Physical Exam:     Visit Vitals  /87 (BP 1 Location: Left upper arm, BP Patient Position: Sitting, BP Cuff Size: Large adult)   Pulse 60   Temp 98.2 °F (36.8 °C)   Resp 18   Ht 5' 10\" (1.778 m)   Wt 206 lb 11.2 oz (93.8 kg)   SpO2 94%   BMI 29.66 kg/m²       ECOG PS: 0  General: Well developed, no acute distress  Eyes: Anicteric sclerae  Lymphatic: No cervical, supraclavicular adenopathy  Respiratory: BCTA, normal respiratory effort  CV: Normal rate, regular rhythm, no murmurs, no edema, port upper chest, 1 + edema bilateral LE L > R.   GI: firm, nontender, nondistended, +BS  MS: Normal gait and station. Digits without clubbing or cyanosis. Skin:  Fingernails dark, thickened at distal half and more normal at proximal half. No ecchymoses, or petechiae. Normal temperature, turgor. Diffuse dry skin. Scattered ecchymosis on arms. 1 cm scaly lesion on left upper thigh   Neuro/Psych: Alert, oriented. Moves all 4 extremities. Appropriate affect, normal judgment/insight.     Results:     Lab Results   Component Value Date/Time    WBC 7.2 12/27/2022 09:38 AM    HGB 10.6 (L) 12/27/2022 09:38 AM    HCT 31.9 (L) 12/27/2022 09:38 AM    PLATELET 440 18/01/5598 09:38 AM    MCV 94.4 12/27/2022 09:38 AM    ABS. NEUTROPHILS 5.3 12/27/2022 09:38 AM     Lab Results   Component Value Date/Time    Sodium 135 (L) 11/28/2022 11:57 AM    Potassium 3.3 (L) 11/28/2022 11:57 AM    Chloride 98 11/28/2022 11:57 AM    CO2 30 11/28/2022 11:57 AM    Glucose 133 (H) 11/28/2022 11:57 AM    BUN 28 (H) 11/28/2022 11:57 AM    Creatinine 1.26 11/28/2022 11:57 AM    GFR est AA >60 10/03/2022 10:06 AM    GFR est non-AA 60 (L) 10/03/2022 10:06 AM    Calcium 9.0 11/28/2022 11:57 AM    Creatinine (POC) 1.30 04/15/2022 08:49 AM     Lab Results   Component Value Date/Time    Bilirubin, total 0.8 11/28/2022 11:57 AM    ALT (SGPT) 32 11/28/2022 11:57 AM    Alk.  phosphatase 71 11/28/2022 11:57 AM    Protein, total 7.4 11/28/2022 11:57 AM    Albumin 4.1 11/28/2022 11:57 AM    Globulin 3.3 11/28/2022 11:57 AM     Lab Results   Component Value Date/Time    Iron 82 04/18/2022 10:47 AM    TIBC 415 04/18/2022 10:47 AM    Iron % saturation 20 04/18/2022 10:47 AM    Ferritin 268 04/18/2022 10:47 AM       Lab Results   Component Value Date/Time    Vitamin B12 312 04/18/2022 10:47 AM    Folate 9.6 04/18/2022 10:47 AM     Lab Results   Component Value Date/Time    TSH 1.72 06/29/2020 09:58 AM     Lab Results   Component Value Date/Time    Prostate Specific Ag 5.9 (H) 11/28/2022 11:57 AM    Prostate Specific Ag 3.5 10/31/2022 10:27 AM    Prostate Specific Ag 2.6 10/03/2022 10:06 AM       No results found for: HAMAT, HAAB, HABT, HAAT, HBSAG, HBSB, HBSAT, HBABN, HBCM, HBCAB, HBCAT, Odetta Ganong, HBEAB, HBEAG, XHEPCS, [id number], [address], [facility], [facility], [address], [id number], [id number], [address] [place] [address], [id number], [facility], [id number], HCGAT     6/11/20: Chromogranin 42    6/11/20: PSA 38.6  7/20/20: PSA 13.2   8/10/20: PSA 1.2  8/31/20: PSA 0.5  9/24/20: PSA 0.4  10/15/20: PSA 0.3  11/5/20: PSA 0.3  11/24/20: 0.2  2/18/21: PSA 0.1    3/22/21 PSA 0.066 (PSA ultrasensitive at [facility])   4/1/21 PSA 0.1, testosterone < 3  5/13/21 PSA 0.1  8/5/21 PSA 0.2  10/28/21: PSA 0.4  1/20/22: PSA 2.3  3/9/22: PSA 9.1   22 PSA 25.3  22 started Zytiga + prednisone   22 PSA 7.2  22 PSA 4.2  22 PSA 2.9  22 PSA 2.8  22: PSA 2.1   10/3/22: PSA 2.6   10/31/22: PSA 3.5   22: PSA 5.8     Imagin/20/20 CT abd/pelvis with IV contrast:  1. No findings to suggest gross abdominal wall hernia or flank hernia. 2. Extensive adenopathy throughout the abdomen and pelvis as described. Findings are concerning for possible metastatic disease or lymphoma. Recommend follow up or comparison with prior studies. 20 PET:  FINDINGS:  HEAD/NECK: No apparent foci of abnormal hypermetabolism. Cerebral evaluation is  limited by normal intense activity. CHEST: Hypermetabolic lymphadenopathy at the base of the left neck and in the  left supraclavicular region is noted. Hypermetabolic superior mediastinal,  prevascular, right paratracheal, subcarinal, and bilateral hilar lymph  lymphadenopathy, maximum SUV of the subcarinal lymph node is 5.7. Small but  hypermetabolic soft tissue nodule left anterior chest wall. ABDOMEN/PELVIS: Hypermetabolic retrocrural, left para-aortic, aortocaval,  bilateral common iliac, bilateral external iliac, and bilateral inguinal  lymphadenopathy, maximum SUV 5.8 of an aortocaval lymph node. Hypermetabolic  mesenteric lymph node left lower quadrant, maximum SUV 12.3. SKELETON: No foci of abnormal hypermetabolism in the axial and visualized  appendicular skeleton. IMPRESSION: Hypermetabolic lymphadenopathy involving the left neck and left  supraclavicular region, mediastinum, bilateral hilum, retroperitoneum,  mesentery, and pelvis as described above. Hypermetabolic soft tissue nodule left  chest.    Brain MRI 20: There is no evidence of intracranial metastatic disease. Mild chronic microvascular ischemic change.   No intracranial mass, hemorrhage or evidence of acute infarction.    ----------------------------------------------------  22 Bone scan:   1. The focus of increased bony activity right occipital bone appears slightly larger. 2. Small focus of activity right sixth rib posterolaterally is stable. CT  demonstrates small focus of sclerosis in this region. Findings are suspicious for bony metastatic disease. No new foci of increased bony activity identified. 7/7/22 CT head:  1. No evidence of acute intracranial process. 2. No lesion to correspond to the previously seen abnormality in the right posterior skull on bone scan. 7/7/22 CT ch/abd/pelv:  Response to treatment characterized by decrease in nodularity in the left gluteal region, decrease in size of retroperitoneal lymph nodes, and decreased enlargement of the right psoas muscle. No new lung nodules or mediastinal lymphadenopathy. RECIST:  Target lesions:  Left retroperitoneal lymph node, 2:74, 14 x 12 mm, previously 16 x 15 mm. Right psoas enlargement, 2:76, 53 x 23 mm, previously 57 x 29 mm. Right pericaval lymph node, 2:77, 17 x 15 mm, previously 20 x 20 mm. Right external iliac lymph node, 2:90, 26 x 20 mm, previously 35 x 18 mm. Nontarget lesions:  Lung nodules have resolved. Right posterior lateral sixth rib sclerotic lesion is new. 9/27/22 CT ch/abd/pelv:  RECIST   TARGET LESIONS:  Lesion (description)         Location (series/slice)                Size      1. Left retroperitoneal lymph node    series 2 image 69    14 x 12 mm decrease in size  2. Right psoas infiltrative lesion    series 2 image 74    5.3 x 2.0 cm unchanged  3. Right pericaval lymph node    series 2 image 76    12 x 10 mm decrease in size  4. Right external iliac lymph node. Series 2 image 87    25 x 21 mm without significant change  NONTARGET LESIONS:  1. Lung nodules remain resolved. 2. Sclerotic lesions in the osseous structures unchanged. IMPRESSION  1. Retroperitoneal lymph nodes are slightly decreased in size. 2.  Infiltrative right psoas lesion is not significant change.   3.  Right external iliac lymph node is not significant changed. 4.  Stable small sclerotic lesions. 9/27/22 Bone scan:  IMPRESSION  1. Focus of activity right occipital bone is stable. The tiny focus in the right sixth rib posterolaterally is less apparent. The tiny focus distal left femur which in retrospect is unchanged. There is a tiny focus in the sacrum which was not present previously but could  be degenerative related to L5-S1 degenerative disc changes. 12/19/22 CT ch/abd/pelv:  IMPRESSION  1. Interval mixed response of retroperitoneal lymph nodes/tumor implants as  outlined above. 2.  Stable scattered sclerotic osseous lesions. 3.  Right eccentric rectal wall thickening and prominent vascularity, consider  correlation with proctoscopy. RECIST   Interval mixed response. TARGET LESIONS:   Lesion (description)         Location (series/slice)                Size      1. Left retroperitoneal lymph nodes/tumor implant, decreased    2-70     currently 9 mm x 6 mm, previously 14 mm x 12 mm  2. Right psoas infiltrative lesion, increased    2-86    largest focus currently  24 mm x 20 mm, previously 21 mm x 16 mm  3. Right pericaval lymph node, stable to slightly decreased    2-77    currently  12 mm x 7 mm, previously 12 mm x 10 mm  4. Right external iliac lymph node, increased    2-87    currently 33 mm x 25  mm, previously 25 mm x 21 mm   NONTARGET LESIONS:  1. Lung nodules remain resolved  2. Sclerotic osseous lesions, stable    12/19/22 Bone scan:  FINDINGS: The calvarial, left distal femur, and sacral lesions are again noted  and demonstrate increased tracer activity compared to the prior exam. No new  abnormal uptake is identified. Incidental renal imaging shows no abnormality. IMPRESSION  Stable pattern of osseous metastatic disease within increase in the  tracer activity corresponding to the previously described lesions. No new lesion  is identified.      Assessment & Plan:   Soraida Manuel is a 75 y.o. 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 1. Metastatic adenocarcinoma of prostate, high grade:  Hormone resistant. Initial pathologist at [address] [place] [address] called this \"Large cell neuroendocrine carcinoma,\" and based on diffuse disease on PET, patient was started on treatment with Carbo-Etoposide-Atezolizumab (thinking this was large cell carcinoma of the lung.) However FoundationOne testing identified TMPRSS2-ERG fusion, which is primarily seen in prostate cancers. This along with elevated PSA of 38 prompted sending of pathology specimen to [facility] for review. Their opinion and IHC staining is consistent with high grade adenocarcinoma of prostate. After Carbo-Etop-Atezo, pt was treated with Docetaxel x 6 cycles. Treatment options now include: Carboplatin + Cabazitaxel. 4/15/22 CT shows disease progression with a new liver and lung mets; bone scan with possible new occipital and possible rib met. Initiated Zytiga + prednisone on 4/29/22.   12/2022 CT shows a mixed response with increase is right iliac lymph node, right psoas and decrease in right pericaval/left peritoneal lymph nodes and stable osseous mets on bone scan. Given mixed response on imaging and rising PSA, I discussed switching treatment. Discussed Cabazitaxel 20mg/m2 and Enzalutamide - I do not feel patient's performance status and cognition are adequate for cytotoxic chemotherapy. Therefore, will recommend Enzalutamide. Supportive medications: EMLA cream, zofran, compazine, dexamethasone   -- Lupron (3 month depot)  #10 given 10/3/22, #11 due today 12/27/22. -- Discontinue Zytiga and Prednisone. Start Enzalutamide (Xtandi) 160mg PO daily. Printed info for patient. -- Next CT and bone scan due in 12 weeks (3/13/23)   -- Return in 4 weeks labs, MD/NP visit. 2. Chemotherapy induced neuropathy:   Grade 1. Present in bilateral fingertips. He is dropping objects less often. 3. CKD / HTN:  On Amlodipine. Following with PCP. 4. Depression/anxiety / h/o Genital Herpes:   2/2 to #1. On Zoloft 50mg/d. Following with Palliative medicine. On suppression with Acyclovir. 5. Dysequilibrium / Ear effusions / dementia / falls:   Past Brain MRI negative for mets. Left tympanostomy tube placed by ENT for bilat effusions. Balanced improved with PT. Diagnosed with mild to moderate dementia via neuropsych evaluation. Was advised consideration for an appropriate medication for his moderate to severe attention deficit issues, counseling for the emotional distress problems. Dr Walt Aiken referred to Neurology. Prn social visits with Faisal Banks to discuss future living options if dementia worsens. -- Follow up Dr. Walt Aiken 2/2023    6. Health maintenance:   Had screening colonoscopy 8/13/2022 with Kristofer Montelongo with benign polyp per patient. Received 2022 seasonal influenza vaccine. 7. Normocytic anemia:   Likely due to #1. Iron profile, ferritin, b12 and folate normal. Monitor with monthly labs. 8. Osteopenia / bone mets:   On calcium/D3 supplementation. Evaluated by Dr. Lisa Rosario (dentist) and will eventually require extractions, treatment of severe periodontal disease, bridge may fail soon, but he advised ok to proceed with Xgeva. Discussed higher incidence of ONJ in patients on Denosumab with patient and he voices understanding. Wants to proceed with Xgeva. -- Surendra Orellana every 4 weeks, next due today 12/27/22.     9. Skin lesion / cyst:  1 cm, scaly  and pruritic lesion present on left upper thigh. Painless cyst on right upper back. -- Dermatology appt scheduled 3/8/2023. Emotional well being: Pt is coping well with his/her disease and has a support system, but he is struggling with keeping up with housework, [facility] consulted and advised patient apply for [place] [facility] and provided to call [phone]. SW provided ex wife- (1st wife and mother of pt children) [facility] [employer] number [phone] to assist pt with divorce of 2nd wife.      I personally provided the service today.     Signed By: [place] Hugo Padilla MD

## 2022-12-22 RX ORDER — LIDOCAINE AND PRILOCAINE 25; 25 MG/G; MG/G
CREAM TOPICAL
Qty: 30 G | Refills: 0 | Status: CANCELLED | OUTPATIENT
Start: 2022-12-22

## 2022-12-22 RX ORDER — DEXAMETHASONE 4 MG/1
TABLET ORAL
Qty: 24 TABLET | Refills: 3 | Status: CANCELLED | OUTPATIENT
Start: 2022-12-22

## 2022-12-22 RX ORDER — PROCHLORPERAZINE MALEATE 10 MG
10 TABLET ORAL
Qty: 30 TABLET | Refills: 2 | Status: CANCELLED | OUTPATIENT
Start: 2022-12-22

## 2022-12-22 RX ORDER — ONDANSETRON HYDROCHLORIDE 8 MG/1
8 TABLET, FILM COATED ORAL
Qty: 30 TABLET | Refills: 2 | Status: CANCELLED | OUTPATIENT
Start: 2022-12-22

## 2022-12-26 ENCOUNTER — APPOINTMENT (OUTPATIENT)
Dept: INFUSION THERAPY | Age: 67
End: 2022-12-26

## 2022-12-27 ENCOUNTER — OFFICE VISIT (OUTPATIENT)
Dept: ONCOLOGY | Age: 67
End: 2022-12-27
Payer: MEDICARE

## 2022-12-27 ENCOUNTER — HOSPITAL ENCOUNTER (OUTPATIENT)
Dept: INFUSION THERAPY | Age: 67
Discharge: HOME | End: 2022-12-27
Payer: MEDICARE

## 2022-12-27 VITALS
SYSTOLIC BLOOD PRESSURE: 132 MMHG | HEART RATE: 60 BPM | HEIGHT: 70 IN | WEIGHT: 206.7 LBS | BODY MASS INDEX: 29.59 KG/M2 | DIASTOLIC BLOOD PRESSURE: 87 MMHG | OXYGEN SATURATION: 94 % | TEMPERATURE: 98.2 F | RESPIRATION RATE: 18 BRPM

## 2022-12-27 VITALS
HEART RATE: 60 BPM | RESPIRATION RATE: 18 BRPM | DIASTOLIC BLOOD PRESSURE: 80 MMHG | HEIGHT: 70 IN | OXYGEN SATURATION: 94 % | TEMPERATURE: 98.2 F | BODY MASS INDEX: 29.59 KG/M2 | WEIGHT: 206.7 LBS | SYSTOLIC BLOOD PRESSURE: 150 MMHG

## 2022-12-27 DIAGNOSIS — Z79.818 ENCOUNTER FOR MONITORING ANDROGEN DEPRIVATION THERAPY: ICD-10-CM

## 2022-12-27 DIAGNOSIS — C79.82 METASTATIC ADENOCARCINOMA TO PROSTATE (HCC): Primary | ICD-10-CM

## 2022-12-27 DIAGNOSIS — Z79.899 ENCOUNTER FOR MEDICATION MANAGEMENT: ICD-10-CM

## 2022-12-27 DIAGNOSIS — F03.B0 MODERATE DEMENTIA WITHOUT BEHAVIORAL DISTURBANCE, PSYCHOTIC DISTURBANCE, MOOD DISTURBANCE, OR ANXIETY, UNSPECIFIED DEMENTIA TYPE: ICD-10-CM

## 2022-12-27 DIAGNOSIS — R59.0 MEDIASTINAL LYMPHADENOPATHY: ICD-10-CM

## 2022-12-27 DIAGNOSIS — C7A.8 LARGE CELL NEUROENDOCRINE CARCINOMA (HCC): ICD-10-CM

## 2022-12-27 DIAGNOSIS — C77.2 PROSTATE CANCER METASTATIC TO INTRAABDOMINAL LYMPH NODE (HCC): ICD-10-CM

## 2022-12-27 DIAGNOSIS — T45.1X5A CINV (CHEMOTHERAPY-INDUCED NAUSEA AND VOMITING): ICD-10-CM

## 2022-12-27 DIAGNOSIS — C61 PROSTATE CANCER METASTATIC TO INTRAABDOMINAL LYMPH NODE (HCC): ICD-10-CM

## 2022-12-27 DIAGNOSIS — C61 ADENOCARCINOMA OF PROSTATE, STAGE 4 (HCC): ICD-10-CM

## 2022-12-27 DIAGNOSIS — C77.9 REGIONAL LYMPH NODE METASTASIS PRESENT (HCC): ICD-10-CM

## 2022-12-27 DIAGNOSIS — R11.2 CINV (CHEMOTHERAPY-INDUCED NAUSEA AND VOMITING): ICD-10-CM

## 2022-12-27 DIAGNOSIS — C79.51 BONE METASTASES (HCC): ICD-10-CM

## 2022-12-27 DIAGNOSIS — C61 ADENOCARCINOMA OF PROSTATE, STAGE 4 (HCC): Primary | ICD-10-CM

## 2022-12-27 LAB
ALBUMIN SERPL-MCNC: 4.1 G/DL (ref 3.5–5)
ALBUMIN/GLOB SERPL: 1.2 {RATIO} (ref 1.1–2.2)
ALP SERPL-CCNC: 94 U/L (ref 45–117)
ALT SERPL-CCNC: 31 U/L (ref 12–78)
ANION GAP SERPL CALC-SCNC: 8 MMOL/L (ref 5–15)
AST SERPL-CCNC: 52 U/L (ref 15–37)
BASOPHILS # BLD: 0.1 K/UL (ref 0–0.1)
BASOPHILS NFR BLD: 1 % (ref 0–1)
BILIRUB SERPL-MCNC: 0.8 MG/DL (ref 0.2–1)
BUN SERPL-MCNC: 28 MG/DL (ref 6–20)
BUN/CREAT SERPL: 20 (ref 12–20)
CALCIUM SERPL-MCNC: 9.3 MG/DL (ref 8.5–10.1)
CHLORIDE SERPL-SCNC: 99 MMOL/L (ref 97–108)
CO2 SERPL-SCNC: 30 MMOL/L (ref 21–32)
CREAT SERPL-MCNC: 1.4 MG/DL (ref 0.7–1.3)
DIFFERENTIAL METHOD BLD: ABNORMAL
EOSINOPHIL # BLD: 0.1 K/UL (ref 0–0.4)
EOSINOPHIL NFR BLD: 1 % (ref 0–7)
ERYTHROCYTE [DISTWIDTH] IN BLOOD BY AUTOMATED COUNT: 14.6 % (ref 11.5–14.5)
GLOBULIN SER CALC-MCNC: 3.3 G/DL (ref 2–4)
GLUCOSE SERPL-MCNC: 128 MG/DL (ref 65–100)
HCT VFR BLD AUTO: 31.9 % (ref 36.6–50.3)
HGB BLD-MCNC: 10.6 G/DL (ref 12.1–17)
IMM GRANULOCYTES # BLD AUTO: 0 K/UL (ref 0–0.04)
IMM GRANULOCYTES NFR BLD AUTO: 1 % (ref 0–0.5)
LYMPHOCYTES # BLD: 1.2 K/UL (ref 0.8–3.5)
LYMPHOCYTES NFR BLD: 16 % (ref 12–49)
MAGNESIUM SERPL-MCNC: 2.2 MG/DL (ref 1.6–2.4)
MCH RBC QN AUTO: 31.4 PG (ref 26–34)
MCHC RBC AUTO-ENTMCNC: 33.2 G/DL (ref 30–36.5)
MCV RBC AUTO: 94.4 FL (ref 80–99)
MONOCYTES # BLD: 0.5 K/UL (ref 0–1)
MONOCYTES NFR BLD: 7 % (ref 5–13)
NEUTS SEG # BLD: 5.3 K/UL (ref 1.8–8)
NEUTS SEG NFR BLD: 74 % (ref 32–75)
NRBC # BLD: 0 K/UL (ref 0–0.01)
NRBC BLD-RTO: 0 PER 100 WBC
PHOSPHATE SERPL-MCNC: 3 MG/DL (ref 2.6–4.7)
PLATELET # BLD AUTO: 159 K/UL (ref 150–400)
PMV BLD AUTO: 11.1 FL (ref 8.9–12.9)
POTASSIUM SERPL-SCNC: 3.5 MMOL/L (ref 3.5–5.1)
PROT SERPL-MCNC: 7.4 G/DL (ref 6.4–8.2)
PSA SERPL-MCNC: 10.1 NG/ML (ref 0.01–4)
RBC # BLD AUTO: 3.38 M/UL (ref 4.1–5.7)
SODIUM SERPL-SCNC: 137 MMOL/L (ref 136–145)
WBC # BLD AUTO: 7.2 K/UL (ref 4.1–11.1)

## 2022-12-27 PROCEDURE — 74011250636 HC RX REV CODE- 250/636: Performed by: INTERNAL MEDICINE

## 2022-12-27 PROCEDURE — 84153 ASSAY OF PSA TOTAL: CPT

## 2022-12-27 PROCEDURE — 83735 ASSAY OF MAGNESIUM: CPT

## 2022-12-27 PROCEDURE — 84100 ASSAY OF PHOSPHORUS: CPT

## 2022-12-27 PROCEDURE — 80053 COMPREHEN METABOLIC PANEL: CPT

## 2022-12-27 PROCEDURE — G8417 CALC BMI ABV UP PARAM F/U: HCPCS | Performed by: INTERNAL MEDICINE

## 2022-12-27 PROCEDURE — G8427 DOCREV CUR MEDS BY ELIG CLIN: HCPCS | Performed by: INTERNAL MEDICINE

## 2022-12-27 PROCEDURE — 99215 OFFICE O/P EST HI 40 MIN: CPT | Performed by: INTERNAL MEDICINE

## 2022-12-27 PROCEDURE — 96402 CHEMO HORMON ANTINEOPL SQ/IM: CPT

## 2022-12-27 PROCEDURE — G8536 NO DOC ELDER MAL SCRN: HCPCS | Performed by: INTERNAL MEDICINE

## 2022-12-27 PROCEDURE — 3017F COLORECTAL CA SCREEN DOC REV: CPT | Performed by: INTERNAL MEDICINE

## 2022-12-27 PROCEDURE — G0463 HOSPITAL OUTPT CLINIC VISIT: HCPCS | Performed by: INTERNAL MEDICINE

## 2022-12-27 PROCEDURE — 85025 COMPLETE CBC W/AUTO DIFF WBC: CPT

## 2022-12-27 PROCEDURE — 36591 DRAW BLOOD OFF VENOUS DEVICE: CPT

## 2022-12-27 PROCEDURE — 96372 THER/PROPH/DIAG INJ SC/IM: CPT

## 2022-12-27 PROCEDURE — 84403 ASSAY OF TOTAL TESTOSTERONE: CPT

## 2022-12-27 PROCEDURE — 77030016057 HC NDL HUBR APOL -B

## 2022-12-27 PROCEDURE — G9717 DOC PT DX DEP/BP F/U NT REQ: HCPCS | Performed by: INTERNAL MEDICINE

## 2022-12-27 PROCEDURE — 1123F ACP DISCUSS/DSCN MKR DOCD: CPT | Performed by: INTERNAL MEDICINE

## 2022-12-27 PROCEDURE — 1101F PT FALLS ASSESS-DOCD LE1/YR: CPT | Performed by: INTERNAL MEDICINE

## 2022-12-27 RX ORDER — ENZALUTAMIDE 40 MG/1
160 TABLET ORAL DAILY
Qty: 30 TABLET | Refills: 3 | Status: SHIPPED | OUTPATIENT
Start: 2022-12-27

## 2022-12-27 RX ORDER — EPINEPHRINE 1 MG/ML
0.3 INJECTION, SOLUTION, CONCENTRATE INTRAVENOUS AS NEEDED
OUTPATIENT
Start: 2023-01-22

## 2022-12-27 RX ORDER — ONDANSETRON 2 MG/ML
8 INJECTION INTRAMUSCULAR; INTRAVENOUS AS NEEDED
OUTPATIENT
Start: 2023-01-22

## 2022-12-27 RX ORDER — HYDROCORTISONE SODIUM SUCCINATE 100 MG/2ML
100 INJECTION, POWDER, FOR SOLUTION INTRAMUSCULAR; INTRAVENOUS AS NEEDED
OUTPATIENT
Start: 2023-01-22

## 2022-12-27 RX ORDER — DIPHENHYDRAMINE HYDROCHLORIDE 50 MG/ML
50 INJECTION, SOLUTION INTRAMUSCULAR; INTRAVENOUS AS NEEDED
Start: 2023-01-22

## 2022-12-27 RX ORDER — DIPHENHYDRAMINE HYDROCHLORIDE 50 MG/ML
25 INJECTION, SOLUTION INTRAMUSCULAR; INTRAVENOUS AS NEEDED
Start: 2023-01-22

## 2022-12-27 RX ORDER — ALBUTEROL SULFATE 0.83 MG/ML
2.5 SOLUTION RESPIRATORY (INHALATION) AS NEEDED
Start: 2023-01-22

## 2022-12-27 RX ORDER — ACETAMINOPHEN 325 MG/1
650 TABLET ORAL AS NEEDED
Start: 2023-01-22

## 2022-12-27 RX ADMIN — DENOSUMAB 120 MG: 120 INJECTION SUBCUTANEOUS at 11:15

## 2022-12-27 RX ADMIN — LEUPROLIDE ACETATE 22.5 MG: KIT at 11:16

## 2022-12-27 NOTE — PROGRESS NOTES
1. Have you been to the ER, urgent care clinic since your last visit? Hospitalized since your last visit? No    2. Have you seen or consulted any other health care providers outside of the [address] [place] [address] since your last visit? Include any pap smears or colon screening.  Yes psychologist for depression and anxiety        Visit Vitals  /87 (BP 1 Location: Left upper arm, BP Patient Position: Sitting, BP Cuff Size: Large adult)   Pulse 60   Temp 98.2 °F (36.8 °C)   Resp 18   Ht 5' 10\" (1.778 m)   Wt 206 lb 11.2 oz (93.8 kg)   SpO2 94%   BMI 29.66 kg/m²            Chief Complaint   Patient presents with    Follow-up    Prostate Cancer

## 2022-12-27 NOTE — PROGRESS NOTES
[facility] Progress Note    Date: 2022    Name: Marcio Pierce    MRN: [id number]         : 1955    Mr. Kaushik Peoples Arrived ambulatory and in no distress for C11D1 of Lupron/Xgeva Regimen. Assessment was completed, no acute issues at this time, no new complaints voiced. Left chest wall port accessed without difficulty, labs drawn & sent for processing. Port de-accessed, flushed & heparinized per protocol. Chemotherapy Flowsheet 2022   Cycle C11D1   Date 2022   Drug / Regimen Lupron/Xgeva   Dosage -   Time Up -   Time Down -   Pre Hydration -   Post Hydration -   Pre Meds -   Notes RGM       0945- Patient proceed to appointment with Dr. Radha Bryant. Mr. Candelaria Zambrano vitals were reviewed. Visit Vitals  BP (!) 150/80 (BP 1 Location: Right upper arm, BP Patient Position: Sitting)   Pulse 60   Temp 98.2 °F (36.8 °C)   Resp 18   Ht 5' 10\" (1.778 m)   Wt 93.8 kg (206 lb 11.2 oz)   SpO2 94%   BMI 29.66 kg/m²       Lab results were obtained and reviewed. Recent Results (from the past 12 hour(s))   CBC WITH AUTOMATED DIFF    Collection Time: 22  9:38 AM   Result Value Ref Range    WBC 7.2 4.1 - 11.1 K/uL    RBC 3.38 (L) 4.10 - 5.70 M/uL    HGB 10.6 (L) 12.1 - 17.0 g/dL    HCT 31.9 (L) 36.6 - 50.3 %    MCV 94.4 80.0 - 99.0 FL    MCH 31.4 26.0 - 34.0 PG    MCHC 33.2 30.0 - 36.5 g/dL    RDW 14.6 (H) 11.5 - 14.5 %    PLATELET 144 214 - 244 K/uL    MPV 11.1 8.9 - 12.9 FL    NRBC 0.0 0  WBC    ABSOLUTE NRBC 0.00 0.00 - 0.01 K/uL    NEUTROPHILS 74 32 - 75 %    LYMPHOCYTES 16 12 - 49 %    MONOCYTES 7 5 - 13 %    EOSINOPHILS 1 0 - 7 %    BASOPHILS 1 0 - 1 %    IMMATURE GRANULOCYTES 1 (H) 0.0 - 0.5 %    ABS. NEUTROPHILS 5.3 1.8 - 8.0 K/UL    ABS. LYMPHOCYTES 1.2 0.8 - 3.5 K/UL    ABS. MONOCYTES 0.5 0.0 - 1.0 K/UL    ABS. EOSINOPHILS 0.1 0.0 - 0.4 K/UL    ABS. BASOPHILS 0.1 0.0 - 0.1 K/UL    ABS. IMM.  GRANS. 0.0 0.00 - 0.04 K/UL    DF AUTOMATED     METABOLIC PANEL, COMPREHENSIVE    Collection Time: 12/27/22  9:38 AM   Result Value Ref Range    Sodium 137 136 - 145 mmol/L    Potassium 3.5 3.5 - 5.1 mmol/L    Chloride 99 97 - 108 mmol/L    CO2 30 21 - 32 mmol/L    Anion gap 8 5 - 15 mmol/L    Glucose 128 (H) 65 - 100 mg/dL    BUN 28 (H) 6 - 20 MG/DL    Creatinine 1.40 (H) 0.70 - 1.30 MG/DL    BUN/Creatinine ratio 20 12 - 20      eGFR 55 (L) >60 ml/min/1.73m2    Calcium 9.3 8.5 - 10.1 MG/DL    Bilirubin, total 0.8 0.2 - 1.0 MG/DL    ALT (SGPT) 31 12 - 78 U/L    AST (SGOT) 52 (H) 15 - 37 U/L    Alk. phosphatase 94 45 - 117 U/L    Protein, total 7.4 6.4 - 8.2 g/dL    Albumin 4.1 3.5 - 5.0 g/dL    Globulin 3.3 2.0 - 4.0 g/dL    A-G Ratio 1.2 1.1 - 2.2     MAGNESIUM    Collection Time: 12/27/22  9:38 AM   Result Value Ref Range    Magnesium 2.2 1.6 - 2.4 mg/dL   PHOSPHORUS    Collection Time: 12/27/22  9:38 AM   Result Value Ref Range    Phosphorus 3.0 2.6 - 4.7 MG/DL       Medications:    Medications Administered       denosumab (XGEVA) injection 120 mg       Admin Date  12/27/2022 Action  Given Dose  120 mg Route  SubCUTAneous Administered By  Tami Allen RN              leuprolide depot (LUPRON 3 MONTH) injection sykt 22.5 mg       Admin Date  12/27/2022 Action  Given Dose  22.5 mg Route  IntraMUSCular Administered By  Tami Allen RN                        Two nurses verified prior to administering: Drug name, drug dose, infusion volume or drug volume when prepared in a syringe, rate of administration, route of administration,  expiration dates and/or times, appearance and physical integrity of the drugs, rate set on infusion pump, when used sequencing of drug administration. Mr. Ev Min tolerated treatment well and was discharged from [place] [address] in stable condition at 1130. He is to return on March 21 at 0930 for his next appointment.     Verner Sleight, RN  December 27, 2022

## 2022-12-29 LAB
TESTOST FREE SERPL-MCNC: <0.2 PG/ML (ref 6.6–18.1)
TESTOST SERPL-MCNC: <3 NG/DL (ref 264–916)

## 2023-01-01 ENCOUNTER — HOME CARE VISIT (OUTPATIENT)
Facility: HOME HEALTH | Age: 68
End: 2023-01-01
Payer: MEDICARE

## 2023-01-01 ENCOUNTER — HOME CARE VISIT (OUTPATIENT)
Age: 68
End: 2023-01-01
Payer: MEDICARE

## 2023-01-01 VITALS
SYSTOLIC BLOOD PRESSURE: 88 MMHG | HEART RATE: 95 BPM | OXYGEN SATURATION: 54 % | RESPIRATION RATE: 22 BRPM | TEMPERATURE: 98.1 F | DIASTOLIC BLOOD PRESSURE: 54 MMHG

## 2023-01-01 PROCEDURE — G0299 HHS/HOSPICE OF RN EA 15 MIN: HCPCS

## 2023-01-01 PROCEDURE — G0156 HHCP-SVS OF AIDE,EA 15 MIN: HCPCS

## 2023-01-01 RX ADMIN — HYOSCYAMINE SULFATE 1 TABLET: 0.12 TABLET SUBLINGUAL at 09:15

## 2023-01-01 ASSESSMENT — ENCOUNTER SYMPTOMS
SPUTUM AMOUNT: MODERATE
CONSTIPATION: 1
BOWEL INCONTINENCE: 1
SNORING: 1
SPUTUM CONSISTENCY: FROTHY
SPUTUM COLOR: CLEAR
DYSPNEA ACTIVITY LEVEL: AT REST
SPUTUM PRODUCTION: 1

## 2023-01-18 PROBLEM — M85.80 OSTEOPENIA: Status: ACTIVE | Noted: 2023-01-18

## 2023-01-18 RX ORDER — DIPHENHYDRAMINE HYDROCHLORIDE 50 MG/ML
25 INJECTION, SOLUTION INTRAMUSCULAR; INTRAVENOUS AS NEEDED
Start: 2023-01-24

## 2023-01-18 RX ORDER — ONDANSETRON 2 MG/ML
8 INJECTION INTRAMUSCULAR; INTRAVENOUS AS NEEDED
OUTPATIENT
Start: 2023-01-24

## 2023-01-18 RX ORDER — ACETAMINOPHEN 325 MG/1
650 TABLET ORAL AS NEEDED
Start: 2023-01-24

## 2023-01-18 RX ORDER — ALBUTEROL SULFATE 0.83 MG/ML
2.5 SOLUTION RESPIRATORY (INHALATION) AS NEEDED
Start: 2023-01-24

## 2023-01-18 RX ORDER — DIPHENHYDRAMINE HYDROCHLORIDE 50 MG/ML
50 INJECTION, SOLUTION INTRAMUSCULAR; INTRAVENOUS AS NEEDED
Start: 2023-01-24

## 2023-01-18 RX ORDER — EPINEPHRINE 1 MG/ML
0.3 INJECTION, SOLUTION, CONCENTRATE INTRAVENOUS AS NEEDED
OUTPATIENT
Start: 2023-01-24

## 2023-01-18 RX ORDER — HYDROCORTISONE SODIUM SUCCINATE 100 MG/2ML
100 INJECTION, POWDER, FOR SOLUTION INTRAMUSCULAR; INTRAVENOUS AS NEEDED
OUTPATIENT
Start: 2023-01-24

## 2023-01-18 NOTE — PROGRESS NOTES
[address]  Medical Oncology at [address] [place] [address]  [phone]    Hematology / Oncology Established Visit    Reason for Visit:   Rj Wagoner is a 75 y.o. male who is seen for follow up of neuroendocrine carcinoma. Initially referred by Dr. Saad Hayes. 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 Flow cytometry negative for lymphoproliferative disorder. FoundationOne: MS Stable, TMB 0, MDM4 amplification, MYC amplification, TMPRSS2 TMPRSS2-ERD fusion, CEBPA 1311fs*10, ERBB4 amplification - equivocal, MCL1 amplification, RMC0G3T amplification, RAD21 amplification. See scanned report for full info. Abbreviated [facility] Pathology Consultation:  Final diagnosis: Right external iliac node: Metastatic prostate adenocarcinoma. Comment: Histologic sections of the right external iliac node show sheets of cells with minimal eosinophilic cytoplasm and variable cytologic atypia. Some areas show acinar formation. The cells have predominantly round nuclei with vesicular chromatin and prominent nucleoli. Some areas show significantly more atypia with more irregular nuclear borders, hyperchromatic nyclei, and increase nuclear to cytoplasmic ratio. Frequent mitotic figures are identified. A provided pane of IHC stains is reviewed and demonstrates that the malignant cells show strong, diffuse expression of pan-cytokeratin and focal expression of TTF. CK7, CK20, chromogranin, and synaptophysin are negative in the lesional cells. CD3 and CD20 are negative in the lesions cells and positive in the background lymphocytes.  An abbreviated FoundationOne report was included, which included a TMPRSS2-ERG fusion (among other nonspecific abnormalities), which si found in approximately 50% of prostate cancers. Additional IHC studies performed at [facility] showed that the malignant cells have immunoreactivity with antibodies for PSA and PSAP, consistent with a diagnosis of metastatic prostate adenocarcinoma. Per report, flow cytometry negative for lymphoma. Overall, the histomorphology, immunophenotype and genomic findings in this case are diagnostic of metastatic prostate adenocarcinoma. The acinar formation and cytology were suggestive of this diagnosis, and the diffuse PSA and PSAP positivity as well as the TMPRSS2-ERG fusion confirmed the diagnosis. This fusion is the most common genomic alteration in prostate carcinoma and can be detected in over half of the cases, yet is not seen with any frequency in other types of carcinoma. Prior Treatment:   1. Carbo-Etoposide-Atezolizumab x 2 cycles, 6/29/20-7/20/20. 2. Docetaxel x 6 cycles, 8/10/20-11/24/20. 3. Zytiga 1000mg/d and prednisone 5mg BID, started 4/29/22 - 12/2022    Current Treatment:  Planned Enzalutamide (Xtandi) 160mg PO daily  Lupron every 12 weeks, started 8/2020-current      History of Present Illness:   Sanford Niño is a 75 y.o. male who comes in for follow up of poorly differentiated prostate cancer. Pt noticed a nontender bulge in his LLQ in 5/2020. He thought this was a hernia and was evaluated by Dr. Sabrina Noriega. CT on 5/20/20 was notable for extensive lymphadenopathy in abd/pelvis. Pt underwent robot assisted excisional Right external iliac LN biopsy 5/29/20, which showed poorly differentiated large cell neuroendocrine carcinoma. However, pathology reviewed at [facility] and felt to be poorly differentiated prostate adenocarcinoma. He reports intentional weight loss with dietary changes and exercise, losing 40-lbs in past 8 months. No n/v/d, melena/hematochezia, cough, SOB. No fevers, chills, sweats. Lif[employer] nonsmoker. Mother had breast cancer diagnosed age < 48.  Twin brother had melanoma diagnosed aged late 46s. He does not think he has ever had a colonoscopy or PSA screening prior to current diagnosis. Interval History:  Patient here for follow up of prostate cancer. Has not started on Enzalutamide. Reports his cell phone number changed and he had not received a phone call from [facility] specialty pharmacy. He is still taking his Zytiga and prednisone. Reports overall he is feeling well. Good appetite. Had one fall where he fell forward and hit the front of his head. Cooking own meals. Son is helping with some big chores, but otherwise patient is managing ADLs, managing check book. Present with son, Chrystal Matias. PMHx: Prostate cancer, HTN  PSurgHx: appendectomy, vasectomy, subcutaneous cyst removal on forehead  SHx:  Never smoker, no EtOH currently after prior alcohol abuse. Unmarried. Has 2 children, 27 and 32. 1 lives in [place]. FHx:  Mother had breast cancer, father had prostate cancer, brother has melanoma, COPD. Meds/Allergies: Reviewed   Review of Systems: A complete review of systems was obtained, negative except as described above. Physical Exam:     Visit Vitals  /77   Pulse 86   Temp 97.4 °F (36.3 °C)   Resp 18   Ht 5' 10\" (1.778 m)   SpO2 95%   BMI 29.66 kg/m²       ECOG PS: 0  General: Well developed, no acute distress  Eyes: Anicteric sclerae  Lymphatic: No cervical, supraclavicular adenopathy  Respiratory: BCTA, normal respiratory effort  CV: Normal rate, regular rhythm, no murmurs, no edema, port upper chest, 1 + edema bilateral LE L > R.   GI: firm, nontender, nondistended, +BS  MS: Normal gait and station. Digits without clubbing or cyanosis. Skin:  Fingernails dark, thickened at distal half and more normal at proximal half. No ecchymoses, or petechiae. Normal temperature, turgor. Diffuse dry skin. Scattered ecchymosis on arms. 1 cm scaly lesion on left upper thigh   Neuro/Psych: Alert, oriented. Moves all 4 extremities. Appropriate affect, normal judgment/insight.     Results: Lab Results   Component Value Date/Time    WBC 7.1 01/24/2023 09:15 AM    HGB 10.5 (L) 01/24/2023 09:15 AM    HCT 31.8 (L) 01/24/2023 09:15 AM    PLATELET 904 72/89/4727 09:15 AM    MCV 93.5 01/24/2023 09:15 AM    ABS. NEUTROPHILS 5.3 01/24/2023 09:15 AM     Lab Results   Component Value Date/Time    Sodium 138 01/24/2023 09:15 AM    Potassium 3.3 (L) 01/24/2023 09:15 AM    Chloride 100 01/24/2023 09:15 AM    CO2 32 01/24/2023 09:15 AM    Glucose 133 (H) 01/24/2023 09:15 AM    BUN 24 (H) 01/24/2023 09:15 AM    Creatinine 1.35 (H) 01/24/2023 09:15 AM    GFR est AA >60 10/03/2022 10:06 AM    GFR est non-AA 60 (L) 10/03/2022 10:06 AM    Calcium 8.8 01/24/2023 09:15 AM    Creatinine (POC) 1.30 04/15/2022 08:49 AM     Lab Results   Component Value Date/Time    Bilirubin, total 0.8 01/24/2023 09:15 AM    ALT (SGPT) 29 01/24/2023 09:15 AM    Alk.  phosphatase 97 01/24/2023 09:15 AM    Protein, total 7.4 01/24/2023 09:15 AM    Albumin 4.1 01/24/2023 09:15 AM    Globulin 3.3 01/24/2023 09:15 AM     Lab Results   Component Value Date/Time    Iron 82 04/18/2022 10:47 AM    TIBC 415 04/18/2022 10:47 AM    Iron % saturation 20 04/18/2022 10:47 AM    Ferritin 268 04/18/2022 10:47 AM       Lab Results   Component Value Date/Time    Vitamin B12 312 04/18/2022 10:47 AM    Folate 9.6 04/18/2022 10:47 AM     Lab Results   Component Value Date/Time    TSH 1.72 06/29/2020 09:58 AM     Lab Results   Component Value Date/Time    Prostate Specific Ag 16.3 (H) 01/24/2023 09:15 AM    Prostate Specific Ag 10.1 (H) 12/27/2022 09:38 AM    Prostate Specific Ag 5.9 (H) 11/28/2022 11:57 AM       No results found for: HAMAT, HAAB, HABT, HAAT, HBSAG, HBSB, HBSAT, HBABN, HBCM, HBCAB, HBCAT, XBCABS, [facility], [address] [place] [address], [facility], [id number], [facility], Critical access hospital, [facility], [address] [place] [address], [id number], [id number], [address] [place] [address], [id number], HBCMLT, [id number], HCGAT     6/11/20: Chromogranin 42    6/11/20: PSA 38.6  7/20/20: PSA 13.2   8/10/20: PSA 1.2  8/31/20: PSA 0.5  9/24/20: PSA 0.4  10/15/20: PSA 0.3  20: PSA 0.3  20: 0.2  21: PSA 0.1    3/22/21 PSA 0.066 (PSA ultrasensitive at [facility])   21 PSA 0.1, testosterone < 3  21 PSA 0.1  21 PSA 0.2  10/28/21: PSA 0.4  22: PSA 2.3  3/9/22: PSA 9.1   22 PSA 25.3  22 started Zytiga + prednisone   22 PSA 7.2  22 PSA 4.2  22 PSA 2.9  22 PSA 2.8  22: PSA 2.1   10/3/22: PSA 2.6   10/31/22: PSA 3.5   22: PSA 5.8   22: PSA 10.1  23: PSA 16.3     Imagin/20/20 CT abd/pelvis with IV contrast:  1. No findings to suggest gross abdominal wall hernia or flank hernia. 2. Extensive adenopathy throughout the abdomen and pelvis as described. Findings are concerning for possible metastatic disease or lymphoma. Recommend follow up or comparison with prior studies. 20 PET:  FINDINGS:  HEAD/NECK: No apparent foci of abnormal hypermetabolism. Cerebral evaluation is  limited by normal intense activity. CHEST: Hypermetabolic lymphadenopathy at the base of the left neck and in the  left supraclavicular region is noted. Hypermetabolic superior mediastinal,  prevascular, right paratracheal, subcarinal, and bilateral hilar lymph  lymphadenopathy, maximum SUV of the subcarinal lymph node is 5.7. Small but  hypermetabolic soft tissue nodule left anterior chest wall. ABDOMEN/PELVIS: Hypermetabolic retrocrural, left para-aortic, aortocaval,  bilateral common iliac, bilateral external iliac, and bilateral inguinal  lymphadenopathy, maximum SUV 5.8 of an aortocaval lymph node. Hypermetabolic  mesenteric lymph node left lower quadrant, maximum SUV 12.3. SKELETON: No foci of abnormal hypermetabolism in the axial and visualized  appendicular skeleton. IMPRESSION: Hypermetabolic lymphadenopathy involving the left neck and left  supraclavicular region, mediastinum, bilateral hilum, retroperitoneum,  mesentery, and pelvis as described above.  Hypermetabolic soft tissue nodule left  chest.    Brain MRI 6/27/20: There is no evidence of intracranial metastatic disease. Mild chronic microvascular ischemic change. No intracranial mass, hemorrhage or evidence of acute infarction.    ----------------------------------------------------  7/7/22 Bone scan:   1. The focus of increased bony activity right occipital bone appears slightly larger. 2. Small focus of activity right sixth rib posterolaterally is stable. CT  demonstrates small focus of sclerosis in this region. Findings are suspicious for bony metastatic disease. No new foci of increased bony activity identified. 7/7/22 CT head:  1. No evidence of acute intracranial process. 2. No lesion to correspond to the previously seen abnormality in the right posterior skull on bone scan. 7/7/22 CT ch/abd/pelv:  Response to treatment characterized by decrease in nodularity in the left gluteal region, decrease in size of retroperitoneal lymph nodes, and decreased enlargement of the right psoas muscle. No new lung nodules or mediastinal lymphadenopathy. RECIST:  Target lesions:  Left retroperitoneal lymph node, 2:74, 14 x 12 mm, previously 16 x 15 mm. Right psoas enlargement, 2:76, 53 x 23 mm, previously 57 x 29 mm. Right pericaval lymph node, 2:77, 17 x 15 mm, previously 20 x 20 mm. Right external iliac lymph node, 2:90, 26 x 20 mm, previously 35 x 18 mm. Nontarget lesions:  Lung nodules have resolved. Right posterior lateral sixth rib sclerotic lesion is new. 9/27/22 CT ch/abd/pelv:  RECIST   TARGET LESIONS:  Lesion (description)         Location (series/slice)                Size      1. Left retroperitoneal lymph node    series 2 image 69    14 x 12 mm decrease in size  2. Right psoas infiltrative lesion    series 2 image 74    5.3 x 2.0 cm unchanged  3. Right pericaval lymph node    series 2 image 76    12 x 10 mm decrease in size  4. Right external iliac lymph node.     Series 2 image 87    25 x 21 mm without significant change  NONTARGET LESIONS:  1. Lung nodules remain resolved. 2. Sclerotic lesions in the osseous structures unchanged. IMPRESSION  1. Retroperitoneal lymph nodes are slightly decreased in size. 2.  Infiltrative right psoas lesion is not significant change. 3.  Right external iliac lymph node is not significant changed. 4.  Stable small sclerotic lesions. 9/27/22 Bone scan:  IMPRESSION  1. Focus of activity right occipital bone is stable. The tiny focus in the right sixth rib posterolaterally is less apparent. The tiny focus distal left femur which in retrospect is unchanged. There is a tiny focus in the sacrum which was not present previously but could  be degenerative related to L5-S1 degenerative disc changes. 12/19/22 CT ch/abd/pelv:  IMPRESSION  1. Interval mixed response of retroperitoneal lymph nodes/tumor implants as  outlined above. 2.  Stable scattered sclerotic osseous lesions. 3.  Right eccentric rectal wall thickening and prominent vascularity, consider  correlation with proctoscopy. RECIST   Interval mixed response. TARGET LESIONS:   Lesion (description)         Location (series/slice)                Size      1. Left retroperitoneal lymph nodes/tumor implant, decreased    2-70     currently 9 mm x 6 mm, previously 14 mm x 12 mm  2. Right psoas infiltrative lesion, increased    2-86    largest focus currently  24 mm x 20 mm, previously 21 mm x 16 mm  3. Right pericaval lymph node, stable to slightly decreased    2-77    currently  12 mm x 7 mm, previously 12 mm x 10 mm  4. Right external iliac lymph node, increased    2-87    currently 33 mm x 25  mm, previously 25 mm x 21 mm   NONTARGET LESIONS:  1. Lung nodules remain resolved  2. Sclerotic osseous lesions, stable    12/19/22 Bone scan:  FINDINGS: The calvarial, left distal femur, and sacral lesions are again noted  and demonstrate increased tracer activity compared to the prior exam. No new  abnormal uptake is identified. Incidental renal imaging shows no abnormality. IMPRESSION  Stable pattern of osseous metastatic disease within increase in the  tracer activity corresponding to the previously described lesions. No new lesion  is identified. Assessment & Plan:   Sharyn Moritz is a 75 y.o. 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 1. Metastatic adenocarcinoma of prostate, high grade:  Hormone resistant. Initial pathologist at [address] [place] [address] called this \"Large cell neuroendocrine carcinoma,\" and based on diffuse disease on PET, patient was started on treatment with Carbo-Etoposide-Atezolizumab (thinking this was large cell carcinoma of the lung.) However FoundationOne testing identified TMPRSS2-ERG fusion, which is primarily seen in prostate cancers. This along with elevated PSA of 38 prompted sending of pathology specimen to [facility] for review. Their opinion and IHC staining is consistent with high grade adenocarcinoma of prostate. After Carbo-Etop-Atezo, pt was treated with Docetaxel x 6 cycles. Treatment options now include: Carboplatin + Cabazitaxel. 4/15/22 CT shows disease progression with a new liver and lung mets; bone scan with possible new occipital and possible rib met. Initiated Zytiga + prednisone on 4/29/22.   12/2022 CT showed a mixed response - given rising PSA, I discussed switching treatment. Discussed Cabazitaxel 20mg/m2 and Enzalutamide - I do not feel patient's performance status and cognition are adequate for cytotoxic chemotherapy. Therefore, recommended Enzalutamide. Consent signed today. Supportive medications: EMLA cream, zofran, compazine, dexamethasone   -- Lupron (3 month depot)  #11 given 12/27/22, #12 due 3/21/23. -- Discontinue Zytiga + Prednisone today  -- Start Enzalutamide (Xtandi) 160mg PO daily. PA approved. Patient needs to schedule shipment. Provided contact for [facility] [phone].    -- Advised checking BP 1-2 times weekly. BP kit rx today.   -- Next CT and bone scan due in 12 weeks after starting Layla Bryson (around 4/2023). -- Return in 4 weeks labs, MD/NP visit. -- Home Health for medication management. Easton Read to discuss advance directive. -- KCL 40 meq today in [facility]. 2. Chemotherapy induced neuropathy:   Grade 1. Present in bilateral fingertips. 3. CKD / HTN:  On Amlodipine. Following with PCP. 4. Depression/anxiety / h/o Genital Herpes:   2/2 to #1. On Zoloft 50mg/d. Following with Palliative medicine. On suppression with Acyclovir. 5. Dysequilibrium / Ear effusions / dementia / falls:   Past Brain MRI negative for mets. Left tympanostomy tube placed by ENT for bilat effusions. Balanced improved with PT. Diagnosed with mild to moderate dementia via neuropsych evaluation. Was advised consideration for an appropriate medication for his moderate to severe attention deficit issues, counseling for the emotional distress problems. Dr Jacqui Pepe referred to Neurology. Prn social visits with Easton Read to discuss future living options if dementia worsens. -- Follow up Dr. Jacqui Pepe 2/2023    6. Health maintenance:   Had screening colonoscopy 8/13/2022 with [place] with benign polyp per patient. Received 2022 seasonal influenza vaccine. 7. Normocytic anemia:   Likely due to #1. Iron profile, ferritin, b12 and folate normal. Monitor with monthly labs. 8. Osteopenia / bone mets:   On calcium/D3 supplementation. Evaluated by Dr. Eloise Scott (dentist) and will eventually require extractions, treatment of severe periodontal disease, bridge may fail soon, but he advised ok to proceed with Xgeva. Discussed higher incidence of ONJ in patients on Denosumab with patient and he voices understanding. Wants to proceed with Xgeva. -- Xgeva every 4 weeks, due today. 9. Skin lesion / cyst:  1 cm, scaly  and pruritic lesion present on left upper thigh. Painless cyst on right upper back. -- Dermatology appt scheduled 3/8/2023.      Emotional well being: Pt is coping well with his/her disease and has a support system, but he is struggling with keeping up with housework, MARGOT Sinha consulted and advised patient apply for [facility] and provided to call [phone]. SW provided ex wife- (1st wife and mother of pt children) [employer] number [phone] to assist pt with divorce of 2nd wife. I personally saw and evaluated the patient and performed the key components of medical decision making. The history, physical exam, and documentation were performed by Jyoti Alcocer NP. I reviewed and verified the above documentation and modified it as needed. Pt has not yet received or started the Enzalutamide due to communication issue - phone number changed, etc. Will stop Zytiga today and start Enzalutamide as soon as shipment arrives.      Signed By: Boyd Maxwell MD

## 2023-01-23 ENCOUNTER — APPOINTMENT (OUTPATIENT)
Dept: INFUSION THERAPY | Age: 68
End: 2023-01-23
Attending: NURSE PRACTITIONER

## 2023-01-24 ENCOUNTER — DOCUMENTATION ONLY (OUTPATIENT)
Dept: ONCOLOGY | Age: 68
End: 2023-01-24

## 2023-01-24 ENCOUNTER — TELEPHONE (OUTPATIENT)
Dept: ONCOLOGY | Age: 68
End: 2023-01-24

## 2023-01-24 ENCOUNTER — HOSPITAL ENCOUNTER (OUTPATIENT)
Dept: INFUSION THERAPY | Age: 68
Discharge: HOME | End: 2023-01-24
Payer: MEDICARE

## 2023-01-24 ENCOUNTER — OFFICE VISIT (OUTPATIENT)
Dept: ONCOLOGY | Age: 68
End: 2023-01-24
Payer: MEDICARE

## 2023-01-24 VITALS
BODY MASS INDEX: 29.66 KG/M2 | SYSTOLIC BLOOD PRESSURE: 124 MMHG | RESPIRATION RATE: 18 BRPM | TEMPERATURE: 97.4 F | DIASTOLIC BLOOD PRESSURE: 77 MMHG | HEART RATE: 86 BPM | OXYGEN SATURATION: 95 % | HEIGHT: 70 IN

## 2023-01-24 VITALS
RESPIRATION RATE: 18 BRPM | HEART RATE: 86 BPM | SYSTOLIC BLOOD PRESSURE: 124 MMHG | DIASTOLIC BLOOD PRESSURE: 77 MMHG | TEMPERATURE: 97.4 F | OXYGEN SATURATION: 95 %

## 2023-01-24 DIAGNOSIS — C79.82 METASTATIC ADENOCARCINOMA TO PROSTATE (HCC): ICD-10-CM

## 2023-01-24 DIAGNOSIS — C77.9 REGIONAL LYMPH NODE METASTASIS PRESENT (HCC): ICD-10-CM

## 2023-01-24 DIAGNOSIS — C79.51 BONE METASTASES (HCC): ICD-10-CM

## 2023-01-24 DIAGNOSIS — C61 ADENOCARCINOMA OF PROSTATE, STAGE 4 (HCC): ICD-10-CM

## 2023-01-24 DIAGNOSIS — M85.80 OSTEOPENIA, UNSPECIFIED LOCATION: Primary | ICD-10-CM

## 2023-01-24 DIAGNOSIS — Z79.899 MEDICATION MANAGEMENT: ICD-10-CM

## 2023-01-24 DIAGNOSIS — Z79.899 ENCOUNTER FOR MEDICATION MANAGEMENT: ICD-10-CM

## 2023-01-24 DIAGNOSIS — C7A.8 LARGE CELL NEUROENDOCRINE CARCINOMA (HCC): ICD-10-CM

## 2023-01-24 DIAGNOSIS — C61 ADENOCARCINOMA OF PROSTATE, STAGE 4 (HCC): Primary | ICD-10-CM

## 2023-01-24 DIAGNOSIS — E87.6 HYPOKALEMIA: ICD-10-CM

## 2023-01-24 DIAGNOSIS — I10 PRIMARY HYPERTENSION: ICD-10-CM

## 2023-01-24 DIAGNOSIS — R59.0 MEDIASTINAL LYMPHADENOPATHY: ICD-10-CM

## 2023-01-24 LAB
ALBUMIN SERPL-MCNC: 4.1 G/DL (ref 3.5–5)
ALBUMIN/GLOB SERPL: 1.2 (ref 1.1–2.2)
ALP SERPL-CCNC: 97 U/L (ref 45–117)
ALT SERPL-CCNC: 29 U/L (ref 12–78)
ANION GAP SERPL CALC-SCNC: 6 MMOL/L (ref 5–15)
AST SERPL-CCNC: 53 U/L (ref 15–37)
BASOPHILS # BLD: 0.1 K/UL (ref 0–0.1)
BASOPHILS NFR BLD: 1 % (ref 0–1)
BILIRUB SERPL-MCNC: 0.8 MG/DL (ref 0.2–1)
BUN SERPL-MCNC: 24 MG/DL (ref 6–20)
BUN/CREAT SERPL: 18 (ref 12–20)
CALCIUM SERPL-MCNC: 8.8 MG/DL (ref 8.5–10.1)
CHLORIDE SERPL-SCNC: 100 MMOL/L (ref 97–108)
CO2 SERPL-SCNC: 32 MMOL/L (ref 21–32)
CREAT SERPL-MCNC: 1.35 MG/DL (ref 0.7–1.3)
DIFFERENTIAL METHOD BLD: ABNORMAL
EOSINOPHIL # BLD: 0.2 K/UL (ref 0–0.4)
EOSINOPHIL NFR BLD: 3 % (ref 0–7)
ERYTHROCYTE [DISTWIDTH] IN BLOOD BY AUTOMATED COUNT: 14.5 % (ref 11.5–14.5)
GLOBULIN SER CALC-MCNC: 3.3 G/DL (ref 2–4)
GLUCOSE SERPL-MCNC: 133 MG/DL (ref 65–100)
HCT VFR BLD AUTO: 31.8 % (ref 36.6–50.3)
HGB BLD-MCNC: 10.5 G/DL (ref 12.1–17)
IMM GRANULOCYTES # BLD AUTO: 0.1 K/UL (ref 0–0.04)
IMM GRANULOCYTES NFR BLD AUTO: 1 % (ref 0–0.5)
LYMPHOCYTES # BLD: 1.1 K/UL (ref 0.8–3.5)
LYMPHOCYTES NFR BLD: 15 % (ref 12–49)
MAGNESIUM SERPL-MCNC: 2.2 MG/DL (ref 1.6–2.4)
MCH RBC QN AUTO: 30.9 PG (ref 26–34)
MCHC RBC AUTO-ENTMCNC: 33 G/DL (ref 30–36.5)
MCV RBC AUTO: 93.5 FL (ref 80–99)
MONOCYTES # BLD: 0.4 K/UL (ref 0–1)
MONOCYTES NFR BLD: 6 % (ref 5–13)
NEUTS SEG # BLD: 5.3 K/UL (ref 1.8–8)
NEUTS SEG NFR BLD: 74 % (ref 32–75)
NRBC # BLD: 0 K/UL (ref 0–0.01)
NRBC BLD-RTO: 0 PER 100 WBC
PHOSPHATE SERPL-MCNC: 2.8 MG/DL (ref 2.6–4.7)
PLATELET # BLD AUTO: 161 K/UL (ref 150–400)
PMV BLD AUTO: 11.2 FL (ref 8.9–12.9)
POTASSIUM SERPL-SCNC: 3.3 MMOL/L (ref 3.5–5.1)
PROT SERPL-MCNC: 7.4 G/DL (ref 6.4–8.2)
PSA SERPL-MCNC: 16.3 NG/ML (ref 0.01–4)
RBC # BLD AUTO: 3.4 M/UL (ref 4.1–5.7)
SODIUM SERPL-SCNC: 138 MMOL/L (ref 136–145)
WBC # BLD AUTO: 7.1 K/UL (ref 4.1–11.1)

## 2023-01-24 PROCEDURE — 3078F DIAST BP <80 MM HG: CPT | Performed by: INTERNAL MEDICINE

## 2023-01-24 PROCEDURE — 80053 COMPREHEN METABOLIC PANEL: CPT

## 2023-01-24 PROCEDURE — G0463 HOSPITAL OUTPT CLINIC VISIT: HCPCS | Performed by: INTERNAL MEDICINE

## 2023-01-24 PROCEDURE — G8536 NO DOC ELDER MAL SCRN: HCPCS | Performed by: INTERNAL MEDICINE

## 2023-01-24 PROCEDURE — 84100 ASSAY OF PHOSPHORUS: CPT

## 2023-01-24 PROCEDURE — 3017F COLORECTAL CA SCREEN DOC REV: CPT | Performed by: INTERNAL MEDICINE

## 2023-01-24 PROCEDURE — 83735 ASSAY OF MAGNESIUM: CPT

## 2023-01-24 PROCEDURE — 3074F SYST BP LT 130 MM HG: CPT | Performed by: INTERNAL MEDICINE

## 2023-01-24 PROCEDURE — 36415 COLL VENOUS BLD VENIPUNCTURE: CPT

## 2023-01-24 PROCEDURE — 1101F PT FALLS ASSESS-DOCD LE1/YR: CPT | Performed by: INTERNAL MEDICINE

## 2023-01-24 PROCEDURE — G9717 DOC PT DX DEP/BP F/U NT REQ: HCPCS | Performed by: INTERNAL MEDICINE

## 2023-01-24 PROCEDURE — G8427 DOCREV CUR MEDS BY ELIG CLIN: HCPCS | Performed by: INTERNAL MEDICINE

## 2023-01-24 PROCEDURE — 84403 ASSAY OF TOTAL TESTOSTERONE: CPT

## 2023-01-24 PROCEDURE — 96372 THER/PROPH/DIAG INJ SC/IM: CPT

## 2023-01-24 PROCEDURE — 1123F ACP DISCUSS/DSCN MKR DOCD: CPT | Performed by: INTERNAL MEDICINE

## 2023-01-24 PROCEDURE — 74011250637 HC RX REV CODE- 250/637: Performed by: NURSE PRACTITIONER

## 2023-01-24 PROCEDURE — 85025 COMPLETE CBC W/AUTO DIFF WBC: CPT

## 2023-01-24 PROCEDURE — G8417 CALC BMI ABV UP PARAM F/U: HCPCS | Performed by: INTERNAL MEDICINE

## 2023-01-24 PROCEDURE — 84153 ASSAY OF PSA TOTAL: CPT

## 2023-01-24 PROCEDURE — 99215 OFFICE O/P EST HI 40 MIN: CPT | Performed by: INTERNAL MEDICINE

## 2023-01-24 PROCEDURE — 74011250636 HC RX REV CODE- 250/636: Performed by: NURSE PRACTITIONER

## 2023-01-24 RX ORDER — POTASSIUM CHLORIDE 750 MG/1
40 TABLET, FILM COATED, EXTENDED RELEASE ORAL
Status: COMPLETED | OUTPATIENT
Start: 2023-01-24 | End: 2023-01-24

## 2023-01-24 RX ORDER — ACETAMINOPHEN 500 MG
1 TABLET ORAL DAILY
Qty: 1 KIT | Refills: 0 | Status: SHIPPED | OUTPATIENT
Start: 2023-01-24

## 2023-01-24 RX ADMIN — POTASSIUM CHLORIDE 40 MEQ: 750 TABLET, FILM COATED, EXTENDED RELEASE ORAL at 12:18

## 2023-01-24 RX ADMIN — DENOSUMAB 120 MG: 120 INJECTION SUBCUTANEOUS at 12:19

## 2023-01-24 NOTE — PROGRESS NOTES
1. Have you been to the ER, urgent care clinic since your last visit? Hospitalized since your last visit? No    2. Have you seen or consulted any other health care providers outside of the [address] [place] [address] since your last visit? Include any pap smears or colon screening.  No        Visit Vitals  /77   Pulse 86   Temp 97.4 °F (36.3 °C)   Resp 18   Ht 5' 10\" (1.778 m)   SpO2 95%   BMI 29.66 kg/m²            Chief Complaint   Patient presents with    Follow-up    Prostate Cancer

## 2023-01-24 NOTE — PROGRESS NOTES
[facility] Progress Note    Date: 2023    Name: Brooke Bonds    MRN: [id number]         : 1955    Mr. Irina Wills Arrived ambulatory and in no distress for xgeva Injection. Assessment was completed, no acute issues at this time, no new complaints voiced. Peripheral Labs drawn by PALAK,  and sent for processing. Mr. Maryam Pires vitals were reviewed. Visit Vitals  /77   Pulse 86   Temp 97.4 °F (36.3 °C)   Resp 18   SpO2 95%     Lab results obtained and reviewed. Recent Results (from the past 12 hour(s))   CBC WITH AUTOMATED DIFF    Collection Time: 23  9:15 AM   Result Value Ref Range    WBC 7.1 4.1 - 11.1 K/uL    RBC 3.40 (L) 4.10 - 5.70 M/uL    HGB 10.5 (L) 12.1 - 17.0 g/dL    HCT 31.8 (L) 36.6 - 50.3 %    MCV 93.5 80.0 - 99.0 FL    MCH 30.9 26.0 - 34.0 PG    MCHC 33.0 30.0 - 36.5 g/dL    RDW 14.5 11.5 - 14.5 %    PLATELET 216 228 - 639 K/uL    MPV 11.2 8.9 - 12.9 FL    NRBC 0.0 0  WBC    ABSOLUTE NRBC 0.00 0.00 - 0.01 K/uL    NEUTROPHILS 74 32 - 75 %    LYMPHOCYTES 15 12 - 49 %    MONOCYTES 6 5 - 13 %    EOSINOPHILS 3 0 - 7 %    BASOPHILS 1 0 - 1 %    IMMATURE GRANULOCYTES 1 (H) 0.0 - 0.5 %    ABS. NEUTROPHILS 5.3 1.8 - 8.0 K/UL    ABS. LYMPHOCYTES 1.1 0.8 - 3.5 K/UL    ABS. MONOCYTES 0.4 0.0 - 1.0 K/UL    ABS. EOSINOPHILS 0.2 0.0 - 0.4 K/UL    ABS. BASOPHILS 0.1 0.0 - 0.1 K/UL    ABS. IMM.  GRANS. 0.1 (H) 0.00 - 0.04 K/UL    DF AUTOMATED     METABOLIC PANEL, COMPREHENSIVE    Collection Time: 23  9:15 AM   Result Value Ref Range    Sodium 138 136 - 145 mmol/L    Potassium 3.3 (L) 3.5 - 5.1 mmol/L    Chloride 100 97 - 108 mmol/L    CO2 32 21 - 32 mmol/L    Anion gap 6 5 - 15 mmol/L    Glucose 133 (H) 65 - 100 mg/dL    BUN 24 (H) 6 - 20 MG/DL    Creatinine 1.35 (H) 0.70 - 1.30 MG/DL    BUN/Creatinine ratio 18 12 - 20      eGFR 57 (L) >60 ml/min/1.73m2    Calcium 8.8 8.5 - 10.1 MG/DL    Bilirubin, total 0.8 0.2 - 1.0 MG/DL    ALT (SGPT) 29 12 - 78 U/L    AST (SGOT) 53 (H) 15 - 37 U/L    Alk. phosphatase 97 45 - 117 U/L    Protein, total 7.4 6.4 - 8.2 g/dL    Albumin 4.1 3.5 - 5.0 g/dL    Globulin 3.3 2.0 - 4.0 g/dL    A-G Ratio 1.2 1.1 - 2.2     MAGNESIUM    Collection Time: 01/24/23  9:15 AM   Result Value Ref Range    Magnesium 2.2 1.6 - 2.4 mg/dL   PHOSPHORUS    Collection Time: 01/24/23  9:15 AM   Result Value Ref Range    Phosphorus 2.8 2.6 - 4.7 MG/DL   PSA, DIAGNOSTIC (PROSTATE SPECIFIC AG)    Collection Time: 01/24/23  9:15 AM   Result Value Ref Range    Prostate Specific Ag 16.3 (H) 0.01 - 4.0 ng/mL      Medications:  Medications Administered       denosumab (XGEVA) injection 120 mg       Admin Date  01/24/2023 Action  Given Dose  120 mg Route  SubCUTAneous Administered By  Verl Schaumann, RN              potassium chloride SR (KLOR-CON 10) tablet 40 mEq       Admin Date  01/24/2023 Action  Given Dose  40 mEq Route  Oral Administered By  Verl Schaumann, RN                 Mr. Martha Marie tolerated treatment well and was discharged from [place] [address] in stable condition. He is to return on March 21 2023 at 0930am for his next appointment.     Myles Garcia RN  January 24, 2023 No

## 2023-01-24 NOTE — PROGRESS NOTES
[employer]  Oncology Social Work  Encounter     Date of ACP Conversation: 1/24/2023     ACP conversation length (minutes):  30 minutes      Patient has prior advance directive? No    Today, patient completed his AMD.     Today, patient named (two) health care agents:    Primary Decision Maker ([address]): Taniya Barrett  Relationship to patient: son  Phone number: [phone]  [x] Named in a scanned document   [] Legal Next of Kin  [] Guardian     Secondary Decision Maker ([address]): Sanjuanita Torres  Relationship to patient: ex-wife  Phone number: [phone]  [x] Named in a scanned document   [] Legal Next of Kin  [] Guardian     ACP documents you current have include:  [x] Advance Directive or Living Will  [] Durable Do Not Resuscitate  [] Physician Orders for Scope of Treatment (POST)  [x] Medical Power of   [] Other    Patient decided in the event death is imminent and medical treatment will not help with recovery, then he does not want treatments to prolong life but wants to be made comfortable. Patient also decided in the event he is unaware of his, surroundings, or others and it is reasonably certain awareness will never be recovered, then he wants to try treatment for a period of 5 days in the hopes that his condition improves. He wants to be made comfortable. Original AMD provided to patient. Copies of AMD provided for healthcare agents and scanned into [employer].       Thank you,   Matteo Martínez LCSW

## 2023-01-24 NOTE — TELEPHONE ENCOUNTER
Patients son called. He stated they called [facility] about getting the Valentina Kevan for his father. He stated the cost of the medication is going to be over 3,000 dollars. He wasn't sure if that is correct and is concerned if that's the cost they are to pay. Please call back when possible.     # [phone]

## 2023-01-25 ENCOUNTER — TELEPHONE (OUTPATIENT)
Dept: ONCOLOGY | Age: 68
End: 2023-01-25

## 2023-01-25 DIAGNOSIS — F03.B0 MODERATE DEMENTIA WITHOUT BEHAVIORAL DISTURBANCE, PSYCHOTIC DISTURBANCE, MOOD DISTURBANCE, OR ANXIETY, UNSPECIFIED DEMENTIA TYPE: ICD-10-CM

## 2023-01-25 DIAGNOSIS — C61 ADENOCARCINOMA OF PROSTATE, STAGE 4 (HCC): Primary | ICD-10-CM

## 2023-01-25 NOTE — TELEPHONE ENCOUNTER
1/25/23- Called patients son, Eliud Young. Informed Eliud Young that patient was signed up for a [address] in December and that this information was forwared to [facility] but it was sent again yesterday. Per Carolyne Medicine with [facility] the lilian is now applied to patients account and patients cost of care for the Obinna Mcgarry is $0 copay. Per Carolyne Medicine [facility] will reach out to Eliudbeny Young today to schedule shipment of Obinna Mcgarry. Eliud Powellric verbalized understanding and was appreciative of the assistance with no further questions or concern.

## 2023-01-25 NOTE — TELEPHONE ENCOUNTER
[email or web address] at [facility]  [phone]      01/25/23 3:39 PM [facility] to initiate home health referral. They do not service his area of residence. [facility] and spoke with Ramonita in intake. Verified that they do accept patient's insurance and service his area. Faxed referral, patient demographics, and office note to [phone]. Will continue to monitor.

## 2023-01-25 NOTE — TELEPHONE ENCOUNTER
Patient's son called stated they tried to get patients Maida Tito filled and [facility] stated it would cost over 3,000 dollars. They are concerned about this cost and would like a call back.     # [phone]

## 2023-01-27 ENCOUNTER — TELEPHONE (OUTPATIENT)
Dept: ONCOLOGY | Age: 68
End: 2023-01-27

## 2023-01-27 NOTE — TELEPHONE ENCOUNTER
[address] at [place]  [phone]    01/27/23 4:37 PM - Called and spoke with the patient. Patient's ID verified x 2. Advised the patient of the NP's recommendations. The patient voiced understanding and gratitude for the call. No further questions or concerns.

## 2023-01-27 NOTE — TELEPHONE ENCOUNTER
Patient called and stated he was trying to get his medications delivered to him. They finally came in and he wants to know if he can take them now or wait until tomorrow. Please call him back when possible.     # [phone]

## 2023-01-27 NOTE — TELEPHONE ENCOUNTER
Jorge Arnold from [facility] called stating she received the referral and patient is schedule to be seen 1/30.        # [phone]

## 2023-01-28 LAB
TESTOST FREE SERPL-MCNC: <0.2 PG/ML (ref 6.6–18.1)
TESTOST SERPL-MCNC: <3 NG/DL (ref 264–916)

## 2023-02-02 ENCOUNTER — TELEPHONE (OUTPATIENT)
Dept: ONCOLOGY | Age: 68
End: 2023-02-02

## 2023-02-02 NOTE — TELEPHONE ENCOUNTER
Oral Chemotherapy     Melissa Rogers is a 75 y.o.male seen for consultation for pharmacist oral chemotherapy management. Diagnosis: neuroendocrine carcinoma    Medication name: enzalutamide (Xtandi) 40 mg tab  Dose: four (4) tablets of 40 mg for a total dose of 160 mg by mouth   Frequency: daily  Ordering provider: Dr. Renata Warner  Start date: 1/27/23 per patient    Checked in with patient over the phone. He reports receiving his Xtandi and starting last Friday, 1/27. No side effects so far except one high blood pressure reading this morning (DBP 90, baseline ~70s). Home health nurses visit and check his blood pressure regularly. No adherence issues since patient has help with his medications daily. Patient was counseled on recording his blood pressure readings (both SBP and DBP since patient is currently only reporting DBP) and let us know if there is a change from his baseline. Mr Karan Ambrose expressed understanding and appreciation for the call. Drug interactions reviewed again today. Drug interactions checked and several identified:  1) Avi [place] may reduce efficacy of amlodipine, leading to blood pressure fluctuations and possibly hypertension. 2) Avi [place] may also reduce efficacy of sertraline. No medication changes necessary at this time. Recommend to monitor and adjust amlodipine and sertraline as necessary.      Parth Sanchez, PharmD, BCPS    For Pharmacy Admin Tracking Only    Program: Medication Management  CPA in place: Yes  Recommendation Provided To: Provider: 1 via Note to Provider   Intervention Detail: New Rx: 1, reason: Interaction  Intervention Accepted By: Provider: 1  Time Spent (min): 30

## 2023-02-02 NOTE — PROGRESS NOTES
[employer]  Medical Oncology at [address] [place] [address]  [phone]    Hematology / Oncology Established Visit    Reason for Visit:   Jaquan Deal is a 75 y.o. male who is seen for follow up of neuroendocrine carcinoma. 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 Flow cytometry negative for lymphoproliferative disorder. FoundationOne: MS Stable, TMB 0, MDM4 amplification, MYC amplification, TMPRSS2 TMPRSS2-ERD fusion, CEBPA 1311fs*10, ERBB4 amplification - equivocal, MCL1 amplification, [id number] amplification, RAD21 amplification. See scanned report for full info. Abbreviated [facility] Pathology Consultation:  Final diagnosis: Right external iliac node: Metastatic prostate adenocarcinoma. Comment: Histologic sections of the right external iliac node show sheets of cells with minimal eosinophilic cytoplasm and variable cytologic atypia. Some areas show acinar formation. The cells have predominantly round nuclei with vesicular chromatin and prominent nucleoli. Some areas show significantly more atypia with more irregular nuclear borders, hyperchromatic nyclei, and increase nuclear to cytoplasmic ratio. Frequent mitotic figures are identified. A provided pane of IHC stains is reviewed and demonstrates that the malignant cells show strong, diffuse expression of pan-cytokeratin and focal expression of TTF. CK7, CK20, chromogranin, and synaptophysin are negative in the lesional cells. CD3 and CD20 are negative in the lesions cells and positive in the background lymphocytes.  An abbreviated [facility] report was included, which included a TMPRSS2-ERG fusion (among other nonspecific abnormalities), which si found in approximately 50% of prostate cancers. Additional IHC studies performed at [facility] showed that the malignant cells have immunoreactivity with antibodies for PSA and PSAP, consistent with a diagnosis of metastatic prostate adenocarcinoma. Per report, flow cytometry negative for lymphoma. Overall, the histomorphology, immunophenotype and genomic findings in this case are diagnostic of metastatic prostate adenocarcinoma. The acinar formation and cytology were suggestive of this diagnosis, and the diffuse PSA and PSAP positivity as well as the TMPRSS2-ERG fusion confirmed the diagnosis. This fusion is the most common genomic alteration in prostate carcinoma and can be detected in over half of the cases, yet is not seen with any frequency in other types of carcinoma. Prior Treatment:   1. Carbo-Etoposide-Atezolizumab x 2 cycles, 6/29/20-7/20/20. 2. Docetaxel x 6 cycles, 8/10/20-11/24/20. 3. Zytiga 1000mg/d and prednisone 5mg BID, started 4/29/22 - 12/2022    Current Treatment:  Planned Enzalutamide (Xtandi) 160mg PO daily  Lupron every 12 weeks, started 8/2020-current      History of Present Illness:   Jocelyn Vidal is a 75 y.o. male who comes in for follow up of poorly differentiated prostate cancer. Pt noticed a nontender bulge in his LLQ in 5/2020. He thought this was a hernia and was evaluated by Dr. Bart Zuleta. CT on 5/20/20 was notable for extensive lymphadenopathy in abd/pelvis. Pt underwent robot assisted excisional Right external iliac LN biopsy 5/29/20, which showed poorly differentiated large cell neuroendocrine carcinoma. However, pathology reviewed at [facility] and felt to be poorly differentiated prostate adenocarcinoma. He reports intentional weight loss with dietary changes and exercise, losing 40-lbs in past 8 months. No n/v/d, melena/hematochezia, cough, SOB. No fevers, chills, sweats. [employer] nonsmoker. Mother had breast cancer diagnosed age < 48. Twin brother had melanoma diagnosed aged late 46s.   He does not think he has ever had a colonoscopy or PSA screening prior to current diagnosis. Interval History:  Patient here for follow up of prostate cancer. Started on Enzalutamide 1/27/23. Denies any obvious side effects. Does note intermittent \"vertigo\" - however denies       Present with son, Rebeka Miner. PMHx: Prostate cancer, HTN  PSurgHx: appendectomy, vasectomy, subcutaneous cyst removal on forehead  SHx:  Never smoker, no EtOH currently after prior alcohol abuse. Unmarried. Has 2 children, 27 and 32. 1 lives in [place]. FHx:  Mother had breast cancer, father had prostate cancer, brother has melanoma, COPD. Meds/Allergies: Reviewed   Review of Systems: A complete review of systems was obtained, negative except as described above. Physical Exam:     Visit Vitals  BP (!) 145/88 (BP 1 Location: Left upper arm, BP Patient Position: Sitting, BP Cuff Size: Large adult)   Pulse 63   Temp 97 °F (36.1 °C)   Resp 18   Ht 5' 10\" (1.778 m)   Wt 204 lb (92.5 kg)   SpO2 98%   BMI 29.27 kg/m²         ECOG PS: 0  General: Well developed, no acute distress  Eyes: Anicteric sclerae  Lymphatic: No cervical, supraclavicular adenopathy  Respiratory: BCTA, normal respiratory effort  CV: Normal rate, regular rhythm, no murmurs, no edema, port upper chest, 1 + edema bilateral LE L > R.   GI: firm, nontender, nondistended, +BS  MS: Normal gait and station. Digits without clubbing or cyanosis. Skin:  Fingernails dark, thickened at distal half and more normal at proximal half. No ecchymoses, or petechiae. Normal temperature, turgor. Diffuse dry skin. Scattered ecchymosis on arms. 1 cm scaly lesion on left upper thigh   Neuro/Psych: Alert, oriented. Moves all 4 extremities. Appropriate affect, normal judgment/insight. Results:     Lab Results   Component Value Date/Time    WBC 6.0 02/21/2023 10:24 AM    HGB 10.4 (L) 02/21/2023 10:24 AM    HCT 30.9 (L) 02/21/2023 10:24 AM    PLATELET 432 88/54/1603 10:24 AM    MCV 90.1 02/21/2023 10:24 AM    ABS. NEUTROPHILS 4.0 02/21/2023 10:24 AM     Lab Results   Component Value Date/Time    Sodium 138 01/24/2023 09:15 AM    Potassium 3.3 (L) 01/24/2023 09:15 AM    Chloride 100 01/24/2023 09:15 AM    CO2 32 01/24/2023 09:15 AM    Glucose 133 (H) 01/24/2023 09:15 AM    BUN 24 (H) 01/24/2023 09:15 AM    Creatinine 1.35 (H) 01/24/2023 09:15 AM    GFR est AA >60 10/03/2022 10:06 AM    GFR est non-AA 60 (L) 10/03/2022 10:06 AM    Calcium 8.8 01/24/2023 09:15 AM    Creatinine (POC) 1.30 04/15/2022 08:49 AM     Lab Results   Component Value Date/Time    Bilirubin, total 0.8 01/24/2023 09:15 AM    ALT (SGPT) 29 01/24/2023 09:15 AM    Alk.  phosphatase 97 01/24/2023 09:15 AM    Protein, total 7.4 01/24/2023 09:15 AM    Albumin 4.1 01/24/2023 09:15 AM    Globulin 3.3 01/24/2023 09:15 AM     Lab Results   Component Value Date/Time    Iron 82 04/18/2022 10:47 AM    TIBC 415 04/18/2022 10:47 AM    Iron % saturation 20 04/18/2022 10:47 AM    Ferritin 268 04/18/2022 10:47 AM       Lab Results   Component Value Date/Time    Vitamin B12 312 04/18/2022 10:47 AM    Folate 9.6 04/18/2022 10:47 AM     Lab Results   Component Value Date/Time    TSH 1.72 06/29/2020 09:58 AM     Lab Results   Component Value Date/Time    Prostate Specific Ag 16.3 (H) 01/24/2023 09:15 AM    Prostate Specific Ag 10.1 (H) 12/27/2022 09:38 AM    Prostate Specific Ag 5.9 (H) 11/28/2022 11:57 AM       No results found for: HAMAT, HAAB, HABT, HAAT, HBSAG, HBSB, HBSAT, HBABN, HBCM, HBCAB, HBCAT, Keesha Matt, HBEAB, HBEAG, XHEPCS, [id number], HBEGLT, [place], HBCLT, [address], [id number], [id number], [address] [place] [address], [id number], HBCMLT, [id number], HCGAT     6/11/20: Chromogranin 42    6/11/20: PSA 38.6  7/20/20: PSA 13.2   8/10/20: PSA 1.2  8/31/20: PSA 0.5  9/24/20: PSA 0.4  10/15/20: PSA 0.3  11/5/20: PSA 0.3  11/24/20: 0.2  2/18/21: PSA 0.1    3/22/21 PSA 0.066 (PSA ultrasensitive at [facility])   4/1/21 PSA 0.1, testosterone < 3  5/13/21 PSA 0.1  8/5/21 PSA 0.2  10/28/21: PSA 0.4  1/20/22: PSA 2.3  3/9/22: PSA 9.1   22 PSA 25.3  22 started Zytiga + prednisone   22 PSA 7.2  22 PSA 4.2  22 PSA 2.9  22 PSA 2.8  22: PSA 2.1   10/3/22: PSA 2.6   10/31/22: PSA 3.5   22: PSA 5.8   22: PSA 10.1  23: PSA 16.3     Imagin/20/20 CT abd/pelvis with IV contrast:  1. No findings to suggest gross abdominal wall hernia or flank hernia. 2. Extensive adenopathy throughout the abdomen and pelvis as described. Findings are concerning for possible metastatic disease or lymphoma. Recommend follow up or comparison with prior studies. 20 PET:  FINDINGS:  HEAD/NECK: No apparent foci of abnormal hypermetabolism. Cerebral evaluation is  limited by normal intense activity. CHEST: Hypermetabolic lymphadenopathy at the base of the left neck and in the  left supraclavicular region is noted. Hypermetabolic superior mediastinal,  prevascular, right paratracheal, subcarinal, and bilateral hilar lymph  lymphadenopathy, maximum SUV of the subcarinal lymph node is 5.7. Small but  hypermetabolic soft tissue nodule left anterior chest wall. ABDOMEN/PELVIS: Hypermetabolic retrocrural, left para-aortic, aortocaval,  bilateral common iliac, bilateral external iliac, and bilateral inguinal  lymphadenopathy, maximum SUV 5.8 of an aortocaval lymph node. Hypermetabolic  mesenteric lymph node left lower quadrant, maximum SUV 12.3. SKELETON: No foci of abnormal hypermetabolism in the axial and visualized  appendicular skeleton. IMPRESSION: Hypermetabolic lymphadenopathy involving the left neck and left  supraclavicular region, mediastinum, bilateral hilum, retroperitoneum,  mesentery, and pelvis as described above. Hypermetabolic soft tissue nodule left  chest.    Brain MRI 20: There is no evidence of intracranial metastatic disease. Mild chronic microvascular ischemic change.   No intracranial mass, hemorrhage or evidence of acute infarction.    ----------------------------------------------------  7/7/22 Bone scan:   1. The focus of increased bony activity right occipital bone appears slightly larger. 2. Small focus of activity right sixth rib posterolaterally is stable. CT  demonstrates small focus of sclerosis in this region. Findings are suspicious for bony metastatic disease. No new foci of increased bony activity identified. 7/7/22 CT head:  1. No evidence of acute intracranial process. 2. No lesion to correspond to the previously seen abnormality in the right posterior skull on bone scan. 7/7/22 CT ch/abd/pelv:  Response to treatment characterized by decrease in nodularity in the left gluteal region, decrease in size of retroperitoneal lymph nodes, and decreased enlargement of the right psoas muscle. No new lung nodules or mediastinal lymphadenopathy. RECIST:  Target lesions:  Left retroperitoneal lymph node, 2:74, 14 x 12 mm, previously 16 x 15 mm. Right psoas enlargement, 2:76, 53 x 23 mm, previously 57 x 29 mm. Right pericaval lymph node, 2:77, 17 x 15 mm, previously 20 x 20 mm. Right external iliac lymph node, 2:90, 26 x 20 mm, previously 35 x 18 mm. Nontarget lesions:  Lung nodules have resolved. Right posterior lateral sixth rib sclerotic lesion is new. 9/27/22 CT ch/abd/pelv:  RECIST   TARGET LESIONS:  Lesion (description)         Location (series/slice)                Size      1. Left retroperitoneal lymph node    series 2 image 69    14 x 12 mm decrease in size  2. Right psoas infiltrative lesion    series 2 image 74    5.3 x 2.0 cm unchanged  3. Right pericaval lymph node    series 2 image 76    12 x 10 mm decrease in size  4. Right external iliac lymph node. Series 2 image 87    25 x 21 mm without significant change  NONTARGET LESIONS:  1. Lung nodules remain resolved. 2. Sclerotic lesions in the osseous structures unchanged. IMPRESSION  1.   Retroperitoneal lymph nodes are slightly decreased in size.  2.  Infiltrative right psoas lesion is not significant change. 3.  Right external iliac lymph node is not significant changed. 4.  Stable small sclerotic lesions. 9/27/22 Bone scan:  IMPRESSION  1. Focus of activity right occipital bone is stable. The tiny focus in the right sixth rib posterolaterally is less apparent. The tiny focus distal left femur which in retrospect is unchanged. There is a tiny focus in the sacrum which was not present previously but could  be degenerative related to L5-S1 degenerative disc changes. 12/19/22 CT ch/abd/pelv:  IMPRESSION  1. Interval mixed response of retroperitoneal lymph nodes/tumor implants as  outlined above. 2.  Stable scattered sclerotic osseous lesions. 3.  Right eccentric rectal wall thickening and prominent vascularity, consider  correlation with proctoscopy. RECIST   Interval mixed response. TARGET LESIONS:   Lesion (description)         Location (series/slice)                Size      1. Left retroperitoneal lymph nodes/tumor implant, decreased    2-70     currently 9 mm x 6 mm, previously 14 mm x 12 mm  2. Right psoas infiltrative lesion, increased    2-86    largest focus currently  24 mm x 20 mm, previously 21 mm x 16 mm  3. Right pericaval lymph node, stable to slightly decreased    2-77    currently  12 mm x 7 mm, previously 12 mm x 10 mm  4. Right external iliac lymph node, increased    2-87    currently 33 mm x 25  mm, previously 25 mm x 21 mm   NONTARGET LESIONS:  1. Lung nodules remain resolved  2. Sclerotic osseous lesions, stable    12/19/22 Bone scan:  FINDINGS: The calvarial, left distal femur, and sacral lesions are again noted  and demonstrate increased tracer activity compared to the prior exam. No new  abnormal uptake is identified. Incidental renal imaging shows no abnormality.    IMPRESSION  Stable pattern of osseous metastatic disease within increase in the  tracer activity corresponding to the previously described lesions. No new lesion  is identified. Assessment & Plan:   Donnamae Najjar is a 75 y.o. 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 1. Metastatic adenocarcinoma of prostate, high grade:  Hormone resistant. Initial pathologist at [address] [place] [address] called this \"Large cell neuroendocrine carcinoma,\" and based on diffuse disease on PET, patient was started on treatment with Carbo-Etoposide-Atezolizumab (thinking this was large cell carcinoma of the lung.) However FoundationOne testing identified TMPRSS2-ERG fusion, which is primarily seen in prostate cancers. This along with elevated PSA of 38 prompted sending of pathology specimen to [facility] for review. Their opinion and IHC staining is consistent with high grade adenocarcinoma of prostate. After Carbo-Etop-Atezo, pt was treated with Docetaxel x 6 cycles. Treatment options now include: Carboplatin + Cabazitaxel. 4/15/22 CT shows disease progression with a new liver and lung mets; bone scan with possible new occipital and possible rib met. Initiated Zytiga + prednisone on 4/29/22.   12/2022 CT showed a mixed response - given rising PSA, I discussed switching treatment. Discussed Cabazitaxel 20mg/m2 and Enzalutamide - I do not feel patient's performance status and cognition are adequate for cytotoxic chemotherapy. Therefore, recommended Enzalutamide. Consent signed today. Supportive medications: EMLA cream, zofran, compazine, dexamethasone   -- Lupron (3 month depot)  #11 given 12/27/22, #12 due 3/21/23. -- Continue Enzalutamide (Xtandi) 160mg PO daily, started 1/27/23. PA approved. Patient needs to schedule shipment. Provided contact for [employer] [phone].    -- Advised checking BP 1-2 times weekly. BP kit rx today. -- Next CT and bone scan due in 12 weeks after starting Ruthellen Cheadle (around 4/2023). -- Return in 4 weeks labs, MD/NP visit. -- Home Health for medication management. Winsome Mccrary to discuss advance directive.    -- KCL 40 meq today in [place]. 2. Chemotherapy induced neuropathy:   Grade 1. Present in bilateral fingertips. 3. CKD / HTN:  On Amlodipine. Following with PCP. Monitoring BP as Xtandi may reduce efficacy of amlodipine. 4. Depression/anxiety / h/o Genital Herpes:   2/2 to #1. On Zoloft 50mg/d. Following with Palliative medicine. On suppression with Acyclovir. 5. Dysequilibrium / Ear effusions / dementia / falls:   Past Brain MRI negative for mets. Left tympanostomy tube placed by ENT for bilat effusions. Balance improved with PT. Diagnosed with mild to moderate dementia via neuropsych evaluation. Was advised consideration for an appropriate medication for his moderate to severe attention deficit issues, counseling for the emotional distress problems. Dr Michelle Duran referred to Neurology. Prn social visits with Grayson Monet to discuss future living options if dementia worsens. -- Follow up Dr. Arely Becerril and Michelle Duran as scheduled in March 2023. 6. Health maintenance:   Had screening colonoscopy 8/13/2022 with GSI with benign polyp per patient. Received 2022 seasonal influenza vaccine. 7. Normocytic anemia:   Likely due to #1. Iron profile, ferritin, b12 and folate normal. Monitor with monthly labs. 8. Osteopenia / bone mets:   On calcium/D3 supplementation. Evaluated by Dr. Rebecca Patel (dentist) and will eventually require extractions, treatment of severe periodontal disease, bridge may fail soon, but he advised ok to proceed with Xgeva. Discussed higher incidence of ONJ in patients on Denosumab with patient and he voices understanding. Wants to proceed with Xgeva. -- Xgeva every 4 weeks    9. Skin lesion / cyst:  1 cm, scaly  and pruritic lesion present on left upper thigh. Painless cyst on right upper back. -- Dermatology appt scheduled 3/8/2023.      Emotional well being: Pt is coping well with his/her disease and has a support system, but he is struggling with keeping up with housework, MARGOT Salinas consulted and advised patient apply for Medicaid LTC and provided to call [phone]. SW provided ex wife- (1st wife and mother of pt children) [employer] number [phone] to assist pt with divorce of 2nd wife. I personally provided the service today.      Signed By: Sonam Clifton MD

## 2023-02-07 ENCOUNTER — TELEPHONE (OUTPATIENT)
Dept: ONCOLOGY | Age: 68
End: 2023-02-07

## 2023-02-07 ENCOUNTER — OFFICE VISIT (OUTPATIENT)
Dept: NEUROLOGY | Age: 68
End: 2023-02-07

## 2023-02-07 DIAGNOSIS — Z91.199 NO-SHOW FOR APPOINTMENT: Primary | ICD-10-CM

## 2023-02-07 NOTE — TELEPHONE ENCOUNTER
Doug from [facility] called. Stated patient fell and has a large skin tear. He was wondering if he could get a verbal order to put in for wound treatment for the patient. Please call him back when possible.     # [phone]

## 2023-02-07 NOTE — TELEPHONE ENCOUNTER
[address] at [place]  [phone]        02/07/23 12:47 PM Return call placed to [facility]. He stated patient fell 2 days ago and has large skin tear to left elbow. Advised of response per Samira Walters NP. He voiced understanding. Doug stated he will also order wound cleanser. Will schedule wound care to be completed twice weekly. Updated Samira Walters of above. No further questions or concerns at this time.

## 2023-02-14 RX ORDER — ACETAMINOPHEN 325 MG/1
650 TABLET ORAL AS NEEDED
Status: CANCELLED
Start: 2023-02-21

## 2023-02-14 RX ORDER — ALBUTEROL SULFATE 0.83 MG/ML
2.5 SOLUTION RESPIRATORY (INHALATION) AS NEEDED
Status: CANCELLED
Start: 2023-02-21

## 2023-02-14 RX ORDER — DIPHENHYDRAMINE HYDROCHLORIDE 50 MG/ML
25 INJECTION, SOLUTION INTRAMUSCULAR; INTRAVENOUS AS NEEDED
Status: CANCELLED
Start: 2023-02-21

## 2023-02-14 RX ORDER — HYDROCORTISONE SODIUM SUCCINATE 100 MG/2ML
100 INJECTION, POWDER, FOR SOLUTION INTRAMUSCULAR; INTRAVENOUS AS NEEDED
Status: CANCELLED | OUTPATIENT
Start: 2023-02-21

## 2023-02-14 RX ORDER — ONDANSETRON 2 MG/ML
8 INJECTION INTRAMUSCULAR; INTRAVENOUS AS NEEDED
Status: CANCELLED | OUTPATIENT
Start: 2023-02-21

## 2023-02-14 RX ORDER — EPINEPHRINE 1 MG/ML
0.3 INJECTION, SOLUTION, CONCENTRATE INTRAVENOUS AS NEEDED
Status: CANCELLED | OUTPATIENT
Start: 2023-02-21

## 2023-02-14 RX ORDER — DIPHENHYDRAMINE HYDROCHLORIDE 50 MG/ML
50 INJECTION, SOLUTION INTRAMUSCULAR; INTRAVENOUS AS NEEDED
Status: CANCELLED
Start: 2023-02-21

## 2023-02-21 ENCOUNTER — HOSPITAL ENCOUNTER (OUTPATIENT)
Dept: INFUSION THERAPY | Age: 68
Discharge: HOME | End: 2023-02-21
Payer: MEDICARE

## 2023-02-21 ENCOUNTER — OFFICE VISIT (OUTPATIENT)
Dept: ONCOLOGY | Age: 68
End: 2023-02-21
Payer: MEDICARE

## 2023-02-21 VITALS
BODY MASS INDEX: 29.2 KG/M2 | OXYGEN SATURATION: 98 % | DIASTOLIC BLOOD PRESSURE: 98 MMHG | WEIGHT: 204 LBS | HEART RATE: 63 BPM | TEMPERATURE: 97 F | SYSTOLIC BLOOD PRESSURE: 154 MMHG | RESPIRATION RATE: 18 BRPM | HEIGHT: 70 IN

## 2023-02-21 VITALS
OXYGEN SATURATION: 98 % | DIASTOLIC BLOOD PRESSURE: 88 MMHG | TEMPERATURE: 97 F | HEIGHT: 70 IN | RESPIRATION RATE: 18 BRPM | WEIGHT: 204 LBS | HEART RATE: 63 BPM | SYSTOLIC BLOOD PRESSURE: 145 MMHG | BODY MASS INDEX: 29.2 KG/M2

## 2023-02-21 DIAGNOSIS — C79.51 BONE METASTASES (HCC): ICD-10-CM

## 2023-02-21 DIAGNOSIS — C61 ADENOCARCINOMA OF PROSTATE, STAGE 4 (HCC): Primary | ICD-10-CM

## 2023-02-21 DIAGNOSIS — I10 PRIMARY HYPERTENSION: ICD-10-CM

## 2023-02-21 DIAGNOSIS — M85.80 OSTEOPENIA, UNSPECIFIED LOCATION: Primary | ICD-10-CM

## 2023-02-21 DIAGNOSIS — F03.B0 MODERATE DEMENTIA WITHOUT BEHAVIORAL DISTURBANCE, PSYCHOTIC DISTURBANCE, MOOD DISTURBANCE, OR ANXIETY, UNSPECIFIED DEMENTIA TYPE: ICD-10-CM

## 2023-02-21 DIAGNOSIS — Z79.899 ENCOUNTER FOR MEDICATION MANAGEMENT: ICD-10-CM

## 2023-02-21 LAB
ALBUMIN SERPL-MCNC: 4 G/DL (ref 3.5–5)
ALBUMIN/GLOB SERPL: 1.1 (ref 1.1–2.2)
ALP SERPL-CCNC: 88 U/L (ref 45–117)
ALT SERPL-CCNC: 30 U/L (ref 12–78)
ANION GAP SERPL CALC-SCNC: 5 MMOL/L (ref 5–15)
AST SERPL-CCNC: 82 U/L (ref 15–37)
BASOPHILS # BLD: 0.1 K/UL (ref 0–0.1)
BASOPHILS NFR BLD: 2 % (ref 0–1)
BILIRUB SERPL-MCNC: 0.7 MG/DL (ref 0.2–1)
BUN SERPL-MCNC: 23 MG/DL (ref 6–20)
BUN/CREAT SERPL: 17 (ref 12–20)
CALCIUM SERPL-MCNC: 9 MG/DL (ref 8.5–10.1)
CHLORIDE SERPL-SCNC: 101 MMOL/L (ref 97–108)
CO2 SERPL-SCNC: 30 MMOL/L (ref 21–32)
CREAT SERPL-MCNC: 1.34 MG/DL (ref 0.7–1.3)
DIFFERENTIAL METHOD BLD: ABNORMAL
EOSINOPHIL # BLD: 0.2 K/UL (ref 0–0.4)
EOSINOPHIL NFR BLD: 4 % (ref 0–7)
ERYTHROCYTE [DISTWIDTH] IN BLOOD BY AUTOMATED COUNT: 14.1 % (ref 11.5–14.5)
GLOBULIN SER CALC-MCNC: 3.5 G/DL (ref 2–4)
GLUCOSE SERPL-MCNC: 103 MG/DL (ref 65–100)
HCT VFR BLD AUTO: 30.9 % (ref 36.6–50.3)
HGB BLD-MCNC: 10.4 G/DL (ref 12.1–17)
IMM GRANULOCYTES # BLD AUTO: 0.1 K/UL (ref 0–0.04)
IMM GRANULOCYTES NFR BLD AUTO: 1 % (ref 0–0.5)
LYMPHOCYTES # BLD: 1.1 K/UL (ref 0.8–3.5)
LYMPHOCYTES NFR BLD: 18 % (ref 12–49)
MAGNESIUM SERPL-MCNC: 2 MG/DL (ref 1.6–2.4)
MCH RBC QN AUTO: 30.3 PG (ref 26–34)
MCHC RBC AUTO-ENTMCNC: 33.7 G/DL (ref 30–36.5)
MCV RBC AUTO: 90.1 FL (ref 80–99)
MONOCYTES # BLD: 0.6 K/UL (ref 0–1)
MONOCYTES NFR BLD: 9 % (ref 5–13)
NEUTS SEG # BLD: 4 K/UL (ref 1.8–8)
NEUTS SEG NFR BLD: 66 % (ref 32–75)
NRBC # BLD: 0 K/UL (ref 0–0.01)
NRBC BLD-RTO: 0 PER 100 WBC
PHOSPHATE SERPL-MCNC: 2.9 MG/DL (ref 2.6–4.7)
PLATELET # BLD AUTO: 205 K/UL (ref 150–400)
PMV BLD AUTO: 10.8 FL (ref 8.9–12.9)
POTASSIUM SERPL-SCNC: 3.2 MMOL/L (ref 3.5–5.1)
PROT SERPL-MCNC: 7.5 G/DL (ref 6.4–8.2)
PSA SERPL-MCNC: 17.8 NG/ML (ref 0.01–4)
RBC # BLD AUTO: 3.43 M/UL (ref 4.1–5.7)
SODIUM SERPL-SCNC: 136 MMOL/L (ref 136–145)
WBC # BLD AUTO: 6 K/UL (ref 4.1–11.1)

## 2023-02-21 PROCEDURE — 3079F DIAST BP 80-89 MM HG: CPT | Performed by: INTERNAL MEDICINE

## 2023-02-21 PROCEDURE — G8536 NO DOC ELDER MAL SCRN: HCPCS | Performed by: INTERNAL MEDICINE

## 2023-02-21 PROCEDURE — 1101F PT FALLS ASSESS-DOCD LE1/YR: CPT | Performed by: INTERNAL MEDICINE

## 2023-02-21 PROCEDURE — 96372 THER/PROPH/DIAG INJ SC/IM: CPT

## 2023-02-21 PROCEDURE — 36415 COLL VENOUS BLD VENIPUNCTURE: CPT

## 2023-02-21 PROCEDURE — 3077F SYST BP >= 140 MM HG: CPT | Performed by: INTERNAL MEDICINE

## 2023-02-21 PROCEDURE — G9717 DOC PT DX DEP/BP F/U NT REQ: HCPCS | Performed by: INTERNAL MEDICINE

## 2023-02-21 PROCEDURE — G8427 DOCREV CUR MEDS BY ELIG CLIN: HCPCS | Performed by: INTERNAL MEDICINE

## 2023-02-21 PROCEDURE — 84153 ASSAY OF PSA TOTAL: CPT

## 2023-02-21 PROCEDURE — G8417 CALC BMI ABV UP PARAM F/U: HCPCS | Performed by: INTERNAL MEDICINE

## 2023-02-21 PROCEDURE — 99215 OFFICE O/P EST HI 40 MIN: CPT | Performed by: INTERNAL MEDICINE

## 2023-02-21 PROCEDURE — G0463 HOSPITAL OUTPT CLINIC VISIT: HCPCS | Performed by: INTERNAL MEDICINE

## 2023-02-21 PROCEDURE — 80053 COMPREHEN METABOLIC PANEL: CPT

## 2023-02-21 PROCEDURE — 83735 ASSAY OF MAGNESIUM: CPT

## 2023-02-21 PROCEDURE — 1123F ACP DISCUSS/DSCN MKR DOCD: CPT | Performed by: INTERNAL MEDICINE

## 2023-02-21 PROCEDURE — 84403 ASSAY OF TOTAL TESTOSTERONE: CPT

## 2023-02-21 PROCEDURE — 3017F COLORECTAL CA SCREEN DOC REV: CPT | Performed by: INTERNAL MEDICINE

## 2023-02-21 PROCEDURE — 84100 ASSAY OF PHOSPHORUS: CPT

## 2023-02-21 PROCEDURE — 74011250636 HC RX REV CODE- 250/636: Performed by: INTERNAL MEDICINE

## 2023-02-21 PROCEDURE — 85025 COMPLETE CBC W/AUTO DIFF WBC: CPT

## 2023-02-21 RX ADMIN — DENOSUMAB 120 MG: 120 INJECTION SUBCUTANEOUS at 11:41

## 2023-02-21 NOTE — PROGRESS NOTES
[facility] Progress Note    Date: 2023    Name: Deon Napoles    MRN: [id number]         : 1955    Mr. Rosa Isela Lindsey Arrived ambulatory and in no distress for Xgeva Injection. Assessment was completed, no acute issues at this time, no new complaints voiced. Port accessed, labs drawn, flushed, heparinized and de accessed. Patient proceeded to appointment with Dr. Rahman Divers team.       Mr. Juliana Conley vitals were reviewed. Visit Vitals  BP (!) 154/98   Pulse 63   Temp 97 °F (36.1 °C)   Resp 18   Ht 5' 10\" (1.778 m)   Wt 92.5 kg (204 lb)   SpO2 98%   BMI 29.27 kg/m²     Labs reviewed and within parameters for treatment. Recent Results (from the past 12 hour(s))   CBC WITH AUTOMATED DIFF    Collection Time: 23 10:24 AM   Result Value Ref Range    WBC 6.0 4.1 - 11.1 K/uL    RBC 3.43 (L) 4.10 - 5.70 M/uL    HGB 10.4 (L) 12.1 - 17.0 g/dL    HCT 30.9 (L) 36.6 - 50.3 %    MCV 90.1 80.0 - 99.0 FL    MCH 30.3 26.0 - 34.0 PG    MCHC 33.7 30.0 - 36.5 g/dL    RDW 14.1 11.5 - 14.5 %    PLATELET 682 742 - 317 K/uL    MPV 10.8 8.9 - 12.9 FL    NRBC 0.0 0  WBC    ABSOLUTE NRBC 0.00 0.00 - 0.01 K/uL    NEUTROPHILS 66 32 - 75 %    LYMPHOCYTES 18 12 - 49 %    MONOCYTES 9 5 - 13 %    EOSINOPHILS 4 0 - 7 %    BASOPHILS 2 (H) 0 - 1 %    IMMATURE GRANULOCYTES 1 (H) 0.0 - 0.5 %    ABS. NEUTROPHILS 4.0 1.8 - 8.0 K/UL    ABS. LYMPHOCYTES 1.1 0.8 - 3.5 K/UL    ABS. MONOCYTES 0.6 0.0 - 1.0 K/UL    ABS. EOSINOPHILS 0.2 0.0 - 0.4 K/UL    ABS. BASOPHILS 0.1 0.0 - 0.1 K/UL    ABS. IMM.  GRANS. 0.1 (H) 0.00 - 0.04 K/UL    DF AUTOMATED     METABOLIC PANEL, COMPREHENSIVE    Collection Time: 23 10:24 AM   Result Value Ref Range    Sodium 136 136 - 145 mmol/L    Potassium 3.2 (L) 3.5 - 5.1 mmol/L    Chloride 101 97 - 108 mmol/L    CO2 30 21 - 32 mmol/L    Anion gap 5 5 - 15 mmol/L    Glucose 103 (H) 65 - 100 mg/dL    BUN 23 (H) 6 - 20 MG/DL    Creatinine 1.34 (H) 0.70 - 1.30 MG/DL    BUN/Creatinine ratio 17 12 - 20      eGFR 58 (L) >60 ml/min/1.73m2    Calcium 9.0 8.5 - 10.1 MG/DL    Bilirubin, total 0.7 0.2 - 1.0 MG/DL    ALT (SGPT) 30 12 - 78 U/L    AST (SGOT) 82 (H) 15 - 37 U/L    Alk. phosphatase 88 45 - 117 U/L    Protein, total 7.5 6.4 - 8.2 g/dL    Albumin 4.0 3.5 - 5.0 g/dL    Globulin 3.5 2.0 - 4.0 g/dL    A-G Ratio 1.1 1.1 - 2.2     MAGNESIUM    Collection Time: 02/21/23 10:24 AM   Result Value Ref Range    Magnesium 2.0 1.6 - 2.4 mg/dL   PHOSPHORUS    Collection Time: 02/21/23 10:24 AM   Result Value Ref Range    Phosphorus 2.9 2.6 - 4.7 MG/DL        Medications:  Medications Administered       denosumab (XGEVA) injection 120 mg       Admin Date  02/21/2023 Action  Given Dose  120 mg Route  SubCUTAneous Administered By  [place], [place]                     Mr. Rigoberto Birmingham tolerated treatment well and was discharged from [place] [address] in stable condition. He is to return on March 21 at 0930 for his next appointment.     [facility]  February 21, 2023

## 2023-02-21 NOTE — PROGRESS NOTES
1. Have you been to the ER, urgent care clinic since your last visit? Hospitalized since your last visit? No    2. Have you seen or consulted any other health care providers outside of the [address] [place] [address] since your last visit? Include any pap smears or colon screening.  Yes Only homecare        Visit Vitals  BP (!) 145/88 (BP 1 Location: Left upper arm, BP Patient Position: Sitting, BP Cuff Size: Large adult)   Pulse 63   Temp 97 °F (36.1 °C)   Resp 18   Ht 5' 10\" (1.778 m)   Wt 204 lb (92.5 kg)   SpO2 98%   BMI 29.27 kg/m²            Chief Complaint   Patient presents with    Follow-up    Prostate Cancer

## 2023-02-23 DIAGNOSIS — E87.6 HYPOKALEMIA: ICD-10-CM

## 2023-02-23 RX ORDER — POTASSIUM CHLORIDE 1500 MG/1
TABLET, FILM COATED, EXTENDED RELEASE ORAL
Qty: 90 TABLET | Refills: 0 | Status: SHIPPED | OUTPATIENT
Start: 2023-02-23

## 2023-02-26 LAB
TESTOST FREE SERPL-MCNC: <0.2 PG/ML (ref 6.6–18.1)
TESTOST SERPL-MCNC: <3 NG/DL (ref 264–916)

## 2023-03-02 ENCOUNTER — TELEPHONE (OUTPATIENT)
Dept: ONCOLOGY | Age: 68
End: 2023-03-02

## 2023-03-02 NOTE — TELEPHONE ENCOUNTER
Oral Chemotherapy     Redd Carranza is a 75 y.o.male seen for consultation for pharmacist oral chemotherapy management. Diagnosis: neuroendocrine carcinoma    Medication name: enzalutamide (Xtandi) 40 mg tab  Dose: four (4) tablets of 40 mg for a total dose of 160 mg by mouth   Frequency: daily  Ordering provider: Dr. Carolyne Quispe  Next cycle start date: 3/6/23 per patient    Called Mr Gracie Irvin today to check in on Xtandi side effects, adherence, blood pressure and his recent fall with skin wound. He reported doing very well with no apparent side effects. Upon further discussion, he revealed that he has no symptoms of infection (pain, swelling or heat at the skin site), though bandage is still on and home health nurse comes once every week to do bandage changes and other wound care. He reported no other episodes of dizziness or falls since. He reported receiving his latest shipment of Dwaine Maurice on 2/28/23 but still has 3 pills left of the previous bottle, which amounts to about 5 days of missed doses. We discussed his adherence and he had help from the home health nurse in sorting and preparing his medications, although he denied any missed doses despite the calculated gap in tablets. Will monitor adherence and pill counts closely. While he denied dizziness, falls, headaches, allergic reactions, chest pain/tightness, he did report intermittent confusion immediately after waking up, where he could not recall what time of day it was. He described the feeling as \"I was expecting for it to be daytime, but it was nighttime and that surprised me. \" Confusion is a rare but significant side effect of Xtandi; we discussed this and Mr Gracie Irvin was encouraged to let us know if his confusions gets worse. Plan to check in more frequently with patient and check on this specifically.  Mr Gracie Irvin complained of feeling like he has to take a nap every day after he takes his Xtandi in the morning, so we discussed him taking it in the evening. Mr Austyn Monk reported having visited his PCP about 1-2 weeks ago but they did not discuss blood pressures there. Instead, his PCP is reportedly only refilled his Adderall. Patient reported that his blood pressures have been good with SBP in 120-130s and DBP hovering at around 70s. After this discussion, Mr Austyn Monk expressed understanding of all topics covered and suggestions given. Will update provider on confusion and adherence. Plan for more frequent monitoring going forward.     Thank you,    Forrest Kee, PharmD, BCPS    For Pharmacy Admin Tracking Only    Program: Medication Management  CPA in place: Yes  Recommendation Provided To: Provider: 1 via Note to Provider  and Patient/Caregiver: 1 via Telephone  Intervention Detail: Adherence Monitorin  Intervention Accepted By: Provider: 1 and Patient/Caregiver: 1  Time Spent (min): 45

## 2023-03-04 DIAGNOSIS — F32.89 OTHER DEPRESSION: ICD-10-CM

## 2023-03-04 DIAGNOSIS — C61 ADENOCARCINOMA OF PROSTATE, STAGE 4 (HCC): ICD-10-CM

## 2023-03-06 RX ORDER — SERTRALINE HYDROCHLORIDE 50 MG/1
TABLET, FILM COATED ORAL
Qty: 90 TABLET | Refills: 1 | Status: SHIPPED | OUTPATIENT
Start: 2023-03-06

## 2023-03-08 ENCOUNTER — OFFICE VISIT (OUTPATIENT)
Dept: NEUROLOGY | Age: 68
End: 2023-03-08
Payer: MEDICARE

## 2023-03-08 VITALS
HEART RATE: 78 BPM | OXYGEN SATURATION: 97 % | BODY MASS INDEX: 29.2 KG/M2 | RESPIRATION RATE: 16 BRPM | DIASTOLIC BLOOD PRESSURE: 74 MMHG | SYSTOLIC BLOOD PRESSURE: 128 MMHG | WEIGHT: 204 LBS | HEIGHT: 70 IN

## 2023-03-08 DIAGNOSIS — F02.A0 MILD LATE ONSET ALZHEIMER'S DEMENTIA WITHOUT BEHAVIORAL DISTURBANCE, PSYCHOTIC DISTURBANCE, MOOD DISTURBANCE, OR ANXIETY (HCC): Primary | ICD-10-CM

## 2023-03-08 DIAGNOSIS — G30.1 MILD LATE ONSET ALZHEIMER'S DEMENTIA WITHOUT BEHAVIORAL DISTURBANCE, PSYCHOTIC DISTURBANCE, MOOD DISTURBANCE, OR ANXIETY (HCC): Primary | ICD-10-CM

## 2023-03-08 PROCEDURE — 1101F PT FALLS ASSESS-DOCD LE1/YR: CPT | Performed by: PSYCHIATRY & NEUROLOGY

## 2023-03-08 PROCEDURE — 3017F COLORECTAL CA SCREEN DOC REV: CPT | Performed by: PSYCHIATRY & NEUROLOGY

## 2023-03-08 PROCEDURE — 99204 OFFICE O/P NEW MOD 45 MIN: CPT | Performed by: PSYCHIATRY & NEUROLOGY

## 2023-03-08 PROCEDURE — 1123F ACP DISCUSS/DSCN MKR DOCD: CPT | Performed by: PSYCHIATRY & NEUROLOGY

## 2023-03-08 PROCEDURE — G9717 DOC PT DX DEP/BP F/U NT REQ: HCPCS | Performed by: PSYCHIATRY & NEUROLOGY

## 2023-03-08 PROCEDURE — G8417 CALC BMI ABV UP PARAM F/U: HCPCS | Performed by: PSYCHIATRY & NEUROLOGY

## 2023-03-08 PROCEDURE — G8427 DOCREV CUR MEDS BY ELIG CLIN: HCPCS | Performed by: PSYCHIATRY & NEUROLOGY

## 2023-03-08 PROCEDURE — G8536 NO DOC ELDER MAL SCRN: HCPCS | Performed by: PSYCHIATRY & NEUROLOGY

## 2023-03-08 RX ORDER — MEMANTINE HYDROCHLORIDE 5 MG/1
5 TABLET ORAL 2 TIMES DAILY
Qty: 180 TABLET | Refills: 1 | Status: SHIPPED | OUTPATIENT
Start: 2023-03-08

## 2023-03-08 NOTE — LETTER
3/9/2023    Patient: Santiago Witt   YOB: 1955   Date of Visit: 3/8/2023     [address]  [facility] [id number]  Via Fax: [phone]     [address] [facility]  [facility] [address] [place] [address]  Via In Basket    Dear Christie Jennings MD  [address] [place] [address] A [facility],      Thank you for referring Mr. Pitt  to [facility] for evaluation. My notes for this consultation are attached. If you have questions, please do not hesitate to call me. I look forward to following your patient along with you.       Sincerely,    Lilo Silva MD

## 2023-03-08 NOTE — PROGRESS NOTES
Chief Complaint   Patient presents with    New Patient    Memory Loss     Patient referred by Dr Mita Blackburn, patient states he states he is having short term memory issues.        Visit Vitals  /74   Pulse 78   Resp 16   Ht 5' 10\" (1.778 m)   Wt 204 lb (92.5 kg)   SpO2 97%   BMI 29.27 kg/m²

## 2023-03-08 NOTE — PROGRESS NOTES
Seamus Levin (1955) is a 75 y.o. male, new patient, here for evaluation of the following     Chief complaint(s):   Chief Complaint   Patient presents with    New Patient    Memory Loss     Patient referred by Dr Jody Pacheco, patient states he states he is having short term memory issues. HPI: 75 y.o. 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 Referred by Neuropsychologist/ Dr Jody Pacheco to establish care regarding abnormal memory test results. Pt reports the Oncologist had sent pt for memory testing to determine if cognitive difficulty was due to his chemotherapy. Pt admits to feeling a little forgetful, for years. Forgets where he places keys. Continues to manage his own finances, says rarely is late to pay a bill. Continues to do all ADLs for himself. Says he continues to drive, not forgetting how to get places. Lives alone. Tells me that PCP is treating him for ADD, been on Adderall for few years. Reports that when he did the Neuropsych test, he was grieving for recent loss of both parents (mother passed away in late March/ Early April 2022, Father passed away in October 2022) and significant other had moved away. He feels that he's passed the grieving period and doing better. Vit B12 on 4- was 312 (rr [phone])     Reviewed most recent labs 2/21/2023:  CBC with normal WBC, mild low Hgb of 10, mild low Hct 36, normal platelet count. CMP with mild low potassium 3.2, creatinine mild elevated at 1.24, GFR mildly reduced at 58, normal ALT, elevated AST of 82. PSA was elevated at 70.8 (reference range 0.01-4.0)    Brain MRI done on  2/20/2021:  Diffusion imaging does not show acute ischemic changes. Mild nonspecific white matter changes. Prominent brainstem signal changes nonspecific. Mucosal thickening is seen within the mastoid air cells and paranasal sinuses. Normal size ventricles for age. No extra-axial fluid collection hemorrhage or shift.   Flow voids in major vessels at the base of the brain are present. No enhancing lesion or masses. IMPRESSION:  No acute intracranial findings no mass. Review of Systems: No intake H&P provided for my review      ==================================================    No Known Allergies    Current Outpatient Medications   Medication Sig Dispense Refill    memantine (NAMENDA) 5 mg tablet Take 1 Tablet by mouth two (2) times a day. For memory 180 Tablet 1    sertraline (ZOLOFT) 50 mg tablet TAKE 1 TABLET BY MOUTH EVERY DAY 90 Tablet 1    potassium chloride SR (K-TAB) 20 mEq tablet TAKE 1 TABLET BY MOUTH EVERY DAY 90 Tablet 0    Blood Pressure Test Kit-Large kit 1 Each by Does Not Apply route daily. 1 Kit 0    enzalutamide (Xtandi) 40 mg tab Take 160 mg by mouth daily. 30 Tablet 3    phosphorus (Phospha 250 Neutral) 250 mg tablet TAKE 1 TABLET BY MOUTH DAILY. TAKE WITH A FULL GLASS OF WATER. TAKE WITH FOOD. 90 Tablet 1    amLODIPine (NORVASC) 10 mg tablet Take 1 Tablet by mouth daily. 90 Tablet 4    calcium carbonate (CALTREX) 600 mg calcium (1,500 mg) tablet Take 600 mg by mouth daily. leuprolide acetate (LUPRON DEPOT IM) 1 Dose by IntraMUSCular route every three (3) months. degarelix (FIRMAGON) 120 mg solr injection 120 mg by SubCUTAneous route once. dextroamphetamine-amphetamine (ADDERALL) 10 mg tablet TAKE 1 TABLET TWICE A DAY FOR 30 DAYS      lancets misc Use to check blood sugars four times daily (Patient not taking: No sig reported) 100 Each 3    OneTouch Ultra2 Meter misc USE TO CHECK BLOOD SUGARS TWICE DAILY (Patient not taking: No sig reported) 1 Each 0    prochlorperazine (Compazine) 10 mg tablet Take 0.5 Tabs by mouth every six (6) hours as needed for Nausea or Vomiting.  (Patient not taking: No sig reported) 30 Tab 2       Past Medical History:   Diagnosis Date    Osteopenia 1/18/2023       Past Surgical History:   Procedure Laterality Date    HX APPENDECTOMY  1964    HX OTHER SURGICAL      subcutaneous cyst of forehead    HX VASECTOMY         family history includes COPD in his brother; Cancer in his brother, father, and mother. reports that he has never smoked. He has never used smokeless tobacco. He reports that he does not currently use alcohol. He reports that he does not use drugs. PHYSICAL EXAM    Vitals:    03/08/23 0927   BP: 128/74   Pulse: 78   Resp: 16   Height: 5' 10\" (1.778 m)   Weight: 92.5 kg (204 lb)   SpO2: 97%       Awake, alert, conversant  Speech is slow but not dysarthric  No aphasia  Face is symmetric  EOMI, VF normal bilateral  Hearing is normal  5/5 strength in all extremities  Intact LT in all extremities  No resting or postural tremors  Stands slow  Gait is slow, mild unsteady, not shuffling      ==================================================    ASSESSMENT/ PLAN:       ICD-10-CM ICD-9-CM    1. Mild late onset Alzheimer's dementia without behavioral disturbance, psychotic disturbance, mood disturbance, or anxiety ([facility])  G30.1 331.0 memantine (NAMENDA) 5 mg tablet    F02. A0 294.10            Discussed initiating memory mediation to slow memory decline  He is agreeable to starting one  Donepezil interacts with his other meds; Namenda does not  Rx'd Namenda 5 mg one tablet twice a day. 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   -- Sharlene Davis MD

## 2023-03-15 ENCOUNTER — TELEPHONE (OUTPATIENT)
Dept: ONCOLOGY | Age: 68
End: 2023-03-15

## 2023-03-15 RX ORDER — EPINEPHRINE 1 MG/ML
0.3 INJECTION, SOLUTION, CONCENTRATE INTRAVENOUS AS NEEDED
Status: CANCELLED | OUTPATIENT
Start: 2023-03-21

## 2023-03-15 RX ORDER — DIPHENHYDRAMINE HYDROCHLORIDE 50 MG/ML
25 INJECTION, SOLUTION INTRAMUSCULAR; INTRAVENOUS AS NEEDED
Status: CANCELLED
Start: 2023-03-21

## 2023-03-15 RX ORDER — ACETAMINOPHEN 325 MG/1
650 TABLET ORAL AS NEEDED
Status: CANCELLED
Start: 2023-03-21

## 2023-03-15 RX ORDER — ONDANSETRON 2 MG/ML
8 INJECTION INTRAMUSCULAR; INTRAVENOUS AS NEEDED
Status: CANCELLED | OUTPATIENT
Start: 2023-03-21

## 2023-03-15 RX ORDER — ALBUTEROL SULFATE 0.83 MG/ML
2.5 SOLUTION RESPIRATORY (INHALATION) AS NEEDED
Status: CANCELLED
Start: 2023-03-21

## 2023-03-15 RX ORDER — HYDROCORTISONE SODIUM SUCCINATE 100 MG/2ML
100 INJECTION, POWDER, FOR SOLUTION INTRAMUSCULAR; INTRAVENOUS AS NEEDED
Status: CANCELLED | OUTPATIENT
Start: 2023-03-21

## 2023-03-15 RX ORDER — DIPHENHYDRAMINE HYDROCHLORIDE 50 MG/ML
50 INJECTION, SOLUTION INTRAMUSCULAR; INTRAVENOUS AS NEEDED
Status: CANCELLED
Start: 2023-03-21

## 2023-03-15 NOTE — PROGRESS NOTES
[address]  Medical Oncology at [address] [place] [address]  [phone]    Hematology / Oncology Established Visit    Reason for Visit:   Barry Reece is a 75 y.o. male who is seen for follow up of neuroendocrine carcinoma. 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 Flow cytometry negative for lymphoproliferative disorder. FoundationOne: MS Stable, TMB 0, MDM4 amplification, MYC amplification, TMPRSS2 TMPRSS2-ERD fusion, CEBPA 1311fs*10, ERBB4 amplification - equivocal, MCL1 amplification, TQQ0A7M amplification, RAD21 amplification. See scanned report for full info. Abbreviated [facility] Pathology Consultation:  Final diagnosis: Right external iliac node: Metastatic prostate adenocarcinoma. Comment: Histologic sections of the right external iliac node show sheets of cells with minimal eosinophilic cytoplasm and variable cytologic atypia. Some areas show acinar formation. The cells have predominantly round nuclei with vesicular chromatin and prominent nucleoli. Some areas show significantly more atypia with more irregular nuclear borders, hyperchromatic nyclei, and increase nuclear to cytoplasmic ratio. Frequent mitotic figures are identified. A provided pane of IHC stains is reviewed and demonstrates that the malignant cells show strong, diffuse expression of pan-cytokeratin and focal expression of TTF. CK7, CK20, chromogranin, and synaptophysin are negative in the lesional cells. CD3 and CD20 are negative in the lesions cells and positive in the background lymphocytes.  An abbreviated [facility] report was included, which included a TMPRSS2-ERG fusion (among other nonspecific abnormalities), which si found in approximately 50% of prostate cancers. Additional IHC studies performed at [facility] showed that the malignant cells have immunoreactivity with antibodies for PSA and PSAP, consistent with a diagnosis of metastatic prostate adenocarcinoma. Per report, flow cytometry negative for lymphoma. Overall, the histomorphology, immunophenotype and genomic findings in this case are diagnostic of metastatic prostate adenocarcinoma. The acinar formation and cytology were suggestive of this diagnosis, and the diffuse PSA and PSAP positivity as well as the TMPRSS2-ERG fusion confirmed the diagnosis. This fusion is the most common genomic alteration in prostate carcinoma and can be detected in over half of the cases, yet is not seen with any frequency in other types of carcinoma. Prior Treatment:   1. Carbo-Etoposide-Atezolizumab x 2 cycles, 6/29/20-7/20/20. 2. Docetaxel x 6 cycles, 8/10/20-11/24/20. 3. Zytiga 1000mg/d and prednisone 5mg BID, started 4/29/22 - 12/2022    Current Treatment:  Planned Enzalutamide (Xtandi) 160mg PO daily  Lupron every 12 weeks, started 8/2020-current      History of Present Illness:   Betsy Arguelles is a 75 y.o. male who comes in for follow up of poorly differentiated prostate cancer. Pt noticed a nontender bulge in his LLQ in 5/2020. He thought this was a hernia and was evaluated by Dr. Bruno Yost. CT on 5/20/20 was notable for extensive lymphadenopathy in abd/pelvis. Pt underwent robot assisted excisional Right external iliac LN biopsy 5/29/20, which showed poorly differentiated large cell neuroendocrine carcinoma. However, pathology reviewed at [facility] and felt to be poorly differentiated prostate adenocarcinoma. He reports intentional weight loss with dietary changes and exercise, losing 40-lbs in past 8 months. No n/v/d, melena/hematochezia, cough, SOB. No fevers, chills, sweats. [employer] nonsmoker. Mother had breast cancer diagnosed age < 48. Twin brother had melanoma diagnosed aged late 46s.   He does not think he has ever had a colonoscopy or PSA screening prior to current diagnosis. Interval History:  Patient here for follow up of prostate cancer. Started on Enzalutamide 1/27/23 and taking it consistently. Had a skin check with dermatology and several skin biopsies. Per patient has some pre-cancerous lesions and has follow up in April. Reports eating and hydrating well - weight is stable. One day of diarrhea, but otherwise no diarrhea or constipation. Denies chest pain or SOB. Continues with  for PT and medication management. Alone today. Son is Ada Hobson. PMHx: Prostate cancer, HTN  PSurgHx: appendectomy, vasectomy, subcutaneous cyst removal on forehead  SHx:  Never smoker, no EtOH currently after prior alcohol abuse. Unmarried. Has 2 children, 27 and 32. 1 lives in [place]. FHx:  Mother had breast cancer, father had prostate cancer, brother has melanoma, COPD. Meds/Allergies: Reviewed   Review of Systems: A complete review of systems was obtained, negative except as described above. Physical Exam:     Visit Vitals  BP (!) 125/92   Pulse 64   Temp 97.7 °F (36.5 °C)   Resp 18   Ht 5' 10\" (1.778 m)   Wt 204 lb 12.8 oz (92.9 kg)   SpO2 94%   BMI 29.39 kg/m²     ECOG PS: 0  General: Well developed, no acute distress  Eyes: Anicteric sclerae  Lymphatic: No cervical, supraclavicular adenopathy  Respiratory: BCTA, normal respiratory effort  CV: Normal rate, regular rhythm, no murmurs, no edema, port upper chest, 1 + edema bilateral LE L > R.   GI: firm, nontender, nondistended, +BS  MS: Normal gait and station. Digits without clubbing or cyanosis. Skin:  Fingernails dark, thickened at distal half and more normal at proximal half. No ecchymoses, or petechiae. Normal temperature, turgor. Diffuse dry skin. Scattered ecchymosis on arms. 1 cm scaly lesion on left upper thigh - biopsied   Neuro/Psych: Alert, oriented. Moves all 4 extremities. Appropriate affect, normal judgment/insight.     Results:     Lab Results   Component Value Date/Time    WBC 4.6 03/21/2023 09:38 AM    HGB 9.9 (L) 03/21/2023 09:38 AM    HCT 29.4 (L) 03/21/2023 09:38 AM    PLATELET 582 27/13/7209 09:38 AM    MCV 92.2 03/21/2023 09:38 AM    ABS. NEUTROPHILS 2.9 03/21/2023 09:38 AM     Lab Results   Component Value Date/Time    Sodium 139 03/21/2023 09:38 AM    Potassium 3.3 (L) 03/21/2023 09:38 AM    Chloride 105 03/21/2023 09:38 AM    CO2 29 03/21/2023 09:38 AM    Glucose 129 (H) 03/21/2023 09:38 AM    BUN 17 03/21/2023 09:38 AM    Creatinine 1.14 03/21/2023 09:38 AM    GFR est AA >60 10/03/2022 10:06 AM    GFR est non-AA 60 (L) 10/03/2022 10:06 AM    Calcium 8.5 03/21/2023 09:38 AM    Creatinine (POC) 1.30 04/15/2022 08:49 AM     Lab Results   Component Value Date/Time    Bilirubin, total 0.7 02/21/2023 10:24 AM    ALT (SGPT) 30 02/21/2023 10:24 AM    Alk.  phosphatase 88 02/21/2023 10:24 AM    Protein, total 7.5 02/21/2023 10:24 AM    Albumin 4.0 02/21/2023 10:24 AM    Globulin 3.5 02/21/2023 10:24 AM     Lab Results   Component Value Date/Time    Iron 82 04/18/2022 10:47 AM    TIBC 415 04/18/2022 10:47 AM    Iron % saturation 20 04/18/2022 10:47 AM    Ferritin 268 04/18/2022 10:47 AM       Lab Results   Component Value Date/Time    Vitamin B12 312 04/18/2022 10:47 AM    Folate 9.6 04/18/2022 10:47 AM     Lab Results   Component Value Date/Time    TSH 1.72 06/29/2020 09:58 AM     Lab Results   Component Value Date/Time    Prostate Specific Ag 17.8 (H) 02/21/2023 10:24 AM    Prostate Specific Ag 16.3 (H) 01/24/2023 09:15 AM    Prostate Specific Ag 10.1 (H) 12/27/2022 09:38 AM       No results found for: HAMAT, HAAB, HABT, HAAT, HBSAG, HBSB, HBSAT, HBABN, HBCM, HBCAB, HBCAT, XBCABS, HBEAB, [facility], [facility], [id number], [facility], [facility], HBCLT, [facility], [id number], [id number], [address] [place] [address], [id number], HBCMLT, [id number], HCGAT     6/11/20: Chromogranin 42    6/11/20: PSA 38.6  7/20/20: PSA 13.2   8/10/20: PSA 1.2  8/31/20: PSA 0.5  9/24/20: PSA 0.4  10/15/20: PSA 0.3  11/5/20: PSA 0.3  20: 0.2  21: PSA 0.1    3/22/21 PSA 0.066 (PSA ultrasensitive at [address] [facility] urology)   21 PSA 0.1, testosterone < 3  21 PSA 0.1  21 PSA 0.2  10/28/21: PSA 0.4  22: PSA 2.3  3/9/22: PSA 9.1   22 PSA 25.3  22 started Zytiga + prednisone   22 PSA 7.2  22 PSA 4.2  22 PSA 2.9  22 PSA 2.8  22: PSA 2.1   10/3/22: PSA 2.6   10/31/22: PSA 3.5   22: PSA 5.8   22: PSA 10.1  23: PSA 16.3   Started Enzalutamide 23: PSA 17.8  3/21/23 PSA pending     Imagin/20/20 CT abd/pelvis with IV contrast:  1. No findings to suggest gross abdominal wall hernia or flank hernia. 2. Extensive adenopathy throughout the abdomen and pelvis as described. Findings are concerning for possible metastatic disease or lymphoma. Recommend follow up or comparison with prior studies. 20 PET:  FINDINGS:  HEAD/NECK: No apparent foci of abnormal hypermetabolism. Cerebral evaluation is  limited by normal intense activity. CHEST: Hypermetabolic lymphadenopathy at the base of the left neck and in the  left supraclavicular region is noted. Hypermetabolic superior mediastinal,  prevascular, right paratracheal, subcarinal, and bilateral hilar lymph  lymphadenopathy, maximum SUV of the subcarinal lymph node is 5.7. Small but  hypermetabolic soft tissue nodule left anterior chest wall. ABDOMEN/PELVIS: Hypermetabolic retrocrural, left para-aortic, aortocaval,  bilateral common iliac, bilateral external iliac, and bilateral inguinal  lymphadenopathy, maximum SUV 5.8 of an aortocaval lymph node. Hypermetabolic  mesenteric lymph node left lower quadrant, maximum SUV 12.3. SKELETON: No foci of abnormal hypermetabolism in the axial and visualized  appendicular skeleton. IMPRESSION: Hypermetabolic lymphadenopathy involving the left neck and left  supraclavicular region, mediastinum, bilateral hilum, retroperitoneum,  mesentery, and pelvis as described above. Hypermetabolic soft tissue nodule left  chest.    Brain MRI 6/27/20: There is no evidence of intracranial metastatic disease. Mild chronic microvascular ischemic change. No intracranial mass, hemorrhage or evidence of acute infarction.    ----------------------------------------------------  7/7/22 Bone scan:   1. The focus of increased bony activity right occipital bone appears slightly larger. 2. Small focus of activity right sixth rib posterolaterally is stable. CT  demonstrates small focus of sclerosis in this region. Findings are suspicious for bony metastatic disease. No new foci of increased bony activity identified. 7/7/22 CT head:  1. No evidence of acute intracranial process. 2. No lesion to correspond to the previously seen abnormality in the right posterior skull on bone scan. 7/7/22 CT ch/abd/pelv:  Response to treatment characterized by decrease in nodularity in the left gluteal region, decrease in size of retroperitoneal lymph nodes, and decreased enlargement of the right psoas muscle. No new lung nodules or mediastinal lymphadenopathy. RECIST:  Target lesions:  Left retroperitoneal lymph node, 2:74, 14 x 12 mm, previously 16 x 15 mm. Right psoas enlargement, 2:76, 53 x 23 mm, previously 57 x 29 mm. Right pericaval lymph node, 2:77, 17 x 15 mm, previously 20 x 20 mm. Right external iliac lymph node, 2:90, 26 x 20 mm, previously 35 x 18 mm. Nontarget lesions:  Lung nodules have resolved. Right posterior lateral sixth rib sclerotic lesion is new. 9/27/22 CT ch/abd/pelv:  RECIST   TARGET LESIONS:  Lesion (description)         Location (series/slice)                Size      1. Left retroperitoneal lymph node    series 2 image 69    14 x 12 mm decrease in size  2. Right psoas infiltrative lesion    series 2 image 74    5.3 x 2.0 cm unchanged  3. Right pericaval lymph node    series 2 image 76    12 x 10 mm decrease in size  4. Right external iliac lymph node.     Series 2 image 87 25 x 21 mm without significant change  NONTARGET LESIONS:  1. Lung nodules remain resolved. 2. Sclerotic lesions in the osseous structures unchanged. IMPRESSION  1. Retroperitoneal lymph nodes are slightly decreased in size. 2.  Infiltrative right psoas lesion is not significant change. 3.  Right external iliac lymph node is not significant changed. 4.  Stable small sclerotic lesions. 9/27/22 Bone scan:  IMPRESSION  1. Focus of activity right occipital bone is stable. The tiny focus in the right sixth rib posterolaterally is less apparent. The tiny focus distal left femur which in retrospect is unchanged. There is a tiny focus in the sacrum which was not present previously but could  be degenerative related to L5-S1 degenerative disc changes. 12/19/22 CT ch/abd/pelv:  IMPRESSION  1. Interval mixed response of retroperitoneal lymph nodes/tumor implants as  outlined above. 2.  Stable scattered sclerotic osseous lesions. 3.  Right eccentric rectal wall thickening and prominent vascularity, consider  correlation with proctoscopy. RECIST   Interval mixed response. TARGET LESIONS:   Lesion (description)         Location (series/slice)                Size      1. Left retroperitoneal lymph nodes/tumor implant, decreased    2-70     currently 9 mm x 6 mm, previously 14 mm x 12 mm  2. Right psoas infiltrative lesion, increased    2-86    largest focus currently  24 mm x 20 mm, previously 21 mm x 16 mm  3. Right pericaval lymph node, stable to slightly decreased    2-77    currently  12 mm x 7 mm, previously 12 mm x 10 mm  4. Right external iliac lymph node, increased    2-87    currently 33 mm x 25  mm, previously 25 mm x 21 mm   NONTARGET LESIONS:  1. Lung nodules remain resolved  2.  Sclerotic osseous lesions, stable    12/19/22 Bone scan:  FINDINGS: The calvarial, left distal femur, and sacral lesions are again noted  and demonstrate increased tracer activity compared to the prior exam. No new  abnormal uptake is identified. Incidental renal imaging shows no abnormality. IMPRESSION  Stable pattern of osseous metastatic disease within increase in the  tracer activity corresponding to the previously described lesions. No new lesion  is identified. Assessment & Plan:   Sujata Duran is a 75 y.o. 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 1. Metastatic adenocarcinoma of prostate, high grade:  Hormone resistant. Initial pathologist at [address] [place] [address] called this \"Large cell neuroendocrine carcinoma,\" and based on diffuse disease on PET, patient was started on treatment with Carbo-Etoposide-Atezolizumab (thinking this was large cell carcinoma of the lung.) However [facility] testing identified TMPRSS2-ERG fusion, which is primarily seen in prostate cancers. This along with elevated PSA of 38 prompted sending of pathology specimen to [facility] for review. Their opinion and IHC staining is consistent with high grade adenocarcinoma of prostate. After Carbo-Etop-Atezo, pt was treated with Docetaxel x 6 cycles. Treatment options now include: Carboplatin + Cabazitaxel. 4/15/22 CT shows disease progression with a new liver and lung mets; bone scan with possible new occipital and possible rib met. Initiated Zytiga + prednisone on 4/29/22.   12/2022 CT showed a mixed response - given rising PSA, I discussed switching treatment. Discussed Cabazitaxel 20mg/m2 and Enzalutamide - I do not feel patient's performance status and cognition are adequate for cytotoxic chemotherapy. Therefore, recommended Enzalutamide. Consent signed. Supportive medications: EMLA cream, zofran, compazine, dexamethasone   -- Lupron (3 month depot)  #11 given 12/27/22, #12 due today 3/21/23. -- Continue Enzalutamide (Xtandi) 160mg PO daily, started 1/27/23. Provided contact for [facility] [phone].   -- Advised checking BP 1-2 times weekly.  Range has been 120-130/80  -- Next CT and bone scan due in 12 weeks after starting Cody Prince (around 4/17/23). Ordered. -- Return in 4 weeks labs, MD visit. -- [facility]. 2. Chemotherapy induced neuropathy:   Grade 1. Present in bilateral fingertips. 3. CKD / HTN / hypokalemia:  On Amlodipine and KCL 20 meq daily. -- Monitoring BP as Xtandi may reduce efficacy of amlodipine. -- KCL 40 meq in [facility] today. 4. Depression/anxiety / h/o Genital Herpes:   2/2 to #1. On Zoloft 50mg/d. On suppression with Acyclovir. 5. Dysequilibrium / Ear effusions / dementia / falls:   Past Brain MRI negative for mets. Left tympanostomy tube placed by ENT for bilat effusions. Balance improved with PT. Diagnosed with mild to moderate dementia via neuropsych evaluation. Was advised consideration for an appropriate medication for his moderate to severe attention deficit issues, counseling for the emotional distress problems. Dr Cynthia Bains referred to Neurology. Prn social visits with Ayana High to discuss future living options if dementia worsens. -- Follow up Dr. Eldonna Riedel and Cynthia Bains as scheduled 3/28/23. 6. Health maintenance:   Had screening colonoscopy 8/13/2022 with GSI with benign polyp per patient. 7. Normocytic anemia:   Likely due to #1. Iron profile, ferritin, b12 and folate normal. Monitor with monthly labs. 8. Osteopenia / bone mets:   On calcium/D3 supplementation. Evaluated by Dr. Krystina Mckinnon (dentist) and will eventually require extractions, treatment of severe periodontal disease, bridge may fail soon, but he advised ok to proceed with Xgeva. Discussed higher incidence of ONJ in patients on Denosumab with patient and he voices understanding. Wants to proceed with Xgeva. -- Xgeva every 4 weeks, #10 today. 9. Skin lesion / cyst:  1 cm, scaly  and pruritic lesion present on left upper thigh. Painless cyst on right upper back. Had biopsies with results pending. -- Request progress [facility] from [facility].    -- Follow up Dermatology appt scheduled 4/2023    Emotional well being: Pt is coping well with his/her disease and has a support system, but he is struggling with keeping up with housework, MARGOT Latif consulted and advised patient apply for [place] [facility] and provided to call [phone]. SW provided ex wife- (1st wife and mother of pt children) [employer] number [phone] to assist pt with divorce of 2nd wife. I personally saw and evaluated the patient and performed the key components of medical decision making. The history, physical exam, and documentation were performed by Daryl Marquez NP. I reviewed and verified the above documentation and modified it as needed. Continue on Enzalutaminde and Lupron which he seems to be tolerating well. Will continue to monitor labs and PSA. Due for imaging prior to next visit.        Signed By: Jojo Santiago MD

## 2023-03-21 ENCOUNTER — HOSPITAL ENCOUNTER (OUTPATIENT)
Dept: INFUSION THERAPY | Age: 68
Discharge: HOME | End: 2023-03-21
Payer: MEDICARE

## 2023-03-21 ENCOUNTER — OFFICE VISIT (OUTPATIENT)
Dept: ONCOLOGY | Age: 68
End: 2023-03-21
Payer: MEDICARE

## 2023-03-21 VITALS
TEMPERATURE: 97.7 F | HEART RATE: 64 BPM | WEIGHT: 204.8 LBS | RESPIRATION RATE: 18 BRPM | HEIGHT: 70 IN | OXYGEN SATURATION: 94 % | BODY MASS INDEX: 29.32 KG/M2 | DIASTOLIC BLOOD PRESSURE: 92 MMHG | SYSTOLIC BLOOD PRESSURE: 125 MMHG

## 2023-03-21 VITALS
WEIGHT: 204.8 LBS | TEMPERATURE: 97.7 F | HEIGHT: 70 IN | DIASTOLIC BLOOD PRESSURE: 92 MMHG | BODY MASS INDEX: 29.32 KG/M2 | RESPIRATION RATE: 18 BRPM | HEART RATE: 64 BPM | SYSTOLIC BLOOD PRESSURE: 125 MMHG | OXYGEN SATURATION: 94 %

## 2023-03-21 DIAGNOSIS — E87.6 HYPOKALEMIA: ICD-10-CM

## 2023-03-21 DIAGNOSIS — M85.80 OSTEOPENIA, UNSPECIFIED LOCATION: Primary | ICD-10-CM

## 2023-03-21 DIAGNOSIS — C79.82 METASTATIC ADENOCARCINOMA TO PROSTATE (HCC): ICD-10-CM

## 2023-03-21 DIAGNOSIS — C61 ADENOCARCINOMA OF PROSTATE, STAGE 4 (HCC): Primary | ICD-10-CM

## 2023-03-21 DIAGNOSIS — C79.51 BONE METASTASES (HCC): ICD-10-CM

## 2023-03-21 DIAGNOSIS — Z79.899 ENCOUNTER FOR MEDICATION MANAGEMENT: ICD-10-CM

## 2023-03-21 DIAGNOSIS — I10 PRIMARY HYPERTENSION: ICD-10-CM

## 2023-03-21 LAB
ALBUMIN SERPL-MCNC: 4 G/DL (ref 3.5–5)
ALBUMIN/GLOB SERPL: 1.2 (ref 1.1–2.2)
ALP SERPL-CCNC: 85 U/L (ref 45–117)
ALT SERPL-CCNC: 23 U/L (ref 12–78)
ANION GAP SERPL CALC-SCNC: 5 MMOL/L (ref 5–15)
AST SERPL-CCNC: 55 U/L (ref 15–37)
BASOPHILS # BLD: 0.1 K/UL (ref 0–0.1)
BASOPHILS NFR BLD: 1 % (ref 0–1)
BILIRUB SERPL-MCNC: 0.8 MG/DL (ref 0.2–1)
BUN SERPL-MCNC: 17 MG/DL (ref 6–20)
BUN/CREAT SERPL: 15 (ref 12–20)
CALCIUM SERPL-MCNC: 8.5 MG/DL (ref 8.5–10.1)
CHLORIDE SERPL-SCNC: 105 MMOL/L (ref 97–108)
CO2 SERPL-SCNC: 29 MMOL/L (ref 21–32)
CREAT SERPL-MCNC: 1.14 MG/DL (ref 0.7–1.3)
DIFFERENTIAL METHOD BLD: ABNORMAL
EOSINOPHIL # BLD: 0.3 K/UL (ref 0–0.4)
EOSINOPHIL NFR BLD: 5 % (ref 0–7)
ERYTHROCYTE [DISTWIDTH] IN BLOOD BY AUTOMATED COUNT: 14.8 % (ref 11.5–14.5)
GLOBULIN SER CALC-MCNC: 3.3 G/DL (ref 2–4)
GLUCOSE SERPL-MCNC: 129 MG/DL (ref 65–100)
HCT VFR BLD AUTO: 29.4 % (ref 36.6–50.3)
HGB BLD-MCNC: 9.9 G/DL (ref 12.1–17)
IMM GRANULOCYTES # BLD AUTO: 0 K/UL (ref 0–0.04)
IMM GRANULOCYTES NFR BLD AUTO: 1 % (ref 0–0.5)
LYMPHOCYTES # BLD: 1 K/UL (ref 0.8–3.5)
LYMPHOCYTES NFR BLD: 21 % (ref 12–49)
MAGNESIUM SERPL-MCNC: 2.2 MG/DL (ref 1.6–2.4)
MCH RBC QN AUTO: 31 PG (ref 26–34)
MCHC RBC AUTO-ENTMCNC: 33.7 G/DL (ref 30–36.5)
MCV RBC AUTO: 92.2 FL (ref 80–99)
MONOCYTES # BLD: 0.4 K/UL (ref 0–1)
MONOCYTES NFR BLD: 9 % (ref 5–13)
NEUTS SEG # BLD: 2.9 K/UL (ref 1.8–8)
NEUTS SEG NFR BLD: 63 % (ref 32–75)
NRBC # BLD: 0 K/UL (ref 0–0.01)
NRBC BLD-RTO: 0 PER 100 WBC
PHOSPHATE SERPL-MCNC: 2.2 MG/DL (ref 2.6–4.7)
PLATELET # BLD AUTO: 168 K/UL (ref 150–400)
PMV BLD AUTO: 11.2 FL (ref 8.9–12.9)
POTASSIUM SERPL-SCNC: 3.3 MMOL/L (ref 3.5–5.1)
PROT SERPL-MCNC: 7.3 G/DL (ref 6.4–8.2)
PSA SERPL-MCNC: 25.5 NG/ML (ref 0.01–4)
RBC # BLD AUTO: 3.19 M/UL (ref 4.1–5.7)
SODIUM SERPL-SCNC: 139 MMOL/L (ref 136–145)
WBC # BLD AUTO: 4.6 K/UL (ref 4.1–11.1)

## 2023-03-21 PROCEDURE — 77030012965 HC NDL HUBR BBMI -A

## 2023-03-21 PROCEDURE — 1123F ACP DISCUSS/DSCN MKR DOCD: CPT | Performed by: INTERNAL MEDICINE

## 2023-03-21 PROCEDURE — G8427 DOCREV CUR MEDS BY ELIG CLIN: HCPCS | Performed by: INTERNAL MEDICINE

## 2023-03-21 PROCEDURE — 3017F COLORECTAL CA SCREEN DOC REV: CPT | Performed by: INTERNAL MEDICINE

## 2023-03-21 PROCEDURE — G8417 CALC BMI ABV UP PARAM F/U: HCPCS | Performed by: INTERNAL MEDICINE

## 2023-03-21 PROCEDURE — 74011250636 HC RX REV CODE- 250/636: Performed by: INTERNAL MEDICINE

## 2023-03-21 PROCEDURE — G8536 NO DOC ELDER MAL SCRN: HCPCS | Performed by: INTERNAL MEDICINE

## 2023-03-21 PROCEDURE — 36415 COLL VENOUS BLD VENIPUNCTURE: CPT

## 2023-03-21 PROCEDURE — G9717 DOC PT DX DEP/BP F/U NT REQ: HCPCS | Performed by: INTERNAL MEDICINE

## 2023-03-21 PROCEDURE — 96402 CHEMO HORMON ANTINEOPL SQ/IM: CPT

## 2023-03-21 PROCEDURE — 84100 ASSAY OF PHOSPHORUS: CPT

## 2023-03-21 PROCEDURE — 74011250637 HC RX REV CODE- 250/637: Performed by: NURSE PRACTITIONER

## 2023-03-21 PROCEDURE — 84153 ASSAY OF PSA TOTAL: CPT

## 2023-03-21 PROCEDURE — 96372 THER/PROPH/DIAG INJ SC/IM: CPT

## 2023-03-21 PROCEDURE — 3080F DIAST BP >= 90 MM HG: CPT | Performed by: INTERNAL MEDICINE

## 2023-03-21 PROCEDURE — 85025 COMPLETE CBC W/AUTO DIFF WBC: CPT

## 2023-03-21 PROCEDURE — 1101F PT FALLS ASSESS-DOCD LE1/YR: CPT | Performed by: INTERNAL MEDICINE

## 2023-03-21 PROCEDURE — 83735 ASSAY OF MAGNESIUM: CPT

## 2023-03-21 PROCEDURE — 99215 OFFICE O/P EST HI 40 MIN: CPT | Performed by: INTERNAL MEDICINE

## 2023-03-21 PROCEDURE — G0463 HOSPITAL OUTPT CLINIC VISIT: HCPCS | Performed by: INTERNAL MEDICINE

## 2023-03-21 PROCEDURE — 84403 ASSAY OF TOTAL TESTOSTERONE: CPT

## 2023-03-21 PROCEDURE — 80053 COMPREHEN METABOLIC PANEL: CPT

## 2023-03-21 PROCEDURE — 3074F SYST BP LT 130 MM HG: CPT | Performed by: INTERNAL MEDICINE

## 2023-03-21 RX ORDER — POTASSIUM CHLORIDE 750 MG/1
40 TABLET, FILM COATED, EXTENDED RELEASE ORAL
Status: COMPLETED | OUTPATIENT
Start: 2023-03-21 | End: 2023-03-21

## 2023-03-21 RX ADMIN — DENOSUMAB 120 MG: 120 INJECTION SUBCUTANEOUS at 11:11

## 2023-03-21 RX ADMIN — POTASSIUM CHLORIDE 40 MEQ: 750 TABLET, EXTENDED RELEASE ORAL at 11:10

## 2023-03-21 RX ADMIN — LEUPROLIDE ACETATE 22.5 MG: KIT at 11:12

## 2023-03-21 NOTE — PROGRESS NOTES
Outpatient Infusion Center Short Visit Progress Note    Mr. Eleanor Cotto admitted to [facility] for C12D1 of Lupron+Xgeva injection ambulatory with cane in stable condition. Assessment completed. No new concerns voiced. Pt denied any recent invasive dental procedure. Chest port accessed, lab drawn and sent to processing. Chest port flushed, heparinized and de-accessed per protocol. Pt proceeded to MD appointment with Dr. Purnima Anders. Chemotherapy Flowsheet 3/21/2023   Cycle C12D1   Date 3/21/2023   Drug / Regimen Lupron+Xgeva   Dosage -   Time Up -   Time Down -   Pre Hydration -   Post Hydration -   Pre Meds -   Notes LGM        Vital Signs:  Visit Vitals  BP (!) 125/92   Pulse 64   Temp 97.7 °F (36.5 °C)   Resp 18   Ht 5' 10\" (1.778 m)   Wt 92.9 kg (204 lb 12.8 oz)   SpO2 94%   BMI 29.39 kg/m²       Lab Results:  Recent Results (from the past 12 hour(s))   CBC WITH AUTOMATED DIFF    Collection Time: 03/21/23  9:38 AM   Result Value Ref Range    WBC 4.6 4.1 - 11.1 K/uL    RBC 3.19 (L) 4.10 - 5.70 M/uL    HGB 9.9 (L) 12.1 - 17.0 g/dL    HCT 29.4 (L) 36.6 - 50.3 %    MCV 92.2 80.0 - 99.0 FL    MCH 31.0 26.0 - 34.0 PG    MCHC 33.7 30.0 - 36.5 g/dL    RDW 14.8 (H) 11.5 - 14.5 %    PLATELET 136 826 - 857 K/uL    MPV 11.2 8.9 - 12.9 FL    NRBC 0.0 0  WBC    ABSOLUTE NRBC 0.00 0.00 - 0.01 K/uL    NEUTROPHILS 63 32 - 75 %    LYMPHOCYTES 21 12 - 49 %    MONOCYTES 9 5 - 13 %    EOSINOPHILS 5 0 - 7 %    BASOPHILS 1 0 - 1 %    IMMATURE GRANULOCYTES 1 (H) 0.0 - 0.5 %    ABS. NEUTROPHILS 2.9 1.8 - 8.0 K/UL    ABS. LYMPHOCYTES 1.0 0.8 - 3.5 K/UL    ABS. MONOCYTES 0.4 0.0 - 1.0 K/UL    ABS. EOSINOPHILS 0.3 0.0 - 0.4 K/UL    ABS. BASOPHILS 0.1 0.0 - 0.1 K/UL    ABS. IMM.  GRANS. 0.0 0.00 - 0.04 K/UL    DF AUTOMATED     METABOLIC PANEL, COMPREHENSIVE    Collection Time: 03/21/23  9:38 AM   Result Value Ref Range    Sodium 139 136 - 145 mmol/L    Potassium 3.3 (L) 3.5 - 5.1 mmol/L    Chloride 105 97 - 108 mmol/L    CO2 29 21 - 32 mmol/L Anion gap 5 5 - 15 mmol/L    Glucose 129 (H) 65 - 100 mg/dL    BUN 17 6 - 20 MG/DL    Creatinine 1.14 0.70 - 1.30 MG/DL    BUN/Creatinine ratio 15 12 - 20      eGFR >60 >60 ml/min/1.73m2    Calcium 8.5 8.5 - 10.1 MG/DL    Bilirubin, total 0.8 0.2 - 1.0 MG/DL    ALT (SGPT) 23 12 - 78 U/L    AST (SGOT) 55 (H) 15 - 37 U/L    Alk. phosphatase 85 45 - 117 U/L    Protein, total 7.3 6.4 - 8.2 g/dL    Albumin 4.0 3.5 - 5.0 g/dL    Globulin 3.3 2.0 - 4.0 g/dL    A-G Ratio 1.2 1.1 - 2.2     MAGNESIUM    Collection Time: 03/21/23  9:38 AM   Result Value Ref Range    Magnesium 2.2 1.6 - 2.4 mg/dL   PHOSPHORUS    Collection Time: 03/21/23  9:38 AM   Result Value Ref Range    Phosphorus 2.2 (L) 2.6 - 4.7 MG/DL           Medications:  Medications Administered       denosumab (XGEVA) injection 120 mg       Admin Date  03/21/2023 Action  Given Dose  120 mg Route  SubCUTAneous Administered By  Angela Dave RN              leuprolide depot (LUPRON 3 MONTH) injection sykt 22.5 mg       Admin Date  03/21/2023 Action  Given Dose  22.5 mg Route  IntraMUSCular Administered By  Angela Dave RN              potassium chloride SR (KLOR-CON 10) tablet 40 mEq       Admin Date  03/21/2023 Action  Given Dose  40 mEq Route  Oral Administered By  Angela Dave RN                      AVS declined. Patient tolerated treatment well. Patient discharged from [facility] ambulatory in no distress at 1120. Patient will be back on 04/18/23 at 1030 of next appointment.     Yogesh Cifuentes RN  March 21, 2023    Future Appointments   Date Time Provider Ayala Morrisi   3/28/2023  8:20 AM Susie Mitchell PsyD NEUROWTC BS AMB   4/18/2023 10:30 AM [facility]   4/18/2023 10:45 AM Anderson Rolon MD ONCSF BS AMB   6/13/2023  9:30 AM SS INF7 CH3 <1H [facility]   6/21/2023 11:00 AM Roberto Levy MD NEUROWTC BS AMB

## 2023-03-21 NOTE — PROGRESS NOTES
1. Have you been to the ER, urgent care clinic since your last visit? Hospitalized since your last visit? No    2. Have you seen or consulted any other health care providers outside of the [address] [place] [address] since your last visit? Include any pap smears or colon screening.  No        Visit Vitals  BP (!) 125/92   Pulse 64   Temp 97.7 °F (36.5 °C)   Resp 18   Ht 5' 10\" (1.778 m)   Wt 204 lb 12.8 oz (92.9 kg)   SpO2 94%   BMI 29.39 kg/m²            Chief Complaint   Patient presents with    Follow-up    Prostate Cancer

## 2023-03-22 ENCOUNTER — TELEPHONE (OUTPATIENT)
Dept: ONCOLOGY | Age: 68
End: 2023-03-22

## 2023-03-22 NOTE — TELEPHONE ENCOUNTER
Oral Chemotherapy     Verito Fields is a 75 y.o.male seen for consultation for pharmacist oral chemotherapy management. Diagnosis: neuroendocrine carcinoma    Medication name: enzalutamide (Xtandi) 40 mg tab  Dose: four (4) tablets of 40 mg for a total dose of 160 mg by mouth   Frequency: daily  Ordering provider: Dr. Brenden Mujica  Cycle start date: 3/6/23 per patient    Called Mr Adore Maldonado to assess adherence per provider request. Using only open-ended, non-leading questions, the patient reported that he takes 4 tablets of Xtandi daily and has not missed any doses. He did not recall any lapses in doses between shipments/no shipment delays. Information relayed to provider.     Thank you,    Joan Flores, PharmD, BCPS    For Pharmacy Admin Tracking Only    Program: Medication Management  CPA in place: Yes  Recommendation Provided To: Provider: 1 via Note to Provider  and Patient/Caregiver: 1 via Telephone  Intervention Detail: Adherence Monitorin  Intervention Accepted By: Provider: 1 and Patient/Caregiver: 1  Time Spent (min): 20

## 2023-03-25 LAB
TESTOST FREE SERPL-MCNC: <0.2 PG/ML (ref 6.6–18.1)
TESTOST SERPL-MCNC: <3 NG/DL (ref 264–916)

## 2023-03-28 ENCOUNTER — OFFICE VISIT (OUTPATIENT)
Dept: NEUROLOGY | Age: 68
End: 2023-03-28
Payer: MEDICARE

## 2023-03-28 ENCOUNTER — TELEPHONE (OUTPATIENT)
Dept: NEUROLOGY | Age: 68
End: 2023-03-28

## 2023-03-28 DIAGNOSIS — F02.A0 MILD LATE ONSET ALZHEIMER'S DEMENTIA WITHOUT BEHAVIORAL DISTURBANCE, PSYCHOTIC DISTURBANCE, MOOD DISTURBANCE, OR ANXIETY (HCC): Primary | ICD-10-CM

## 2023-03-28 DIAGNOSIS — G30.1 MILD LATE ONSET ALZHEIMER'S DEMENTIA WITHOUT BEHAVIORAL DISTURBANCE, PSYCHOTIC DISTURBANCE, MOOD DISTURBANCE, OR ANXIETY (HCC): Primary | ICD-10-CM

## 2023-03-28 DIAGNOSIS — R41.89 COGNITIVE DECLINE: ICD-10-CM

## 2023-03-28 DIAGNOSIS — F10.91 ALCOHOL USE DISORDER IN REMISSION: ICD-10-CM

## 2023-03-28 DIAGNOSIS — F43.23 ADJUSTMENT DISORDER WITH MIXED ANXIETY AND DEPRESSED MOOD: ICD-10-CM

## 2023-03-28 PROCEDURE — 1123F ACP DISCUSS/DSCN MKR DOCD: CPT | Performed by: CLINICAL NEUROPSYCHOLOGIST

## 2023-03-28 PROCEDURE — 90832 PSYTX W PT 30 MINUTES: CPT | Performed by: CLINICAL NEUROPSYCHOLOGIST

## 2023-03-28 RX ORDER — MEMANTINE HYDROCHLORIDE 5 MG/1
5 TABLET ORAL 2 TIMES DAILY
Qty: 180 TABLET | Refills: 1 | Status: SHIPPED | OUTPATIENT
Start: 2023-03-28

## 2023-03-28 NOTE — PROGRESS NOTES
Prior to seeing the patient I reviewed the records, including the previously completed report, the records in [place], and any updated visits from other providers since I saw the patient last.      Today, I engaged in a psychoeducational and supportive and cognitive/behavioral psychotherapy session with the patient and son. I provided psychotherapy in the form of psychoeducation and support with respect to the results of the recent Neuropsychological Evaluation, including discussing individual tests as well as patient's areas of neurocognitive strength versus weakness. We discussed, in detail, the following: This patient generated an abnormal range Neuropsychological Evaluation with respect to neurocognitive functioning. In this regard, he is showing problems with verbal fluency, sustained visual attention, auditory learning, auditory memory, bilateral motor skills, and processing speed. His performances across all other neurocognitive domains assessed are normal.  From an emotional standpoint, there is mild depression and anxiety about health. Alcohol use in recovery. The pattern of scores here is not consistent with chemo related cognitive decline/fatigue/\"fog. \" This is not a focal or lateralized issue, either. In my opinion, this profile is consistent with an evolving organic process that is currently at a mild to moderate level of severity. This is likely Alzheimer's disease, though a combination of Alzheimer's and vascular issues is also possible. In addition to continued medical care, my recommendations include consideration for memory management medication. I also strongly advise consideration for an appropriate medication for his moderate to severe attention deficit issues if this is not medically contraindicated. The emotional distress problems appear functional in etiology for her, for which I recommend active engagement in counseling.   The patient should be encouraged to remain as mentally, socially, and physically active as possible. Continue to abstain from alcohol. I will send for a Neurology consult. The patient's generated cognitive difficulties are significant to the degree whereby it is my opinion that he should not live independently without appropriate supervision for those domains with a heavy memory emphasis. This includes medication management supervision and supervision of financial dealings. Marked problems with attention and reaction time raise concern regarding driving safety, and I suggest consideration for a formal evaluation of same. The patient has capacity at this time. This has been caught early on, and I hope he recognizes this as positive. The more emotional support he can receive, given the history, the better. I wish him well. Baseline now established. Follow up yearly, or prn. Clinical correlation is, of course, indicated. I will discuss these findings with the patient and family when they follow up with me in the near future. A follow up Neuropsychological Evaluation is indicated on a prn basis. 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 I also answered numerous questions related to the clinical findings, including discussing various methods to improve cognition and mood. Counseling provided regarding mood and cognition. CBT and supportive psychotherapy techniques were utilized. Supportive/Cognitive Behavioral/Solution Focused psychotherapy provided  Discussed rational versus irrational thinking patterns and their consequences. Discussed healthy/adaptive and unhealthy/maladaptive coping.       Encouraged pt to stay physically active (exercising or walking 3440-3019 steps 5 days a week, if physically tolerated), do memory exercises (crossword puzzles, card games, memory dary on phone), and if possible, be socially engaged/ avoid social isolation as that can cause/ worsen depression/ worsen cognition. Son concurs. Discussed grief and loss as a function of this and making it worse, as is his hearing. Just got prescription for Namenda. It has not been approved yet but shows as approved? I have asked them to check with Dr. Renata Alfredo nurse before they leave here today to verify all appropriate steps have been taken on our end and then to follow up with us to see if Dr. Eric Gonzalez or his staff can assist in communicating with [facility] about approvals.        The patient had the following concerns which I deferred to their referring provider: meds for mood/cognition      Time spent today: 20

## 2023-03-30 ENCOUNTER — TELEPHONE (OUTPATIENT)
Dept: NEUROLOGY | Age: 68
End: 2023-03-30

## 2023-03-30 ENCOUNTER — PATIENT MESSAGE (OUTPATIENT)
Dept: NEUROLOGY | Age: 68
End: 2023-03-30

## 2023-04-03 ENCOUNTER — PATIENT MESSAGE (OUTPATIENT)
Dept: NEUROLOGY | Age: 68
End: 2023-04-03

## 2023-04-14 ENCOUNTER — HOSPITAL ENCOUNTER (OUTPATIENT)
Dept: CT IMAGING | Age: 68
Discharge: HOME | End: 2023-04-14
Attending: NURSE PRACTITIONER

## 2023-04-17 NOTE — PROGRESS NOTES
[address]  Medical Oncology at [address] [place] [address]  [phone]    Hematology / Oncology Established Visit    Reason for Visit:   Shira Florentino is a 75 y.o. male who is seen for follow up of neuroendocrine carcinoma. 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 Flow cytometry negative for lymphoproliferative disorder. FoundationOne: MS Stable, TMB 0, MDM4 amplification, MYC amplification, TMPRSS2 TMPRSS2-ERD fusion, CEBPA 1311fs*10, ERBB4 amplification - equivocal, MCL1 amplification, [id number] amplification, RAD21 amplification. See scanned report for full info. Abbreviated [facility] Pathology Consultation:  Final diagnosis: Right external iliac node: Metastatic prostate adenocarcinoma. Comment: Histologic sections of the right external iliac node show sheets of cells with minimal eosinophilic cytoplasm and variable cytologic atypia. Some areas show acinar formation. The cells have predominantly round nuclei with vesicular chromatin and prominent nucleoli. Some areas show significantly more atypia with more irregular nuclear borders, hyperchromatic nyclei, and increase nuclear to cytoplasmic ratio. Frequent mitotic figures are identified. A provided pane of IHC stains is reviewed and demonstrates that the malignant cells show strong, diffuse expression of pan-cytokeratin and focal expression of TTF. CK7, CK20, chromogranin, and synaptophysin are negative in the lesional cells. CD3 and CD20 are negative in the lesions cells and positive in the background lymphocytes.  An abbreviated FoundationOne report was included, which included a TMPRSS2-ERG fusion (among other nonspecific abnormalities), which si found in approximately 50% of prostate cancers. Additional IHC studies performed at [facility] showed that the malignant cells have immunoreactivity with antibodies for PSA and PSAP, consistent with a diagnosis of metastatic prostate adenocarcinoma. Per report, flow cytometry negative for lymphoma. Overall, the histomorphology, immunophenotype and genomic findings in this case are diagnostic of metastatic prostate adenocarcinoma. The acinar formation and cytology were suggestive of this diagnosis, and the diffuse PSA and PSAP positivity as well as the TMPRSS2-ERG fusion confirmed the diagnosis. This fusion is the most common genomic alteration in prostate carcinoma and can be detected in over half of the cases, yet is not seen with any frequency in other types of carcinoma. Prior Treatment:   1. Carbo-Etoposide-Atezolizumab x 2 cycles, 6/29/20-7/20/20. 2. Docetaxel x 6 cycles, 8/10/20-11/24/20. 3. Zytiga 1000mg/d and prednisone 5mg BID, started 4/29/22 - 12/2022    Current Treatment:  Enzalutamide (Xtandi) 160mg PO daily, 1/2723 - current  Lupron every 12 weeks, started 8/2020-current      History of Present Illness:   Bina Banks is a 75 y.o. male who comes in for follow up of poorly differentiated prostate cancer. Pt noticed a nontender bulge in his LLQ in 5/2020. He thought this was a hernia and was evaluated by Dr. Brent Mart. CT on 5/20/20 was notable for extensive lymphadenopathy in abd/pelvis. Pt underwent robot assisted excisional Right external iliac LN biopsy 5/29/20, which showed poorly differentiated large cell neuroendocrine carcinoma. However, pathology reviewed at [facility] and felt to be poorly differentiated prostate adenocarcinoma. He reports intentional weight loss with dietary changes and exercise, losing 40-lbs in past 8 months. No n/v/d, melena/hematochezia, cough, SOB. No fevers, chills, sweats. Lifetiime nonsmoker. Mother had breast cancer diagnosed age < 48. Twin brother had melanoma diagnosed aged late 46s.   He does not think he has ever had a colonoscopy or PSA screening prior to current diagnosis. Interval History:  Patient here for follow up of prostate cancer. Started on Enzalutamide 1/27/23 and reports taking it consistently. Continues with HH for PT and medication management. He denies any pain in his abdomen, pelvis, back. He did have a fall at home which he attributes to dysequilibrium. Alone today. Son is Audra Barker. PMHx: Prostate cancer, HTN  PSurgHx: appendectomy, vasectomy, subcutaneous cyst removal on forehead  SHx:  Never smoker, no EtOH currently after prior alcohol abuse. Unmarried. Has 2 children, 27 and 32. 1 lives in [place]. FHx:  Mother had breast cancer, father had prostate cancer, brother has melanoma, COPD. Meds/Allergies: Reviewed   Review of Systems: A complete review of systems was obtained, negative except as described above. Physical Exam:     Visit Vitals  /80 (BP 1 Location: Left upper arm, BP Patient Position: Sitting, BP Cuff Size: Adult)   Pulse 60   Temp 97.8 °F (36.6 °C)   Resp 18   Ht 5' 10\" (1.778 m)   Wt 201 lb 11.2 oz (91.5 kg)   SpO2 97%   BMI 28.94 kg/m²       ECOG PS: 0  General: Well developed, no acute distress  Eyes: Anicteric sclerae  Lymphatic: No cervical, supraclavicular adenopathy  Respiratory: BCTA, normal respiratory effort  CV: Normal rate, regular rhythm, no murmurs, no edema, port upper chest, 1 + edema bilateral LE L > R.   GI: firm, nontender, nondistended, +BS  MS: Normal gait and station. Digits without clubbing or cyanosis. Skin:  Fingernails dark, thickened at distal half and more normal at proximal half. No ecchymoses, or petechiae. Normal temperature, turgor. Diffuse dry skin. Scattered ecchymosis on arms. Neuro/Psych: Alert, oriented. Moves all 4 extremities. Appropriate affect, normal judgment/insight.     Results:     Lab Results   Component Value Date/Time    WBC 6.0 04/18/2023 11:04 AM    HGB 10.0 (L) 04/18/2023 11:04 AM    HCT 29.8 (L) 04/18/2023 11:04 AM    PLATELET 576 28/90/3866 11:04 AM    MCV 89.5 04/18/2023 11:04 AM    ABS. NEUTROPHILS 4.2 04/18/2023 11:04 AM     Lab Results   Component Value Date/Time    Sodium 133 (L) 04/18/2023 11:04 AM    Potassium 3.6 04/18/2023 11:04 AM    Chloride 100 04/18/2023 11:04 AM    CO2 28 04/18/2023 11:04 AM    Glucose 115 (H) 04/18/2023 11:04 AM    BUN 21 (H) 04/18/2023 11:04 AM    Creatinine 1.07 04/18/2023 11:04 AM    GFR est AA >60 10/03/2022 10:06 AM    GFR est non-AA 60 (L) 10/03/2022 10:06 AM    Calcium 9.1 04/18/2023 11:04 AM    Creatinine (POC) 1.30 04/15/2022 08:49 AM     Lab Results   Component Value Date/Time    Bilirubin, total 0.5 04/18/2023 11:04 AM    ALT (SGPT) 29 04/18/2023 11:04 AM    Alk.  phosphatase 125 (H) 04/18/2023 11:04 AM    Protein, total 7.5 04/18/2023 11:04 AM    Albumin 4.2 04/18/2023 11:04 AM    Globulin 3.3 04/18/2023 11:04 AM     Lab Results   Component Value Date/Time    Iron 82 04/18/2022 10:47 AM    TIBC 415 04/18/2022 10:47 AM    Iron % saturation 20 04/18/2022 10:47 AM    Ferritin 268 04/18/2022 10:47 AM       Lab Results   Component Value Date/Time    Vitamin B12 312 04/18/2022 10:47 AM    Folate 9.6 04/18/2022 10:47 AM     Lab Results   Component Value Date/Time    TSH 1.72 06/29/2020 09:58 AM     Lab Results   Component Value Date/Time    Prostate Specific Ag 25.5 (H) 03/21/2023 09:38 AM    Prostate Specific Ag 17.8 (H) 02/21/2023 10:24 AM    Prostate Specific Ag 16.3 (H) 01/24/2023 09:15 AM       No results found for: HAMAT, HAAB, HABT, HAAT, HBSAG, HBSB, HBSAT, HBABN, HBCM, HBCAB, HBCAT, XBCABS, HBEAB, [address], [facility], [id number], [address], [facility], HBCLT, [address], [id number], [id number], [address] [place] [address], [id number], HBCMLT, [id number], HCGAT     6/11/20: Chromogranin 42    6/11/20: PSA 38.6  7/20/20: PSA 13.2   8/10/20: PSA 1.2  8/31/20: PSA 0.5  9/24/20: PSA 0.4  10/15/20: PSA 0.3  11/5/20: PSA 0.3  11/24/20: 0.2  2/18/21: PSA 0.1    3/22/21 PSA 0.066 (PSA ultrasensitive at [facility] urology)   4/1/21 PSA 0.1, testosterone < 3  21 PSA 0.1  21 PSA 0.2  10/28/21: PSA 0.4  22: PSA 2.3  3/9/22: PSA 9.1   22 PSA 25.3  22 started Zytiga + prednisone   22 PSA 7.2  22 PSA 4.2  22 PSA 2.9  22 PSA 2.8  22: PSA 2.1   10/3/22: PSA 2.6   10/31/22: PSA 3.5   22: PSA 5.8   22: PSA 10.1  23: PSA 16.3   Started Enzalutamide 23: PSA 17.8  3/21/23 PSA 25.5    Imagin/20/20 CT abd/pelvis with IV contrast:  1. No findings to suggest gross abdominal wall hernia or flank hernia. 2. Extensive adenopathy throughout the abdomen and pelvis as described. Findings are concerning for possible metastatic disease or lymphoma. Recommend follow up or comparison with prior studies. 20 PET:  FINDINGS:  HEAD/NECK: No apparent foci of abnormal hypermetabolism. Cerebral evaluation is  limited by normal intense activity. CHEST: Hypermetabolic lymphadenopathy at the base of the left neck and in the  left supraclavicular region is noted. Hypermetabolic superior mediastinal,  prevascular, right paratracheal, subcarinal, and bilateral hilar lymph  lymphadenopathy, maximum SUV of the subcarinal lymph node is 5.7. Small but  hypermetabolic soft tissue nodule left anterior chest wall. ABDOMEN/PELVIS: Hypermetabolic retrocrural, left para-aortic, aortocaval,  bilateral common iliac, bilateral external iliac, and bilateral inguinal  lymphadenopathy, maximum SUV 5.8 of an aortocaval lymph node. Hypermetabolic  mesenteric lymph node left lower quadrant, maximum SUV 12.3. SKELETON: No foci of abnormal hypermetabolism in the axial and visualized  appendicular skeleton. IMPRESSION: Hypermetabolic lymphadenopathy involving the left neck and left  supraclavicular region, mediastinum, bilateral hilum, retroperitoneum,  mesentery, and pelvis as described above. Hypermetabolic soft tissue nodule left  chest.    Brain MRI 20:    There is no evidence of intracranial metastatic disease. Mild chronic microvascular ischemic change. No intracranial mass, hemorrhage or evidence of acute infarction.    ----------------------------------------------------  7/7/22 Bone scan:   1. The focus of increased bony activity right occipital bone appears slightly larger. 2. Small focus of activity right sixth rib posterolaterally is stable. CT  demonstrates small focus of sclerosis in this region. Findings are suspicious for bony metastatic disease. No new foci of increased bony activity identified. 7/7/22 CT head:  1. No evidence of acute intracranial process. 2. No lesion to correspond to the previously seen abnormality in the right posterior skull on bone scan. 7/7/22 CT ch/abd/pelv:  Response to treatment characterized by decrease in nodularity in the left gluteal region, decrease in size of retroperitoneal lymph nodes, and decreased enlargement of the right psoas muscle. No new lung nodules or mediastinal lymphadenopathy. RECIST:  Target lesions:  Left retroperitoneal lymph node, 2:74, 14 x 12 mm, previously 16 x 15 mm. Right psoas enlargement, 2:76, 53 x 23 mm, previously 57 x 29 mm. Right pericaval lymph node, 2:77, 17 x 15 mm, previously 20 x 20 mm. Right external iliac lymph node, 2:90, 26 x 20 mm, previously 35 x 18 mm. Nontarget lesions:  Lung nodules have resolved. Right posterior lateral sixth rib sclerotic lesion is new. 9/27/22 CT ch/abd/pelv:  RECIST   TARGET LESIONS:  Lesion (description)         Location (series/slice)                Size      1. Left retroperitoneal lymph node    series 2 image 69    14 x 12 mm decrease in size  2. Right psoas infiltrative lesion    series 2 image 74    5.3 x 2.0 cm unchanged  3. Right pericaval lymph node    series 2 image 76    12 x 10 mm decrease in size  4. Right external iliac lymph node. Series 2 image 87    25 x 21 mm without significant change  NONTARGET LESIONS:  1. Lung nodules remain resolved.   2. Sclerotic lesions in the osseous structures unchanged. IMPRESSION  1. Retroperitoneal lymph nodes are slightly decreased in size. 2.  Infiltrative right psoas lesion is not significant change. 3.  Right external iliac lymph node is not significant changed. 4.  Stable small sclerotic lesions. 9/27/22 Bone scan:  IMPRESSION  1. Focus of activity right occipital bone is stable. The tiny focus in the right sixth rib posterolaterally is less apparent. The tiny focus distal left femur which in retrospect is unchanged. There is a tiny focus in the sacrum which was not present previously but could  be degenerative related to L5-S1 degenerative disc changes. 12/19/22 CT ch/abd/pelv:  IMPRESSION  1. Interval mixed response of retroperitoneal lymph nodes/tumor implants as  outlined above. 2.  Stable scattered sclerotic osseous lesions. 3.  Right eccentric rectal wall thickening and prominent vascularity, consider  correlation with proctoscopy. RECIST   Interval mixed response. TARGET LESIONS:   Lesion (description)         Location (series/slice)                Size      1. Left retroperitoneal lymph nodes/tumor implant, decreased    2-70     currently 9 mm x 6 mm, previously 14 mm x 12 mm  2. Right psoas infiltrative lesion, increased    2-86    largest focus currently  24 mm x 20 mm, previously 21 mm x 16 mm  3. Right pericaval lymph node, stable to slightly decreased    2-77    currently  12 mm x 7 mm, previously 12 mm x 10 mm  4. Right external iliac lymph node, increased    2-87    currently 33 mm x 25  mm, previously 25 mm x 21 mm   NONTARGET LESIONS:  1. Lung nodules remain resolved  2. Sclerotic osseous lesions, stable    12/19/22 Bone scan:  FINDINGS: The calvarial, left distal femur, and sacral lesions are again noted  and demonstrate increased tracer activity compared to the prior exam. No new  abnormal uptake is identified. Incidental renal imaging shows no abnormality.    IMPRESSION  Stable pattern of osseous metastatic disease within increase in the  tracer activity corresponding to the previously described lesions. No new lesion  is identified. Assessment & Plan:   Richard Arnold is a 75 y.o. 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 1. Metastatic adenocarcinoma of prostate, high grade:  Hormone resistant. Initial pathologist at [address] [place] [address] called this \"Large cell neuroendocrine carcinoma,\" and based on diffuse disease on PET, patient was started on treatment with Carbo-Etoposide-Atezolizumab (thinking this was large cell carcinoma of the lung.) However [facility] testing identified TMPRSS2-ERG fusion, which is primarily seen in prostate cancers. This along with elevated PSA of 38 prompted sending of pathology specimen to [facility] for review. Their opinion and IHC staining is consistent with high grade adenocarcinoma of prostate. After Carbo-Etop-Atezo, pt was treated with Docetaxel x 6 cycles. Treatment options now include: Carboplatin + Cabazitaxel. 4/15/22 CT shows disease progression with a new liver and lung mets; bone scan with possible new occipital and possible rib met. Initiated Zytiga + prednisone on 4/29/22.   12/2022 CT showed a mixed response - given rising PSA, I discussed switching treatment. Discussed Cabazitaxel 20mg/m2 and Enzalutamide - I do not feel patient's performance status and cognition are adequate for cytotoxic chemotherapy. Therefore, recommended Enzalutamide. Unfortunately PSA continues to rise despite taking Enzalutamide. Have reminded pt to complete imaging. Will continue medication for now. However, I did discuss that I suspect he has progressive disease and has run out of suitable treatment options. I recommend starting to think about transitioning to hospice. Will need to confirm with repeat imaging. Did discuss Consent signed.    Supportive medications: EMLA cream, zofran, compazine, dexamethasone   -- Lupron (3 month depot)  #12 given 3/21/23, #13 due approximately 6/21/23  -- Continue Enzalutamide (Xtandi) 160mg PO daily, started 1/27/23. Provided contact for [facility] [phone].   -- Discuss need for repeat CT and bone scan - reminded pt to get this scheduled  -- Also advised reporting to ER if he develops worsening headache, vision changes or new n/v given recent fall. -- [address] for medication management - can transition to hospice through their agency when needed. -- Return in 4 weeks labs, MD visit. 2. Chemotherapy induced neuropathy:   Grade 1. Present in bilateral fingertips. 3. CKD / HTN / hypokalemia:  On Amlodipine and KCL 20 meq daily. -- Monitoring BP as Xtandi may reduce efficacy of amlodipine. -- KCL 40 meq in [facility] today. 4. Depression/anxiety / h/o Genital Herpes:   2/2 to #1. On Zoloft 50mg/d. On suppression with Acyclovir. 5. Dysequilibrium / Ear effusions / dementia / falls:   Past Brain MRI negative for mets. Left tympanostomy tube placed by ENT for bilat effusions. Balance improved with PT. Diagnosed with mild to moderate dementia via neuropsych evaluation. Was advised consideration for an appropriate medication for his moderate to severe attention deficit issues, counseling for the emotional distress problems. Dr Laura Rico referred to Neurology. Prn social visits with Charles Mittal to discuss future living options if dementia worsens. -- Follow up Dr. Indiana Burns and Laura Rico as scheduled    6. Health maintenance:   Had screening colonoscopy 8/13/2022 with [place] with benign polyp per patient. 7. Normocytic anemia:   Likely due to #1. Iron profile, ferritin, b12 and folate normal. Monitor with monthly labs. 8. Osteopenia / bone mets:   On calcium/D3 supplementation. Evaluated by Dr. Piter Hameed (dentist) and will eventually require extractions, treatment of severe periodontal disease, bridge may fail soon, but he advised ok to proceed with Xgeva.   Discussed higher incidence of ONJ in patients on Denosumab with patient and he voiced understanding and wants to proceed. -- Xgeva every 4 weeks    9. Skin lesion / cyst:  1 cm, scaly  and pruritic lesion present on left upper thigh. Painless cyst on right upper back. Had biopsies at [facility] with results pending. Emotional well being: Pt is coping well with his/her disease and has a support system, but he is struggling with keeping up with housework, MARGOT Roger consulted and advised patient apply for [facility] and provided to call [phone]. SW provided ex wife- (1st wife and mother of pt children) [facility] number [phone] to assist pt with divorce of 2nd wife.      Signed By: Livia Snow MD

## 2023-04-18 ENCOUNTER — HOSPITAL ENCOUNTER (OUTPATIENT)
Dept: INFUSION THERAPY | Age: 68
Discharge: HOME | End: 2023-04-18
Payer: MEDICARE

## 2023-04-18 ENCOUNTER — TELEPHONE (OUTPATIENT)
Dept: ONCOLOGY | Age: 68
End: 2023-04-18

## 2023-04-18 ENCOUNTER — OFFICE VISIT (OUTPATIENT)
Dept: ONCOLOGY | Age: 68
End: 2023-04-18
Payer: MEDICARE

## 2023-04-18 VITALS
HEIGHT: 70 IN | HEART RATE: 60 BPM | DIASTOLIC BLOOD PRESSURE: 80 MMHG | RESPIRATION RATE: 18 BRPM | WEIGHT: 201.7 LBS | SYSTOLIC BLOOD PRESSURE: 135 MMHG | OXYGEN SATURATION: 97 % | BODY MASS INDEX: 28.88 KG/M2 | TEMPERATURE: 97.8 F

## 2023-04-18 VITALS
DIASTOLIC BLOOD PRESSURE: 89 MMHG | RESPIRATION RATE: 18 BRPM | HEIGHT: 70 IN | SYSTOLIC BLOOD PRESSURE: 162 MMHG | TEMPERATURE: 97.8 F | OXYGEN SATURATION: 97 % | WEIGHT: 201.7 LBS | HEART RATE: 60 BPM | BODY MASS INDEX: 28.88 KG/M2

## 2023-04-18 DIAGNOSIS — R97.20 ELEVATED PSA: ICD-10-CM

## 2023-04-18 DIAGNOSIS — M85.80 OSTEOPENIA, UNSPECIFIED LOCATION: Primary | ICD-10-CM

## 2023-04-18 DIAGNOSIS — C79.51 MALIGNANT NEOPLASM OF PROSTATE METASTATIC TO BONE (HCC): ICD-10-CM

## 2023-04-18 DIAGNOSIS — Z79.899 ENCOUNTER FOR MEDICATION MANAGEMENT: ICD-10-CM

## 2023-04-18 DIAGNOSIS — C61 MALIGNANT NEOPLASM OF PROSTATE METASTATIC TO BONE (HCC): ICD-10-CM

## 2023-04-18 DIAGNOSIS — C61 ADENOCARCINOMA OF PROSTATE, STAGE 4 (HCC): Primary | ICD-10-CM

## 2023-04-18 DIAGNOSIS — F03.B0 MODERATE DEMENTIA WITHOUT BEHAVIORAL DISTURBANCE, PSYCHOTIC DISTURBANCE, MOOD DISTURBANCE, OR ANXIETY, UNSPECIFIED DEMENTIA TYPE (HCC): ICD-10-CM

## 2023-04-18 DIAGNOSIS — F32.89 OTHER DEPRESSION: ICD-10-CM

## 2023-04-18 LAB
ALBUMIN SERPL-MCNC: 4.2 G/DL (ref 3.5–5)
ALBUMIN/GLOB SERPL: 1.3 (ref 1.1–2.2)
ALP SERPL-CCNC: 125 U/L (ref 45–117)
ALT SERPL-CCNC: 29 U/L (ref 12–78)
ANION GAP SERPL CALC-SCNC: 5 MMOL/L (ref 5–15)
AST SERPL-CCNC: 63 U/L (ref 15–37)
BASOPHILS # BLD: 0.1 K/UL (ref 0–0.1)
BASOPHILS NFR BLD: 1 % (ref 0–1)
BILIRUB SERPL-MCNC: 0.5 MG/DL (ref 0.2–1)
BUN SERPL-MCNC: 21 MG/DL (ref 6–20)
BUN/CREAT SERPL: 20 (ref 12–20)
CALCIUM SERPL-MCNC: 9.1 MG/DL (ref 8.5–10.1)
CHLORIDE SERPL-SCNC: 100 MMOL/L (ref 97–108)
CO2 SERPL-SCNC: 28 MMOL/L (ref 21–32)
CREAT SERPL-MCNC: 1.07 MG/DL (ref 0.7–1.3)
DIFFERENTIAL METHOD BLD: ABNORMAL
EOSINOPHIL # BLD: 0.3 K/UL (ref 0–0.4)
EOSINOPHIL NFR BLD: 4 % (ref 0–7)
ERYTHROCYTE [DISTWIDTH] IN BLOOD BY AUTOMATED COUNT: 14.5 % (ref 11.5–14.5)
GLOBULIN SER CALC-MCNC: 3.3 G/DL (ref 2–4)
GLUCOSE SERPL-MCNC: 115 MG/DL (ref 65–100)
HCT VFR BLD AUTO: 29.8 % (ref 36.6–50.3)
HGB BLD-MCNC: 10 G/DL (ref 12.1–17)
IMM GRANULOCYTES # BLD AUTO: 0.1 K/UL (ref 0–0.04)
IMM GRANULOCYTES NFR BLD AUTO: 1 % (ref 0–0.5)
LYMPHOCYTES # BLD: 1 K/UL (ref 0.8–3.5)
LYMPHOCYTES NFR BLD: 17 % (ref 12–49)
MAGNESIUM SERPL-MCNC: 2 MG/DL (ref 1.6–2.4)
MCH RBC QN AUTO: 30 PG (ref 26–34)
MCHC RBC AUTO-ENTMCNC: 33.6 G/DL (ref 30–36.5)
MCV RBC AUTO: 89.5 FL (ref 80–99)
MONOCYTES # BLD: 0.5 K/UL (ref 0–1)
MONOCYTES NFR BLD: 8 % (ref 5–13)
NEUTS SEG # BLD: 4.2 K/UL (ref 1.8–8)
NEUTS SEG NFR BLD: 69 % (ref 32–75)
NRBC # BLD: 0 K/UL (ref 0–0.01)
NRBC BLD-RTO: 0 PER 100 WBC
PHOSPHATE SERPL-MCNC: 3.1 MG/DL (ref 2.6–4.7)
PLATELET # BLD AUTO: 199 K/UL (ref 150–400)
PMV BLD AUTO: 11.1 FL (ref 8.9–12.9)
POTASSIUM SERPL-SCNC: 3.6 MMOL/L (ref 3.5–5.1)
PROT SERPL-MCNC: 7.5 G/DL (ref 6.4–8.2)
PSA SERPL-MCNC: 41.9 NG/ML (ref 0.01–4)
RBC # BLD AUTO: 3.33 M/UL (ref 4.1–5.7)
SODIUM SERPL-SCNC: 133 MMOL/L (ref 136–145)
WBC # BLD AUTO: 6 K/UL (ref 4.1–11.1)

## 2023-04-18 PROCEDURE — G9717 DOC PT DX DEP/BP F/U NT REQ: HCPCS | Performed by: INTERNAL MEDICINE

## 2023-04-18 PROCEDURE — 36415 COLL VENOUS BLD VENIPUNCTURE: CPT

## 2023-04-18 PROCEDURE — 74011250636 HC RX REV CODE- 250/636: Performed by: INTERNAL MEDICINE

## 2023-04-18 PROCEDURE — 84153 ASSAY OF PSA TOTAL: CPT

## 2023-04-18 PROCEDURE — 3017F COLORECTAL CA SCREEN DOC REV: CPT | Performed by: INTERNAL MEDICINE

## 2023-04-18 PROCEDURE — 80053 COMPREHEN METABOLIC PANEL: CPT

## 2023-04-18 PROCEDURE — 96401 CHEMO ANTI-NEOPL SQ/IM: CPT

## 2023-04-18 PROCEDURE — 99215 OFFICE O/P EST HI 40 MIN: CPT | Performed by: INTERNAL MEDICINE

## 2023-04-18 PROCEDURE — 1101F PT FALLS ASSESS-DOCD LE1/YR: CPT | Performed by: INTERNAL MEDICINE

## 2023-04-18 PROCEDURE — 83735 ASSAY OF MAGNESIUM: CPT

## 2023-04-18 PROCEDURE — 1123F ACP DISCUSS/DSCN MKR DOCD: CPT | Performed by: INTERNAL MEDICINE

## 2023-04-18 PROCEDURE — G8427 DOCREV CUR MEDS BY ELIG CLIN: HCPCS | Performed by: INTERNAL MEDICINE

## 2023-04-18 PROCEDURE — G0463 HOSPITAL OUTPT CLINIC VISIT: HCPCS | Performed by: INTERNAL MEDICINE

## 2023-04-18 PROCEDURE — 77030012965 HC NDL HUBR BBMI -A

## 2023-04-18 PROCEDURE — 74011000250 HC RX REV CODE- 250: Performed by: INTERNAL MEDICINE

## 2023-04-18 PROCEDURE — 74011250636 HC RX REV CODE- 250/636: Performed by: NURSE PRACTITIONER

## 2023-04-18 PROCEDURE — 85025 COMPLETE CBC W/AUTO DIFF WBC: CPT

## 2023-04-18 PROCEDURE — G8536 NO DOC ELDER MAL SCRN: HCPCS | Performed by: INTERNAL MEDICINE

## 2023-04-18 PROCEDURE — G8417 CALC BMI ABV UP PARAM F/U: HCPCS | Performed by: INTERNAL MEDICINE

## 2023-04-18 PROCEDURE — 84100 ASSAY OF PHOSPHORUS: CPT

## 2023-04-18 RX ORDER — SODIUM CHLORIDE 9 MG/ML
10 INJECTION INTRAVENOUS AS NEEDED
Status: DISCONTINUED | OUTPATIENT
Start: 2023-04-18 | End: 2023-04-19 | Stop reason: HOSPADM

## 2023-04-18 RX ORDER — HEPARIN 100 UNIT/ML
500 SYRINGE INTRAVENOUS AS NEEDED
Status: DISCONTINUED | OUTPATIENT
Start: 2023-04-18 | End: 2023-04-19 | Stop reason: HOSPADM

## 2023-04-18 RX ADMIN — Medication 500 UNITS: at 13:44

## 2023-04-18 RX ADMIN — DENOSUMAB 120 MG: 120 INJECTION SUBCUTANEOUS at 13:40

## 2023-04-18 RX ADMIN — SODIUM CHLORIDE, PRESERVATIVE FREE 10 ML: 5 INJECTION INTRAVENOUS at 13:44

## 2023-04-18 NOTE — TELEPHONE ENCOUNTER
[address] at [place]  [phone]    04/18/23 1:07 PM - Attempted to call the patient. There was no answer. Left a voicemail for the patient stating the infusion center reached and he needs to come back to be deaccessed. Asked the patient to please call back once he receives the message along with our office number.

## 2023-04-18 NOTE — PROGRESS NOTES
1. Have you been to the ER, urgent care clinic since your last visit? Hospitalized since your last visit? No    2. Have you seen or consulted any other health care providers outside of the [address] [place] [address] since your last visit? Include any pap smears or colon screening. Yes Dematologist for melanoma, Dr. Cristela Frost  /80 (BP 1 Location: Left upper arm, BP Patient Position: Sitting, BP Cuff Size: Adult)   Pulse 60   Temp 97.8 °F (36.6 °C)   Resp 18   Ht 5' 10\" (1.778 m)   Wt 201 lb 11.2 oz (91.5 kg)   SpO2 97%   BMI 28.94 kg/m²    Patient reports a fall on Sunday, 4/16/23. He did hit his head. He states starting yesterday, he has a headache and ringing.   Will update MD.         Chief Complaint   Patient presents with    Follow-up    Prostate Cancer

## 2023-04-18 NOTE — PROGRESS NOTES
[facility] Chemo Progress Note    Date: April 18, 2023        1045: Mr. Darinel Serrano Arrived to [facility] for  [facility] ambulatory in stable condition. Assessment was completed and port accessed by Milagro Manning RN. Labs drawn and sent for processing. Went to provider appointment with Medical Oncology. Returned from provider appointment. Labs reviewed. Criteria for treatment was met. Chemotherapy Flowsheet 4/18/2023   Cycle -   Date 4/18/2023   Drug / Regimen Adrian Cecily   Dosage -   Time Up -   Time Down -   Pre Hydration -   Post Hydration -   Pre Meds -   Notes Left armm         Mr. Tho Krishnan vitals were reviewed. Patient Vitals for the past 12 hrs:   Temp Pulse Resp BP SpO2   04/18/23 1047 97.8 °F (36.6 °C) 60 18 (!) 162/89 97 %         Lab results were obtained and reviewed. Recent Results (from the past 12 hour(s))   CBC WITH AUTOMATED DIFF    Collection Time: 04/18/23 11:04 AM   Result Value Ref Range    WBC 6.0 4.1 - 11.1 K/uL    RBC 3.33 (L) 4.10 - 5.70 M/uL    HGB 10.0 (L) 12.1 - 17.0 g/dL    HCT 29.8 (L) 36.6 - 50.3 %    MCV 89.5 80.0 - 99.0 FL    MCH 30.0 26.0 - 34.0 PG    MCHC 33.6 30.0 - 36.5 g/dL    RDW 14.5 11.5 - 14.5 %    PLATELET 599 485 - 398 K/uL    MPV 11.1 8.9 - 12.9 FL    NRBC 0.0 0  WBC    ABSOLUTE NRBC 0.00 0.00 - 0.01 K/uL    NEUTROPHILS 69 32 - 75 %    LYMPHOCYTES 17 12 - 49 %    MONOCYTES 8 5 - 13 %    EOSINOPHILS 4 0 - 7 %    BASOPHILS 1 0 - 1 %    IMMATURE GRANULOCYTES 1 (H) 0.0 - 0.5 %    ABS. NEUTROPHILS 4.2 1.8 - 8.0 K/UL    ABS. LYMPHOCYTES 1.0 0.8 - 3.5 K/UL    ABS. MONOCYTES 0.5 0.0 - 1.0 K/UL    ABS. EOSINOPHILS 0.3 0.0 - 0.4 K/UL    ABS. BASOPHILS 0.1 0.0 - 0.1 K/UL    ABS. IMM.  GRANS. 0.1 (H) 0.00 - 0.04 K/UL    DF AUTOMATED     METABOLIC PANEL, COMPREHENSIVE    Collection Time: 04/18/23 11:04 AM   Result Value Ref Range    Sodium 133 (L) 136 - 145 mmol/L    Potassium 3.6 3.5 - 5.1 mmol/L    Chloride 100 97 - 108 mmol/L    CO2 28 21 - 32 mmol/L    Anion gap 5 5 - 15 mmol/L    Glucose 115 (H) 65 - 100 mg/dL    BUN 21 (H) 6 - 20 MG/DL    Creatinine 1.07 0.70 - 1.30 MG/DL    BUN/Creatinine ratio 20 12 - 20      eGFR >60 >60 ml/min/1.73m2    Calcium 9.1 8.5 - 10.1 MG/DL    Bilirubin, total 0.5 0.2 - 1.0 MG/DL    ALT (SGPT) 29 12 - 78 U/L    AST (SGOT) 63 (H) 15 - 37 U/L    Alk. phosphatase 125 (H) 45 - 117 U/L    Protein, total 7.5 6.4 - 8.2 g/dL    Albumin 4.2 3.5 - 5.0 g/dL    Globulin 3.3 2.0 - 4.0 g/dL    A-G Ratio 1.3 1.1 - 2.2     MAGNESIUM    Collection Time: 04/18/23 11:04 AM   Result Value Ref Range    Magnesium 2.0 1.6 - 2.4 mg/dL   PHOSPHORUS    Collection Time: 04/18/23 11:04 AM   Result Value Ref Range    Phosphorus 3.1 2.6 - 4.7 MG/DL         Pre-medications  were administered as ordered and chemotherapy was initiated. Medications Administered       0.9% sodium chloride injection 10 mL       Admin Date  04/18/2023 Action  Given Dose  10 mL Route  IntraVENous Administered By  Andrae Lau RN              denosumab (XGEVA) injection 120 mg       Admin Date  04/18/2023 Action  Given Dose  120 mg Route  SubCUTAneous Administered By  Andrae Lau RN              heparin (porcine) pf 500 Units       Admin Date  04/18/2023 Action  Given Dose  500 Units Route  IntraVENous Administered By  Andrae Lau, Eugene Romero Patient tolerated treatment well. Port flushed, heparinized and de accessed per protocol. Patient was discharged in stable condition.  Patient is aware of next scheduled [facility] appointment on 6/13/23 at 9:30 AM.    Future Appointments   Date Time Provider Ayala Kidd   5/16/2023 10:45 AM Mark Christianson MD [facility] BS AMB   6/13/2023  9:30 AM SS INF7 CH3 <1H [facility]   6/21/2023 11:00 AM Yunier Robin  [address] BS AMB   3/28/2024  9:20 AM Taurus Jung PsyD [address] BS JOVAN Benedict, RN, RN  April 18, 2023

## 2023-04-24 NOTE — PROGRESS NOTES
Pharmacist/Oncology Collaboration for Hyperglycemia    CC:  Blood sugar meter training/education    S/O: Zehra Webber is a 76 y.o. male referred by KSENIA Leon for hyperglycemia. Patient states that he's a little depressed with everything going on. Went from being super healthy and thinking he had a hernia to finding out he has stage 4 cancer. Also unsure of what to think at this point as to whether it's better or worse that the diagnosis has changed. He's a little tearful at times during our meeting just seemingly overwhelmed with diagnosis. Was able to see his grandkids which brings him great sasha. Has a female partner that he sees primarily on weekends who is moving to be closer to her family to help with grandkids given the covid situation. He says this is not a stressor and is actually a really positive thing because he will still see her. States he has a strong support system. 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 Lives in [facility] so our office location is convenient for patient. PCP: Martina Mckeon    Oncology Diagnosis: Adenocarcinoma of prostate, stage 4.  -was first diagnosed as a neuroendocrine carcinoma but upon further workup was discovered to be adenocarcionoma of prostate    Current Oncology Regimen:  ADT and Docetaxel every 3 weeks - started 8/10/20  -Premed with Dexamethasone 8mg BID day before and day after    Prior treatment 6/29-7/20 also included dexamethasone. HPI Hyperglycemia: Patient notes that he has never had an issue with his blood sugar. Prior to this diagnosis he was very healthy. Notes that his father developed diabetes much later in life but no other significant family history. He has received several doses of dexamethasone over the last couple of months. Sugars have increased since the first treatment and the last check were >200.      Current Diabetes Regimen:  None    ROS: Patient denies hypoglycemic and hyperglycemic signs/symptoms, chest pain, shortness of breath, neuropathy, vision changes, and any foot changes. Self-Monitoring Blood Glucose:  None    Diet/Exercise:  Diet was not addressed during this visit  Patient is very active - enjoys bike rides      Data reviewed:    Blood Glucose from [facility]:  6/11/2020:  129  6/29/2020:  113  7/20/2020: 128  8/10/2020:  219    Lab Results   Component Value Date/Time    Hemoglobin A1c (POC) 6.5 08/12/2020 09:11 AM     Lab Results   Component Value Date/Time    Sodium 140 08/10/2020 08:22 AM    Potassium 3.2 (L) 08/10/2020 08:22 AM    Chloride 107 08/10/2020 08:22 AM    CO2 24 08/10/2020 08:22 AM    Anion gap 9 08/10/2020 08:22 AM    Glucose 219 (H) 08/10/2020 08:22 AM    BUN 21 (H) 08/10/2020 08:22 AM    Creatinine 0.93 08/10/2020 08:22 AM    BUN/Creatinine ratio 23 (H) 08/10/2020 08:22 AM    GFR est AA >60 08/10/2020 08:22 AM    GFR est non-AA >60 08/10/2020 08:22 AM    Calcium 9.1 08/10/2020 08:22 AM       Assessment/Plan:     1. Hyperglycemia:  Patient experiencing steroid induced hyperglycemia. Unsure what baseline labs were as PCP not in system. A1c today indicative of some baseline glucose issue, possibly pre-diabetes, likely exacerbated by steroids. Taught patient how to test blood sugars and where blood sugars should be. Educated patient on some of the basics of hyperglycemia related to steroids. Advised to check blood sugars 4 times a day when on steroid and day or 2 after and then once to twice daily to see where our baseline is. Will touch base with patient over the next week to see how things are and to come up with plan for next treatment/premed with steroids. All changes made to patient regimen per collaborative practice agreement. Patient verbalized understanding of the information presented and all of the patients questions were answered. AVS was handed to the patient and information reviewed.       Patient advised to call me directly or Dr. Trini Degroot / NP Rylie Johnson with any additional questions or concerns. Information regarding visit will also be sent to Dr. Jazmin Cerda to ensure continuity of care.      Follow-up: 1 week via phone      Mary Singh, PharmD, BCPS, CDCES      CLINICAL PHARMACY CONSULT: MED RECONCILIATION/REVIEW ADDENDUM    For Pharmacy Admin Tracking Only    PHSO: Cristian Hardy [id number] Patient?: No  Total # of Interventions Recommended: Count: 2  Total Interventions Accepted: 2  Time Spent (min): 60 Duration Of Freeze Thaw-Cycle (Seconds): 0 Render Note In Bullet Format When Appropriate: No Detail Level: Detailed Show Applicator Variable?: Yes Post-Care Instructions: I reviewed with the patient in detail post-care instructions. Patient is to wear sunprotection, and avoid picking at any of the treated lesions. Pt may apply Vaseline to crusted or scabbing areas. Consent: The patient's consent was obtained including but not limited to risks of crusting, scabbing, blistering, scarring, darker or lighter pigmentary change, recurrence, incomplete removal and infection.

## 2023-04-28 DIAGNOSIS — C61 MALIGNANT NEOPLASM OF PROSTATE METASTATIC TO BONE (HCC): Primary | ICD-10-CM

## 2023-04-28 DIAGNOSIS — C61 ADENOCARCINOMA OF PROSTATE, STAGE 4 (HCC): ICD-10-CM

## 2023-04-28 DIAGNOSIS — C79.51 MALIGNANT NEOPLASM OF PROSTATE METASTATIC TO BONE (HCC): Primary | ICD-10-CM

## 2023-05-04 ENCOUNTER — HOSPITAL ENCOUNTER (OUTPATIENT)
Dept: CT IMAGING | Age: 68
Discharge: HOME | End: 2023-05-04
Attending: NURSE PRACTITIONER
Payer: MEDICARE

## 2023-05-04 ENCOUNTER — HOSPITAL ENCOUNTER (OUTPATIENT)
Dept: NUCLEAR MEDICINE | Age: 68
End: 2023-05-04
Attending: NURSE PRACTITIONER
Payer: MEDICARE

## 2023-05-04 DIAGNOSIS — C61 ADENOCARCINOMA OF PROSTATE, STAGE 4 (HCC): ICD-10-CM

## 2023-05-04 DIAGNOSIS — C79.51 MALIGNANT NEOPLASM OF PROSTATE METASTATIC TO BONE (HCC): ICD-10-CM

## 2023-05-04 DIAGNOSIS — C61 MALIGNANT NEOPLASM OF PROSTATE METASTATIC TO BONE (HCC): ICD-10-CM

## 2023-05-04 PROCEDURE — 74011000636 HC RX REV CODE- 636: Performed by: NURSE PRACTITIONER

## 2023-05-04 PROCEDURE — 78306 BONE IMAGING WHOLE BODY: CPT

## 2023-05-04 PROCEDURE — 74177 CT ABD & PELVIS W/CONTRAST: CPT

## 2023-05-04 RX ADMIN — IOPAMIDOL 100 ML: 755 INJECTION, SOLUTION INTRAVENOUS at 08:52

## 2023-05-16 ENCOUNTER — OFFICE VISIT (OUTPATIENT)
Age: 68
End: 2023-05-16
Payer: MEDICARE

## 2023-05-16 ENCOUNTER — HOSPICE ADMISSION (OUTPATIENT)
Age: 68
End: 2023-05-16
Payer: MEDICARE

## 2023-05-16 ENCOUNTER — TELEPHONE (OUTPATIENT)
Age: 68
End: 2023-05-16

## 2023-05-16 VITALS
HEART RATE: 71 BPM | DIASTOLIC BLOOD PRESSURE: 76 MMHG | SYSTOLIC BLOOD PRESSURE: 105 MMHG | BODY MASS INDEX: 27.49 KG/M2 | WEIGHT: 192 LBS | RESPIRATION RATE: 16 BRPM | OXYGEN SATURATION: 93 % | TEMPERATURE: 97.3 F | HEIGHT: 70 IN

## 2023-05-16 DIAGNOSIS — Z71.89 GOALS OF CARE, COUNSELING/DISCUSSION: ICD-10-CM

## 2023-05-16 DIAGNOSIS — R97.20 ELEVATED PROSTATE SPECIFIC ANTIGEN (PSA): ICD-10-CM

## 2023-05-16 DIAGNOSIS — C61 MALIGNANT NEOPLASM OF PROSTATE (HCC): Primary | ICD-10-CM

## 2023-05-16 PROCEDURE — 99215 OFFICE O/P EST HI 40 MIN: CPT | Performed by: INTERNAL MEDICINE

## 2023-05-16 PROCEDURE — G8419 CALC BMI OUT NRM PARAM NOF/U: HCPCS | Performed by: INTERNAL MEDICINE

## 2023-05-16 PROCEDURE — G8427 DOCREV CUR MEDS BY ELIG CLIN: HCPCS | Performed by: INTERNAL MEDICINE

## 2023-05-16 PROCEDURE — 1036F TOBACCO NON-USER: CPT | Performed by: INTERNAL MEDICINE

## 2023-05-16 PROCEDURE — 3017F COLORECTAL CA SCREEN DOC REV: CPT | Performed by: INTERNAL MEDICINE

## 2023-05-16 PROCEDURE — 1123F ACP DISCUSS/DSCN MKR DOCD: CPT | Performed by: INTERNAL MEDICINE

## 2023-05-16 ASSESSMENT — PATIENT HEALTH QUESTIONNAIRE - PHQ9
SUM OF ALL RESPONSES TO PHQ QUESTIONS 1-9: 0
1. LITTLE INTEREST OR PLEASURE IN DOING THINGS: 0
SUM OF ALL RESPONSES TO PHQ QUESTIONS 1-9: 0
SUM OF ALL RESPONSES TO PHQ9 QUESTIONS 1 & 2: 0
SUM OF ALL RESPONSES TO PHQ QUESTIONS 1-9: 0
SUM OF ALL RESPONSES TO PHQ QUESTIONS 1-9: 0
2. FEELING DOWN, DEPRESSED OR HOPELESS: 0

## 2023-05-16 NOTE — TELEPHONE ENCOUNTER
05/16/23 4:01 PM Call placed to [facility] and hospice. Per notes, patient already established with [facility]. Patient now pursuing hospice services. Spoke with clinical manager, she stated patient was discharged from home health services on 03/29. However, she will send message to [facility] to see if patient can be reestablished for hospice services. Provided office phone number for call back. Will continue to monitor. 4:41 PM Received return call from [place] with [facility]. Per his request, will fax referral, patient demographics, and office note to [phone]. Will cancel referral to [facility] as well since patient was known patient to [facility]. 05/19/23 9:24 AM Notified David Mcmillan with [facility] hospice that patient has been admitted to [facility]. She voiced understanding and stated she would update clinical team. No further questions or concerns at this time.

## 2023-05-16 NOTE — PROGRESS NOTES
Chief Complaint   Patient presents with    Follow-up           Vitals:    05/16/23 1211   BP: 105/76   Pulse: 71   Resp: 16   Temp: 97.3 °F (36.3 °C)   SpO2: 93%            1. Have you been to the ER, urgent care clinic since your last visit? Hospitalized since your last visit? No  2. Have you seen or consulted any other health care providers outside of the [address] [place] [address] since your last visit? Include any pap smears or colon screening.  No
screening colonoscopy 8/13/2022 with [place] with benign polyp per patient. 7. Normocytic anemia:   Likely due to #1. Iron profile, ferritin, b12 and folate normal. Monitor with monthly labs. 8. Osteopenia / bone mets:   On calcium/D3 supplementation. Evaluated by Dr. Geovanna Wilson (dentist) and will eventually require extractions, treatment of severe periodontal disease, bridge may fail soon, but he advised ok to proceed with Xgeva. Discussed higher incidence of ONJ in patients on Denosumab with patient and he voiced understanding and wants to proceed. Discontinue Xgeva at this time. 9. Goal of care:  Discussed that his disease is not curable and that his treatment is no longer working. I do not recommend further treatment given low utility, low expected response and high risk of toxicity. I discussed my recommendation for hospice. Emotional well being: Pt is coping well with his/her disease and has a support system, but he is struggling with keeping up with housework, Eloise Busing, LINDSAY consulted and advised patient apply for [facility] and provided to call [phone]. SW provided ex wife- (1st wife and mother of pt children) [facility] number [phone] to assist pt with divorce of 2nd wife.      Signed By: Jaime Oconnell MD

## 2023-05-17 ENCOUNTER — HOME CARE VISIT (OUTPATIENT)
Facility: HOME HEALTH | Age: 68
End: 2023-05-17
Payer: MEDICARE

## 2023-05-17 VITALS
HEART RATE: 86 BPM | RESPIRATION RATE: 14 BRPM | SYSTOLIC BLOOD PRESSURE: 110 MMHG | DIASTOLIC BLOOD PRESSURE: 64 MMHG | OXYGEN SATURATION: 96 %

## 2023-05-17 PROCEDURE — 2500000001 HSPC NON INJECTABLE MED

## 2023-05-17 PROCEDURE — 0651 HSPC ROUTINE HOME CARE

## 2023-05-17 PROCEDURE — G0299 HHS/HOSPICE OF RN EA 15 MIN: HCPCS

## 2023-05-17 ASSESSMENT — ENCOUNTER SYMPTOMS: NAUSEA: 1

## 2023-05-17 NOTE — HOSPICE
states pt lives alone with support from family. Son lives 5 minutes away and has two small children he cares for. Bill Wang is supportive as well. This writer related that team would work with family on long term plan for safe care in the home. Patient reports frequent falls and periods of feeling dizzy. He still drives. Dr. Chantell Adorno contacted, agrees to serve as attending provider for hospice and provided verbal certification of terminal illness with prognosis of 6 months or less life expectancy. Orders for hospice admission, medications and plan of treatment received. Medication reconciliation completed.     Currently this patient has:  PORT:        did not discuss port flushing      MEDS:  I have reviewed the patient's medication list with MD and identified the following:  Nonformulary medications:   Unrelated medications: amlodipine, memantine     IDT communication to include MD, SN, SW, [address] [place] [address] and support team.

## 2023-05-18 ENCOUNTER — HOME CARE VISIT (OUTPATIENT)
Facility: HOME HEALTH | Age: 68
End: 2023-05-18
Payer: MEDICARE

## 2023-05-18 ENCOUNTER — HOME CARE VISIT (OUTPATIENT)
Age: 68
End: 2023-05-18
Payer: MEDICARE

## 2023-05-18 PROCEDURE — 0651 HSPC ROUTINE HOME CARE

## 2023-05-18 PROCEDURE — G0156 HHCP-SVS OF AIDE,EA 15 MIN: HCPCS

## 2023-05-18 PROCEDURE — G0299 HHS/HOSPICE OF RN EA 15 MIN: HCPCS

## 2023-05-18 NOTE — HOSPICE
Phone contact made with son/MPOA to schedule initial SW Assessment. Pt's RN and hospice aide are scheduled to visit today. Per Callum's request, SW visit scheduled for Monday (5/22) at 1pm. Therefore, initial SW Assessment will not be completed within the first 5 days of admission.

## 2023-05-19 ENCOUNTER — HOME CARE VISIT (OUTPATIENT)
Age: 68
End: 2023-05-19
Payer: MEDICARE

## 2023-05-19 VITALS — SYSTOLIC BLOOD PRESSURE: 136 MMHG | DIASTOLIC BLOOD PRESSURE: 77 MMHG | RESPIRATION RATE: 16 BRPM | HEART RATE: 68 BPM

## 2023-05-19 PROCEDURE — 0651 HSPC ROUTINE HOME CARE

## 2023-05-19 NOTE — HOSPICE
Initial visit made after patient was admitted to [facility] yesterday. Patient opened the door to greet this nurse. Patient ambulates with his walking stick at times. Patient denies c/o pain, SOB, N/V/D, or constipation. Patient states he has a hard time grasping and holding on to objects. Medications appear scattered in the home. This nurse prepares a weekly pill box for [facility]. Meds he is not taking are placed in a separate area. SRK meds arrived. We reviewed all the instructions. The box is left sealed and placed in the fridge. [facility] is encouraged to call [facility] main number prior to taking any of the meds. He appears asymptomatic at this time. Sharon Barberpin arrives. Patient states he no longer ambulates up the stairs and is afraid to take a shower. Jennifer Rutherford will provide a sponge bath this visit. Patient states he is unstable and may not tolerate a shower with use of a shower chair. [facility] is able to identify the phone number for hospice located on his folder and state she will call as needed.      supplies ordered

## 2023-05-20 ENCOUNTER — HOME CARE VISIT (OUTPATIENT)
Age: 68
End: 2023-05-20
Payer: MEDICARE

## 2023-05-20 PROCEDURE — 0651 HSPC ROUTINE HOME CARE

## 2023-05-21 PROCEDURE — 0651 HSPC ROUTINE HOME CARE

## 2023-05-22 ENCOUNTER — HOME CARE VISIT (OUTPATIENT)
Facility: HOME HEALTH | Age: 68
End: 2023-05-22
Payer: MEDICARE

## 2023-05-22 PROCEDURE — G0155 HHCP-SVS OF CSW,EA 15 MIN: HCPCS

## 2023-05-22 PROCEDURE — 0651 HSPC ROUTINE HOME CARE

## 2023-05-22 PROCEDURE — G0156 HHCP-SVS OF AIDE,EA 15 MIN: HCPCS

## 2023-05-23 ENCOUNTER — HOME CARE VISIT (OUTPATIENT)
Age: 68
End: 2023-05-23
Payer: MEDICARE

## 2023-05-23 ENCOUNTER — HOME CARE VISIT (OUTPATIENT)
Facility: HOME HEALTH | Age: 68
End: 2023-05-23
Payer: MEDICARE

## 2023-05-23 VITALS
SYSTOLIC BLOOD PRESSURE: 125 MMHG | OXYGEN SATURATION: 90 % | RESPIRATION RATE: 12 BRPM | HEART RATE: 70 BPM | TEMPERATURE: 97.3 F | DIASTOLIC BLOOD PRESSURE: 69 MMHG

## 2023-05-23 PROCEDURE — G0299 HHS/HOSPICE OF RN EA 15 MIN: HCPCS

## 2023-05-23 PROCEDURE — 0651 HSPC ROUTINE HOME CARE

## 2023-05-23 NOTE — HOSPICE
Routine visit. Upon arrival, pt Catalino Dalal and son Kali Little present. Pt A&Ox3; denies pain, n/v, constipation, and dyspnea. 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 VSS. O2 sats 90% on RA. Discussed fall risk; pt had fall about 3 months ago in his home, with minor injuries. No falls since that episode. He has been using a cane and began using rollater two days ago. Pt states he has fluid in his ears  which is affecting his equilibrium. He had tubes placed recently to drain the fluid, which has helped his balance. Also has had ear wax buildup at times in the past. Pt has a 4cm scabbed and healing laceration or skin tear of right upper back. He was not aware of it, and thinke he may have  bumped in something sharp in the kitchen or bedroom. Open to air. The wound has caused no pain and requires no treatment at this time. Also has 3cm palpable mass of right mid-back; no pain; pt unsure of origin or duration; no pain, per pt. Pt inquired about what he should expect in coming weeks in terms of disease progression. We discussed increasing fatigue, decreasing appetite, what to do if he experiences pain, dyspnea or other symptoms. Encouraged pt to rest when tired, eat when he is hungry, and focus on safety to avoid falls. Pt is still driving, though infrequently and only short distances. Son expressed concern about pt's ability to drive. Pt responded that they will discuss this later. At pt's request, [facility] order placed for Wed, 5/24 (noon-5pm) delivery of hospital bed and bedside table. RN performed med rec and refilled pillbox. No supplies needed. Reminded pt and son of hospice's 24/7 availability for questions/concerns. Pt and son verbalized understanding.

## 2023-05-24 ENCOUNTER — HOME CARE VISIT (OUTPATIENT)
Facility: HOME HEALTH | Age: 68
End: 2023-05-24
Payer: MEDICARE

## 2023-05-24 PROCEDURE — G0156 HHCP-SVS OF AIDE,EA 15 MIN: HCPCS

## 2023-05-24 PROCEDURE — 0651 HSPC ROUTINE HOME CARE

## 2023-05-25 ENCOUNTER — HOME CARE VISIT (OUTPATIENT)
Facility: HOME HEALTH | Age: 68
End: 2023-05-25
Payer: MEDICARE

## 2023-05-25 PROCEDURE — 0651 HSPC ROUTINE HOME CARE

## 2023-05-25 PROCEDURE — G0299 HHS/HOSPICE OF RN EA 15 MIN: HCPCS

## 2023-05-26 ENCOUNTER — HOME CARE VISIT (OUTPATIENT)
Facility: HOME HEALTH | Age: 68
End: 2023-05-26
Payer: MEDICARE

## 2023-05-26 VITALS — HEART RATE: 73 BPM | SYSTOLIC BLOOD PRESSURE: 139 MMHG | RESPIRATION RATE: 16 BRPM | DIASTOLIC BLOOD PRESSURE: 97 MMHG

## 2023-05-26 PROCEDURE — G0156 HHCP-SVS OF AIDE,EA 15 MIN: HCPCS

## 2023-05-26 PROCEDURE — 0651 HSPC ROUTINE HOME CARE

## 2023-05-26 NOTE — HOSPICE
Patient found ambulating around his home with his walking stick. Patient states it's difficult navigating the walker through tight spaces. Patient denies c/o pain or SOB. This nurse reviews the goals of hospice care and answers patient's questions. Patient states he has great support from his ex wife and son. Patient reports he still drives and prefers to remain active. Weekly pill box is full an patient takes his am meds. Patient reports he slept in today and enjoys the hospital bed. This nurse encourages Ariana Ochoa to contact [phone] [facility] main number as needed with questions or concerns.

## 2023-05-27 PROCEDURE — 0651 HSPC ROUTINE HOME CARE

## 2023-05-28 PROCEDURE — 0651 HSPC ROUTINE HOME CARE

## 2023-05-29 PROCEDURE — 0651 HSPC ROUTINE HOME CARE

## 2023-05-30 ENCOUNTER — HOME CARE VISIT (OUTPATIENT)
Facility: HOME HEALTH | Age: 68
End: 2023-05-30
Payer: MEDICARE

## 2023-05-30 VITALS
SYSTOLIC BLOOD PRESSURE: 148 MMHG | DIASTOLIC BLOOD PRESSURE: 92 MMHG | HEART RATE: 65 BPM | RESPIRATION RATE: 16 BRPM | OXYGEN SATURATION: 97 %

## 2023-05-30 PROCEDURE — G0299 HHS/HOSPICE OF RN EA 15 MIN: HCPCS

## 2023-05-30 PROCEDURE — 0651 HSPC ROUTINE HOME CARE

## 2023-05-30 PROCEDURE — 2500000001 HSPC NON INJECTABLE MED

## 2023-05-30 PROCEDURE — G0156 HHCP-SVS OF AIDE,EA 15 MIN: HCPCS

## 2023-05-30 NOTE — HOSPICE
Initial SW Assessment completed on 5/22/23 with pt; son, Cristi Ashby and ex-wife, Vickie Beltre. Pt was alert and oriented but did show signs of forgetfulness which he openly acknowledges. Pt pleasant but anxious and tearful at times during assessment as he discussed struggling with unknown of how his disease progression will be and how he will cope. Pt did endorse experiencing both anxious and depressive symptoms during today's assessment but denies suicidal ideation. Pt resides alone in his private home with son, Cristi Ashby living approximately 10 minutes away with his wife and young children. Pt well supported by son and ex-wife. 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 MSW provided home safety education which included recommendations to remove scatter rugs and clear floor space blocked by a large coffee table. MSW also expressed need for pt to have more supervision and care presence in the home now due to pt's forgetfulness. Plan for Next 2 Weeks: Referral to [facility]. SW visit in June to assist with exploring caregiver options and resources for final arrangements. Problem: Pt with anxious and depressive symptoms  Problem 2: Inadequate care arrangement. Code status discussed; pt wishes to remain FULL CODE at present  Advance Directive: Pt has an Advance Medical Directive; Cristi Ashby is MPOA. MSW making referral to [facility] to assist pt with a Will and General Durable Power of   Final Arrangements: Unknown at this time.

## 2023-05-30 NOTE — HOSPICE
Patient found sitting on his sofa eating breakfast. Patient ambulates to the kitchen using his walking stick. His gait is slow and unsteady. Patient denies c/o pain or SOB. He reports family members will be arriving tomorrow and will stay in the home for about a week. Weekly pill box filled. ART Barragan present to provide patient with a bath. Patient states he understands to contact [phone] main number if needed.    Zoloft refilled

## 2023-05-31 ENCOUNTER — HOME CARE VISIT (OUTPATIENT)
Facility: HOME HEALTH | Age: 68
End: 2023-05-31
Payer: MEDICARE

## 2023-05-31 PROCEDURE — 0651 HSPC ROUTINE HOME CARE

## 2023-05-31 NOTE — HOSPICE
is visiting for a routine pastoral visit with Ashley Meléndez. He engaged in life review, sharing about his varied professional career and love of the outdoors. He shared about his recent decline and the impact this has had on him.

## 2023-06-01 ENCOUNTER — HOME CARE VISIT (OUTPATIENT)
Facility: HOME HEALTH | Age: 68
End: 2023-06-01
Payer: MEDICARE

## 2023-06-01 ENCOUNTER — HOME CARE VISIT (OUTPATIENT)
Age: 68
End: 2023-06-01
Payer: MEDICARE

## 2023-06-01 VITALS
RESPIRATION RATE: 18 BRPM | OXYGEN SATURATION: 96 % | HEART RATE: 70 BPM | SYSTOLIC BLOOD PRESSURE: 138 MMHG | DIASTOLIC BLOOD PRESSURE: 83 MMHG | TEMPERATURE: 97.3 F

## 2023-06-01 PROCEDURE — G0300 HHS/HOSPICE OF LPN EA 15 MIN: HCPCS

## 2023-06-01 PROCEDURE — 0651 HSPC ROUTINE HOME CARE

## 2023-06-01 ASSESSMENT — ENCOUNTER SYMPTOMS: DYSPNEA ACTIVITY LEVEL: AFTER AMBULATING 10 - 20 FT

## 2023-06-01 NOTE — HOSPICE
routine visit. On arrival patient sitting in chair on front porch. Patient denied pain. Family visiting with patient. Patient stated depression is managed with medication and coping skills. Patient had had fewer episodes of vertigo. He continues to have urinary frequency and urgency. He is sleeping well at night with use of hospital bed. All questions and concerns addressed. No medication refills needed.

## 2023-06-02 ENCOUNTER — HOME CARE VISIT (OUTPATIENT)
Age: 68
End: 2023-06-02
Payer: MEDICARE

## 2023-06-02 ENCOUNTER — HOME CARE VISIT (OUTPATIENT)
Facility: HOME HEALTH | Age: 68
End: 2023-06-02
Payer: MEDICARE

## 2023-06-02 PROCEDURE — G0156 HHCP-SVS OF AIDE,EA 15 MIN: HCPCS

## 2023-06-02 PROCEDURE — 0651 HSPC ROUTINE HOME CARE

## 2023-06-03 PROCEDURE — 0651 HSPC ROUTINE HOME CARE

## 2023-06-04 PROCEDURE — 0651 HSPC ROUTINE HOME CARE

## 2023-06-05 ENCOUNTER — HOME CARE VISIT (OUTPATIENT)
Facility: HOME HEALTH | Age: 68
End: 2023-06-05
Payer: MEDICARE

## 2023-06-05 PROCEDURE — G0156 HHCP-SVS OF AIDE,EA 15 MIN: HCPCS

## 2023-06-06 ENCOUNTER — HOME CARE VISIT (OUTPATIENT)
Facility: HOME HEALTH | Age: 68
End: 2023-06-06
Payer: MEDICARE

## 2023-06-06 VITALS — SYSTOLIC BLOOD PRESSURE: 141 MMHG | DIASTOLIC BLOOD PRESSURE: 68 MMHG | HEART RATE: 65 BPM | RESPIRATION RATE: 16 BRPM

## 2023-06-06 PROCEDURE — G0299 HHS/HOSPICE OF RN EA 15 MIN: HCPCS

## 2023-06-06 ASSESSMENT — ENCOUNTER SYMPTOMS: SKIN LESIONS: 1

## 2023-06-06 NOTE — HOSPICE
Patient found sitting on his porch enjoying his visit with Dennis Gonzales. Patient reports his appetite has decreased some as he does not have the desire to eat. Patient denies c/o pain or SOB. He reports having daily bowel movements. Patient c/o urinary urgency and states \"he dribbles\" at times. This nurse suggests patient wear protective underwear and he declines. Patient states he has begun going to the bathroom sooner than later. Patient reports he has an occasional headache. He declines intervention at this time. Patient states if the headaches continue to bother him, he will contact [phone] main number. This nurse fills patient's weekly pill box. Patient to contact [facility] for a refill of Namenda.

## 2023-06-07 ENCOUNTER — HOME CARE VISIT (OUTPATIENT)
Facility: HOME HEALTH | Age: 68
End: 2023-06-07
Payer: MEDICARE

## 2023-06-07 PROCEDURE — G0156 HHCP-SVS OF AIDE,EA 15 MIN: HCPCS

## 2023-06-08 ENCOUNTER — HOME CARE VISIT (OUTPATIENT)
Age: 68
End: 2023-06-08
Payer: MEDICARE

## 2023-06-08 ENCOUNTER — HOME CARE VISIT (OUTPATIENT)
Facility: HOME HEALTH | Age: 68
End: 2023-06-08
Payer: MEDICARE

## 2023-06-09 ENCOUNTER — HOME CARE VISIT (OUTPATIENT)
Facility: HOME HEALTH | Age: 68
End: 2023-06-09
Payer: MEDICARE

## 2023-06-09 ENCOUNTER — HOME CARE VISIT (OUTPATIENT)
Age: 68
End: 2023-06-09
Payer: MEDICARE

## 2023-06-09 PROCEDURE — G0156 HHCP-SVS OF AIDE,EA 15 MIN: HCPCS

## 2023-06-19 ENCOUNTER — HOME CARE VISIT (OUTPATIENT)
Facility: HOME HEALTH | Age: 68
End: 2023-06-19
Payer: MEDICARE

## 2023-06-19 PROCEDURE — G0156 HHCP-SVS OF AIDE,EA 15 MIN: HCPCS

## 2023-06-20 ENCOUNTER — HOME CARE VISIT (OUTPATIENT)
Facility: HOME HEALTH | Age: 68
End: 2023-06-20
Payer: MEDICARE

## 2023-06-20 PROCEDURE — G0299 HHS/HOSPICE OF RN EA 15 MIN: HCPCS

## 2023-06-21 ENCOUNTER — HOME CARE VISIT (OUTPATIENT)
Facility: HOME HEALTH | Age: 68
End: 2023-06-21
Payer: MEDICARE

## 2023-06-21 ENCOUNTER — HOME CARE VISIT (OUTPATIENT)
Age: 68
End: 2023-06-21
Payer: MEDICARE

## 2023-06-21 PROCEDURE — G0156 HHCP-SVS OF AIDE,EA 15 MIN: HCPCS

## 2023-06-22 VITALS
SYSTOLIC BLOOD PRESSURE: 185 MMHG | OXYGEN SATURATION: 98 % | TEMPERATURE: 97.7 F | DIASTOLIC BLOOD PRESSURE: 106 MMHG | HEART RATE: 72 BPM

## 2023-06-22 ASSESSMENT — ENCOUNTER SYMPTOMS
PAIN LOCATION - PAIN QUALITY: ACHE
DYSPNEA ACTIVITY LEVEL: AFTER AMBULATING LESS THAN 10 FT

## 2023-06-23 ENCOUNTER — HOME CARE VISIT (OUTPATIENT)
Facility: HOME HEALTH | Age: 68
End: 2023-06-23
Payer: MEDICARE

## 2023-06-23 PROCEDURE — G0156 HHCP-SVS OF AIDE,EA 15 MIN: HCPCS

## 2023-06-23 NOTE — HOSPICE
Upon arrival to the home, patient answers door and invites RNCM in. Patient remains alert and oriented x3-4 and pleasantly conversational. Patient discussed [facility] [id number] with family for Father's Day and walked quite a bit with cane. Described having difficulty walking on gravel and grassy areas with no walkway/sidewalk. Patient reports that his wheelchair has a broke piece where a foot rest attached. RNCM to request service call with [facility]. Patient's son Darryle Hipps and granddaughter went to [facility] [employer] today. Patient reports being lonely and becomes a little tearful when talking about his ex wife and how he still loves her. She has been checking in on patient at times since his illness. Patient asks questions related to disease process and how will he know if his cancer is progressing. Instructed that if he begins having more pain in abdomen, generalized weakness, decreased appetite then we will know his illness is progressing. Patient reports good appetite and ate a large breakfast today. On assessment, blood pressure is elevated at 185/106. RNCM to [facility] with patient to fill his pill box and noticed that today's medications are still in container, which may explain elevated blood pressure. Patient also reports having used the eliptical to try and exercise briefly yesterday and endorses sore muscles today and being tired from all the walking on Sunday. Encouraged patient to take tylenol for minor muscle pain and rest. Patient agrees. Refills: Memantine ordered via [facility] locally from [facility] in [place]. Explained to patient that this medication is not covered by hospice but they can pick it up from [facility] and run it through insurance as he always has. Patient voices understanding. Supplies: liners. Encouraged patient to call hospice 24/7 with any needs or change in condition. Patient voices understanding and agrees.

## 2023-06-26 ENCOUNTER — HOME CARE VISIT (OUTPATIENT)
Age: 68
End: 2023-06-26
Payer: MEDICARE

## 2023-06-27 ENCOUNTER — HOME CARE VISIT (OUTPATIENT)
Facility: HOME HEALTH | Age: 68
End: 2023-06-27
Payer: MEDICARE

## 2023-06-27 VITALS
HEART RATE: 87 BPM | SYSTOLIC BLOOD PRESSURE: 135 MMHG | RESPIRATION RATE: 18 BRPM | OXYGEN SATURATION: 93 % | DIASTOLIC BLOOD PRESSURE: 90 MMHG | TEMPERATURE: 96.4 F

## 2023-06-27 PROCEDURE — G0299 HHS/HOSPICE OF RN EA 15 MIN: HCPCS

## 2023-06-27 ASSESSMENT — ENCOUNTER SYMPTOMS
DYSPNEA ACTIVITY LEVEL: AFTER AMBULATING LESS THAN 10 FT
PAIN LOCATION - PAIN QUALITY: ACHES

## 2023-06-28 ENCOUNTER — HOME CARE VISIT (OUTPATIENT)
Facility: HOME HEALTH | Age: 68
End: 2023-06-28
Payer: MEDICARE

## 2023-06-28 PROCEDURE — G0156 HHCP-SVS OF AIDE,EA 15 MIN: HCPCS

## 2023-06-30 ENCOUNTER — HOME CARE VISIT (OUTPATIENT)
Facility: HOME HEALTH | Age: 68
End: 2023-06-30
Payer: MEDICARE

## 2023-06-30 ENCOUNTER — HOME CARE VISIT (OUTPATIENT)
Age: 68
End: 2023-06-30
Payer: MEDICARE

## 2023-06-30 PROCEDURE — G0156 HHCP-SVS OF AIDE,EA 15 MIN: HCPCS

## 2023-07-03 ENCOUNTER — HOME CARE VISIT (OUTPATIENT)
Facility: HOME HEALTH | Age: 68
End: 2023-07-03
Payer: MEDICARE

## 2023-07-03 PROCEDURE — G0156 HHCP-SVS OF AIDE,EA 15 MIN: HCPCS

## 2023-07-03 PROCEDURE — G0299 HHS/HOSPICE OF RN EA 15 MIN: HCPCS

## 2023-07-05 ENCOUNTER — HOME CARE VISIT (OUTPATIENT)
Facility: HOME HEALTH | Age: 68
End: 2023-07-05
Payer: MEDICARE

## 2023-07-05 PROCEDURE — G0156 HHCP-SVS OF AIDE,EA 15 MIN: HCPCS

## 2023-07-07 ENCOUNTER — HOME CARE VISIT (OUTPATIENT)
Facility: HOME HEALTH | Age: 68
End: 2023-07-07
Payer: MEDICARE

## 2023-07-07 VITALS
TEMPERATURE: 97.6 F | SYSTOLIC BLOOD PRESSURE: 136 MMHG | DIASTOLIC BLOOD PRESSURE: 76 MMHG | HEART RATE: 72 BPM | RESPIRATION RATE: 18 BRPM

## 2023-07-07 PROCEDURE — G0156 HHCP-SVS OF AIDE,EA 15 MIN: HCPCS

## 2023-07-07 NOTE — HOSPICE
Patient ambulating from bathroom to living room after just receiving shower from hospice aide. Patient is alert and oriented X4. Patient denies pain but does report intermittent neuropathy to bilateral hands. Patient reports his hearing has decreased within the past 2 weeks. No obvious cerumen noted. New order obtained from [facility] Govind NP for Flonase nasal spray: Two sprays to bilateral nostrils once daily. Patient's son Raul Barnhart notified of new order and that if the Flonase is not effective, NP may order ear drops with ear flushing. Patient reports increased urinary incontinence. Decrease appetite. Last bowel movement yesterday. Skin intact. Pill box filled. Medication ordered for delivery via [facility]: Flonase. Reinforced hospice 24/7 for any concerns or change in patient's condition.

## 2023-07-10 ENCOUNTER — HOME CARE VISIT (OUTPATIENT)
Facility: HOME HEALTH | Age: 68
End: 2023-07-10
Payer: MEDICARE

## 2023-07-10 PROCEDURE — G0156 HHCP-SVS OF AIDE,EA 15 MIN: HCPCS

## 2023-07-11 ENCOUNTER — HOME CARE VISIT (OUTPATIENT)
Facility: HOME HEALTH | Age: 68
End: 2023-07-11
Payer: MEDICARE

## 2023-07-11 PROCEDURE — G0299 HHS/HOSPICE OF RN EA 15 MIN: HCPCS

## 2023-07-12 ENCOUNTER — HOME CARE VISIT (OUTPATIENT)
Facility: HOME HEALTH | Age: 68
End: 2023-07-12
Payer: MEDICARE

## 2023-07-12 PROCEDURE — G0156 HHCP-SVS OF AIDE,EA 15 MIN: HCPCS

## 2023-07-14 ENCOUNTER — HOME CARE VISIT (OUTPATIENT)
Facility: HOME HEALTH | Age: 68
End: 2023-07-14
Payer: MEDICARE

## 2023-07-14 PROCEDURE — G0156 HHCP-SVS OF AIDE,EA 15 MIN: HCPCS

## 2023-07-17 ENCOUNTER — HOME CARE VISIT (OUTPATIENT)
Facility: HOME HEALTH | Age: 68
End: 2023-07-17
Payer: MEDICARE

## 2023-07-17 VITALS
RESPIRATION RATE: 18 BRPM | TEMPERATURE: 97.2 F | HEART RATE: 76 BPM | OXYGEN SATURATION: 95 % | SYSTOLIC BLOOD PRESSURE: 138 MMHG | DIASTOLIC BLOOD PRESSURE: 84 MMHG

## 2023-07-17 PROCEDURE — G0156 HHCP-SVS OF AIDE,EA 15 MIN: HCPCS

## 2023-07-17 PROCEDURE — G0299 HHS/HOSPICE OF RN EA 15 MIN: HCPCS

## 2023-07-17 ASSESSMENT — ENCOUNTER SYMPTOMS
CONSTIPATION: 1
DYSPNEA ACTIVITY LEVEL: AFTER AMBULATING 10 - 20 FT

## 2023-07-18 NOTE — HOSPICE
Visiting jointly today with Dorcas Clark NP to meet patient. Patient and ex wife Kiara Martinez present for visit today. Son Ritesh Lemon is currently in [facility] closing up his apartment to move to [place]. Kiara Yepezrobertcielo is staying with patient to help care for him while son is out of town. Upon arrival, patient was in bed sleeping soundly. Patient awakened and ambulated slowly to the living room for visit. Kiara Luchoisidro joins us soon. Patient remains alert and oriented x4 with self reported forgetfulness at times. No pain or shortness of breath reported by patient at this time. Kiara Martinez endorses that patient does appear winded with activity recently. Patient and Kiara Martinez have questions about patient's disease process and where mets are located. 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 Patient becomes tearful with this news and states at his last oncology appointment this wasn't fully explained to him and no family were with him at that time. Patient reports feeling overwhelmed because he never had Stage 1, 2, or 3 cancer-went straight to stage 4 disease and chemo. On further discussion, patient has been having mild low back pain that he thought was overexertion/muscle related. This pain is likely bone mets. Discussed pain relief options and patient agreeable to try low dose of oxycodone to help with pain. MAR updated and family instructed on 5 mg tablets that can be cut in half so patient can start with just 2.5 mg since medication tends to make him very sleepy. Patient and Kiara Martinez both voice understanding and seem happy with this. Patient has also been having issues with 3-4 days constipation. Senna S ordered and MAR updated. Patient to take 1 tablet each night at bedtime.  Kiara Martinez asks if it would help patient with his mental clarity/focus if he restarts Adderall which he has taken in the past. Mayito Christy instructs that she can prescribe it, but it won't be covered under hospice benefit due to being

## 2023-07-19 ENCOUNTER — HOME CARE VISIT (OUTPATIENT)
Age: 68
End: 2023-07-19
Payer: MEDICARE

## 2023-07-21 ENCOUNTER — HOME CARE VISIT (OUTPATIENT)
Facility: HOME HEALTH | Age: 68
End: 2023-07-21
Payer: MEDICARE

## 2023-07-21 VITALS
HEART RATE: 70 BPM | RESPIRATION RATE: 16 BRPM | TEMPERATURE: 97.6 F | SYSTOLIC BLOOD PRESSURE: 116 MMHG | DIASTOLIC BLOOD PRESSURE: 76 MMHG

## 2023-07-21 PROCEDURE — G0156 HHCP-SVS OF AIDE,EA 15 MIN: HCPCS

## 2023-07-21 NOTE — HOSPICE
Patient in living room upon arrival for visit; Chloé Hernandez present. Patient is alert and oriented X4; denies pain or dyspnea. Patient reports stable appetite; daily bowel movements. Skin intact. Patient and Chloé Hernandez inquiring about podiatry options; contacts sent to Chloé Hernandez. Pill box filled. No medications or supplies needed at this time. Reinforced hospice 24/7 for any concerns or change in patient's condition.

## 2023-07-24 ENCOUNTER — HOME CARE VISIT (OUTPATIENT)
Facility: HOME HEALTH | Age: 68
End: 2023-07-24
Payer: MEDICARE

## 2023-07-24 PROCEDURE — G0156 HHCP-SVS OF AIDE,EA 15 MIN: HCPCS

## 2023-07-25 ENCOUNTER — HOME CARE VISIT (OUTPATIENT)
Facility: HOME HEALTH | Age: 68
End: 2023-07-25
Payer: MEDICARE

## 2023-07-25 PROCEDURE — G0299 HHS/HOSPICE OF RN EA 15 MIN: HCPCS

## 2023-07-26 ENCOUNTER — HOME CARE VISIT (OUTPATIENT)
Age: 68
End: 2023-07-26
Payer: MEDICARE

## 2023-07-26 ENCOUNTER — HOME CARE VISIT (OUTPATIENT)
Facility: HOME HEALTH | Age: 68
End: 2023-07-26
Payer: MEDICARE

## 2023-07-26 PROCEDURE — G0156 HHCP-SVS OF AIDE,EA 15 MIN: HCPCS

## 2023-07-28 ENCOUNTER — HOME CARE VISIT (OUTPATIENT)
Facility: HOME HEALTH | Age: 68
End: 2023-07-28
Payer: MEDICARE

## 2023-07-28 VITALS
SYSTOLIC BLOOD PRESSURE: 157 MMHG | DIASTOLIC BLOOD PRESSURE: 98 MMHG | RESPIRATION RATE: 18 BRPM | TEMPERATURE: 96.8 F | HEART RATE: 97 BPM

## 2023-07-28 PROCEDURE — G0156 HHCP-SVS OF AIDE,EA 15 MIN: HCPCS

## 2023-07-28 ASSESSMENT — ENCOUNTER SYMPTOMS
CONSTIPATION: 1
DYSPNEA ACTIVITY LEVEL: AFTER AMBULATING LESS THAN 10 FT

## 2023-07-30 ENCOUNTER — HOME CARE VISIT (OUTPATIENT)
Age: 68
End: 2023-07-30
Payer: MEDICARE

## 2023-07-31 ENCOUNTER — HOME CARE VISIT (OUTPATIENT)
Facility: HOME HEALTH | Age: 68
End: 2023-07-31
Payer: MEDICARE

## 2023-07-31 ENCOUNTER — HOME CARE VISIT (OUTPATIENT)
Age: 68
End: 2023-07-31
Payer: MEDICARE

## 2023-07-31 VITALS
TEMPERATURE: 97.9 F | HEART RATE: 80 BPM | RESPIRATION RATE: 18 BRPM | SYSTOLIC BLOOD PRESSURE: 92 MMHG | DIASTOLIC BLOOD PRESSURE: 71 MMHG

## 2023-07-31 PROCEDURE — G0299 HHS/HOSPICE OF RN EA 15 MIN: HCPCS

## 2023-07-31 PROCEDURE — G0156 HHCP-SVS OF AIDE,EA 15 MIN: HCPCS

## 2023-07-31 ASSESSMENT — ENCOUNTER SYMPTOMS
PAIN LOCATION - PAIN QUALITY: ACHE, SORE, SHARP
CONSTIPATION: 1

## 2023-08-02 ENCOUNTER — HOME CARE VISIT (OUTPATIENT)
Facility: HOME HEALTH | Age: 68
End: 2023-08-02
Payer: MEDICARE

## 2023-08-02 PROCEDURE — G0156 HHCP-SVS OF AIDE,EA 15 MIN: HCPCS

## 2023-08-04 ENCOUNTER — HOME CARE VISIT (OUTPATIENT)
Facility: HOME HEALTH | Age: 68
End: 2023-08-04
Payer: MEDICARE

## 2023-08-04 PROCEDURE — G0156 HHCP-SVS OF AIDE,EA 15 MIN: HCPCS

## 2023-08-07 ENCOUNTER — HOME CARE VISIT (OUTPATIENT)
Facility: HOME HEALTH | Age: 68
End: 2023-08-07
Payer: MEDICARE

## 2023-08-07 PROCEDURE — G0156 HHCP-SVS OF AIDE,EA 15 MIN: HCPCS

## 2023-08-08 ENCOUNTER — HOME CARE VISIT (OUTPATIENT)
Facility: HOME HEALTH | Age: 68
End: 2023-08-08
Payer: MEDICARE

## 2023-08-08 VITALS
DIASTOLIC BLOOD PRESSURE: 71 MMHG | SYSTOLIC BLOOD PRESSURE: 116 MMHG | HEART RATE: 69 BPM | RESPIRATION RATE: 18 BRPM | TEMPERATURE: 96.3 F

## 2023-08-08 PROCEDURE — G0299 HHS/HOSPICE OF RN EA 15 MIN: HCPCS

## 2023-08-08 ASSESSMENT — ENCOUNTER SYMPTOMS
CONSTIPATION: 1
DYSPNEA ACTIVITY LEVEL: AFTER AMBULATING LESS THAN 10 FT

## 2023-08-08 NOTE — HOSPICE
Visiting today for recertification for new hospice benefit. Received patient ambulatory in the kitchen with rollator. Walks with slow steady gait. Son Maddie Avendaño is present for visit today. Patient remains alert and oriented x4 with clear speech. Skin and nailbeds today are pale, unable to get pulsse ox to read. No complaints of pain or shortness of breath at this time. Patient reports he usually has more pain in evening and at night after being active during the day. Patient reports current pain medication is effectively relieving his pain. Pain is mostly in back, hip and right leg. Patient reports that he is still waking up between 3-5 times nightly feeling like he has to urinate. At times he is able to void, and other times he has the sensation that he needs to go but finds his bladder doesn't empty. Discussed symptom with Love Rivas NP. No new orders received, stating it is likely disease process due to bladder tumor. Discussed with patient and son about his code status. Patient states he is ready to sign a DNR and understands that CPR is unlikely to be successful if attempted. Form signed in front of son and RNCM. Sent to consents to be signed by MD and scanned into patient's chart. Patient reports that his appetite is less than one month ago. He eats 2-3 meals daily but smaller plate portions. Supplements with Ensure once daily. Encouraged family to try the high protein formula. Patient agrees. Pillbox filled for the week by [facility]. Refills needed: senna, ordered for delivery via [facility], confirmation [id number]. LMAUC today 28 cm. Was 28.5 on 7/17. Patient also expresses desire for a beach trip while he is still able. Discussed the possiblity of a travel contract. Email sent to  to follow up. Patient also in need of podiatry visit. Will ask NP Kati Rodas if she is available to make a joint visit to address. Encouraged patient and son to call hospice 24/7 with any needs or change in patient's condition.  They

## 2023-08-09 ENCOUNTER — HOME CARE VISIT (OUTPATIENT)
Facility: HOME HEALTH | Age: 68
End: 2023-08-09
Payer: MEDICARE

## 2023-08-09 ENCOUNTER — HOME CARE VISIT (OUTPATIENT)
Age: 68
End: 2023-08-09
Payer: MEDICARE

## 2023-08-09 PROCEDURE — G0156 HHCP-SVS OF AIDE,EA 15 MIN: HCPCS

## 2023-08-11 ENCOUNTER — HOME CARE VISIT (OUTPATIENT)
Facility: HOME HEALTH | Age: 68
End: 2023-08-11
Payer: MEDICARE

## 2023-08-11 PROCEDURE — G0156 HHCP-SVS OF AIDE,EA 15 MIN: HCPCS

## 2023-08-14 ENCOUNTER — HOME CARE VISIT (OUTPATIENT)
Age: 68
End: 2023-08-14
Payer: MEDICARE

## 2023-08-14 ENCOUNTER — HOME CARE VISIT (OUTPATIENT)
Facility: HOME HEALTH | Age: 68
End: 2023-08-14
Payer: MEDICARE

## 2023-08-14 PROCEDURE — G0156 HHCP-SVS OF AIDE,EA 15 MIN: HCPCS

## 2023-08-14 NOTE — HOSPICE
MSW made phone contacgt with son, Taylor Up related to severe weather advisory and to schedule a visit. Spoke with Americaoris Jeovanny, son of Sarah Oseguera. Taylor Jeovanny confirms that pt's home has working [facility] and that pt is comfortable. Pt's other son has returned to [place] but his ex-wife and niece are root staying with him in the home. SW visit declined for July.

## 2023-08-14 NOTE — HOSPICE
Phone contact made first with Harrison Hurley and then pt to offer a SW visit or assess for needs. Both denied needs and declined a visit for now.

## 2023-08-14 NOTE — HOSPICE
SW phone contact to schedule a SW visit. Visit declined citing no present needs. [facility]  about to provide update that pt was able to complete a Living Will and other important documents with a LINC  following MSWs referral. MSW also able to email [facility]  a list of cremation providers. SW to follow up in July as visits for June are declined unless pt or Callum call the office with any emergent needs.

## 2023-08-15 ENCOUNTER — HOME CARE VISIT (OUTPATIENT)
Facility: HOME HEALTH | Age: 68
End: 2023-08-15
Payer: MEDICARE

## 2023-08-15 ENCOUNTER — HOME HEALTH/ HOSPICE FACE TO FACE (OUTPATIENT)
Facility: HOSPITAL | Age: 68
End: 2023-08-15

## 2023-08-15 ENCOUNTER — HOME CARE VISIT (OUTPATIENT)
Age: 68
End: 2023-08-15
Payer: MEDICARE

## 2023-08-15 PROCEDURE — G0299 HHS/HOSPICE OF RN EA 15 MIN: HCPCS

## 2023-08-15 NOTE — HOSPICE
[facility] LCSW note; This LCSW called pts sons Min Lopez and Saroj ( left vm) to update them on travel contract for upcoming [employer] through Friday. Travel contract is being sent to [employer]. LCSW retierated to family to call us for nay needs, we would traige, then we would contact [phone] [phone] if needed.

## 2023-08-16 NOTE — HOSPICE
Came at [address] [facility] request to provide podiatry care. Patient with long, thick, brittle and yellowed nails that were so long they were growing into the plantar surface of several toes. Debrided with podiatry nippers and dremeled. Patient tolerated well. Des Montaño, NP

## 2023-08-17 ENCOUNTER — HOME CARE VISIT (OUTPATIENT)
Age: 68
End: 2023-08-17
Payer: MEDICARE

## 2023-08-18 VITALS
HEART RATE: 78 BPM | OXYGEN SATURATION: 91 % | TEMPERATURE: 95 F | RESPIRATION RATE: 16 BRPM | SYSTOLIC BLOOD PRESSURE: 204 MMHG | DIASTOLIC BLOOD PRESSURE: 81 MMHG

## 2023-08-18 ASSESSMENT — ENCOUNTER SYMPTOMS: DYSPNEA ACTIVITY LEVEL: AFTER AMBULATING LESS THAN 10 FT

## 2023-08-18 NOTE — HOSPICE
Received patient walking slowly in the home with walking stick today. Escorted patient to living room to [facility]. Patient remains alert and oriented x4 with slow clear speech. Jointly visiting today with Teodoro Gao NP for podiatry care. Assisted patient with removing his shoes and socks and placing feet on pillow on coffee table. Chux placed under feet. Patient with no complaint of pain or shortness of breath at this time. Son Ritesh Lemon also present for visit today. RNCM fills pillbox for the week as requested. Son reports that the patient is only taking his adderall once daily now since they suspected that was why the patient was not sleeping well. MAR updated to reflect the change. Patient endorses sleeping better now. O2 sat 91% on room air after ambulation. Patient denies feeling short of breath. Discussed upcoming trip to [place] and encouraged patient to make sure he takes all needed medications with him as well as number for hospice. Discussed the travel contract is in place in case patient has changes while away. Patient will need refill of adderall called into local pharmacy when he returns from the beach. No supplies requested. Patient tolerated podiatry visit without difficulty. Encouraged family to call hospice 24/7 with any needs or change in patient's condition. Patient and karen voice understanding and agree.

## 2023-08-21 ENCOUNTER — HOME CARE VISIT (OUTPATIENT)
Facility: HOME HEALTH | Age: 68
End: 2023-08-21
Payer: MEDICARE

## 2023-08-21 PROCEDURE — G0156 HHCP-SVS OF AIDE,EA 15 MIN: HCPCS

## 2023-08-22 ENCOUNTER — HOME CARE VISIT (OUTPATIENT)
Age: 68
End: 2023-08-22
Payer: MEDICARE

## 2023-08-22 ENCOUNTER — HOME CARE VISIT (OUTPATIENT)
Facility: HOME HEALTH | Age: 68
End: 2023-08-22
Payer: MEDICARE

## 2023-08-22 PROCEDURE — G0299 HHS/HOSPICE OF RN EA 15 MIN: HCPCS

## 2023-08-23 ENCOUNTER — HOME CARE VISIT (OUTPATIENT)
Age: 68
End: 2023-08-23
Payer: MEDICARE

## 2023-08-23 PROCEDURE — G0299 HHS/HOSPICE OF RN EA 15 MIN: HCPCS

## 2023-08-23 NOTE — HOSPICE
is visiting for a routine pastoral visit with Mr. Lenny Rose. He shared about his current limitations and processed the difficulty of losing independence. He shared about his recent trip to the beach and his enjoyment of this. He discussed some of his shifting awareness of his decline and how he thinks about this more some days. He shared there are some days where his awareness brings him to tears and crying noting that he accepts this and makes space for his sadness.  prvovided an active listening presence, pastoral dialogue, and use of poetry for support. Mr. Lenny Rose notes his appreciation for visitation.

## 2023-08-25 ENCOUNTER — HOME CARE VISIT (OUTPATIENT)
Facility: HOME HEALTH | Age: 68
End: 2023-08-25
Payer: MEDICARE

## 2023-08-25 PROCEDURE — G0156 HHCP-SVS OF AIDE,EA 15 MIN: HCPCS

## 2023-08-28 ENCOUNTER — HOME CARE VISIT (OUTPATIENT)
Facility: HOME HEALTH | Age: 68
End: 2023-08-28
Payer: MEDICARE

## 2023-08-28 VITALS
OXYGEN SATURATION: 88 % | DIASTOLIC BLOOD PRESSURE: 72 MMHG | TEMPERATURE: 95.6 F | HEART RATE: 69 BPM | RESPIRATION RATE: 16 BRPM | SYSTOLIC BLOOD PRESSURE: 125 MMHG

## 2023-08-28 PROCEDURE — G0156 HHCP-SVS OF AIDE,EA 15 MIN: HCPCS

## 2023-08-28 ASSESSMENT — ENCOUNTER SYMPTOMS
PAIN LOCATION - PAIN QUALITY: ACHES
DYSPNEA ACTIVITY LEVEL: AT REST

## 2023-08-28 NOTE — HOSPICE
Received patient in hospital bed with no shirt, bleeding from several new skin tears from fall this morning. Son Hola Pickens present for visit. Son reports that he was making the patient breakfast in the kitchen and heard his dad in the bathroom and asked if he was OK. Patient responded he was fine, just using the bathroom. Then son heard a loud noise, and the patient was turning to go to bed and fell landing on the floor and hitting his closet door. Several skin tears on right forearm, wrist,and a large tear on patient's back near scapula. RNCM performed wound care to places and instructed patient that he really should ask family for assistance any time he needs to get out of bed at this time. This is second fall and it appears patient is progressively weaker. On assessment heart rate is irregularly irregular. On room air, patient's O2 sats are 88%. O2 applied during visit and by end of visit O2 sats have recovered to 95% on 2L/min via NC. Abdirahman Pickens medicated patient with oxycodone just after his fall and patient has no complaint of pain at this time. Jerry Davalos, MOON notified of fall. No new orders. Nothing appears to be broken and patient is able to move all extremities. No refills needed at this time. Wound care supplies ordered via [employer] for delivery. Encouraged family to call hospice 24/7 with any needs or change in patient's condition. Hola Pickens voices understanding and agrees.

## 2023-08-29 ENCOUNTER — HOME CARE VISIT (OUTPATIENT)
Facility: HOME HEALTH | Age: 68
End: 2023-08-29
Payer: MEDICARE

## 2023-08-29 VITALS — RESPIRATION RATE: 20 BRPM | TEMPERATURE: 97 F | HEART RATE: 94 BPM

## 2023-08-29 PROCEDURE — G0299 HHS/HOSPICE OF RN EA 15 MIN: HCPCS

## 2023-08-29 ASSESSMENT — ENCOUNTER SYMPTOMS
SNORING: 1
DYSPNEA ACTIVITY LEVEL: AT REST

## 2023-08-30 ENCOUNTER — HOME CARE VISIT (OUTPATIENT)
Facility: HOME HEALTH | Age: 68
End: 2023-08-30
Payer: MEDICARE

## 2023-08-30 VITALS
DIASTOLIC BLOOD PRESSURE: 72 MMHG | SYSTOLIC BLOOD PRESSURE: 136 MMHG | TEMPERATURE: 97.6 F | HEART RATE: 72 BPM | RESPIRATION RATE: 18 BRPM

## 2023-08-30 PROCEDURE — G0156 HHCP-SVS OF AIDE,EA 15 MIN: HCPCS

## 2023-08-30 NOTE — HOSPICE
Patient lying in bed upon arrival for visit; patient's son present. Patient is alert; denies pain. Patient sustained a fall this morning when going to the bathroom without assistance. Two new skin tears to left elbow from fall this morning. Areas already cleansed and covered with bandaids by patient's son. Patient reports increasing generalized weakness, fatigue and unsteady gait. Fall precautions reviewed with patient and son, and recommended that patient use urinal or incontinence underwear at night to prevent falls. Patient also reminded to wear slippers or socks with non-slip soles and to ambulate with assistance if weakness continues for safety. O2 saturation 86-89% on room air. Patient denies any dyspnea currently, however oxygen concentrator ordered for delivery via [employer] in case need arises. Decreased appetite. Last bowel movement yesterday. Phone call made to [facility] during visit for updates. Medications to be refilled by hospice triage: Flomax, Adderall, Norvasc. Reinforced hospice 24/7 for any concerns or change in patient's condition.

## 2023-08-30 NOTE — HOSPICE
Received patient resting in hospital bed with eyes closed. Son Nayeli Cramer present for visit today. Patient easily rouses with verbal greeting. Patient remains oriented to self, place only and recognizes familiar people/family members. Thought today was 2035, and took 30 seconds to come up with President [place]. Son reports patient is having increased confusion, especially at night, but since his fall last week has been asking for assistance before getting up. Son has put monitor in patient's room so he can check on him from his room upstairs. Patient reports that he currently has no pain or shortness of breath. O2 not currently being worn, but machine is running and NC on bed. Skin tears from fall last week have all scabbed over. Purple bruising to right flank remains, but fading. Son Nayeli Cramer reports that patient's appetite has decreased by about half of what he was eating 2 weeks ago. Nayeli Cramer also says patient has had several incontinent occasions with urine and has needed sheets and pajamas washed. Discussed placing a cerna catheter but son feels patient is still getting out of bed often enough that it would be inconvenient. Wants to defer until a time when patient is bedbound. Mattress of hospital bed noted to have sunken in area in center and looks very uncomfortable. Asked triage to order an air mattress and replacement of regular bed mattress. Family has placed eggcrate on bed, but it doesn't really help. Patient's fingernails are quite long and appear to have fungal growth underneath. RNCM soaked hands in warm soapy water and clipped nails with nail clipper. Instructed morris Cramer to do this again in a few days to try and get more of under nail material cleaned away. Nayeli Cramer agrees. Nayeli Cramer filled pillbox, RNCM checked accuracy and it is. No refills requested at this time. No supplies needed. Encouraged family to call hospice 24/7 with any needs or change in patient's condition. Nayeli Cramer voices understanding and agrees.

## 2023-09-01 ENCOUNTER — HOME CARE VISIT (OUTPATIENT)
Facility: HOME HEALTH | Age: 68
End: 2023-09-01
Payer: MEDICARE

## 2023-09-01 PROCEDURE — G0299 HHS/HOSPICE OF RN EA 15 MIN: HCPCS

## 2023-09-02 VITALS
DIASTOLIC BLOOD PRESSURE: 74 MMHG | SYSTOLIC BLOOD PRESSURE: 124 MMHG | OXYGEN SATURATION: 93 % | TEMPERATURE: 98.2 F | HEART RATE: 74 BPM | RESPIRATION RATE: 18 BRPM

## 2023-09-02 ASSESSMENT — ENCOUNTER SYMPTOMS
SNORING: 1
DYSPNEA ACTIVITY LEVEL: AT REST

## 2023-09-02 NOTE — HOSPICE
Visiting today to reassess for decline/effectiveness of medications given and to place a cerna catheter if patient agrees. Received patient resting in hospital bed with eyes closed. No nonverbal signs of pain or shortness of breath at this time. O2 in use continually now at 2L/min via NC. Son Kelsey Sharma present for visit today states patient has not had any morphine of haldol since RN visit yesterday and has been sleeping well, resting comfortably. Patient rouses with verbal greeting. Remains oriented to self, place and recognizes familiar people. Speech more delayed than 2 weeks ago. Generalized weakness reported by patient. Patient agreeable to cerna placement. Medicated with 0.25 ml morphine by RNCM at 1050 for comfort during catheter placement. While allowing time for medication to work, [facility] prefills oral syringes with morphine and haldol, labelled and placed in separate labelled bags. Explained to Kelsey Sharma to give as needed for pain, shortness of breath or agitation respectively. Donn Meredith understanding and agrees. Discussed signs of end of life and what to look for, when to call hospice for changes in patient's condition or increased pain not managed by current medication doses. Kelsey Sharma agrees. Family is looking into patient's long term care insurance policy and hiring caregivers part time to help Kelsey Sharma care for his dad. Returned to bedside where patient reports comfort from morphine. RNCM placed #18 french cerna with 10 ml balloon on first attempt without difficulty. Patient tolerated procedure well. Lesly urine returned immedicately. Cath secure placed on patient's right thigh. Demonstrated how to empy cerna for morris Sharma. Instructed on cerna care, positioning and when to call hospice if cerna no longer draining or patient has discomfort. Donn Meredith understanding and agrees. Now that cerna is in place, Montana Schmid, MOON agrees that we should discontinue patient's Flomax, as it is no longer needed.  Removed

## 2023-09-05 NOTE — HOSPICE
Received notification from patient's son [facility] that his father has passed peacefully at home. Instructed that RNCM will return to home ASAP. Upon arrival to the home, patient's ex wife Luis Enrique Ziegler, family friend Jose Martinez, and sons [facility] and Letty Ji and Callum's wife are present. Family grieving appropriately. Saroj's daughter is in school today, but the family are requesting bereavement support to help her deal with loss. RNCM accompanied by Kezia Stoll RN. After 1 full minute of ausculation, TOD pronounced at 1100 am by [facility]. Post mortem care provided by nurses and cerna removed. Medications wasted per hospice protocol with ex wife Luis Enrique Ziegler as witness. Family was undecided about  home choice at time of death, but have now decided on [place]. RNCM called  home for removal of the patient. Dr. Inna Norman and hospice team notified of patient's passing by email. Instructed family to reach out to hospice for any bereavement needs. Family voice appreciation of all hospice care received. DME pickup to be requested by triage nurse.

## 2023-09-05 NOTE — HOSPICE
Received message from family through triage this morning that patient has declined drastically over night and they need a nursing visit. Upon arrival to the home, son Ino Evans was present at bedside. Received patient in hospital bed with cheyne-weinstein breathing and moderate secretions heard in back of throat. Discussed managing end of life secretions with son Ino Evans. RNCM demonstrated how to crush hyoscyamine tablet and add a few drops of water and give in oral syringe. RNCM administered dose at 0915. With son's assistance patient was turned to right side and positioned for comfort to help facilitate drainage of secretions. Patient's O2 sat 54% on 2L/min via NC. Blood pressure low at 88/54. Pulse 95, irregularly irregular. No pedal pulses palpated. No bowel sounds on ausculation with distended abdomen noted. Og[place]by draining red urine. Discussed that the patient is likely to pass today. Ex wife Breezy Díaz and son [facility] arrive at end of visit. Encouraged family to call hospice when patient passes and this nurse will return. Family comfortable giving comfort medications and voice agreement with plan. Discussed that they are still working on choosing a  home.

## 2023-09-06 ENCOUNTER — HOME CARE VISIT (OUTPATIENT)
Age: 68
End: 2023-09-06
Payer: MEDICARE

## 2023-09-15 DIAGNOSIS — F32.89 OTHER SPECIFIED DEPRESSIVE EPISODES: ICD-10-CM

## 2023-09-15 DIAGNOSIS — G30.1 ALZHEIMER'S DISEASE WITH LATE ONSET (CODE) (HCC): ICD-10-CM

## 2023-09-15 DIAGNOSIS — C61 MALIGNANT NEOPLASM OF PROSTATE (HCC): ICD-10-CM

## 2024-09-11 NOTE — PROGRESS NOTES
[address] [facility] at [facility]  [phone]    Hematology / Oncology Consult    Reason for Visit:   Wilton Mascorro is a 71 y.o. male who is seen in consultation at the request of Dr. Sherryle Barbara for evaluation of carcinoma. Hematology Oncology Treatment History:     Diagnosis: Large cell neuroendocrine carcinoma. Stage: IV    Pathology:   5/29/20 excisional R external iliac LN biopsy: Poorly differentiated carcinoma with features of large cell neuroendocrine carcinoma with loss of chromogranin and synaptophysin expression. Flow cytometry negative for lymphoproliferative disorder. Prior Treatment: None    Current Treatment: pending  Treatment duration   Frequency of visits     History of Present Illness:   Wilton Mascorro is a 71 y.o. male who comes in for evaluation of poorly differentiated neuroendocrine carcinoma. Pt noticed a nontender bulge in his LLQ in 5/2020. He thought this was a hernia and was evaluated by Dr. Jewels Melendez. CT on 5/20/20 was notable for extensive lymphadenopathy in abd/pelvis. Pt underwent robot assisted excisional Right external iliac LN biopsy 5/29/20, which showed poorly differentiated large cell neuroendocrine carcinoma. He reports intentional weight loss with dietary changes and exercise, losing 40-lbs in past 8 months. No n/v/d, melena/hematochezia, cough, SOB. No fevers, chills, sweats. [employer] nonsmoker. Mother had breast cancer diagnosed age < 48. Twin brother had melanoma diagnosed aged late 46s. He does not think he has ever had a colonoscopy or PSA screening. History reviewed. No pertinent past medical history. No past surgical history on file. Social History     Tobacco Use    Smoking status: Never Smoker    Smokeless tobacco: Never Used   Substance Use Topics    Alcohol use: Not Currently   Unmarried. Has 2 children, 27 and 32. 1 lives in [place].      Family History   Problem Relation Age of Onset    Cancer Mother Called pt and scheduled both appt with PCP and lab appt. Informed pt that medication was refilled  [facility] Carm[facility]    breast    Cancer Father         prostate    Cancer Brother         melanoma    COPD Brother      Current Outpatient Medications   Medication Sig    dextroamphetamine-amphetamine (ADDERALL) 10 mg tablet TAKE 1 TABLET TWICE A DAY FOR 30 DAYS    acyclovir (ZOVIRAX) 400 mg tablet Take 400 mg by mouth two (2) times a day. No current facility-administered medications for this visit. No Known Allergies     Review of Systems: A complete review of systems was obtained, negative except as described above. Physical Exam:     Visit Vitals  BP (!) 155/92 (BP 1 Location: Left arm, BP Patient Position: Sitting)   Pulse 82   Temp 99.2 °F (37.3 °C) (Oral)   Ht 5' 10.5\" (1.791 m)   Wt 183 lb 3.2 oz (83.1 kg)   SpO2 97%   BMI 25.91 kg/m²     ECOG PS: 0  General: Well developed, no acute distress  Eyes: PERRLA, EOMI, anicteric sclerae  HENT: Atraumatic, OP clear, TMs intact without erythema  Neck: Supple  Lymphatic: No cervical, supraclavicular, axillary or inguinal adenopathy  Respiratory: CTAB, normal respiratory effort  CV: Normal rate, regular rhythm, no murmurs, no peripheral edema  GI: Soft, nontender, nondistended, no hepatomegaly, no splenomegaly. L lower abdomen with soft mobile mass, approx 5cm  MS: Normal gait and station. Digits without clubbing or cyanosis. Skin: No rashes, ecchymoses, or petechiae. Normal temperature, turgor, and texture. Neuro/Psych: Alert, oriented. 5/5 strength in all 4 extremities. Appropriate affect, normal judgment/insight.     Results:   No results found for: WBC, HGB, HCT, PLT, MCV, ANEU, HGBPOC, HCTPOC, HGBEXT, HCTEXT, PLTEXT  No results found for: NA, K, CL, CO2, GLU, BUN, CREA, GFRAA, GFRNA, CA, NAPOC, KPOCT, CLPOC, GLUCPOC, IBUN, CREAPOC, ICAI  No results found for: TBILI, ALT, AP, TP, ALB, GLOB  No results found for: IRON, FE, TIBC, IBCT, PSAT, FERR    No results found for: B12LT, FOL, RBCF  No results found for: TSH, TSH2, TSH3, TSHP, TSHEXT  No results found for: HAMAT, HAAB, HABT, HAAT, HBSAG, HBSB, HBSAT, HBABN, HBCM, HBCAB, HBCAT, XBCABS, [facility], [address], XHEPCS, [id number], [address], [place], HBCLT, HBEBLT, [id number], [id number], [address] [facility], [id number], HBCMLT, [id number], HCGAT      Imagin/20/20 CT abd/pelvis with IV contrast:  Impression:  1. No findings to suggest gross abdominal wall hernia or flank hernia. 2. Extensive adenopathy throughout the abdomen and pelvis as described. Findings are concerning for possible metastatic disease or lymphoma. Recommend follow up or comparison with prior studies. 3. Other findings as described. Assessment & Plan:   Omar Lafleur is a 71 y.o. 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 1. Large cell neuroendocrine carcinoma: I spoke with pathologist who states this appears to be a high grade aggressive and poorly differentiated cancer. Likely neuroendocrine based on perinuclear staining pattern and some TTF-1 positivity. Primary lesion unknown at this time and we will need additional imaging to identify the primary. The pathology is somewhat incongruent with patient who remains largely asymptomatic. -- Load outside CT in [address] [facility] testing on tumor. -- CBC, CMP, PSA, CEA, CA 19-9, chromograninA  -- PET scan  -- Return on 20 to review PET and determine plan of action. -- Will hold off on port placement, COVID testing until we review PET. 2. ADHD: On Adderal    3. 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 4. Elevated BP: No diagnosis of  HTN. 5. Health maintenance: No prior colonoscopy or PSA per patient. Emotional well being: Pt is coping well with his/her disease and has excellent support. I appreciate the opportunity to participate in Namita Blanche Fernández leander.     Signed By: Lisa Polk MD     2020

## 2024-09-16 NOTE — HOSPICE
Received patient in the kitchen with ex wife Bozena Edouard for visit. Patient was swiffering the kitchen floor. No complaints of pain or shortness of breath, however slight SOB noted with exertion. Patient reports his pain has been well managed with oxycodone. Bozena Edouard has been keeping a list with days/times he takes it. Usually 1/2 tablet is sufficient, but has had a while tablet about 3 times since it was prescribed. RNCM filled patient's pill box today for the week. Patient reports he is still having trouble sleeping due to having to get up to urinate frequently, often only small amounts. Sometimes urine does dribble out prior to getting to the bathroom. Lucia Block notified of symptoms. Order received for Flomax 0.4 mg by mouth once daily for urinary symptoms. MAR updated. Instructed patient and spouse on medication, possible side effects and how to take it. Natalya and patient voice understanding. Medication ordered via [employer] for delivery, confirmation [id number]. No supplies requested today. Encouraged family to call hospice 24/7 with any needs or change in condition. They voice understanding and agree.
102.3

## 2024-12-09 NOTE — HOSPICE
Patient stable at time of discharge. Reviewed discharge instructions and education. Patient verbalized understanding. Patient states no questions at this time.    Routine visit for hospice patient. Patient received sitting on side of hospital bed, former spouse Kiara Martinez present during visit. Patient stated he got disoriented in his room recently due to night and not having a working nightlight in his room. Kiara Martinez stated she has ordered a few nightlights and bulbs for delivery. Patient was recently provided a bedside urinal to use at night to avoid need to ambulate to the bathroom alone in the dark; patient is hesitant to use urinal. Encouraged patient to try and use urinal during the day to become familiar with use prior to need during the night. Patient was started on Flomax last week to assist with nocturia. Discussed alternatives to urinal including diapers, liners, and external catheters. Patient will consider alternatives if urinal proves ineffective. Patient states he ate a good breakfast today, an English muffin with peanut butter and jam, patient is drinking appropriately. Family has begun keeping a log of medication administration as well as bowel habits. Patient's last BM was Saturday, he was recently started on senna recently which he feels has helped. Reviewed oxycodone dosing; patient is going 7+ hours between doses, discussed need to take more frequently so that pain in manageable and controlled rather than waiting until pain is worse and trying to have medication \"catch up\" to pain, patient and Kiara Martinez verbalized understanding. Pill box filled and amlodipine ordered via [employer] #[id number]. No supplies needed at this time.